# Patient Record
Sex: FEMALE | Race: WHITE | NOT HISPANIC OR LATINO | URBAN - METROPOLITAN AREA
[De-identification: names, ages, dates, MRNs, and addresses within clinical notes are randomized per-mention and may not be internally consistent; named-entity substitution may affect disease eponyms.]

---

## 2018-03-09 ENCOUNTER — EMERGENCY (EMERGENCY)
Facility: HOSPITAL | Age: 48
LOS: 1 days | Discharge: HOME | End: 2018-03-09
Attending: EMERGENCY MEDICINE | Admitting: EMERGENCY MEDICINE
Payer: MEDICARE

## 2018-03-09 VITALS
HEART RATE: 87 BPM | SYSTOLIC BLOOD PRESSURE: 141 MMHG | TEMPERATURE: 99 F | DIASTOLIC BLOOD PRESSURE: 76 MMHG | OXYGEN SATURATION: 98 % | RESPIRATION RATE: 16 BRPM

## 2018-03-09 LAB
BASOPHILS # BLD AUTO: 0.07 K/UL — SIGNIFICANT CHANGE UP (ref 0–0.2)
BASOPHILS NFR BLD AUTO: 0.6 % — SIGNIFICANT CHANGE UP (ref 0–2)
BUN SERPL-MCNC: 6 MG/DL — LOW (ref 7–23)
CALCIUM SERPL-MCNC: 8.8 MG/DL — SIGNIFICANT CHANGE UP (ref 8.4–10.5)
CHLORIDE SERPL-SCNC: 99 MMOL/L — SIGNIFICANT CHANGE UP (ref 98–107)
CO2 SERPL-SCNC: 24 MMOL/L — SIGNIFICANT CHANGE UP (ref 22–31)
CREAT SERPL-MCNC: 0.88 MG/DL — SIGNIFICANT CHANGE UP (ref 0.5–1.3)
EOSINOPHIL # BLD AUTO: 0.24 K/UL — SIGNIFICANT CHANGE UP (ref 0–0.5)
EOSINOPHIL NFR BLD AUTO: 1.9 % — SIGNIFICANT CHANGE UP (ref 0–6)
GLUCOSE SERPL-MCNC: 114 MG/DL — HIGH (ref 70–99)
HBA1C BLD-MCNC: 6.4 % — HIGH (ref 4–5.6)
HCT VFR BLD CALC: 41.4 % — SIGNIFICANT CHANGE UP (ref 34.5–45)
HGB BLD-MCNC: 12.6 G/DL — SIGNIFICANT CHANGE UP (ref 11.5–15.5)
IMM GRANULOCYTES # BLD AUTO: 0.1 # — SIGNIFICANT CHANGE UP
IMM GRANULOCYTES NFR BLD AUTO: 0.8 % — SIGNIFICANT CHANGE UP (ref 0–1.5)
LYMPHOCYTES # BLD AUTO: 1.65 K/UL — SIGNIFICANT CHANGE UP (ref 1–3.3)
LYMPHOCYTES # BLD AUTO: 13.2 % — SIGNIFICANT CHANGE UP (ref 13–44)
MCHC RBC-ENTMCNC: 27.5 PG — SIGNIFICANT CHANGE UP (ref 27–34)
MCHC RBC-ENTMCNC: 30.4 % — LOW (ref 32–36)
MCV RBC AUTO: 90.2 FL — SIGNIFICANT CHANGE UP (ref 80–100)
MONOCYTES # BLD AUTO: 1.14 K/UL — HIGH (ref 0–0.9)
MONOCYTES NFR BLD AUTO: 9.1 % — SIGNIFICANT CHANGE UP (ref 2–14)
NEUTROPHILS # BLD AUTO: 9.3 K/UL — HIGH (ref 1.8–7.4)
NEUTROPHILS NFR BLD AUTO: 74.4 % — SIGNIFICANT CHANGE UP (ref 43–77)
NRBC # FLD: 0 — SIGNIFICANT CHANGE UP
PLATELET # BLD AUTO: 338 K/UL — SIGNIFICANT CHANGE UP (ref 150–400)
PMV BLD: 10.1 FL — SIGNIFICANT CHANGE UP (ref 7–13)
POTASSIUM SERPL-MCNC: 4.4 MMOL/L — SIGNIFICANT CHANGE UP (ref 3.5–5.3)
POTASSIUM SERPL-SCNC: 4.4 MMOL/L — SIGNIFICANT CHANGE UP (ref 3.5–5.3)
RBC # BLD: 4.59 M/UL — SIGNIFICANT CHANGE UP (ref 3.8–5.2)
RBC # FLD: 18.5 % — HIGH (ref 10.3–14.5)
SODIUM SERPL-SCNC: 138 MMOL/L — SIGNIFICANT CHANGE UP (ref 135–145)
WBC # BLD: 12.5 K/UL — HIGH (ref 3.8–10.5)
WBC # FLD AUTO: 12.5 K/UL — HIGH (ref 3.8–10.5)

## 2018-03-09 PROCEDURE — 10060 I&D ABSCESS SIMPLE/SINGLE: CPT | Mod: GC

## 2018-03-09 PROCEDURE — 73130 X-RAY EXAM OF HAND: CPT | Mod: 26,LT

## 2018-03-09 PROCEDURE — 99218: CPT | Mod: 25,GC

## 2018-03-09 RX ORDER — KETOROLAC TROMETHAMINE 30 MG/ML
30 SYRINGE (ML) INJECTION ONCE
Qty: 0 | Refills: 0 | Status: DISCONTINUED | OUTPATIENT
Start: 2018-03-09 | End: 2018-03-09

## 2018-03-09 RX ORDER — AMPICILLIN SODIUM AND SULBACTAM SODIUM 250; 125 MG/ML; MG/ML
3 INJECTION, POWDER, FOR SUSPENSION INTRAMUSCULAR; INTRAVENOUS EVERY 6 HOURS
Qty: 0 | Refills: 0 | Status: DISCONTINUED | OUTPATIENT
Start: 2018-03-09 | End: 2018-03-13

## 2018-03-09 RX ORDER — OXYCODONE AND ACETAMINOPHEN 5; 325 MG/1; MG/1
1 TABLET ORAL ONCE
Qty: 0 | Refills: 0 | Status: DISCONTINUED | OUTPATIENT
Start: 2018-03-09 | End: 2018-03-09

## 2018-03-09 RX ORDER — SODIUM CHLORIDE 9 MG/ML
1000 INJECTION INTRAMUSCULAR; INTRAVENOUS; SUBCUTANEOUS ONCE
Qty: 0 | Refills: 0 | Status: COMPLETED | OUTPATIENT
Start: 2018-03-09 | End: 2018-03-09

## 2018-03-09 RX ORDER — TETANUS TOXOID, REDUCED DIPHTHERIA TOXOID AND ACELLULAR PERTUSSIS VACCINE, ADSORBED 5; 2.5; 8; 8; 2.5 [IU]/.5ML; [IU]/.5ML; UG/.5ML; UG/.5ML; UG/.5ML
0.5 SUSPENSION INTRAMUSCULAR ONCE
Qty: 0 | Refills: 0 | Status: COMPLETED | OUTPATIENT
Start: 2018-03-09 | End: 2018-03-09

## 2018-03-09 RX ORDER — ONDANSETRON 8 MG/1
4 TABLET, FILM COATED ORAL ONCE
Qty: 0 | Refills: 0 | Status: COMPLETED | OUTPATIENT
Start: 2018-03-09 | End: 2018-03-09

## 2018-03-09 RX ORDER — AMPICILLIN SODIUM AND SULBACTAM SODIUM 250; 125 MG/ML; MG/ML
3 INJECTION, POWDER, FOR SUSPENSION INTRAMUSCULAR; INTRAVENOUS ONCE
Qty: 0 | Refills: 0 | Status: COMPLETED | OUTPATIENT
Start: 2018-03-09 | End: 2018-03-09

## 2018-03-09 RX ADMIN — ONDANSETRON 4 MILLIGRAM(S): 8 TABLET, FILM COATED ORAL at 17:38

## 2018-03-09 RX ADMIN — AMPICILLIN SODIUM AND SULBACTAM SODIUM 200 GRAM(S): 250; 125 INJECTION, POWDER, FOR SUSPENSION INTRAMUSCULAR; INTRAVENOUS at 13:54

## 2018-03-09 RX ADMIN — OXYCODONE AND ACETAMINOPHEN 1 TABLET(S): 5; 325 TABLET ORAL at 13:24

## 2018-03-09 RX ADMIN — Medication 30 MILLIGRAM(S): at 18:30

## 2018-03-09 RX ADMIN — OXYCODONE AND ACETAMINOPHEN 1 TABLET(S): 5; 325 TABLET ORAL at 12:54

## 2018-03-09 RX ADMIN — AMPICILLIN SODIUM AND SULBACTAM SODIUM 200 GRAM(S): 250; 125 INJECTION, POWDER, FOR SUSPENSION INTRAMUSCULAR; INTRAVENOUS at 19:31

## 2018-03-09 RX ADMIN — TETANUS TOXOID, REDUCED DIPHTHERIA TOXOID AND ACELLULAR PERTUSSIS VACCINE, ADSORBED 0.5 MILLILITER(S): 5; 2.5; 8; 8; 2.5 SUSPENSION INTRAMUSCULAR at 14:33

## 2018-03-09 RX ADMIN — SODIUM CHLORIDE 1000 MILLILITER(S): 9 INJECTION INTRAMUSCULAR; INTRAVENOUS; SUBCUTANEOUS at 13:41

## 2018-03-09 RX ADMIN — Medication 30 MILLIGRAM(S): at 19:00

## 2018-03-09 NOTE — ED PROVIDER NOTE - OBJECTIVE STATEMENT
48 y/o female h/o anxiety, gastric bypass bibems with left hand pain and swelling after cat bite.  Per pt, she startled her housecat (shots utd) two days ago and got scratches to her left forearm and was bitten on the ulnar aspect of her left hand.  Since that time, worse pain, redness, and swelling.  Shaking chills this am, but no fever.  Denies ha, neck stiffness, cp, sob, cough, abd pain, n/v/d, dysuria, rash.

## 2018-03-09 NOTE — ED CDU PROVIDER INITIAL DAY NOTE - OBJECTIVE STATEMENT
48 yo F here for L hand swelling and erythema s/p cat bite. pt reports 2 nights ago she pulled blanket off cat and startled cat sustained two bites and several scratches to L hand/wrist. Did not take any meds. Unknown last tetanus. Cat is owned by patient is has been acting normally and is vaccinated. Denies fever chills vomiting diarrhea cp sob weakness numbness tingling

## 2018-03-09 NOTE — ED PROVIDER NOTE - MEDICAL DECISION MAKING DETAILS
46 y/o female s/p cat bite to hand with pain, redness, swelling.  Concern for puncture wounds leading to deep space infection.  Obtain cbc, bmp, xr hand to eval for fb, give ivf, iv abx, discuss with hand for recs, antic obs for continued antibiosis and reassessment in the am.

## 2018-03-09 NOTE — ED PROVIDER NOTE - MUSCULOSKELETAL MINIMAL EXAM
right hand:  dorsum is erythematous and swollen without fluctuance, scabbed over puncture wounds at ulnar aspect of dorsum and x2 puncture wounds at lat ulnar aspect, distal sensation intact, brisk cap refill <2 sec each digit, lmtd rom pip/dip/mcp/wrist d/t pain

## 2018-03-09 NOTE — ED CDU PROVIDER INITIAL DAY NOTE - PROGRESS NOTE DETAILS
Norberto: I am signing out to VESTA Case. Patient's mother is terminally ill and may pass away overnight. If this happens and she has to leave she can be discharged but the patient MUST return for her next dose of IV antibiotics. She should be aware she must return through the ED

## 2018-03-09 NOTE — ED CDU PROVIDER INITIAL DAY NOTE - ATTENDING CONTRIBUTION TO CARE
pt with pmhx htn, cat bite 2 days ago, came to ED due to worsening left hand swelling. cat is owned by the patient and has had all it's shots.. no fevers. exam, vs wnl, nad. dorsal left hand mild erythema, swelling and tenderness. multiple superficial forearm abrasions and one abrasion on dorsal left hand. seen by hand who rec opening up the hand wounds, irrigating and packing, which was done by Dr. Stockton in ED. erythema margins marked by Dr. Stockton. started on unasyn. pt admitted to CDU for IV abx and observation for worsening of cellulitis.

## 2018-03-09 NOTE — ED CDU PROVIDER INITIAL DAY NOTE - MEDICAL DECISION MAKING DETAILS
48 yo F here for L hand/wrist swelling and erythema s/p cat bite/scratch. Plan for CDU for IV abx, and monitoring. Hand consulted in ED and recc I&D and packing, will be performed by ED team.

## 2018-03-09 NOTE — PROGRESS NOTE ADULT - SUBJECTIVE AND OBJECTIVE BOX
Left dorsal hand cat bite 2 days ago  With progressive cellulitis swelling and pain  Asked to eval  Has scratch marks on forearm and puncture wounds dorsal hand and wrist    Recc:    Reopening irrigating and packing all bite wounds on dorsal hand and wrist  IV ABX  Elevation

## 2018-03-09 NOTE — ED CDU PROVIDER INITIAL DAY NOTE - PHYSICAL EXAMINATION
L hand/wrist: sensate intact, NVI, mild swelling, erythema to dorsum of hand extending over wrist joint. Able to ROM fingers and wrist however c/o pain with this movement. No discharge or bleeding.

## 2018-03-09 NOTE — ED PROVIDER NOTE - PSH
Status Post Gastric Bypass for Obesity    Status Post Laparoscopic Cholecystectomy    Tubal Ligation Status

## 2018-03-09 NOTE — ED ADULT NURSE NOTE - OBJECTIVE STATEMENT
Break RN: Pt rcvd in intake room 13, aox3, ambulatory, c/o cat bite from 2 nights ago, bites noted on the L hand, scratches on L arm, +pain and swelling, pt unable to move wrist and fingers without pain. Pt denies any other complaints, seen and eval by MD, 20G placed on R AC, labs sent, meds given as ordered, to be transferred to CDU.

## 2018-03-09 NOTE — ED PROVIDER NOTE - PMH
Anxiety    Chronic insomnia    Depression    ETOH abuse    HTN (hypertension)    PTSD (post-traumatic stress disorder)

## 2018-03-09 NOTE — ED CDU PROVIDER INITIAL DAY NOTE - CHPI ED SYMPTOMS NEG
no weakness/no decreased eating/drinking/no numbness/no vomiting/no dizziness/no nausea/no tingling/no fever/no chills

## 2018-03-10 VITALS
OXYGEN SATURATION: 99 % | TEMPERATURE: 98 F | RESPIRATION RATE: 16 BRPM | HEART RATE: 74 BPM | DIASTOLIC BLOOD PRESSURE: 92 MMHG | SYSTOLIC BLOOD PRESSURE: 160 MMHG

## 2018-03-10 LAB — HCG SERPL-ACNC: < 5 MIU/ML — SIGNIFICANT CHANGE UP

## 2018-03-10 PROCEDURE — 99217: CPT

## 2018-03-10 RX ORDER — ACETAMINOPHEN WITH CODEINE 300MG-30MG
1 TABLET ORAL
Qty: 1 | Refills: 0 | OUTPATIENT
Start: 2018-03-10

## 2018-03-10 RX ORDER — KETOROLAC TROMETHAMINE 30 MG/ML
30 SYRINGE (ML) INJECTION ONCE
Qty: 0 | Refills: 0 | Status: DISCONTINUED | OUTPATIENT
Start: 2018-03-10 | End: 2018-03-10

## 2018-03-10 RX ORDER — ACETAMINOPHEN 500 MG
650 TABLET ORAL ONCE
Qty: 0 | Refills: 0 | Status: COMPLETED | OUTPATIENT
Start: 2018-03-10 | End: 2018-03-10

## 2018-03-10 RX ADMIN — AMPICILLIN SODIUM AND SULBACTAM SODIUM 200 GRAM(S): 250; 125 INJECTION, POWDER, FOR SUSPENSION INTRAMUSCULAR; INTRAVENOUS at 01:23

## 2018-03-10 RX ADMIN — AMPICILLIN SODIUM AND SULBACTAM SODIUM 200 GRAM(S): 250; 125 INJECTION, POWDER, FOR SUSPENSION INTRAMUSCULAR; INTRAVENOUS at 07:04

## 2018-03-10 RX ADMIN — Medication 30 MILLIGRAM(S): at 02:15

## 2018-03-10 RX ADMIN — Medication 30 MILLIGRAM(S): at 01:38

## 2018-03-10 RX ADMIN — Medication 650 MILLIGRAM(S): at 07:25

## 2018-03-10 NOTE — ED CDU PROVIDER SUBSEQUENT DAY NOTE - ATTENDING CONTRIBUTION TO CARE
Seen by me yesterday. pt came to ED for left hand cellulitis due to cat bat. started on unasyn IV. this am, much improved edema and redness. no extension of cellulitis. seen by hand surgery yesterday and re-evaluated today. He cleared for discharge. will continue augmentin and f/u with him. rter for worsening cellulitis.

## 2018-03-10 NOTE — ED CDU PROVIDER SUBSEQUENT DAY NOTE - HISTORY
Pt is 46 y/o female, Rt hand dominant with PMhx of anxiety, depression, HTN, PTSD, Rt hand dominant her eval of cat bite/scratch to Lt hand 3 days ago. Pt states that she was trying to pull the blanket, didn't see the cat underneath, the cat got startled and bit/scratched the pt. Pt comes with pain and swelling to Lt hand, denies fever, chills, immunosuppressed state; cat is UTD with immunizations and pt is UTD with tdap. in CDU for IV abx; I&D performed by ER, no drainable collection.

## 2018-03-10 NOTE — PROGRESS NOTE ADULT - SUBJECTIVE AND OBJECTIVE BOX
Hand swelling and cellulitis improved  Recc :  Daily soaks BID with packing out  Pack wounds  Elevation  ABX

## 2018-03-10 NOTE — ED CDU PROVIDER SUBSEQUENT DAY NOTE - PHYSICAL EXAMINATION
Lt hand/forearm  - (+) edema noted to dorsum oh hand, dsg in situ, not removed as pt will be evaluated by plastics am, pt able to make good  (which is improvement), superficcial abrasions noted to volar aspect of forearm. 2+ dp, no streaking

## 2018-03-10 NOTE — ED CDU PROVIDER SUBSEQUENT DAY NOTE - PROGRESS NOTE DETAILS
Pt feeling better after CDU stay. Hand swelling and redness improved. Pt seen and eval by Hand this morning, pt stable for dc with hand f.u, ,will dc on augmentin.

## 2018-03-10 NOTE — ED CDU PROVIDER DISPOSITION NOTE - CLINICAL COURSE
pt presented to the ED for left hand cellulitis due to cat bite. received unasyn IV with significant improvement. cleared by hand surgery. will discharge with augmentin, rter if worsening or no improvement.   The patient was given verbal and written discharge instructions. Specifically, instructions when to return to the ED and when to seek follow-up from their pcp was discussed. Any specialty follow-up was discussed, including how to make an appointment.  Instructions were discussed in simple, plain language and was understood by the patient. The patient understands that should their symptoms worsen or any new symptoms arise, they should return to the ED immediately for further evaluation.

## 2018-07-26 ENCOUNTER — EMERGENCY (EMERGENCY)
Facility: HOSPITAL | Age: 48
LOS: 1 days | Discharge: HOME | End: 2018-07-26
Admitting: EMERGENCY MEDICINE
Payer: MEDICARE

## 2018-07-26 VITALS
DIASTOLIC BLOOD PRESSURE: 94 MMHG | OXYGEN SATURATION: 100 % | HEART RATE: 92 BPM | RESPIRATION RATE: 15 BRPM | TEMPERATURE: 99 F | SYSTOLIC BLOOD PRESSURE: 161 MMHG

## 2018-07-26 VITALS
DIASTOLIC BLOOD PRESSURE: 101 MMHG | TEMPERATURE: 99 F | SYSTOLIC BLOOD PRESSURE: 155 MMHG | HEART RATE: 98 BPM | RESPIRATION RATE: 16 BRPM | OXYGEN SATURATION: 100 %

## 2018-07-26 LAB
ALBUMIN SERPL ELPH-MCNC: 3.5 G/DL — SIGNIFICANT CHANGE UP (ref 3.3–5)
ALP SERPL-CCNC: 102 U/L — SIGNIFICANT CHANGE UP (ref 40–120)
ALT FLD-CCNC: 39 U/L — HIGH (ref 4–33)
APPEARANCE UR: SIGNIFICANT CHANGE UP
APTT BLD: 27.2 SEC — LOW (ref 27.5–37.4)
AST SERPL-CCNC: 33 U/L — HIGH (ref 4–32)
BACTERIA # UR AUTO: SIGNIFICANT CHANGE UP
BASOPHILS # BLD AUTO: 0.03 K/UL — SIGNIFICANT CHANGE UP (ref 0–0.2)
BASOPHILS NFR BLD AUTO: 0.3 % — SIGNIFICANT CHANGE UP (ref 0–2)
BILIRUB SERPL-MCNC: 0.7 MG/DL — SIGNIFICANT CHANGE UP (ref 0.2–1.2)
BILIRUB UR-MCNC: SIGNIFICANT CHANGE UP
BLOOD UR QL VISUAL: NEGATIVE — SIGNIFICANT CHANGE UP
BUN SERPL-MCNC: 9 MG/DL — SIGNIFICANT CHANGE UP (ref 7–23)
CALCIUM SERPL-MCNC: 8.7 MG/DL — SIGNIFICANT CHANGE UP (ref 8.4–10.5)
CHLORIDE SERPL-SCNC: 98 MMOL/L — SIGNIFICANT CHANGE UP (ref 98–107)
CO2 SERPL-SCNC: 22 MMOL/L — SIGNIFICANT CHANGE UP (ref 22–31)
COLOR SPEC: HIGH
CREAT SERPL-MCNC: 0.79 MG/DL — SIGNIFICANT CHANGE UP (ref 0.5–1.3)
EOSINOPHIL # BLD AUTO: 0.25 K/UL — SIGNIFICANT CHANGE UP (ref 0–0.5)
EOSINOPHIL NFR BLD AUTO: 2.3 % — SIGNIFICANT CHANGE UP (ref 0–6)
GLUCOSE SERPL-MCNC: 105 MG/DL — HIGH (ref 70–99)
GLUCOSE UR-MCNC: SIGNIFICANT CHANGE UP
GRAN CASTS # UR COMP ASSIST: SIGNIFICANT CHANGE UP
HCG SERPL-ACNC: < 5 MIU/ML — SIGNIFICANT CHANGE UP
HCT VFR BLD CALC: 35.9 % — SIGNIFICANT CHANGE UP (ref 34.5–45)
HGB BLD-MCNC: 12.1 G/DL — SIGNIFICANT CHANGE UP (ref 11.5–15.5)
HYALINE CASTS # UR AUTO: SIGNIFICANT CHANGE UP (ref 0–?)
IMM GRANULOCYTES # BLD AUTO: 0.04 # — SIGNIFICANT CHANGE UP
IMM GRANULOCYTES NFR BLD AUTO: 0.4 % — SIGNIFICANT CHANGE UP (ref 0–1.5)
INR BLD: 1.23 — HIGH (ref 0.88–1.17)
KETONES UR-MCNC: SIGNIFICANT CHANGE UP
LEUKOCYTE ESTERASE UR-ACNC: NEGATIVE — SIGNIFICANT CHANGE UP
LIDOCAIN IGE QN: 59.5 U/L — SIGNIFICANT CHANGE UP (ref 7–60)
LYMPHOCYTES # BLD AUTO: 1.07 K/UL — SIGNIFICANT CHANGE UP (ref 1–3.3)
LYMPHOCYTES # BLD AUTO: 9.7 % — LOW (ref 13–44)
MCHC RBC-ENTMCNC: 33.7 % — SIGNIFICANT CHANGE UP (ref 32–36)
MCHC RBC-ENTMCNC: 34.7 PG — HIGH (ref 27–34)
MCV RBC AUTO: 102.9 FL — HIGH (ref 80–100)
MONOCYTES # BLD AUTO: 0.66 K/UL — SIGNIFICANT CHANGE UP (ref 0–0.9)
MONOCYTES NFR BLD AUTO: 6 % — SIGNIFICANT CHANGE UP (ref 2–14)
MUCOUS THREADS # UR AUTO: SIGNIFICANT CHANGE UP
NEUTROPHILS # BLD AUTO: 8.95 K/UL — HIGH (ref 1.8–7.4)
NEUTROPHILS NFR BLD AUTO: 81.3 % — HIGH (ref 43–77)
NITRITE UR-MCNC: NEGATIVE — SIGNIFICANT CHANGE UP
NRBC # FLD: 0 — SIGNIFICANT CHANGE UP
PH UR: 6 — SIGNIFICANT CHANGE UP (ref 4.6–8)
PLATELET # BLD AUTO: 301 K/UL — SIGNIFICANT CHANGE UP (ref 150–400)
PMV BLD: 10.5 FL — SIGNIFICANT CHANGE UP (ref 7–13)
POTASSIUM SERPL-MCNC: 3.7 MMOL/L — SIGNIFICANT CHANGE UP (ref 3.5–5.3)
POTASSIUM SERPL-SCNC: 3.7 MMOL/L — SIGNIFICANT CHANGE UP (ref 3.5–5.3)
PROT SERPL-MCNC: 6.4 G/DL — SIGNIFICANT CHANGE UP (ref 6–8.3)
PROT UR-MCNC: 100 MG/DL — HIGH
PROTHROM AB SERPL-ACNC: 13.7 SEC — HIGH (ref 9.8–13.1)
RBC # BLD: 3.49 M/UL — LOW (ref 3.8–5.2)
RBC # FLD: 26.5 % — HIGH (ref 10.3–14.5)
SODIUM SERPL-SCNC: 136 MMOL/L — SIGNIFICANT CHANGE UP (ref 135–145)
SP GR SPEC: 1.03 — SIGNIFICANT CHANGE UP (ref 1–1.04)
SQUAMOUS # UR AUTO: SIGNIFICANT CHANGE UP
UROBILINOGEN FLD QL: 4 MG/DL — HIGH
WBC # BLD: 11 K/UL — HIGH (ref 3.8–10.5)
WBC # FLD AUTO: 11 K/UL — HIGH (ref 3.8–10.5)
WBC UR QL: HIGH (ref 0–?)

## 2018-07-26 PROCEDURE — 74177 CT ABD & PELVIS W/CONTRAST: CPT | Mod: 26

## 2018-07-26 PROCEDURE — 99284 EMERGENCY DEPT VISIT MOD MDM: CPT

## 2018-07-26 RX ORDER — KETOROLAC TROMETHAMINE 30 MG/ML
15 SYRINGE (ML) INJECTION ONCE
Qty: 0 | Refills: 0 | Status: DISCONTINUED | OUTPATIENT
Start: 2018-07-26 | End: 2018-07-26

## 2018-07-26 RX ORDER — FAMOTIDINE 10 MG/ML
20 INJECTION INTRAVENOUS ONCE
Qty: 0 | Refills: 0 | Status: COMPLETED | OUTPATIENT
Start: 2018-07-26 | End: 2018-07-26

## 2018-07-26 RX ORDER — ONDANSETRON 8 MG/1
4 TABLET, FILM COATED ORAL ONCE
Qty: 0 | Refills: 0 | Status: COMPLETED | OUTPATIENT
Start: 2018-07-26 | End: 2018-07-26

## 2018-07-26 RX ORDER — SODIUM CHLORIDE 9 MG/ML
1000 INJECTION INTRAMUSCULAR; INTRAVENOUS; SUBCUTANEOUS ONCE
Qty: 0 | Refills: 0 | Status: COMPLETED | OUTPATIENT
Start: 2018-07-26 | End: 2018-07-26

## 2018-07-26 RX ADMIN — Medication 15 MILLIGRAM(S): at 20:43

## 2018-07-26 RX ADMIN — FAMOTIDINE 20 MILLIGRAM(S): 10 INJECTION INTRAVENOUS at 20:52

## 2018-07-26 RX ADMIN — SODIUM CHLORIDE 1000 MILLILITER(S): 9 INJECTION INTRAMUSCULAR; INTRAVENOUS; SUBCUTANEOUS at 20:43

## 2018-07-26 RX ADMIN — Medication 30 MILLILITER(S): at 20:52

## 2018-07-26 RX ADMIN — ONDANSETRON 4 MILLIGRAM(S): 8 TABLET, FILM COATED ORAL at 20:43

## 2018-07-26 RX ADMIN — SODIUM CHLORIDE 1000 MILLILITER(S): 9 INJECTION INTRAMUSCULAR; INTRAVENOUS; SUBCUTANEOUS at 22:00

## 2018-07-26 NOTE — ED PROVIDER NOTE - PROGRESS NOTE DETAILS
PA CHERELLE: Patient laying comfortably in bed NAD, No complaints. Will continue to monitor. WBC 11, UA neg, lipase wnl, Pending CT read. VESTA Mac: pt signed out to me from VESTA Johnson, pt doing well, labs reviewed, CT negative for acute pathology, results d/w patient and will fu with PCP regarding hepatomegaly. Pt abdomen is soft nt nd. Denies any abdominal pain in ED. Tolerating PO. Pt is going to follow with her surgeon out patient given resolution of symptoms and normal CT.

## 2018-07-26 NOTE — ED PROVIDER NOTE - OBJECTIVE STATEMENT
46 yo F, active smoker with PMH of gastric bypass 2004, cholecystectomy 2002, exploratory lap for SBO 8 yrs ago, HTN, BIB daughter to ER c/o epigastric pain associate with n/v/d x2 weeks; nonspecific right leg numbness x2-3 days. Pt states epigastric pain, sharp and squeezing, 9/10, radiating to the right, worse with solid and fluid intake. Reports nausea throughout the day, with intermittent vomiting with foam and jigna, nonbloody. States having diarrhea, watery, nonbloody, x3 today, x6 yesterday. Admits taking Zantac and tylenol with some relief. Admits drinking beers x2 daily to help her sleep. Denies eating outside food, recent abx, recent hospitalization, or recent travel. Reports having right thigh numbness, constant for 2-3 days, w/ chronic lower back pain, no radiculopathy, no weakness, no injury. Also, admits stating sob at triage, states feeling sob x2 years, contribute to her obesity, no chest pain, no diaphoresis, no leg swelling, no calf tenderness, no injury/trauma, no hx of PE/DVT. Denies any fever, chills, dizziness, headache, neck pain, chest pain, back pain, dysuria, or any other complaints.

## 2018-07-26 NOTE — ED ADULT NURSE NOTE - OBJECTIVE STATEMENT
Pt received in intake spot 4, A&OX3, NAD.  c/o upper abdominal pain intermittent for the past 3 weeks with N/V/D.  Pt denies any bloody emesis or stool.  Denies any urinary symptoms.  Pt appears comfortable.  Labs sent.  Medicated as per MD orders.  Will continue to monitor.

## 2018-07-26 NOTE — ED PROVIDER NOTE - MEDICAL DECISION MAKING DETAILS
48 yo F, active smoker with PMH of gastric bypass 2004, cholecystectomy 2002, exploratory lap for SBO 8 yrs ago, HTN, BIB daughter to ER c/o epigastric pain associate with n/v/d x2 weeks; nonspecific right leg numbness x2-3 days.   -Plan: labs, Ua, CT A/P to r/o acute intra abd pathology, give IVF, pain control, and reassess.

## 2018-07-27 RX ADMIN — Medication 30 MILLILITER(S): at 01:10

## 2018-07-27 RX ADMIN — Medication 15 MILLIGRAM(S): at 00:00

## 2018-09-25 PROCEDURE — 90834 PSYTX W PT 45 MINUTES: CPT

## 2018-11-15 ENCOUNTER — OUTPATIENT (OUTPATIENT)
Dept: OUTPATIENT SERVICES | Facility: HOSPITAL | Age: 48
LOS: 1 days | Discharge: HOME | End: 2018-11-15
Payer: MEDICARE

## 2018-11-15 LAB
ALBUMIN SERPL ELPH-MCNC: 3.8 G/DL — SIGNIFICANT CHANGE UP (ref 3.3–5)
ALP SERPL-CCNC: 104 U/L — SIGNIFICANT CHANGE UP (ref 40–120)
ALT FLD-CCNC: 18 U/L — SIGNIFICANT CHANGE UP (ref 4–33)
APPEARANCE UR: SIGNIFICANT CHANGE UP
AST SERPL-CCNC: 26 U/L — SIGNIFICANT CHANGE UP (ref 4–32)
BACTERIA # UR AUTO: SIGNIFICANT CHANGE UP
BASOPHILS # BLD AUTO: 0.06 K/UL — SIGNIFICANT CHANGE UP (ref 0–0.2)
BASOPHILS NFR BLD AUTO: 0.5 % — SIGNIFICANT CHANGE UP (ref 0–2)
BILIRUB SERPL-MCNC: 0.4 MG/DL — SIGNIFICANT CHANGE UP (ref 0.2–1.2)
BILIRUB UR-MCNC: SIGNIFICANT CHANGE UP
BLOOD UR QL VISUAL: NEGATIVE — SIGNIFICANT CHANGE UP
BUN SERPL-MCNC: 8 MG/DL — SIGNIFICANT CHANGE UP (ref 7–23)
CALCIUM SERPL-MCNC: 9.4 MG/DL — SIGNIFICANT CHANGE UP (ref 8.4–10.5)
CHLORIDE SERPL-SCNC: 103 MMOL/L — SIGNIFICANT CHANGE UP (ref 98–107)
CHOLEST SERPL-MCNC: 139 MG/DL — SIGNIFICANT CHANGE UP (ref 120–199)
CO2 SERPL-SCNC: 22 MMOL/L — SIGNIFICANT CHANGE UP (ref 22–31)
COLOR SPEC: SIGNIFICANT CHANGE UP
CREAT SERPL-MCNC: 0.82 MG/DL — SIGNIFICANT CHANGE UP (ref 0.5–1.3)
EOSINOPHIL # BLD AUTO: 0.26 K/UL — SIGNIFICANT CHANGE UP (ref 0–0.5)
EOSINOPHIL NFR BLD AUTO: 2.2 % — SIGNIFICANT CHANGE UP (ref 0–6)
FERRITIN SERPL-MCNC: 36.05 NG/ML — SIGNIFICANT CHANGE UP (ref 15–150)
FOLATE SERPL-MCNC: 2.6 NG/ML — LOW (ref 4.7–20)
GLUCOSE SERPL-MCNC: 98 MG/DL — SIGNIFICANT CHANGE UP (ref 70–99)
GLUCOSE UR-MCNC: NEGATIVE — SIGNIFICANT CHANGE UP
HCT VFR BLD CALC: 45.2 % — HIGH (ref 34.5–45)
HDLC SERPL-MCNC: 44 MG/DL — LOW (ref 45–65)
HGB BLD-MCNC: 14.4 G/DL — SIGNIFICANT CHANGE UP (ref 11.5–15.5)
HYALINE CASTS # UR AUTO: HIGH
IMM GRANULOCYTES # BLD AUTO: 0.04 # — SIGNIFICANT CHANGE UP
IMM GRANULOCYTES NFR BLD AUTO: 0.3 % — SIGNIFICANT CHANGE UP (ref 0–1.5)
IRON SATN MFR SERPL: 58 UG/DL — SIGNIFICANT CHANGE UP (ref 30–160)
KETONES UR-MCNC: SIGNIFICANT CHANGE UP
LEUKOCYTE ESTERASE UR-ACNC: SIGNIFICANT CHANGE UP
LIPID PNL WITH DIRECT LDL SERPL: 74 MG/DL — SIGNIFICANT CHANGE UP
LYMPHOCYTES # BLD AUTO: 1.44 K/UL — SIGNIFICANT CHANGE UP (ref 1–3.3)
LYMPHOCYTES # BLD AUTO: 12.2 % — LOW (ref 13–44)
MCHC RBC-ENTMCNC: 30.5 PG — SIGNIFICANT CHANGE UP (ref 27–34)
MCHC RBC-ENTMCNC: 31.9 % — LOW (ref 32–36)
MCV RBC AUTO: 95.8 FL — SIGNIFICANT CHANGE UP (ref 80–100)
MONOCYTES # BLD AUTO: 0.85 K/UL — SIGNIFICANT CHANGE UP (ref 0–0.9)
MONOCYTES NFR BLD AUTO: 7.2 % — SIGNIFICANT CHANGE UP (ref 2–14)
NEUTROPHILS # BLD AUTO: 9.14 K/UL — HIGH (ref 1.8–7.4)
NEUTROPHILS NFR BLD AUTO: 77.6 % — HIGH (ref 43–77)
NITRITE UR-MCNC: NEGATIVE — SIGNIFICANT CHANGE UP
NRBC # FLD: 0 — SIGNIFICANT CHANGE UP
PH UR: 5.5 — SIGNIFICANT CHANGE UP (ref 5–8)
PLATELET # BLD AUTO: 367 K/UL — SIGNIFICANT CHANGE UP (ref 150–400)
PMV BLD: 12.1 FL — SIGNIFICANT CHANGE UP (ref 7–13)
POTASSIUM SERPL-MCNC: 4.4 MMOL/L — SIGNIFICANT CHANGE UP (ref 3.5–5.3)
POTASSIUM SERPL-SCNC: 4.4 MMOL/L — SIGNIFICANT CHANGE UP (ref 3.5–5.3)
PROT SERPL-MCNC: 6.4 G/DL — SIGNIFICANT CHANGE UP (ref 6–8.3)
PROT UR-MCNC: 30 — SIGNIFICANT CHANGE UP
RBC # BLD: 4.72 M/UL — SIGNIFICANT CHANGE UP (ref 3.8–5.2)
RBC # FLD: 17.8 % — HIGH (ref 10.3–14.5)
RBC CASTS # UR COMP ASSIST: SIGNIFICANT CHANGE UP (ref 0–?)
SODIUM SERPL-SCNC: 142 MMOL/L — SIGNIFICANT CHANGE UP (ref 135–145)
SP GR SPEC: 1.03 — SIGNIFICANT CHANGE UP (ref 1–1.04)
SQUAMOUS # UR AUTO: SIGNIFICANT CHANGE UP
TRIGL SERPL-MCNC: 151 MG/DL — HIGH (ref 10–149)
TSH SERPL-MCNC: 1.33 UIU/ML — SIGNIFICANT CHANGE UP (ref 0.27–4.2)
UROBILINOGEN FLD QL: HIGH
VIT B12 SERPL-MCNC: 269 PG/ML — SIGNIFICANT CHANGE UP (ref 200–900)
WBC # BLD: 11.79 K/UL — HIGH (ref 3.8–10.5)
WBC # FLD AUTO: 11.79 K/UL — HIGH (ref 3.8–10.5)
WBC UR QL: HIGH (ref 0–?)

## 2018-11-15 PROCEDURE — 93010 ELECTROCARDIOGRAM REPORT: CPT

## 2018-11-15 PROCEDURE — 99204 OFFICE O/P NEW MOD 45 MIN: CPT

## 2018-12-04 DIAGNOSIS — G43.909 MIGRAINE, UNSPECIFIED, NOT INTRACTABLE, WITHOUT STATUS MIGRAINOSUS: ICD-10-CM

## 2018-12-04 DIAGNOSIS — F32.9 MAJOR DEPRESSIVE DISORDER, SINGLE EPISODE, UNSPECIFIED: ICD-10-CM

## 2019-01-07 ENCOUNTER — APPOINTMENT (OUTPATIENT)
Dept: GASTROENTEROLOGY | Facility: CLINIC | Age: 49
End: 2019-01-07
Payer: MEDICARE

## 2019-01-07 VITALS
WEIGHT: 213 LBS | TEMPERATURE: 98.7 F | HEIGHT: 64 IN | SYSTOLIC BLOOD PRESSURE: 135 MMHG | BODY MASS INDEX: 36.37 KG/M2 | HEART RATE: 94 BPM | OXYGEN SATURATION: 98 % | DIASTOLIC BLOOD PRESSURE: 85 MMHG

## 2019-01-07 DIAGNOSIS — E16.2 HYPOGLYCEMIA, UNSPECIFIED: ICD-10-CM

## 2019-01-07 DIAGNOSIS — Z86.39 PERSONAL HISTORY OF OTHER ENDOCRINE, NUTRITIONAL AND METABOLIC DISEASE: ICD-10-CM

## 2019-01-07 DIAGNOSIS — R12 HEARTBURN: ICD-10-CM

## 2019-01-07 DIAGNOSIS — R63.4 ABNORMAL WEIGHT LOSS: ICD-10-CM

## 2019-01-07 DIAGNOSIS — R13.10 DYSPHAGIA, UNSPECIFIED: ICD-10-CM

## 2019-01-07 PROCEDURE — 99204 OFFICE O/P NEW MOD 45 MIN: CPT

## 2019-01-07 RX ORDER — FOLIC ACID 20 MG
CAPSULE ORAL
Refills: 0 | Status: ACTIVE | COMMUNITY

## 2019-01-07 RX ORDER — HYDROXYZINE HYDROCHLORIDE 25 MG/1
25 TABLET ORAL
Refills: 0 | Status: ACTIVE | COMMUNITY

## 2019-01-07 NOTE — HISTORY OF PRESENT ILLNESS
[FreeTextEntry1] : Patient is a 48-year-old female with history of gastric bypass performed in 2004. She had some sort of a revision for possible internal hernia in 2012. In July patient had severe heartburn and reflux symptoms with vomiting and some diarrhea. She took Pepcid and Zantac with some relief of her symptoms. She was seen in the emergency room and underwent a CAT scan.\par She now has been complaining of difficulty eating. She is not even able to take some oral amounts of food without having difficulty in swallowing. She has lost about 70 pounds over the past 6 months which has been unintentional.\par Over the past 24 hours, she has had 2 episodes of vomiting.\par Her bowel movements are regular except for occasional diarrhea.\par \par Stopped her anti anxiety medication 8 months ago.

## 2019-01-07 NOTE — ASSESSMENT
[FreeTextEntry1] : Patient with history of gastric bypass surgery for morbid obesity in 2004. 6 months ago she noted severe bouts of heartburn. She had taken some H2 blockers with some relief. She now complains of episodes of nausea and vomiting, early satiety, dysphagia, an unintentional weight loss of about 70 pounds in 6 months.\par The symptoms may be due to stricture or gastritis. She also stopped taking her anti anxiety medications about 8 months ago.

## 2019-01-09 ENCOUNTER — RESULT REVIEW (OUTPATIENT)
Age: 49
End: 2019-01-09

## 2019-01-09 ENCOUNTER — OTHER (OUTPATIENT)
Age: 49
End: 2019-01-09

## 2019-01-09 ENCOUNTER — APPOINTMENT (OUTPATIENT)
Dept: GASTROENTEROLOGY | Facility: HOSPITAL | Age: 49
End: 2019-01-09
Payer: MEDICARE

## 2019-01-09 ENCOUNTER — OUTPATIENT (OUTPATIENT)
Dept: OUTPATIENT SERVICES | Facility: HOSPITAL | Age: 49
LOS: 1 days | Discharge: HOME | End: 2019-01-09
Payer: MEDICARE

## 2019-01-09 DIAGNOSIS — K21.9 GASTRO-ESOPHAGEAL REFLUX DISEASE WITHOUT ESOPHAGITIS: ICD-10-CM

## 2019-01-09 LAB — GLUCOSE BLDC GLUCOMTR-MCNC: 124 MG/DL — HIGH (ref 70–99)

## 2019-01-09 PROCEDURE — 88312 SPECIAL STAINS GROUP 1: CPT | Mod: 26

## 2019-01-09 PROCEDURE — 43239 EGD BIOPSY SINGLE/MULTIPLE: CPT

## 2019-01-09 PROCEDURE — 88305 TISSUE EXAM BY PATHOLOGIST: CPT | Mod: 26

## 2019-01-11 LAB — SURGICAL PATHOLOGY STUDY: SIGNIFICANT CHANGE UP

## 2019-01-22 ENCOUNTER — RX RENEWAL (OUTPATIENT)
Age: 49
End: 2019-01-22

## 2019-01-22 RX ORDER — DEXLANSOPRAZOLE 60 MG/1
60 CAPSULE, DELAYED RELEASE ORAL DAILY
Qty: 30 | Refills: 3 | Status: ACTIVE | COMMUNITY
Start: 2019-01-07 | End: 1900-01-01

## 2019-01-28 ENCOUNTER — APPOINTMENT (OUTPATIENT)
Dept: GASTROENTEROLOGY | Facility: CLINIC | Age: 49
End: 2019-01-28
Payer: MEDICARE

## 2019-01-28 VITALS
SYSTOLIC BLOOD PRESSURE: 131 MMHG | DIASTOLIC BLOOD PRESSURE: 80 MMHG | HEART RATE: 78 BPM | BODY MASS INDEX: 35.68 KG/M2 | OXYGEN SATURATION: 98 % | WEIGHT: 209 LBS | HEIGHT: 64 IN | TEMPERATURE: 98.2 F

## 2019-01-28 DIAGNOSIS — Z09 ENCOUNTER FOR FOLLOW-UP EXAMINATION AFTER COMPLETED TREATMENT FOR CONDITIONS OTHER THAN MALIGNANT NEOPLASM: ICD-10-CM

## 2019-01-28 DIAGNOSIS — E11.29 TYPE 2 DIABETES MELLITUS WITH OTHER DIABETIC KIDNEY COMPLICATION: ICD-10-CM

## 2019-01-28 DIAGNOSIS — Z78.9 OTHER SPECIFIED HEALTH STATUS: ICD-10-CM

## 2019-01-28 DIAGNOSIS — K28.9 GASTROJEJUNAL ULCER, UNSPECIFIED AS ACUTE OR CHRONIC, W/OUT HEMORRHAGE OR PERFORATION: ICD-10-CM

## 2019-01-28 DIAGNOSIS — R68.81 EARLY SATIETY: ICD-10-CM

## 2019-01-28 DIAGNOSIS — Z80.1 FAMILY HISTORY OF MALIGNANT NEOPLASM OF TRACHEA, BRONCHUS AND LUNG: ICD-10-CM

## 2019-01-28 DIAGNOSIS — Z63.5 DISRUPTION OF FAMILY BY SEPARATION AND DIVORCE: ICD-10-CM

## 2019-01-28 DIAGNOSIS — R11.2 NAUSEA WITH VOMITING, UNSPECIFIED: ICD-10-CM

## 2019-01-28 DIAGNOSIS — Z87.19 PERSONAL HISTORY OF OTHER DISEASES OF THE DIGESTIVE SYSTEM: ICD-10-CM

## 2019-01-28 DIAGNOSIS — F17.200 NICOTINE DEPENDENCE, UNSPECIFIED, UNCOMPLICATED: ICD-10-CM

## 2019-01-28 PROCEDURE — 99214 OFFICE O/P EST MOD 30 MIN: CPT

## 2019-01-28 RX ORDER — FAMOTIDINE 40 MG/1
40 TABLET, FILM COATED ORAL
Qty: 30 | Refills: 3 | Status: ACTIVE | COMMUNITY
Start: 2019-01-28 | End: 1900-01-01

## 2019-01-28 RX ORDER — SUCRALFATE 1 G/1
1 TABLET ORAL 3 TIMES DAILY
Qty: 90 | Refills: 1 | Status: ACTIVE | COMMUNITY
Start: 2019-01-28 | End: 1900-01-01

## 2019-01-28 RX ORDER — OMEPRAZOLE 40 MG/1
40 CAPSULE, DELAYED RELEASE ORAL
Qty: 30 | Refills: 3 | Status: ACTIVE | COMMUNITY
Start: 2019-01-28 | End: 1900-01-01

## 2019-01-28 SDOH — SOCIAL STABILITY - SOCIAL INSECURITY: DISRUPTION OF FAMILY BY SEPARATION AND DIVORCE: Z63.5

## 2019-01-28 NOTE — HISTORY OF PRESENT ILLNESS
[FreeTextEntry1] : Patient with history of gastric bypass. She had an upper endoscopy that revealed a large marginal ulcer at the gastrojejunal anastomosis on the gastric side. She is off Dexilant with worsening of her symptoms. She still has occasional vomiting, abdominal pain, and early satiety.\par Biopsies revealed a benign ulcer. H. pylori was negative

## 2019-01-28 NOTE — REVIEW OF SYSTEMS
[As Noted in HPI] : as noted in HPI [Arthralgias] : arthralgias [Joint Pain] : joint pain [Limb Weakness] : limb weakness [Difficulty Walking] : difficulty walking [Negative] : Endocrine [FreeTextEntry2] : Overweight

## 2019-01-28 NOTE — PHYSICAL EXAM
[General Appearance - Alert] : alert [General Appearance - In No Acute Distress] : in no acute distress [Sclera] : the sclera and conjunctiva were normal [PERRL With Normal Accommodation] : pupils were equal in size, round, and reactive to light [Extraocular Movements] : extraocular movements were intact [Outer Ear] : the ears and nose were normal in appearance [Oropharynx] : the oropharynx was normal [Neck Appearance] : the appearance of the neck was normal [Neck Cervical Mass (___cm)] : no neck mass was observed [Jugular Venous Distention Increased] : there was no jugular-venous distention [Thyroid Diffuse Enlargement] : the thyroid was not enlarged [Thyroid Nodule] : there were no palpable thyroid nodules [Auscultation Breath Sounds / Voice Sounds] : lungs were clear to auscultation bilaterally [Heart Rate And Rhythm] : heart rate was normal and rhythm regular [Heart Sounds] : normal S1 and S2 [Heart Sounds Gallop] : no gallops [Murmurs] : no murmurs [Heart Sounds Pericardial Friction Rub] : no pericardial rub [Bowel Sounds] : normal bowel sounds [Abdomen Soft] : soft [Abdomen Tenderness] : non-tender [] : no hepato-splenomegaly [Abdomen Mass (___ Cm)] : no abdominal mass palpated [No CVA Tenderness] : no ~M costovertebral angle tenderness [No Spinal Tenderness] : no spinal tenderness [FreeTextEntry1] : Difficulty walking [Oriented To Time, Place, And Person] : oriented to person, place, and time [Impaired Insight] : insight and judgment were intact [Affect] : the affect was normal

## 2019-01-28 NOTE — ASSESSMENT
[FreeTextEntry1] : Patient with marginal ulcer. She will continue her PPI, an H2 blocker, and Carafate t.i.d. It symptoms persist, a trial of metoclopramide 10 mg Q. p.m. will be given. She may have a component of gastroparesis.

## 2019-02-06 NOTE — ED ADULT TRIAGE NOTE - TEMPERATURE IN CELSIUS (DEGREES C)
February 6, 2019     Patient: Elliot Mclaughlin   YOB: 2013   Date of Visit: 2/6/2019       To Whom it May Concern:    Elliot Mclaughlin was seen in my clinic on 2/6/2019 at 10:00 am. Please excuse Elliot for his absence from school on this day to make the appointment.    If you have any questions or concerns, please don't hesitate to call.      Sincerely,         Zahira Cantu CNP        CC: No Recipients    
37.3

## 2019-03-12 PROCEDURE — 90853 GROUP PSYCHOTHERAPY: CPT

## 2019-03-19 LAB
ALBUMIN SERPL ELPH-MCNC: 3.4 G/DL — SIGNIFICANT CHANGE UP (ref 3.3–5)
ALP SERPL-CCNC: 95 U/L — SIGNIFICANT CHANGE UP (ref 40–120)
ALT FLD-CCNC: 21 U/L — SIGNIFICANT CHANGE UP (ref 4–33)
ANION GAP SERPL CALC-SCNC: 12 MMO/L — SIGNIFICANT CHANGE UP (ref 7–14)
AST SERPL-CCNC: 27 U/L — SIGNIFICANT CHANGE UP (ref 4–32)
BILIRUB SERPL-MCNC: 0.4 MG/DL — SIGNIFICANT CHANGE UP (ref 0.2–1.2)
BUN SERPL-MCNC: 12 MG/DL — SIGNIFICANT CHANGE UP (ref 7–23)
CALCIUM SERPL-MCNC: 9.5 MG/DL — SIGNIFICANT CHANGE UP (ref 8.4–10.5)
CHLORIDE SERPL-SCNC: 105 MMOL/L — SIGNIFICANT CHANGE UP (ref 98–107)
CHOLEST SERPL-MCNC: 146.6 MG/DL — SIGNIFICANT CHANGE UP (ref 120–199)
CO2 SERPL-SCNC: 22 MMOL/L — SIGNIFICANT CHANGE UP (ref 22–31)
CREAT SERPL-MCNC: 0.71 MG/DL — SIGNIFICANT CHANGE UP (ref 0.5–1.3)
FOLATE SERPL-MCNC: > 20 NG/ML — HIGH (ref 4.7–20)
GLUCOSE SERPL-MCNC: 100 MG/DL — HIGH (ref 70–99)
HCT VFR BLD CALC: 42.5 % — SIGNIFICANT CHANGE UP (ref 34.5–45)
HDLC SERPL-MCNC: 54 MG/DL — SIGNIFICANT CHANGE UP (ref 45–65)
HGB BLD-MCNC: 13.3 G/DL — SIGNIFICANT CHANGE UP (ref 11.5–15.5)
LIPID PNL WITH DIRECT LDL SERPL: 66 MG/DL — SIGNIFICANT CHANGE UP
MCHC RBC-ENTMCNC: 30.1 PG — SIGNIFICANT CHANGE UP (ref 27–34)
MCHC RBC-ENTMCNC: 31.3 % — LOW (ref 32–36)
MCV RBC AUTO: 96.2 FL — SIGNIFICANT CHANGE UP (ref 80–100)
NRBC # FLD: 0 K/UL — LOW (ref 25–125)
PLATELET # BLD AUTO: 301 K/UL — SIGNIFICANT CHANGE UP (ref 150–400)
PMV BLD: 11.8 FL — SIGNIFICANT CHANGE UP (ref 7–13)
POTASSIUM SERPL-MCNC: 4.4 MMOL/L — SIGNIFICANT CHANGE UP (ref 3.5–5.3)
POTASSIUM SERPL-SCNC: 4.4 MMOL/L — SIGNIFICANT CHANGE UP (ref 3.5–5.3)
PROT SERPL-MCNC: 6.1 G/DL — SIGNIFICANT CHANGE UP (ref 6–8.3)
RBC # BLD: 4.42 M/UL — SIGNIFICANT CHANGE UP (ref 3.8–5.2)
RBC # FLD: 14.2 % — SIGNIFICANT CHANGE UP (ref 10.3–14.5)
SODIUM SERPL-SCNC: 139 MMOL/L — SIGNIFICANT CHANGE UP (ref 135–145)
TRIGL SERPL-MCNC: 130 MG/DL — SIGNIFICANT CHANGE UP (ref 10–149)
VIT B12 SERPL-MCNC: 319 PG/ML — SIGNIFICANT CHANGE UP (ref 200–900)
WBC # BLD: 8.27 K/UL — SIGNIFICANT CHANGE UP (ref 3.8–10.5)
WBC # FLD AUTO: 8.27 K/UL — SIGNIFICANT CHANGE UP (ref 3.8–10.5)

## 2019-03-19 PROCEDURE — 99214 OFFICE O/P EST MOD 30 MIN: CPT

## 2019-05-23 PROCEDURE — 90853 GROUP PSYCHOTHERAPY: CPT

## 2019-05-28 PROCEDURE — 90853 GROUP PSYCHOTHERAPY: CPT

## 2019-09-02 PROBLEM — Z09 FOLLOW UP: Status: ACTIVE | Noted: 2019-01-28

## 2019-12-24 ENCOUNTER — INPATIENT (INPATIENT)
Facility: HOSPITAL | Age: 49
LOS: 5 days | Discharge: ANOTHER HOSPITAL | DRG: 949 | End: 2019-12-30
Attending: PHYSICAL MEDICINE & REHABILITATION | Admitting: PHYSICAL MEDICINE & REHABILITATION
Payer: MEDICARE

## 2019-12-24 VITALS
RESPIRATION RATE: 16 BRPM | DIASTOLIC BLOOD PRESSURE: 70 MMHG | SYSTOLIC BLOOD PRESSURE: 134 MMHG | WEIGHT: 201.28 LBS | OXYGEN SATURATION: 98 % | HEART RATE: 76 BPM | HEIGHT: 64 IN | TEMPERATURE: 98 F

## 2019-12-24 DIAGNOSIS — I69.354 HEMIPLEGIA AND HEMIPARESIS FOLLOWING CEREBRAL INFARCTION AFFECTING LEFT NON-DOMINANT SIDE: ICD-10-CM

## 2019-12-24 DIAGNOSIS — G62.9 POLYNEUROPATHY, UNSPECIFIED: ICD-10-CM

## 2019-12-24 DIAGNOSIS — K21.9 GASTRO-ESOPHAGEAL REFLUX DISEASE WITHOUT ESOPHAGITIS: ICD-10-CM

## 2019-12-24 DIAGNOSIS — Z51.89 ENCOUNTER FOR OTHER SPECIFIED AFTERCARE: ICD-10-CM

## 2019-12-24 DIAGNOSIS — M79.671 PAIN IN RIGHT FOOT: ICD-10-CM

## 2019-12-24 DIAGNOSIS — R20.0 ANESTHESIA OF SKIN: ICD-10-CM

## 2019-12-24 DIAGNOSIS — R53.1 WEAKNESS: ICD-10-CM

## 2019-12-24 DIAGNOSIS — R21 RASH AND OTHER NONSPECIFIC SKIN ERUPTION: ICD-10-CM

## 2019-12-24 DIAGNOSIS — F41.9 ANXIETY DISORDER, UNSPECIFIED: ICD-10-CM

## 2019-12-24 DIAGNOSIS — F43.10 POST-TRAUMATIC STRESS DISORDER, UNSPECIFIED: ICD-10-CM

## 2019-12-24 DIAGNOSIS — F10.10 ALCOHOL ABUSE, UNCOMPLICATED: ICD-10-CM

## 2019-12-24 DIAGNOSIS — Z98.84 BARIATRIC SURGERY STATUS: ICD-10-CM

## 2019-12-24 DIAGNOSIS — F17.210 NICOTINE DEPENDENCE, CIGARETTES, UNCOMPLICATED: ICD-10-CM

## 2019-12-24 DIAGNOSIS — I65.22 OCCLUSION AND STENOSIS OF LEFT CAROTID ARTERY: ICD-10-CM

## 2019-12-24 DIAGNOSIS — F32.9 MAJOR DEPRESSIVE DISORDER, SINGLE EPISODE, UNSPECIFIED: ICD-10-CM

## 2019-12-24 DIAGNOSIS — I73.9 PERIPHERAL VASCULAR DISEASE, UNSPECIFIED: ICD-10-CM

## 2019-12-24 DIAGNOSIS — I63.9 CEREBRAL INFARCTION, UNSPECIFIED: ICD-10-CM

## 2019-12-24 DIAGNOSIS — G47.00 INSOMNIA, UNSPECIFIED: ICD-10-CM

## 2019-12-24 DIAGNOSIS — I63.511 CEREBRAL INFARCTION DUE TO UNSPECIFIED OCCLUSION OR STENOSIS OF RIGHT MIDDLE CEREBRAL ARTERY: ICD-10-CM

## 2019-12-24 DIAGNOSIS — I96 GANGRENE, NOT ELSEWHERE CLASSIFIED: ICD-10-CM

## 2019-12-24 RX ORDER — ENOXAPARIN SODIUM 100 MG/ML
40 INJECTION SUBCUTANEOUS DAILY
Refills: 0 | Status: DISCONTINUED | OUTPATIENT
Start: 2019-12-24 | End: 2019-12-24

## 2019-12-24 RX ORDER — PREGABALIN 225 MG/1
1000 CAPSULE ORAL DAILY
Refills: 0 | Status: DISCONTINUED | OUTPATIENT
Start: 2019-12-24 | End: 2019-12-30

## 2019-12-24 RX ORDER — TRAZODONE HCL 50 MG
150 TABLET ORAL AT BEDTIME
Refills: 0 | Status: DISCONTINUED | OUTPATIENT
Start: 2019-12-24 | End: 2019-12-30

## 2019-12-24 RX ORDER — PANTOPRAZOLE SODIUM 20 MG/1
40 TABLET, DELAYED RELEASE ORAL
Refills: 0 | Status: DISCONTINUED | OUTPATIENT
Start: 2019-12-24 | End: 2019-12-30

## 2019-12-24 RX ORDER — ATORVASTATIN CALCIUM 80 MG/1
20 TABLET, FILM COATED ORAL AT BEDTIME
Refills: 0 | Status: DISCONTINUED | OUTPATIENT
Start: 2019-12-24 | End: 2019-12-28

## 2019-12-24 RX ORDER — ACETAMINOPHEN 500 MG
650 TABLET ORAL EVERY 6 HOURS
Refills: 0 | Status: DISCONTINUED | OUTPATIENT
Start: 2019-12-24 | End: 2019-12-30

## 2019-12-24 RX ORDER — FLUOXETINE HCL 10 MG
20 CAPSULE ORAL DAILY
Refills: 0 | Status: DISCONTINUED | OUTPATIENT
Start: 2019-12-24 | End: 2019-12-30

## 2019-12-24 RX ORDER — NYSTATIN CREAM 100000 [USP'U]/G
1 CREAM TOPICAL
Refills: 0 | Status: DISCONTINUED | OUTPATIENT
Start: 2019-12-24 | End: 2019-12-30

## 2019-12-24 RX ORDER — ASPIRIN/CALCIUM CARB/MAGNESIUM 324 MG
81 TABLET ORAL DAILY
Refills: 0 | Status: DISCONTINUED | OUTPATIENT
Start: 2019-12-24 | End: 2019-12-30

## 2019-12-24 RX ORDER — FOLIC ACID 0.8 MG
1 TABLET ORAL DAILY
Refills: 0 | Status: DISCONTINUED | OUTPATIENT
Start: 2019-12-24 | End: 2019-12-30

## 2019-12-24 RX ADMIN — ATORVASTATIN CALCIUM 20 MILLIGRAM(S): 80 TABLET, FILM COATED ORAL at 23:09

## 2019-12-24 RX ADMIN — Medication 150 MILLIGRAM(S): at 23:09

## 2019-12-24 RX ADMIN — Medication 650 MILLIGRAM(S): at 23:17

## 2019-12-24 RX ADMIN — Medication 650 MILLIGRAM(S): at 23:41

## 2019-12-24 NOTE — H&P ADULT - HISTORY OF PRESENT ILLNESS
Pt is a 50 y/o F with PMHx of GERD, smoker, PTSD, spinal disc herniation, depression, and gastric bypass who presented on 12/17/19 to [facility] for left sided weakness, right gaze preference, and facial droop. Pt noted that she had been having weakness to her left leg and arm the week prior to admission and it had gotten progressively worse. The day prior to admission, she noted right sided vision loss. On day of admission, she fell while trying to get off the bed and hit the right side of head, no LOC. Her daughters found her and called the ambulance. 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 She developed basal ganglia hemorrhagic conversion, but was stable. Per vascular, pt has a history consistent with ischemic rest pain (pain at night relieved when she hangs her feet off the side of her bed) and at best has short distance claudication. Both feet, while cool distally, are neurovascularly intact with no appreciable differences. There were no signs of acute embolic disease amenable to thrombectomy. Pt continued to have left sided upper and lower extremity weakness. Pt was evaluated by PT and was recommended for acute inpatient rehabilitation when stable. Pt was deemed stable for discharge, and she was transferred to [facility] on 12/24/19 for acute inpatient rehabilitation.

## 2019-12-24 NOTE — H&P ADULT - NSHPPHYSICALEXAM_GEN_ALL_CORE
Constitutional - NAD, Comfortable  HEENT - NCAT, EOMI  Neck - Supple, No limited ROM, R neck pain and spasm  Chest - good chest expansion, good respiratory effort, CTAB   Cardio - warm and well perfused extremities, RRR, no murmur  Abdomen -  Soft, NTND  Extremities - No peripheral edema, No calf tenderness   Neurologic Exam:                    Cognitive -             Orientation: Awake, Alert, AAO to self, place, date with minimal cuing.             Attention:  Days of week, recall 3 objects with minimal cuing, spells WORLD backwards, Months of year backwards            Memory: Recent/ remote memory intact            Thought: process, content appropriate, follows commands     Speech - Fluent, Comprehensible, No dysarthria, No aphasia      Cranial Nerves - Mild L facial asymmetry, tongue midline     Motor -  grossly L hemiplegia                    LEFT    UE - ShAB 0/5, EF 0/5, EE 0/5, WE 0/5, no  strength                    RIGHT UE - ShAB 5/5, EF 5/5, EE 5/5, WE 5/5,  WNL                    LEFT    LE - HF 2/5, KE 2/5, DF 1/5, PF 0/5                    RIGHT LE - HF 5/5, KE 5/5, DF 5/5, PF 5/5        Sensory - decreased to LT Left upper and lower extremity      Reflexes - DTR 1+ / 4 bilaterally, +mcgraw on L , neg Babinski's b/l     Coordination - FTN intact on R. unable to perform on L due to weakness     OculoVestibular - No saccades, R non-sustained nystagmus   Psychiatric - Mood stable, Affect WNL

## 2019-12-24 NOTE — H&P ADULT - NSHPSOCIALHISTORY_GEN_ALL_CORE
SOCIAL HISTORY  - Smoking: every day smoker, 0.5 packs per day  - Alcohol: pt drinks one large can of beer each day  - Drug Use: denied    FUNCTIONAL HISTORY  Pt lives with her 2 adult daughters in an elevator apartment. She has no stairs to navigate. Prior to admission, pt was independent with all of her ADLs and did not require any assistive devices for ambulation.    CURRENT FUNCTIONAL STATUS  12/22/19  Supine to sit Mod A  Sit to stand Max A x2  Side-steps attempted, pt unable  Pt sat at the edge of bed for 6-8 minutes. In sitting, Pt performed leWright and forward/posterior weight shifting with min/mod A. Pt required mod/max A to maintain sitting balance during the ex.  Therapeutic Exercise: in sitting, Pt performed following ther ex: right hip flexion, knee extension, ankle pumps and PROM (hip flexion, knee extension, ankle pumps) perform to LLE, 10 repetitions each.

## 2019-12-24 NOTE — H&P ADULT - NSICDXPASTMEDICALHX_GEN_ALL_CORE_FT
PAST MEDICAL HISTORY:  Anxiety     Chronic insomnia     Depression     ETOH abuse     HTN (hypertension)     PTSD (post-traumatic stress disorder)

## 2019-12-24 NOTE — H&P ADULT - ASSESSMENT
Pt is a 50 y/o F with PMHx of GERD, smoker, PTSD, spinal disc herniation, depression, and gastric bypass who presented on 12/17/19 to [facility] for left sided weakness, right gaze preference, and facial droop. Pt was found to have an occlusion of the Right MCA M1 segment with occlusion of left internal carotid artery.    #CVA  - Right MCA M1 segment infarct  - Lipitor  - Aspirin    #HTN  - Continue Enalapril 10mg bid    #Depression  - Fluoxetine 20mg    #Insomnia  - Trazodone 150mg at bedtime    #GI  - Protonix    #Alcohol Abuse  - Vit B12, Folic Acid    #Pain  - Tylenol    #Rash  - Nystatin    #Diet  - Dysphagia 1 with honey thick liquid    #DVT ppx  - Lovenox    MEDICAL PROGNOSIS: GOOD                                   REHAB POTENTIAL: GOOD  ESTIMATED DISPOSITION: HOME                             ELOS: 10-14 Days   EXPECTED THERAPY:     P.T. 1 hr/day       O.T. 1 hr/day      S.L.P. 1 hr/day      EXP FREQUENCY: 5 days per 7 day period     PRESCREEN COMPARISON I have reviewed the prescreen information and I have found no relevant changes between the preadmission screening and my post admission evaluation     RATIONALE FOR INPATIENT ADMISSION - Patient demonstrates the following: (check all that apply)  [X] Medically appropriate for rehabilitation admission  [X] Has attainable rehab goals with an appropriate initial discharge plan  [X] Has rehabilitation potential (expected to make a significant improvement within a reasonable period of time)   [X] Requires close medical management by a rehab physician, rehab nursing care, Hospitalist and comprehensive interdisciplinary team (including PT, OT, & or SLP, Prosthetics and Orthotics) Pt is a 50 y/o F with PMHx of GERD, smoker, PTSD, spinal disc herniation, depression, and gastric bypass who presented on 12/17/19 to [facility] for left sided weakness, right gaze preference, and facial droop. Pt was found to have an occlusion of the Right MCA M1 segment with occlusion of left internal carotid artery.    #CVA  - Right MCA M1 segment infarct  - Lipitor  - Aspirin    #HTN  - Continue Enalapril 10mg bid    #Depression  - Fluoxetine 20mg    #Insomnia  - Trazodone 150mg at bedtime    #GI  - Protonix    #Alcohol Abuse  - Vit B12, Folic Acid    #Pain  - Tylenol    #Rash  - Nystatin    #Diet  - Dysphagia 1 with honey thick liquid  - oral care BID    #DVT ppx  - Lovenox on hold. Not on discharge medication rec. Unclear if restarted at [facility].  Will consult neurology in AM to evaluate if ok to restart. CTH on 12/19 with stable BG hemorrhage     MEDICAL PROGNOSIS: GOOD                                   REHAB POTENTIAL: GOOD  ESTIMATED DISPOSITION: HOME                             ELOS: 10-14 Days   EXPECTED THERAPY:     P.T. 1 hr/day       O.T. 1 hr/day      S.L.P. 1 hr/day      EXP FREQUENCY: 5 days per 7 day period     PRESCREEN COMPARISON I have reviewed the prescreen information and I have found no relevant changes between the preadmission screening and my post admission evaluation     RATIONALE FOR INPATIENT ADMISSION - Patient demonstrates the following: (check all that apply)  [X] Medically appropriate for rehabilitation admission  [X] Has attainable rehab goals with an appropriate initial discharge plan  [X] Has rehabilitation potential (expected to make a significant improvement within a reasonable period of time)   [X] Requires close medical management by a rehab physician, rehab nursing care, Hospitalist and comprehensive interdisciplinary team (including PT, OT, & or SLP, Prosthetics and Orthotics)

## 2019-12-24 NOTE — H&P ADULT - ATTENDING COMMENTS
Patient seen and examined.   I agree with note and plan.  Vitals/labs stable. Will consult neuro to see if can start Lovenox for DVT px     MEDICAL PROGNOSIS: GOOD                                   REHAB POTENTIAL: GOOD  ESTIMATED DISPOSITION: HOME                             ELOS: 10-14 Days   EXPECTED THERAPY:     P.T. 1 hr/day       O.T. 1 hr/day      S.L.P. 1 hr/day      EXP FREQUENCY: 5 days per 7 day period     PRESCREEN COMPARISON I have reviewed the prescreen information and I have found no relevant changes between the preadmission screening and my post admission evaluation     RATIONALE FOR INPATIENT ADMISSION - Patient demonstrates the following: (check all that apply)  [X] Medically appropriate for rehabilitation admission  [X] Has attainable rehab goals with an appropriate initial discharge plan  [X] Has rehabilitation potential (expected to make a significant improvement within a reasonable period of time)   [X] Requires close medical management by a rehab physician, rehab nursing care, Hospitalist and comprehensive interdisciplinary team (including PT, OT, & or SLP, Prosthetics and Orthotics)

## 2019-12-24 NOTE — H&P ADULT - NSHPLABSRESULTS_GEN_ALL_CORE
12/22/19    Glucose: 89  Sodium: 142  Potassium: 3.9  BUN: 2.2  Creatinine: 0.50    WBC: 8.02  HgB: 11.3  Hct: 35.7  Platelets: 324  INR (12/21): 1.06  PT/PTT (12/21): 12.2/25.3    Radiology    XR foot right 12/18/19: No evidence of acute bony abnormality    CTA neck w contrast 12/18/19: Occlusion of the RIGHT MCA M1 segment. There is collateral flow visualized in right MCA M2 branches. Occlusion at the origin of the LEFT internal carotid artery with reconstitution of flow at the level of the LEFT supraclinoid internal carotid artery.    CT Head 12/22/19: Right MCA territory infarct with associated basal ganglia hemorrhage, similar in appearance to the prior study, with slight increased mass effect on the right lateral ventricle and persistent effacement of the occipital horn of the right lateral ventricle. Unchanged minimal leftward midline shift of 2mm.

## 2019-12-24 NOTE — H&P ADULT - NSICDXPASTSURGICALHX_GEN_ALL_CORE_FT
PAST SURGICAL HISTORY:  Status Post Gastric Bypass for Obesity     Status Post Laparoscopic Cholecystectomy     Tubal Ligation Status

## 2019-12-25 LAB
ANION GAP SERPL CALC-SCNC: 9 MMOL/L — SIGNIFICANT CHANGE UP (ref 5–17)
BUN SERPL-MCNC: 10 MG/DL — SIGNIFICANT CHANGE UP (ref 7–23)
CALCIUM SERPL-MCNC: 8.2 MG/DL — LOW (ref 8.4–10.5)
CHLORIDE SERPL-SCNC: 105 MMOL/L — SIGNIFICANT CHANGE UP (ref 96–108)
CO2 SERPL-SCNC: 27 MMOL/L — SIGNIFICANT CHANGE UP (ref 22–31)
CREAT SERPL-MCNC: 0.66 MG/DL — SIGNIFICANT CHANGE UP (ref 0.5–1.3)
GLUCOSE SERPL-MCNC: 76 MG/DL — SIGNIFICANT CHANGE UP (ref 70–99)
HCT VFR BLD CALC: 34.3 % — LOW (ref 34.5–45)
HGB BLD-MCNC: 11.4 G/DL — LOW (ref 11.5–15.5)
MCHC RBC-ENTMCNC: 33.2 GM/DL — SIGNIFICANT CHANGE UP (ref 32–36)
MCHC RBC-ENTMCNC: 34.2 PG — HIGH (ref 27–34)
MCV RBC AUTO: 103 FL — HIGH (ref 80–100)
NRBC # BLD: 0 /100 WBCS — SIGNIFICANT CHANGE UP (ref 0–0)
PLATELET # BLD AUTO: 428 K/UL — HIGH (ref 150–400)
POTASSIUM SERPL-MCNC: 3.5 MMOL/L — SIGNIFICANT CHANGE UP (ref 3.5–5.3)
POTASSIUM SERPL-SCNC: 3.5 MMOL/L — SIGNIFICANT CHANGE UP (ref 3.5–5.3)
RBC # BLD: 3.33 M/UL — LOW (ref 3.8–5.2)
RBC # FLD: 19.7 % — HIGH (ref 10.3–14.5)
SODIUM SERPL-SCNC: 141 MMOL/L — SIGNIFICANT CHANGE UP (ref 135–145)
WBC # BLD: 8.78 K/UL — SIGNIFICANT CHANGE UP (ref 3.8–10.5)
WBC # FLD AUTO: 8.78 K/UL — SIGNIFICANT CHANGE UP (ref 3.8–10.5)

## 2019-12-25 PROCEDURE — 99223 1ST HOSP IP/OBS HIGH 75: CPT

## 2019-12-25 PROCEDURE — 70450 CT HEAD/BRAIN W/O DYE: CPT | Mod: 26

## 2019-12-25 PROCEDURE — 99223 1ST HOSP IP/OBS HIGH 75: CPT | Mod: GC

## 2019-12-25 RX ORDER — OXYCODONE AND ACETAMINOPHEN 5; 325 MG/1; MG/1
1 TABLET ORAL EVERY 4 HOURS
Refills: 0 | Status: DISCONTINUED | OUTPATIENT
Start: 2019-12-25 | End: 2019-12-30

## 2019-12-25 RX ORDER — GABAPENTIN 400 MG/1
100 CAPSULE ORAL EVERY 8 HOURS
Refills: 0 | Status: DISCONTINUED | OUTPATIENT
Start: 2019-12-25 | End: 2019-12-30

## 2019-12-25 RX ORDER — ENOXAPARIN SODIUM 100 MG/ML
40 INJECTION SUBCUTANEOUS DAILY
Refills: 0 | Status: DISCONTINUED | OUTPATIENT
Start: 2019-12-25 | End: 2019-12-30

## 2019-12-25 RX ORDER — TRAMADOL HYDROCHLORIDE 50 MG/1
25 TABLET ORAL ONCE
Refills: 0 | Status: DISCONTINUED | OUTPATIENT
Start: 2019-12-25 | End: 2019-12-25

## 2019-12-25 RX ADMIN — Medication 1 MILLIGRAM(S): at 11:44

## 2019-12-25 RX ADMIN — PANTOPRAZOLE SODIUM 40 MILLIGRAM(S): 20 TABLET, DELAYED RELEASE ORAL at 05:50

## 2019-12-25 RX ADMIN — OXYCODONE AND ACETAMINOPHEN 1 TABLET(S): 5; 325 TABLET ORAL at 09:35

## 2019-12-25 RX ADMIN — Medication 150 MILLIGRAM(S): at 21:47

## 2019-12-25 RX ADMIN — GABAPENTIN 100 MILLIGRAM(S): 400 CAPSULE ORAL at 14:36

## 2019-12-25 RX ADMIN — NYSTATIN CREAM 1 APPLICATION(S): 100000 CREAM TOPICAL at 17:36

## 2019-12-25 RX ADMIN — GABAPENTIN 100 MILLIGRAM(S): 400 CAPSULE ORAL at 21:47

## 2019-12-25 RX ADMIN — Medication 650 MILLIGRAM(S): at 05:51

## 2019-12-25 RX ADMIN — NYSTATIN CREAM 1 APPLICATION(S): 100000 CREAM TOPICAL at 05:51

## 2019-12-25 RX ADMIN — Medication 10 MILLIGRAM(S): at 17:36

## 2019-12-25 RX ADMIN — ATORVASTATIN CALCIUM 20 MILLIGRAM(S): 80 TABLET, FILM COATED ORAL at 21:46

## 2019-12-25 RX ADMIN — TRAMADOL HYDROCHLORIDE 25 MILLIGRAM(S): 50 TABLET ORAL at 00:51

## 2019-12-25 RX ADMIN — PREGABALIN 1000 MICROGRAM(S): 225 CAPSULE ORAL at 11:45

## 2019-12-25 RX ADMIN — ENOXAPARIN SODIUM 40 MILLIGRAM(S): 100 INJECTION SUBCUTANEOUS at 21:47

## 2019-12-25 RX ADMIN — TRAMADOL HYDROCHLORIDE 25 MILLIGRAM(S): 50 TABLET ORAL at 01:42

## 2019-12-25 RX ADMIN — Medication 81 MILLIGRAM(S): at 11:44

## 2019-12-25 RX ADMIN — Medication 10 MILLIGRAM(S): at 05:50

## 2019-12-25 RX ADMIN — Medication 20 MILLIGRAM(S): at 11:45

## 2019-12-25 RX ADMIN — OXYCODONE AND ACETAMINOPHEN 1 TABLET(S): 5; 325 TABLET ORAL at 09:05

## 2019-12-25 NOTE — CONSULT NOTE ADULT - SUBJECTIVE AND OBJECTIVE BOX
50 y/o F with PMHx of GERD, smoker, PTSD, spinal disc herniation, depression, and gastric bypass who presented on 12/17/19 to [facility] for left sided weakness, right gaze preference, and facial droop. Pt noted that she had been having weakness to her left leg and arm the week prior to admission and it had gotten progressively worse. The day prior to admission, she noted right sided vision loss. On day of admission, she fell while trying to get off the bed and hit the right side of head, no LOC. Her daughters found her and called the ambulance. 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 She developed basal ganglia hemorrhagic conversion, but was stable. Per vascular, pt has a history consistent with ischemic rest pain (pain at night relieved when she hangs her feet off the side of her bed) and at best has short distance claudication. Both feet, while cool distally, are neurovascularly intact with no appreciable differences. There were no signs of acute embolic disease amenable to thrombectomy. Pt continued to have left sided upper and lower extremity weakness. Pt was evaluated by PT and was recommended for acute inpatient rehabilitation when stable. Pt was deemed stable for discharge, and she was transferred to [facility] on 12/24/19 for acute inpatient rehabilitation.    seen at the bedside, no sob, no n/v, c/o 10/10 pain in the LE right > left, burning, sharp pain, radiated form toes to the thighs.        Allergies and Intolerances:        Allergies:  	No Known Allergies:   	No Known Allergies:     Home Medications:   * Patient Currently Takes Medications as of 10-Mar-2018 04:12 documented in Structured Notes  · 	amoxicillin-clavulanate 875 mg-125 mg oral tablet: 875 milligram(s) orally every 12 hours   · 	Percocet 5/325: 1 tab(s) orally every 6 hours, As Needed - for severe pain  · 	folic acid 1 mg oral tablet: 1 tab(s) orally once a day  · 	thiamine 100 mg oral tablet: 1 tab(s) orally once a day  · 	Multiple Vitamins oral tablet: 1 tab(s) orally once a day  · 	pantoprazole 40 mg oral delayed release tablet: 1 tab(s) orally once a day (before a meal)  · 	prazosin 2 mg oral capsule: 3 cap(s) orally once a day (at bedtime)  · 	gabapentin 100 mg oral capsule: 1 cap(s) orally 3 times a day, As needed, anxiety  · 	QUEtiapine 300 mg oral tablet: 1 tab(s) orally once a day (at bedtime)  · 	Effexor XR: 225 milligram(s) orally once a day    Patient History:   Past Medical, Past Surgical, and Family History:  PAST MEDICAL HISTORY:  Anxiety     Chronic insomnia     Depression     ETOH abuse     HTN (hypertension)     PTSD (post-traumatic stress disorder).     PAST SURGICAL HISTORY:  Status Post Gastric Bypass for Obesity     Status Post Laparoscopic Cholecystectomy     Tubal Ligation Status.    Social History:  Smoking: every day smoker, 0.5 packs per day  	- Alcohol: pt drinks one large can of beer each day  	- Drug Use: denied    Vital Signs Last 24 Hrs  T(C): 36.8 (24 Dec 2019 20:20), Max: 36.8 (24 Dec 2019 20:20)  T(F): 98.3 (24 Dec 2019 20:20), Max: 98.3 (24 Dec 2019 20:20)  HR: 72 (25 Dec 2019 05:46) (72 - 76)  BP: 150/74 (25 Dec 2019 05:46) (134/70 - 150/74)  BP(mean): --  RR: 16 (24 Dec 2019 20:20) (16 - 16)  SpO2: 98% (24 Dec 2019 20:20) (98% - 98%)    GENERAL- NAD  EAR/NOSE/MOUTH/THROAT - no pharyngeal exudates, no oral leisions,  MMM  EYES- ROSE, conjunctiva and Sclera clear  NECK- supple  RESPIRATORY-  clear to auscultation bilaterally  CARDIOVASCULAR - SIS2, RRR  GI - soft NT BS present  EXTREMITIES- no pedal edema  NEUROLOGY- left sided weakness, facial droop.  SKIN- no rashes, warm to touch  PSYCHIATRY- AAO X 3  MUSCULOSKELETAL- ROM restricted to left UE AND LE                              11.4   8.78  )-----------( 428      ( 25 Dec 2019 06:00 )             34.3       12-25    141  |  105  |  10  ----------------------------<  76  3.5   |  27  |  0.66    Ca    8.2<L>      25 Dec 2019 06:00            Labs reviewed:     < from: CT Abdomen and Pelvis w/ Oral Cont and w/ IV Cont (07.26.18 @ 23:27) >    IMPRESSION:     No CT evidence of bowel obstruction, active inflammatory process or   intra-abdominal source for infection.  There are no findings on the CT   scan to explain the patient's epigastric pain.  Severe hepatomegaly and   severe    < end of copied text >      ECG reviewed and interpreted:   < from: 12 Lead ECG (11.15.18 @ 14:13) >    Ventricular Rate 88 BPM    Atrial Rate 88 BPM    P-R Interval 156 ms    QRS Duration 68 ms    Q-T Interval 358 ms    QTC Calculation(Bezet) 433 ms    P Axis 67 degrees    R Axis 52 degrees    T Axis 68 degrees    Diagnosis Line Normal sinus rhythm  Low voltage QRS  Borderline ECG

## 2019-12-25 NOTE — CONSULT NOTE ADULT - SUBJECTIVE AND OBJECTIVE BOX
Neurology consult    IMTIAZ [id number]     Patient is a 49y old  Female who presents with a chief complaint of CVA (25 Dec 2019 09:22)      HPI:  Pt is a 48 y/o F with PMHx of GERD, smoker, PTSD, spinal disc herniation, depression, and gastric bypass who presented on 12/17/19 to [facility] for left sided weakness, right gaze preference, and facial droop. Pt noted that she had been having weakness to her left leg and arm the week prior to admission and it had gotten progressively worse. The day prior to admission, she noted right sided vision loss. On day of admission, she fell while trying to get off the bed and hit the right side of head, no LOC. Her daughters found her and called the ambulance. 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 She developed basal ganglia hemorrhagic conversion, but was stable. Per vascular, pt has a history consistent with ischemic rest pain (pain at night relieved when she hangs her feet off the side of her bed) and at best has short distance claudication. Both feet, while cool distally, are neurovascularly intact with no appreciable differences. There were no signs of acute embolic disease amenable to thrombectomy. Pt continued to have left sided upper and lower extremity weakness. Pt was evaluated by PT and was recommended for acute inpatient rehabilitation when stable. Pt was deemed stable for discharge, and she was transferred to [facility] on 12/24/19 for acute inpatient rehabilitation. (24 Dec 2019 13:09)      MEDICATIONS    acetaminophen   Tablet .. 650 milliGRAM(s) Oral every 6 hours PRN  aspirin  chewable 81 milliGRAM(s) Oral daily  atorvastatin 20 milliGRAM(s) Oral at bedtime  cyanocobalamin 1000 MICROGram(s) Oral daily  enalapril 10 milliGRAM(s) Oral two times a day  FLUoxetine 20 milliGRAM(s) Oral daily  folic acid 1 milliGRAM(s) Oral daily  gabapentin 100 milliGRAM(s) Oral every 8 hours  nystatin Cream 1 Application(s) Topical two times a day  oxycodone    5 mG/acetaminophen 325 mG 1 Tablet(s) Oral every 4 hours PRN  pantoprazole    Tablet 40 milliGRAM(s) Oral before breakfast  traZODone 150 milliGRAM(s) Oral at bedtime      PMH: Chronic insomnia  PTSD (post-traumatic stress disorder)  ETOH abuse  Anxiety  HTN (hypertension)  Depression       PSH: Tubal Ligation Status  Status Post Gastric Bypass for Obesity  Status Post Laparoscopic Cholecystectomy      Family history:   FAMILY HISTORY:      SOCIAL HISTORY:  No history of tobacco or alcohol use     Allergies    No Known Allergies    Intolerances        Height (cm): 162.6 (12-24 @ 20:20)  Weight (kg): 91.3 (12-24 @ 20:20)  BMI (kg/m2): 34.5 (12-24 @ 20:20)    Vital Signs Last 24 Hrs  T(C): 36.8 (25 Dec 2019 09:42), Max: 36.8 (24 Dec 2019 20:20)  T(F): 98.3 (25 Dec 2019 09:42), Max: 98.3 (24 Dec 2019 20:20)  HR: 72 (25 Dec 2019 09:42) (72 - 76)  BP: 147/86 (25 Dec 2019 09:42) (134/70 - 150/74)  BP(mean): --  RR: 14 (25 Dec 2019 09:42) (14 - 16)  SpO2: 99% (25 Dec 2019 09:42) (98% - 99%)      On Neurological Examination:    Head: normocephalic Neck: supple no carotid bruits    Mental Status - Patient is alert, oriented speech basically intact    Cranial Nerves - PERRL, EOMI, VFF, left VII paresis    Motor Exam : Left arm plegic left leg severe paresis    Sensory : reduced pin left    Reflexes:  symmetric  plantars silent                                                           LABS:  CBC Full  -  ( 25 Dec 2019 06:00 )  WBC Count : 8.78 K/uL  RBC Count : 3.33 M/uL  Hemoglobin : 11.4 g/dL  Hematocrit : 34.3 %  Platelet Count - Automated : 428 K/uL  Mean Cell Volume : 103.0 fl  Mean Cell Hemoglobin : 34.2 pg  Mean Cell Hemoglobin Concentration : 33.2 gm/dL  Auto Neutrophil # : x  Auto Lymphocyte # : x  Auto Monocyte # : x  Auto Eosinophil # : x  Auto Basophil # : x  Auto Neutrophil % : x  Auto Lymphocyte % : x  Auto Monocyte % : x  Auto Eosinophil % : x  Auto Basophil % : x      12-25    141  |  105  |  10  ----------------------------<  76  3.5   |  27  |  0.66    Ca    8.2<L>      25 Dec 2019 06:00        Hemoglobin A1C:                 RADIOLOGY  CT:     < from: CT Head No Cont (12.25.19 @ 11:06) >    EXAM:  CT BRAIN      PROCEDURE DATE:  12/25/2019        INTERPRETATION:  HISTORY: History of CVA with hemorrhagic transformation.    COMPARISON: None.    Multiple contiguous transaxial images of the brain were obtained from the skull base to the vertex. Reformatted sagittal and coronal imaging also performed.    FINDINGS: Subacute right MCA territorial type infarct is identified with effacement of the overlying cortical sulci and mass effect upon the right lateral ventricular system. No significant midline shift is identified. Intraparenchymal hemorrhage is identified consistent with provided clinical history of hemorrhagic transformation; assessment for interval change cannot be performed as prior CT examinations are not submitted for comparison. There is no evidence for hydrocephalus. No extra-axial hemorrhage is identified. No osseous fractures identified.    IMPRESSION: Subacute right MCA territorial type infarct is identified with effacement of the overlying cortical sulci and masseffect upon the right lateral ventricular system. No significant midline shift is identified. Intraparenchymal hemorrhage is identified consistent with provided clinical history of hemorrhagic transformation; assessment for interval change cannot be performed as prior CT examinations are not submitted for comparison.    Results discussed by telephone with Dr. Kirkland.            < end of copied text >

## 2019-12-25 NOTE — CONSULT NOTE ADULT - ASSESSMENT
48 yo female with right MCA M1 occlusion and sizable right hemisphere infarct.  Today's CT reviewed revealing infarct with an area of residual hemorrhage in the right basal ganglia. Also reported to have a left carotid occlusion on CTA.     REC:  I'd OK this patient for DVT prophylaxis with Lovenox in the setting of a significant risk for DVT following the right hemisphere stroke.

## 2019-12-25 NOTE — CONSULT NOTE ADULT - ASSESSMENT
50 y/o F with PMHx of GERD, smoker, PTSD, spinal disc herniation, depression, and gastric bypass who presented on 12/17/19 to [facility] for left sided weakness, right gaze preference, and facial droop. Pt was found to have an occlusion of the Right MCA M1 segment with occlusion of left internal carotid artery- pt/ot/dvt ppx, pain meds  Lipitor    LE pain- gabapentin, percocet prn    HTN- Enalapril     Depression-Fluoxetine     Insomnia- Trazodone     Gerd- Protonix    Alcohol Abuse- Vit B12, Folic Acid    Pain- Tylenol    DVT ppx -asa 81    will follow 50 y/o F with PMHx of GERD, smoker, PTSD, spinal disc herniation, depression, and gastric bypass who presented on 12/17/19 to [facility] for left sided weakness, right gaze preference, and facial droop. Pt was found to have an occlusion of the Right MCA M1 segment with occlusion of left internal carotid artery. She developed basal ganglia hemorrhagic conversion,- pt/ot/dvt ppx, pain meds  Lipitor    LE pain- gabapentin, percocet prn    HTN- Enalapril     Depression-Fluoxetine     Insomnia- Trazodone     Gerd- Protonix    Alcohol Abuse- Vit B12, Folic Acid    Pain- Tylenol    DVT ppx -asa 81    will follow

## 2019-12-26 PROCEDURE — 99232 SBSQ HOSP IP/OBS MODERATE 35: CPT | Mod: GC

## 2019-12-26 RX ORDER — LIDOCAINE 4 G/100G
1 CREAM TOPICAL
Refills: 0 | Status: DISCONTINUED | OUTPATIENT
Start: 2019-12-26 | End: 2019-12-30

## 2019-12-26 RX ADMIN — GABAPENTIN 100 MILLIGRAM(S): 400 CAPSULE ORAL at 14:06

## 2019-12-26 RX ADMIN — LIDOCAINE 1 APPLICATION(S): 4 CREAM TOPICAL at 18:18

## 2019-12-26 RX ADMIN — NYSTATIN CREAM 1 APPLICATION(S): 100000 CREAM TOPICAL at 05:36

## 2019-12-26 RX ADMIN — Medication 81 MILLIGRAM(S): at 12:29

## 2019-12-26 RX ADMIN — ATORVASTATIN CALCIUM 20 MILLIGRAM(S): 80 TABLET, FILM COATED ORAL at 21:57

## 2019-12-26 RX ADMIN — Medication 1 MILLIGRAM(S): at 12:29

## 2019-12-26 RX ADMIN — PANTOPRAZOLE SODIUM 40 MILLIGRAM(S): 20 TABLET, DELAYED RELEASE ORAL at 06:04

## 2019-12-26 RX ADMIN — Medication 20 MILLIGRAM(S): at 12:29

## 2019-12-26 RX ADMIN — OXYCODONE AND ACETAMINOPHEN 1 TABLET(S): 5; 325 TABLET ORAL at 09:30

## 2019-12-26 RX ADMIN — GABAPENTIN 100 MILLIGRAM(S): 400 CAPSULE ORAL at 05:37

## 2019-12-26 RX ADMIN — Medication 10 MILLIGRAM(S): at 18:13

## 2019-12-26 RX ADMIN — ENOXAPARIN SODIUM 40 MILLIGRAM(S): 100 INJECTION SUBCUTANEOUS at 12:29

## 2019-12-26 RX ADMIN — PREGABALIN 1000 MICROGRAM(S): 225 CAPSULE ORAL at 12:29

## 2019-12-26 RX ADMIN — Medication 10 MILLIGRAM(S): at 05:37

## 2019-12-26 RX ADMIN — GABAPENTIN 100 MILLIGRAM(S): 400 CAPSULE ORAL at 22:30

## 2019-12-26 RX ADMIN — Medication 150 MILLIGRAM(S): at 21:57

## 2019-12-26 RX ADMIN — NYSTATIN CREAM 1 APPLICATION(S): 100000 CREAM TOPICAL at 18:14

## 2019-12-26 RX ADMIN — OXYCODONE AND ACETAMINOPHEN 1 TABLET(S): 5; 325 TABLET ORAL at 09:08

## 2019-12-26 NOTE — PROGRESS NOTE ADULT - ASSESSMENT
Pt is a 48 y/o F with PMHx of GERD, smoker, PTSD, spinal disc herniation, depression, and gastric bypass who presented on 12/17/19 to [facility] for left sided weakness, right gaze preference, and facial droop. Pt was found to have an occlusion of the Right MCA M1 segment with occlusion of left internal carotid artery.    #CVA  - Right MCA M1 segment infarct  - Lipitor  - Aspirin    #HTN  - Continue Enalapril 10mg bid    #Depression  - Fluoxetine 20mg    #Insomnia  - Trazodone 150mg at bedtime    #GI  - Protonix    #Alcohol Abuse  - Vit B12, Folic Acid    #Pain  - Tylenol    #Rash  - Nystatin    #Diet  - Dysphagia 1 with honey thick liquid  - oral care BID    #DVT ppx  - Lovenox

## 2019-12-26 NOTE — DIETITIAN INITIAL EVALUATION ADULT. - EST PROTEIN NEEDS3
myalgias. Allergic/Immunologic: Negative for environmental allergies. Neurological: Negative for dizziness and headaches. Objective:      Physical Exam   Constitutional: He is oriented to person, place, and time. He appears well-developed and well-nourished. HENT:   Head: Normocephalic and atraumatic. Eyes: Pupils are equal, round, and reactive to light. EOM are normal.   Neck: Normal range of motion. Cardiovascular: Normal rate, regular rhythm and normal heart sounds. Pulmonary/Chest: Effort normal and breath sounds normal.   Abdominal: Bowel sounds are normal.   Neurological: He is alert and oriented to person, place, and time. Psychiatric: He has a normal mood and affect. His behavior is normal. Judgment and thought content normal.   Vitals reviewed. Monofilament Foot &Sensory exam:  Exam of the of the both feet showed moderate decreased sensation, tested with the monofilament. Good pulses, no lesions or ulcers, goodperipheral pulses. Nails unremarkable. Laboratory:  Recent Labs     05/10/19  0859   LABA1C 9.8     No results for input(s): LABMICR in the last 72 hours. No results found for: NA, K, CL, CO2, BUN, CREATININE, GLUCOSE, CALCIUM, PROT, LABALBU, BILITOT, ALKPHOS, AST, ALT, LABGLOM, GFRAA, AGRATIO, GLOB       Assessment:      Diabetes mellitus Type II, under inadequate control. Diagnosis Orders   1. Type 2 diabetes mellitus without complication, without long-term current use of insulin ([facility])  POCT glycosylated hemoglobin (Hb A1C)   2. Essential hypertension  Controlled - lisinopril (PRINIVIL;ZESTRIL) 20 MG tablet   3. Atrial fibrillation, unspecified type ([facility])  metoprolol tartrate (LOPRESSOR) 50 MG tablet        Plan:   Counseled pt to have labs done that were ordered on October 24, 2018. Discussed Coronary Calcium CT with pt ad he will consider and let me know when to order. Discussed foot care.
68.32

## 2019-12-26 NOTE — DIETITIAN INITIAL EVALUATION ADULT. - ADD RECOMMEND
1) Monitor Weights, Intake, Tolerance, Skin & Labwork   2) Education Provided on Need for Increased Fluids/Fiber 3) Continue Nutrition Plan of Care

## 2019-12-26 NOTE — DIETITIAN INITIAL EVALUATION ADULT. - OTHER INFO
49yr Old Female - Dx: CVA - Initial Assessment - Diet - Puree (Dysphagia 1) Diet  w/ Honey Thick Liquids, Denies Food Allergy/Intolerance, Tolerates Diet Well, No Chewing/Some Swallowing Complications (Per Patient- Speech Therapy to Eval/Screen), Consumes 100% of 1st Meal (as Per Observation), No Pressure Ulcers (as Per Nursing Flow Sheets), No Edema Noted (as Per Nursing Flow Sheets), No Recent Vomiting/Diarrhea & Some Nausea/Constipation (as Per Patient)

## 2019-12-26 NOTE — CHART NOTE - NSCHARTNOTEFT_GEN_A_CORE
REHABILITATION DIAGNOSIS/IMPAIRMENT GROUP CODE: CVA  ***    COMORBIDITIES/COMPLICATING CONDITIONS IMPACTING REHABILITATION:  HEALTH ISSUES - PROBLEM Dx:  CVA  depression  PAD         PAST MEDICAL & SURGICAL HISTORY:  Chronic insomnia  PTSD (post-traumatic stress disorder)  ETOH abuse  Anxiety  HTN (hypertension)  Depression  Tubal Ligation Status  Status Post Gastric Bypass for Obesity  Status Post Laparoscopic Cholecystectomy      Based upon consideration of the patient's impairments, functional status, complicating conditions and any other contributing factors and after information garnered from the assessments of all therapy disciplines involved in treating the patient and other pertinent clinicians:    INTERDISCIPLINARY REHABILITATION INTERVENTIONS:    [  X ] Transfer Training  [  X ] Bed Mobility  [ X  ] Therapeutic Exercise  [  X ] Balance/Coordination Exercises  [  X ] Locomotion retraining  [   ] Stairs  [   ]X Functional Transfer Training  [  X ] Bowel/Bladder program  [  X ] Pain Management  [   X] Skin/Wound Care  [  X ] Visual/Perceptual Training  [ X  ] Therapeutic Recreation Activities  [  X ] Neuromuscular Re-education  [ X  ] Activities of Daily Living  [ X  ] Speech Exercise  [   X] Swallowing Exercises  [   ] Vital Stim  [   ] Dietary Supplements  [  X ] Calorie Count  [ X  ] Cognitive Exercises  [ X  ] Congnitive/Linguistic Treatment  [   ] Behavior Program  [   X] Neuropsych Therapy  [  X ] Patient/Family Counseling  [ X  ] Family Training  [  X ] Community Re-entry  [   X] Orthotic Evaluation  [   ] Prosthetic Eval/Training    MEDICAL PROGNOSIS:  good     REHAB POTENTIAL:  good     EXPECTED DAILY THERAPY:         PT: 1hr/day       OT: 1hr/day       ST: 1hr/day       P&O: 0    EXPECTED INTENSITY OF PROGRAM:  3 hrs/day    EXPECTED FREQUENCY OF PROGRAM:  5 days/week    ESTIMATED LOS:  10-14 days    ESTIMATED DISPOSITION:  home    INTERDISCIPLINARY FUNCTIONAL OUTCOMES?GOALS:         Gait/Mobility: min a a       Transfers: min a a       ADLs: min a       Functional Transfers: min a       Medication Management: mod i       Communication: independent       Cognitive: independent            Bladder: continent       Bowel: continent

## 2019-12-26 NOTE — DIETITIAN INITIAL EVALUATION ADULT. - DIET TYPE
on Puree (Dysphagia 1) Diet w/ Honey Thick Liquids   Education Provided on Need for Increased Fluids/Fiber   Patient Does Not Follow Diet @Home, Consumes 2-3Meals a Day  & Occasionally Takes  Protein Supplements @Home Education Provided on Need for Increased Fluids/Fiber

## 2019-12-26 NOTE — DIETITIAN INITIAL EVALUATION ADULT. - ENERGY NEEDS
Height: 64Inches   Weight: 201lb   Body Mass Index (BMI): 34.5kg/m2   Ideal Body Weight Range: 120lb (+/-10%)   Percent Ideal Body Weight ~168%

## 2019-12-27 LAB
ANION GAP SERPL CALC-SCNC: 9 MMOL/L — SIGNIFICANT CHANGE UP (ref 5–17)
BUN SERPL-MCNC: 8 MG/DL — SIGNIFICANT CHANGE UP (ref 7–23)
CALCIUM SERPL-MCNC: 8.2 MG/DL — LOW (ref 8.4–10.5)
CHLORIDE SERPL-SCNC: 107 MMOL/L — SIGNIFICANT CHANGE UP (ref 96–108)
CO2 SERPL-SCNC: 25 MMOL/L — SIGNIFICANT CHANGE UP (ref 22–31)
CREAT SERPL-MCNC: 0.62 MG/DL — SIGNIFICANT CHANGE UP (ref 0.5–1.3)
GLUCOSE SERPL-MCNC: 83 MG/DL — SIGNIFICANT CHANGE UP (ref 70–99)
HCT VFR BLD CALC: 36.5 % — SIGNIFICANT CHANGE UP (ref 34.5–45)
HGB BLD-MCNC: 12.1 G/DL — SIGNIFICANT CHANGE UP (ref 11.5–15.5)
MCHC RBC-ENTMCNC: 33.2 GM/DL — SIGNIFICANT CHANGE UP (ref 32–36)
MCHC RBC-ENTMCNC: 34.2 PG — HIGH (ref 27–34)
MCV RBC AUTO: 103.1 FL — HIGH (ref 80–100)
NRBC # BLD: 0 /100 WBCS — SIGNIFICANT CHANGE UP (ref 0–0)
PLATELET # BLD AUTO: 339 K/UL — SIGNIFICANT CHANGE UP (ref 150–400)
POTASSIUM SERPL-MCNC: 3.8 MMOL/L — SIGNIFICANT CHANGE UP (ref 3.5–5.3)
POTASSIUM SERPL-SCNC: 3.8 MMOL/L — SIGNIFICANT CHANGE UP (ref 3.5–5.3)
RBC # BLD: 3.54 M/UL — LOW (ref 3.8–5.2)
RBC # FLD: 19.8 % — HIGH (ref 10.3–14.5)
SODIUM SERPL-SCNC: 141 MMOL/L — SIGNIFICANT CHANGE UP (ref 135–145)
WBC # BLD: 8.48 K/UL — SIGNIFICANT CHANGE UP (ref 3.8–10.5)
WBC # FLD AUTO: 8.48 K/UL — SIGNIFICANT CHANGE UP (ref 3.8–10.5)

## 2019-12-27 PROCEDURE — 99232 SBSQ HOSP IP/OBS MODERATE 35: CPT | Mod: GC

## 2019-12-27 RX ADMIN — ATORVASTATIN CALCIUM 20 MILLIGRAM(S): 80 TABLET, FILM COATED ORAL at 22:53

## 2019-12-27 RX ADMIN — PREGABALIN 1000 MICROGRAM(S): 225 CAPSULE ORAL at 12:01

## 2019-12-27 RX ADMIN — Medication 20 MILLIGRAM(S): at 12:01

## 2019-12-27 RX ADMIN — OXYCODONE AND ACETAMINOPHEN 1 TABLET(S): 5; 325 TABLET ORAL at 13:16

## 2019-12-27 RX ADMIN — LIDOCAINE 1 APPLICATION(S): 4 CREAM TOPICAL at 09:09

## 2019-12-27 RX ADMIN — NYSTATIN CREAM 1 APPLICATION(S): 100000 CREAM TOPICAL at 05:43

## 2019-12-27 RX ADMIN — Medication 81 MILLIGRAM(S): at 12:01

## 2019-12-27 RX ADMIN — OXYCODONE AND ACETAMINOPHEN 1 TABLET(S): 5; 325 TABLET ORAL at 09:40

## 2019-12-27 RX ADMIN — OXYCODONE AND ACETAMINOPHEN 1 TABLET(S): 5; 325 TABLET ORAL at 23:20

## 2019-12-27 RX ADMIN — ENOXAPARIN SODIUM 40 MILLIGRAM(S): 100 INJECTION SUBCUTANEOUS at 12:01

## 2019-12-27 RX ADMIN — GABAPENTIN 100 MILLIGRAM(S): 400 CAPSULE ORAL at 22:53

## 2019-12-27 RX ADMIN — LIDOCAINE 1 APPLICATION(S): 4 CREAM TOPICAL at 17:49

## 2019-12-27 RX ADMIN — GABAPENTIN 100 MILLIGRAM(S): 400 CAPSULE ORAL at 05:42

## 2019-12-27 RX ADMIN — NYSTATIN CREAM 1 APPLICATION(S): 100000 CREAM TOPICAL at 17:49

## 2019-12-27 RX ADMIN — OXYCODONE AND ACETAMINOPHEN 1 TABLET(S): 5; 325 TABLET ORAL at 13:45

## 2019-12-27 RX ADMIN — OXYCODONE AND ACETAMINOPHEN 1 TABLET(S): 5; 325 TABLET ORAL at 22:53

## 2019-12-27 RX ADMIN — GABAPENTIN 100 MILLIGRAM(S): 400 CAPSULE ORAL at 13:15

## 2019-12-27 RX ADMIN — Medication 10 MILLIGRAM(S): at 05:42

## 2019-12-27 RX ADMIN — Medication 150 MILLIGRAM(S): at 22:53

## 2019-12-27 RX ADMIN — Medication 10 MILLIGRAM(S): at 17:49

## 2019-12-27 RX ADMIN — OXYCODONE AND ACETAMINOPHEN 1 TABLET(S): 5; 325 TABLET ORAL at 09:09

## 2019-12-27 RX ADMIN — PANTOPRAZOLE SODIUM 40 MILLIGRAM(S): 20 TABLET, DELAYED RELEASE ORAL at 05:44

## 2019-12-27 RX ADMIN — Medication 1 MILLIGRAM(S): at 12:01

## 2019-12-27 NOTE — PROGRESS NOTE ADULT - ASSESSMENT
Pt is a 50 y/o F with PMHx of GERD, smoker, PTSD, spinal disc herniation, depression, and gastric bypass who presented on 12/17/19 to [facility] for left sided weakness, right gaze preference, and facial droop. Pt was found to have an occlusion of the Right MCA M1 segment with occlusion of left internal carotid artery.    #CVA  - Right MCA M1 segment infarct  - Lipitor  - Aspirin    #HTN  - Continue Enalapril 10mg bid    #Depression  - Fluoxetine 20mg    #Insomnia  - Trazodone 150mg at bedtime    #GI  - Protonix    #Alcohol Abuse  - Vit B12, Folic Acid    #Pain  - Tylenol  - Gabapentin added for neuropathic pain    #Rash  - Nystatin    #Diet  - Dysphagia 1 with pureed thick liquid  - oral care BID    #DVT ppx  - Lovenox

## 2019-12-28 PROCEDURE — 99232 SBSQ HOSP IP/OBS MODERATE 35: CPT

## 2019-12-28 PROCEDURE — 99232 SBSQ HOSP IP/OBS MODERATE 35: CPT | Mod: GC

## 2019-12-28 RX ORDER — ATORVASTATIN CALCIUM 80 MG/1
40 TABLET, FILM COATED ORAL AT BEDTIME
Refills: 0 | Status: DISCONTINUED | OUTPATIENT
Start: 2019-12-28 | End: 2019-12-30

## 2019-12-28 RX ADMIN — GABAPENTIN 100 MILLIGRAM(S): 400 CAPSULE ORAL at 06:23

## 2019-12-28 RX ADMIN — Medication 20 MILLIGRAM(S): at 12:27

## 2019-12-28 RX ADMIN — NYSTATIN CREAM 1 APPLICATION(S): 100000 CREAM TOPICAL at 06:23

## 2019-12-28 RX ADMIN — Medication 81 MILLIGRAM(S): at 12:27

## 2019-12-28 RX ADMIN — Medication 10 MILLIGRAM(S): at 21:49

## 2019-12-28 RX ADMIN — Medication 150 MILLIGRAM(S): at 21:44

## 2019-12-28 RX ADMIN — Medication 1 MILLIGRAM(S): at 12:27

## 2019-12-28 RX ADMIN — ATORVASTATIN CALCIUM 40 MILLIGRAM(S): 80 TABLET, FILM COATED ORAL at 21:44

## 2019-12-28 RX ADMIN — PREGABALIN 1000 MICROGRAM(S): 225 CAPSULE ORAL at 12:27

## 2019-12-28 RX ADMIN — Medication 10 MILLIGRAM(S): at 17:04

## 2019-12-28 RX ADMIN — NYSTATIN CREAM 1 APPLICATION(S): 100000 CREAM TOPICAL at 17:03

## 2019-12-28 RX ADMIN — Medication 10 MILLIGRAM(S): at 08:23

## 2019-12-28 RX ADMIN — PANTOPRAZOLE SODIUM 40 MILLIGRAM(S): 20 TABLET, DELAYED RELEASE ORAL at 06:23

## 2019-12-28 RX ADMIN — ENOXAPARIN SODIUM 40 MILLIGRAM(S): 100 INJECTION SUBCUTANEOUS at 12:27

## 2019-12-28 RX ADMIN — GABAPENTIN 100 MILLIGRAM(S): 400 CAPSULE ORAL at 21:44

## 2019-12-28 RX ADMIN — LIDOCAINE 1 APPLICATION(S): 4 CREAM TOPICAL at 17:06

## 2019-12-28 RX ADMIN — GABAPENTIN 100 MILLIGRAM(S): 400 CAPSULE ORAL at 17:02

## 2019-12-28 RX ADMIN — LIDOCAINE 1 APPLICATION(S): 4 CREAM TOPICAL at 06:23

## 2019-12-28 NOTE — PROGRESS NOTE ADULT - ASSESSMENT
Ms Chang is a pleasant 49 year-old female with hx of GERD, tobacco use, PTSD, spinal disc herniation, depression, and gastric bypass who presented with focal deficits and was found to have an occlusion of the Right MCA M1 segment with occlusion of left internal carotid artery. She developed basal ganglia hemorrhagic conversion as well. She is currently admitted to [facility] for acute rehabilitation of her functional deficits    Neurology  Stroke  - c/w lipitor 20mg qhs, would increase to 80mg qhs  - c/w asa 81mg qd  - pt/ot/slp per primary team  - pain management per primary team  Depression  - fluoxetine 20mg qd  Insomnia  - trazadone 150mg qhs  Alcohol/Tobacco Use  - Vit B12, Folic Acid  - cessation advised    Cardiovascular  Hypertension  - enalapril 10mg bid with hold parameters  - patient's BP very labile, would ideally like to target BP<120 given recent stroke but limited by points of documented borderline BP        dvt prophylaxis: lovenox  diet: per primary team  case discussed with PMR resident  if a clinical question should arise regarding this patient, please do not hesitate to contact me at [phone] until 7pm on 12/28/2019

## 2019-12-29 PROCEDURE — 99232 SBSQ HOSP IP/OBS MODERATE 35: CPT

## 2019-12-29 PROCEDURE — 99232 SBSQ HOSP IP/OBS MODERATE 35: CPT | Mod: GC

## 2019-12-29 RX ADMIN — PREGABALIN 1000 MICROGRAM(S): 225 CAPSULE ORAL at 12:01

## 2019-12-29 RX ADMIN — LIDOCAINE 1 APPLICATION(S): 4 CREAM TOPICAL at 06:03

## 2019-12-29 RX ADMIN — Medication 10 MILLIGRAM(S): at 17:32

## 2019-12-29 RX ADMIN — Medication 10 MILLIGRAM(S): at 05:49

## 2019-12-29 RX ADMIN — NYSTATIN CREAM 1 APPLICATION(S): 100000 CREAM TOPICAL at 05:52

## 2019-12-29 RX ADMIN — GABAPENTIN 100 MILLIGRAM(S): 400 CAPSULE ORAL at 21:25

## 2019-12-29 RX ADMIN — Medication 20 MILLIGRAM(S): at 12:01

## 2019-12-29 RX ADMIN — Medication 650 MILLIGRAM(S): at 00:55

## 2019-12-29 RX ADMIN — GABAPENTIN 100 MILLIGRAM(S): 400 CAPSULE ORAL at 13:50

## 2019-12-29 RX ADMIN — Medication 1 MILLIGRAM(S): at 12:48

## 2019-12-29 RX ADMIN — ATORVASTATIN CALCIUM 40 MILLIGRAM(S): 80 TABLET, FILM COATED ORAL at 21:24

## 2019-12-29 RX ADMIN — Medication 150 MILLIGRAM(S): at 21:25

## 2019-12-29 RX ADMIN — PANTOPRAZOLE SODIUM 40 MILLIGRAM(S): 20 TABLET, DELAYED RELEASE ORAL at 06:02

## 2019-12-29 RX ADMIN — NYSTATIN CREAM 1 APPLICATION(S): 100000 CREAM TOPICAL at 17:33

## 2019-12-29 RX ADMIN — OXYCODONE AND ACETAMINOPHEN 1 TABLET(S): 5; 325 TABLET ORAL at 12:30

## 2019-12-29 RX ADMIN — OXYCODONE AND ACETAMINOPHEN 1 TABLET(S): 5; 325 TABLET ORAL at 21:25

## 2019-12-29 RX ADMIN — LIDOCAINE 1 APPLICATION(S): 4 CREAM TOPICAL at 17:32

## 2019-12-29 RX ADMIN — Medication 650 MILLIGRAM(S): at 07:21

## 2019-12-29 RX ADMIN — GABAPENTIN 100 MILLIGRAM(S): 400 CAPSULE ORAL at 06:02

## 2019-12-29 RX ADMIN — OXYCODONE AND ACETAMINOPHEN 1 TABLET(S): 5; 325 TABLET ORAL at 22:05

## 2019-12-29 RX ADMIN — Medication 81 MILLIGRAM(S): at 12:01

## 2019-12-29 RX ADMIN — ENOXAPARIN SODIUM 40 MILLIGRAM(S): 100 INJECTION SUBCUTANEOUS at 12:48

## 2019-12-29 RX ADMIN — OXYCODONE AND ACETAMINOPHEN 1 TABLET(S): 5; 325 TABLET ORAL at 11:58

## 2019-12-29 NOTE — CHART NOTE - NSCHARTNOTEFT_GEN_A_CORE
Given concern for vascular changes of the right foot. Dopplers were utilized to locate pulses on patient. Dorsalis pedis and posterior tibialis were both audible. Discussed with vascular surgery. Agree with current plan of care. Will obtain ROSA MARIA and vascular surgery will evaluate patient in the AM

## 2019-12-29 NOTE — PROGRESS NOTE ADULT - ASSESSMENT
Ms Chang is a pleasant 49 year-old female with hx of GERD, PVD, tobacco use, PTSD, spinal disc herniation, depression, and gastric bypass who presented with focal deficits and was found to have an occlusion of the Right MCA M1 segment with occlusion of left internal carotid artery. She developed basal ganglia hemorrhagic conversion as well. She is currently admitted to [facility] for acute rehabilitation of her functional deficits    Neurology  Stroke  - c/w lipitor 20mg qhs, would increase to 80mg qhs  - c/w asa 81mg qd  - pt/ot/slp per primary team  - pain management per primary team  Depression  - fluoxetine 20mg qd  Insomnia  - trazadone 150mg qhs  Alcohol/Tobacco Use  - Vit B12, Folic Acid  - cessation advised    Cardiovascular  Hypertension  - enalapril 10mg bid with hold parameters  - patient's BP very labile, would ideally like to target BP<120 given recent stroke but limited by points of documented borderline BP    Peripheral Vascular Disease  - was extensively investigated when pt was in [facility]. Per chart review patient has ischemic rest pain and both feet were cool but neurovascularly intact.   - would obtain vascular surgery input to determine role for anticoagulation vs surgical intervention vs symptom control.     dvt prophylaxis: lovenox  diet: per primary team  case discussed with PMR resident  if a clinical question should arise regarding this patient, please do not hesitate to contact me at [phone] until 7pm on 12/29/2019

## 2019-12-30 ENCOUNTER — TRANSCRIPTION ENCOUNTER (OUTPATIENT)
Age: 49
End: 2019-12-30

## 2019-12-30 ENCOUNTER — INPATIENT (INPATIENT)
Facility: HOSPITAL | Age: 49
LOS: 22 days | Discharge: NURSING, REHAB OR LONG-TERM CARE | DRG: 240 | End: 2020-01-22
Attending: SURGERY | Admitting: HOSPITALIST
Payer: MEDICARE

## 2019-12-30 VITALS
RESPIRATION RATE: 14 BRPM | TEMPERATURE: 98 F | DIASTOLIC BLOOD PRESSURE: 79 MMHG | OXYGEN SATURATION: 98 % | HEART RATE: 78 BPM | SYSTOLIC BLOOD PRESSURE: 131 MMHG

## 2019-12-30 VITALS
SYSTOLIC BLOOD PRESSURE: 119 MMHG | TEMPERATURE: 98 F | RESPIRATION RATE: 18 BRPM | HEART RATE: 74 BPM | OXYGEN SATURATION: 99 % | DIASTOLIC BLOOD PRESSURE: 80 MMHG

## 2019-12-30 DIAGNOSIS — M79.606 PAIN IN LEG, UNSPECIFIED: ICD-10-CM

## 2019-12-30 LAB
ANION GAP SERPL CALC-SCNC: 10 MMOL/L — SIGNIFICANT CHANGE UP (ref 5–17)
BUN SERPL-MCNC: 10 MG/DL — SIGNIFICANT CHANGE UP (ref 7–23)
CALCIUM SERPL-MCNC: 8.7 MG/DL — SIGNIFICANT CHANGE UP (ref 8.4–10.5)
CHLORIDE SERPL-SCNC: 107 MMOL/L — SIGNIFICANT CHANGE UP (ref 96–108)
CO2 SERPL-SCNC: 26 MMOL/L — SIGNIFICANT CHANGE UP (ref 22–31)
CREAT SERPL-MCNC: 0.68 MG/DL — SIGNIFICANT CHANGE UP (ref 0.5–1.3)
GLUCOSE SERPL-MCNC: 117 MG/DL — HIGH (ref 70–99)
HCT VFR BLD CALC: 37.3 % — SIGNIFICANT CHANGE UP (ref 34.5–45)
HGB BLD-MCNC: 12.2 G/DL — SIGNIFICANT CHANGE UP (ref 11.5–15.5)
MCHC RBC-ENTMCNC: 32.7 GM/DL — SIGNIFICANT CHANGE UP (ref 32–36)
MCHC RBC-ENTMCNC: 34.1 PG — HIGH (ref 27–34)
MCV RBC AUTO: 104.2 FL — HIGH (ref 80–100)
NRBC # BLD: 0 /100 WBCS — SIGNIFICANT CHANGE UP (ref 0–0)
PLATELET # BLD AUTO: 455 K/UL — HIGH (ref 150–400)
POTASSIUM SERPL-MCNC: 3.8 MMOL/L — SIGNIFICANT CHANGE UP (ref 3.5–5.3)
POTASSIUM SERPL-SCNC: 3.8 MMOL/L — SIGNIFICANT CHANGE UP (ref 3.5–5.3)
RBC # BLD: 3.58 M/UL — LOW (ref 3.8–5.2)
RBC # FLD: 19.4 % — HIGH (ref 10.3–14.5)
SODIUM SERPL-SCNC: 143 MMOL/L — SIGNIFICANT CHANGE UP (ref 135–145)
WBC # BLD: 10.29 K/UL — SIGNIFICANT CHANGE UP (ref 3.8–10.5)
WBC # FLD AUTO: 10.29 K/UL — SIGNIFICANT CHANGE UP (ref 3.8–10.5)

## 2019-12-30 PROCEDURE — 99233 SBSQ HOSP IP/OBS HIGH 50: CPT

## 2019-12-30 PROCEDURE — 99232 SBSQ HOSP IP/OBS MODERATE 35: CPT | Mod: GC

## 2019-12-30 RX ORDER — ENOXAPARIN SODIUM 100 MG/ML
40 INJECTION SUBCUTANEOUS
Qty: 0 | Refills: 0 | DISCHARGE
Start: 2019-12-30

## 2019-12-30 RX ORDER — FLUOXETINE HCL 10 MG
1 CAPSULE ORAL
Qty: 0 | Refills: 0 | DISCHARGE
Start: 2019-12-30

## 2019-12-30 RX ORDER — ASPIRIN/CALCIUM CARB/MAGNESIUM 324 MG
1 TABLET ORAL
Qty: 0 | Refills: 0 | DISCHARGE
Start: 2019-12-30

## 2019-12-30 RX ORDER — TRAZODONE HCL 50 MG
1 TABLET ORAL
Qty: 0 | Refills: 0 | DISCHARGE
Start: 2019-12-30

## 2019-12-30 RX ORDER — PREGABALIN 225 MG/1
1 CAPSULE ORAL
Qty: 0 | Refills: 0 | DISCHARGE
Start: 2019-12-30

## 2019-12-30 RX ORDER — FOLIC ACID 0.8 MG
1 TABLET ORAL
Qty: 0 | Refills: 0 | DISCHARGE
Start: 2019-12-30

## 2019-12-30 RX ORDER — LIDOCAINE 4 G/100G
1 CREAM TOPICAL
Qty: 0 | Refills: 0 | DISCHARGE
Start: 2019-12-30

## 2019-12-30 RX ORDER — ATORVASTATIN CALCIUM 80 MG/1
1 TABLET, FILM COATED ORAL
Qty: 0 | Refills: 0 | DISCHARGE
Start: 2019-12-30

## 2019-12-30 RX ORDER — GABAPENTIN 400 MG/1
1 CAPSULE ORAL
Qty: 0 | Refills: 0 | DISCHARGE
Start: 2019-12-30

## 2019-12-30 RX ORDER — NYSTATIN CREAM 100000 [USP'U]/G
1 CREAM TOPICAL
Qty: 0 | Refills: 0 | DISCHARGE
Start: 2019-12-30

## 2019-12-30 RX ORDER — ACETAMINOPHEN 500 MG
3 TABLET ORAL
Qty: 0 | Refills: 0 | DISCHARGE
Start: 2019-12-30

## 2019-12-30 RX ORDER — PANTOPRAZOLE SODIUM 20 MG/1
1 TABLET, DELAYED RELEASE ORAL
Qty: 0 | Refills: 0 | DISCHARGE
Start: 2019-12-30

## 2019-12-30 RX ORDER — ACETAMINOPHEN 500 MG
2 TABLET ORAL
Qty: 0 | Refills: 0 | DISCHARGE
Start: 2019-12-30

## 2019-12-30 RX ADMIN — NYSTATIN CREAM 1 APPLICATION(S): 100000 CREAM TOPICAL at 05:46

## 2019-12-30 RX ADMIN — Medication 10 MILLIGRAM(S): at 17:48

## 2019-12-30 RX ADMIN — Medication 20 MILLIGRAM(S): at 12:42

## 2019-12-30 RX ADMIN — PREGABALIN 1000 MICROGRAM(S): 225 CAPSULE ORAL at 12:42

## 2019-12-30 RX ADMIN — ENOXAPARIN SODIUM 40 MILLIGRAM(S): 100 INJECTION SUBCUTANEOUS at 12:42

## 2019-12-30 RX ADMIN — GABAPENTIN 100 MILLIGRAM(S): 400 CAPSULE ORAL at 13:46

## 2019-12-30 RX ADMIN — OXYCODONE AND ACETAMINOPHEN 1 TABLET(S): 5; 325 TABLET ORAL at 18:20

## 2019-12-30 RX ADMIN — PANTOPRAZOLE SODIUM 40 MILLIGRAM(S): 20 TABLET, DELAYED RELEASE ORAL at 06:04

## 2019-12-30 RX ADMIN — GABAPENTIN 100 MILLIGRAM(S): 400 CAPSULE ORAL at 05:42

## 2019-12-30 RX ADMIN — Medication 1 MILLIGRAM(S): at 12:42

## 2019-12-30 RX ADMIN — Medication 650 MILLIGRAM(S): at 05:42

## 2019-12-30 RX ADMIN — Medication 81 MILLIGRAM(S): at 12:42

## 2019-12-30 RX ADMIN — LIDOCAINE 1 APPLICATION(S): 4 CREAM TOPICAL at 17:53

## 2019-12-30 RX ADMIN — OXYCODONE AND ACETAMINOPHEN 1 TABLET(S): 5; 325 TABLET ORAL at 14:20

## 2019-12-30 RX ADMIN — LIDOCAINE 1 APPLICATION(S): 4 CREAM TOPICAL at 05:47

## 2019-12-30 RX ADMIN — OXYCODONE AND ACETAMINOPHEN 1 TABLET(S): 5; 325 TABLET ORAL at 13:46

## 2019-12-30 RX ADMIN — Medication 10 MILLIGRAM(S): at 05:42

## 2019-12-30 RX ADMIN — NYSTATIN CREAM 1 APPLICATION(S): 100000 CREAM TOPICAL at 17:52

## 2019-12-30 RX ADMIN — OXYCODONE AND ACETAMINOPHEN 1 TABLET(S): 5; 325 TABLET ORAL at 17:48

## 2019-12-30 NOTE — PROGRESS NOTE ADULT - ASSESSMENT
Pt is a 50 y/o F with PMHx of GERD, smoker, PTSD, spinal disc herniation, depression, and gastric bypass who presented on 12/17/19 to [facility] for left sided weakness, right gaze preference, and facial droop. Pt was found to have an occlusion of the Right MCA M1 segment with occlusion of left internal carotid artery.    #CVA  - Right MCA M1 segment infarct  - Lipitor  - Aspirin    #Right Foot Pain  - Vascular consult is in and she should be seen today  - pain control   - xray foot    #HTN  - Continue Enalapril 10mg bid    #Depression  - Fluoxetine 20mg    #Insomnia  - Trazodone 150mg at bedtime    #GI  - Protonix    #Alcohol Abuse  - Vit B12, Folic Acid    #Pain  - Tylenol  - Gabapentin added for neuropathic pain    #Rash  - Nystatin    #Diet  - Dysphagia 1 with pureed nectar thick liquid  - oral care BID    #DVT ppx  - Lovenox

## 2019-12-30 NOTE — DISCHARGE NOTE PROVIDER - HOSPITAL COURSE
Pt is a 50 y/o F with PMHx of GERD, smoker, PTSD, spinal disc herniation, depression, and gastric bypass who presented on 12/17/19 to [facility] for left sided weakness, right gaze preference, and facial droop. Pt noted that she had been having weakness to her left leg and arm the week prior to admission and it had gotten progressively worse. The day prior to admission, she noted right sided vision loss. On day of admission, she fell while trying to get off the bed and hit the right side of head, no LOC. Her daughters found her and called the ambulance. 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 She developed basal ganglia hemorrhagic conversion, but was stable. Per vascular, pt has a history consistent with ischemic rest pain (pain at night relieved when she hangs her feet off the side of her bed) and at best has short distance claudication. Both feet, while cool distally, are neurovascularly intact with no appreciable differences. There were no signs of acute embolic disease amenable to thrombectomy. Pt continued to have left sided upper and lower extremity weakness. Pt was evaluated by PT and was recommended for acute inpatient rehabilitation when stable. Pt was deemed stable for discharge, and she was transferred to [facility] on 12/24/19 for acute inpatient rehabilitation.        Pt did well with therapy, however, she continued to have right foot pain, especially at her right 4th toe which was ashy/dusky in color. Patient was evaluated by vascular surgery and an ROSA MARIA was 0.4 which correlates with severe PAD. Vascular surgery recommended transfer to [place] for angiogram and possible stenting or other vascular procedures.

## 2019-12-30 NOTE — H&P ADULT - HISTORY OF PRESENT ILLNESS
Mrs. Pickett is a 48 y/o woman w/ history of chronic smoker, PTSD/depression, spinal disc herniation, GERD, gastric bypass (2004), ex lap for SBO (9 yrs ago) and recent CVA, who is transferred from [place] for severe PVD.    Patient has a history consistent with ischemic rest pain. She has pain at night which gets relieved by hanging her feet down off at the side. She also reports symptoms consistent with vascular claudications of legs even with short ambulation, despite being able to ambulate without any assistance devices. She reports sharp burning pain that is severe (8~10/10 when it is bad) that shoots up from her toes to the thigh area below the knee. This has been ongoing issue, yet got worse lately. The right foot pain is worse on 4th toe which has discoloration to ashy/dusky color, and also on distal plantar surface. Also has left leg pain similar to right yet less in intensity. Since recent stroke and residual left sided weakness, she has been more reliant on right side for functioning, and the foot pain has been getting worse with movements.  Patient states that prior to the recent admission for CVA, patient did not have trouble with the ambulation in terms of muscle strength, paresthesia, or numbness.  At [facility], dana was evaluated by vascular surgery, and an ROSA MARIA was 0.4, correlating w/ severe PAD. Vascular surgery recommended transfer to [place] for angiogram and possible stenting or other vascular procedures, and patient is now transferred to [facility].    Of note, patient had recent CVA. She presented to [facility] on 12/17/19 with left sided weakness, right gaze preference, and facial droop, found to have Right M1 occlusion and Left carotid artery occlusion. She developed basal ganglia hemorrhagic conversion, but was stable. Pt was evaluated by PT and was recommended for acute inpatient rehabilitation when stable. Pt was deemed stable for discharge, and she was transferred to [facility] on 12/24/19 for acute inpatient rehabilitation.    Upon admission, patient is hemodynamically stable.  Temp 36.8, HR 74, /80, RR 18, SpO2 99% on RA.  She continues to report the foot pain, yet is not too severe at the moment.  She has been also constipated, which gives her abdominal discomfort. Last BM today. Otherwise, denies CP, palpitation, SOB, lightheadedness, abdominal pain, melena/hematochezia, n/v/d, dysuria/hematuria, fever, chills. Mrs. Pickett is a 50 y/o woman w/ history of chronic smoker, PTSD/depression, spinal disc herniation, GERD, gastric bypass (2004), ex lap for SBO (9 yrs ago) and recent CVA, who is transferred from [place] for severe PVD.    Patient has a history consistent with ischemic rest pain. She has pain at night which gets relieved by hanging her feet down off at the side. She also reports symptoms consistent with vascular claudications of legs even with short ambulation, despite being able to ambulate without any assistance devices. She reports sharp burning pain that is severe (8~10/10 when it is bad) that shoots up from her toes to the thigh area below the knee. This has been ongoing issue, yet got worse lately. The right foot pain is worse on 4th toe which has discoloration to ashy/dusky color, and also on distal plantar surface. Also has left leg pain similar to right yet less in intensity. Since recent stroke and residual left sided weakness, she has been more reliant on right side for functioning, and the foot pain has been getting worse with movements.  Patient states that prior to the recent admission for CVA, patient did not have trouble with the ambulation in terms of muscle strength, paresthesia, or numbness. However, now, she has severe pain with paresthesia.  At [facility], dana was evaluated by vascular surgery, and an ROSA MARIA was 0.4, correlating w/ severe PAD. Vascular surgery recommended transfer to [place] for angiogram and possible stenting or other vascular procedures, and patient is now transferred to [facility].    Of note, patient had recent CVA. She presented to [facility] on 12/17/19 with left sided weakness, right gaze preference, and facial droop, found to have Right M1 occlusion and Left carotid artery occlusion. She developed basal ganglia hemorrhagic conversion, but was stable. Pt was evaluated by PT and was recommended for acute inpatient rehabilitation when stable. Pt was deemed stable for discharge, and she was transferred to [facility] on 12/24/19 for acute inpatient rehabilitation.    Upon admission, patient is hemodynamically stable.  Temp 36.8, HR 74, /80, RR 18, SpO2 99% on RA.  She continues to report the foot pain, yet is not too severe at the moment.  She has been also constipated, which gives her abdominal discomfort. Last BM today. Otherwise, denies CP, palpitation, SOB, lightheadedness, abdominal pain, melena/hematochezia, n/v/d, dysuria/hematuria, fever, chills.

## 2019-12-30 NOTE — H&P ADULT - NSHPPHYSICALEXAM_GEN_ALL_CORE
Vital Signs Last 24 Hrs  T(C): 36.8 (30 Dec 2019 21:47), Max: 36.8 (30 Dec 2019 14:00)  T(F): 98.3 (30 Dec 2019 21:47), Max: 98.3 (30 Dec 2019 14:00)  HR: 74 (30 Dec 2019 21:47) (74 - 123)  BP: 119/80 (30 Dec 2019 21:47) (119/80 - 156/88)  BP(mean): --  RR: 18 (30 Dec 2019 21:47) (14 - 18)  SpO2: 99% (30 Dec 2019 21:47) (97% - 99%)      PHYSICAL EXAM:    Constitutional: NAD. obese female lying on bed at her left side. on room air.  HEENT: AT/NC, no conjunctival injection. MMM, supple neck.  Respiratory: CTAB. equal aeration bilaterally. no wheezing, no crackes, no rhonchi. no increase in WOB  Cardiovascular: RRR, no M/R/G. 2+ distal pulses. Cap refill < 3 seconds.  Gastrointestinal: soft; NT/ND, +BS  Extremities: right 4th toe with discoloration. tender to palpation and movements. no pedal edema. Was unable to palpate dorsalis pedis pulses bilaterally. popliteal pulses present bilaterally.   Neurological: A&Ox 3; responds to pain; responds to verbal commands and answers questions appropriately; left hemiparesis, decreased epicritic sensation on both feet.   Psychiatric: normal mood/affect.

## 2019-12-30 NOTE — CONSULT NOTE ADULT - ASSESSMENT
49 y.o. female with a recent CVA , LONG SMOKING HX AND NEUROPATHY PRESENTS WITH ISCHEMIC PAIN IN THE RIGHT FOREFOOT WITH GANGRENE OF THE RIGHT 4TH. TOE. THE PATIENT SHOULD BE   TRANSFERRED TO [place] FOR ANGIOGRAM AND REVASCULARIZATION OF THE RIGHT LOWER EXTREMITY. THE PATIENT WAS GIVEN INFORMED CONSENT.

## 2019-12-30 NOTE — DISCHARGE NOTE PROVIDER - CARE PROVIDERS DIRECT ADDRESSES
[email or web address]

## 2019-12-30 NOTE — DISCHARGE NOTE PROVIDER - CARE PROVIDER_API CALL
Shanae Kirkland ()  PhysicalRehab Medicine  [address], [address] [place] [address]  Phone: [phone]  Fax: [phone]  Follow Up Time:     Javier Dennison)  Surgery  [address], [address]  [place], [place] [id number]  Phone: [phone]  Fax: [phone]  Follow Up Time:

## 2019-12-30 NOTE — H&P ADULT - NSHPLABSRESULTS_GEN_ALL_CORE
12.2   10.29 )-----------( 455      ( 30 Dec 2019 06:20 )             37.3       12-30    143  |  107  |  10  ----------------------------<  117<H>  3.8   |  26  |  0.68    Ca    8.7      30 Dec 2019 06:20                        Lactate Trend            CAPILLARY BLOOD GLUCOSE                RADIOLOGY, EKG & ADDITIONAL TESTS: Reviewed.

## 2019-12-30 NOTE — PROGRESS NOTE ADULT - SUBJECTIVE AND OBJECTIVE BOX
HPI:  Pt is a 50 y/o F with PMHx of GERD, smoker, PTSD, spinal disc herniation, depression, and gastric bypass who presented on 12/17/19 to [facility] for left sided weakness, right gaze preference, and facial droop. Pt noted that she had been having weakness to her left leg and arm the week prior to admission and it had gotten progressively worse. The day prior to admission, she noted right sided vision loss. On day of admission, she fell while trying to get off the bed and hit the right side of head, no LOC. Her daughters found her and called the ambulance. 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 She developed basal ganglia hemorrhagic conversion, but was stable. Per vascular, pt has a history consistent with ischemic rest pain (pain at night relieved when she hangs her feet off the side of her bed) and at best has short distance claudication. Both feet, while cool distally, are neurovascularly intact with no appreciable differences. There were no signs of acute embolic disease amenable to thrombectomy. Pt continued to have left sided upper and lower extremity weakness.    SUBJECTIVE / INTERVAL HPI: Patient seen and examined at bedside. Pt is complaining of shooting pain from her toe up to her knee on the right side. Her toe looks better; she previously said it looked black. She continues to favor her right side.     REVIEW OF SYSTEMS:    CONSTITUTIONAL: No fevers or chills  RESPIRATORY: No cough, wheezing, or shortness of breath  CARDIOVASCULAR: No chest pain or palpitations  GASTROINTESTINAL: No abdominal or epigastric pain. No nausea or vomiting. No diarrhea or constipation.   GENITOURINARY: No dysuria, frequency or hematuria  NEUROLOGICAL: + numbness, + weakness  SKIN: No itching, rashes, +bruising of right 4th toe      VITAL SIGNS:  Vital Signs Last 24 Hrs  T(C): 36.6 (27 Dec 2019 08:12), Max: 36.7 (26 Dec 2019 20:37)  T(F): 97.9 (27 Dec 2019 08:12), Max: 98.1 (26 Dec 2019 20:37)  HR: 74 (27 Dec 2019 08:12) (70 - 75)  BP: 114/83 (27 Dec 2019 08:12) (114/83 - 149/96)  BP(mean): --  RR: 14 (27 Dec 2019 08:12) (14 - 14)  SpO2: 98% (27 Dec 2019 08:12) (98% - 98%)    PHYSICAL EXAM:    General: NAD, sitting in wheelchair   Cardiovascular: +S1/S2, RRR  Respiratory: CTA B/L; no W/R/R, no crackles  Gastrointestinal: soft, NT/ND; +BSx4  Extremities: warm, well perfused; Right 4th toe is blue in color, but warm, tender to palpation  Neurological: AAOx3; left hemiplegia    MEDICATIONS:  MEDICATIONS  (STANDING):  aspirin  chewable 81 milliGRAM(s) Oral daily  atorvastatin 20 milliGRAM(s) Oral at bedtime  cyanocobalamin 1000 MICROGram(s) Oral daily  enalapril 10 milliGRAM(s) Oral two times a day  enoxaparin Injectable 40 milliGRAM(s) SubCutaneous daily  FLUoxetine 20 milliGRAM(s) Oral daily  folic acid 1 milliGRAM(s) Oral daily  gabapentin 100 milliGRAM(s) Oral every 8 hours  lidocaine 2% Gel 1 Application(s) Topical two times a day  nystatin Cream 1 Application(s) Topical two times a day  pantoprazole    Tablet 40 milliGRAM(s) Oral before breakfast  traZODone 150 milliGRAM(s) Oral at bedtime    MEDICATIONS  (PRN):  acetaminophen   Tablet .. 650 milliGRAM(s) Oral every 6 hours PRN Temp greater or equal to 38C (100.4F), Mild Pain (1 - 3)  bisacodyl Suppository 10 milliGRAM(s) Rectal daily PRN Constipation  oxycodone    5 mG/acetaminophen 325 mG 1 Tablet(s) Oral every 4 hours PRN Moderate Pain (4 - 6)      ALLERGIES:  Allergies    No Known Allergies    Intolerances        LABS:                        12.1   8.48  )-----------( 339      ( 27 Dec 2019 06:10 )             36.5     12-27    141  |  107  |  8   ----------------------------<  83  3.8   |  25  |  0.62    Ca    8.2<L>      27 Dec 2019 06:10          CAPILLARY BLOOD GLUCOSE          RADIOLOGY & ADDITIONAL TESTS: Reviewed.
HPI:  Pt is a 50 y/o F with PMHx of GERD, smoker, PTSD, spinal disc herniation, depression, and gastric bypass who presented on 12/17/19 to [facility] for left sided weakness, right gaze preference, and facial droop. Pt noted that she had been having weakness to her left leg and arm the week prior to admission and it had gotten progressively worse. The day prior to admission, she noted right sided vision loss. On day of admission, she fell while trying to get off the bed and hit the right side of head, no LOC. Her daughters found her and called the ambulance. 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 She developed basal ganglia hemorrhagic conversion, but was stable. Per vascular, pt has a history consistent with ischemic rest pain (pain at night relieved when she hangs her feet off the side of her bed) and at best has short distance claudication. Both feet, while cool distally, are neurovascularly intact with no appreciable differences. There were no signs of acute embolic disease amenable to thrombectomy. Pt continued to have left sided upper and lower extremity weakness.    SUBJECTIVE / INTERVAL HPI: Patient seen and examined at bedside. Pt is doing well this morning. She is still favoring her right side and has left side neglect. Pt was able to sleep overnight. She complains of right foot pain, due to hitting her foot on a table wheel prior to admission. Otherwise she is doing well.     REVIEW OF SYSTEMS:    CONSTITUTIONAL: No fevers or chills  EYES/ENT: No visual changes;  No vertigo or throat pain   NECK: No pain or stiffness  RESPIRATORY: No cough, wheezing, or shortness of breath  CARDIOVASCULAR: No chest pain or palpitations  GASTROINTESTINAL: No abdominal or epigastric pain. No nausea or vomiting. No diarrhea or constipation.   GENITOURINARY: No dysuria, frequency or hematuria  NEUROLOGICAL: + numbness, + weakness  SKIN: No itching, rashes      VITAL SIGNS:  Vital Signs Last 24 Hrs  T(C): 36.5 (26 Dec 2019 07:56), Max: 36.7 (25 Dec 2019 21:54)  T(F): 97.7 (26 Dec 2019 07:56), Max: 98 (25 Dec 2019 21:54)  HR: 76 (26 Dec 2019 07:56) (70 - 79)  BP: 159/95 (26 Dec 2019 07:56) (118/81 - 159/95)  BP(mean): --  RR: 14 (26 Dec 2019 07:56) (14 - 14)  SpO2: 99% (26 Dec 2019 07:56) (99% - 99%)    PHYSICAL EXAM:    General: NAD, lying in bed eating breakfast  Cardiovascular: warm and well perfused  Respiratory: no respiratory distress; no W/R/R,  Gastrointestinal: soft, NT/ND; +BSx4  Extremities: No lower extremity edema. Right 4th toe is blue (improved per patient) and tender to palpation  Neurological: AAOx3; Left hemiplegia     MEDICATIONS:  MEDICATIONS  (STANDING):  aspirin  chewable 81 milliGRAM(s) Oral daily  atorvastatin 20 milliGRAM(s) Oral at bedtime  cyanocobalamin 1000 MICROGram(s) Oral daily  enalapril 10 milliGRAM(s) Oral two times a day  enoxaparin Injectable 40 milliGRAM(s) SubCutaneous daily  FLUoxetine 20 milliGRAM(s) Oral daily  folic acid 1 milliGRAM(s) Oral daily  gabapentin 100 milliGRAM(s) Oral every 8 hours  nystatin Cream 1 Application(s) Topical two times a day  pantoprazole    Tablet 40 milliGRAM(s) Oral before breakfast  traZODone 150 milliGRAM(s) Oral at bedtime    MEDICATIONS  (PRN):  acetaminophen   Tablet .. 650 milliGRAM(s) Oral every 6 hours PRN Temp greater or equal to 38C (100.4F), Mild Pain (1 - 3)  oxycodone    5 mG/acetaminophen 325 mG 1 Tablet(s) Oral every 4 hours PRN Moderate Pain (4 - 6)      ALLERGIES:  Allergies    No Known Allergies    Intolerances        LABS:                        11.4   8.78  )-----------( 428      ( 25 Dec 2019 06:00 )             34.3     12-25    141  |  105  |  10  ----------------------------<  76  3.5   |  27  |  0.66    Ca    8.2<L>      25 Dec 2019 06:00          CAPILLARY BLOOD GLUCOSE          RADIOLOGY & ADDITIONAL TESTS: Reviewed.
HPI:  Pt is a 50 y/o F with PMHx of GERD, smoker, PTSD, spinal disc herniation, depression, and gastric bypass who presented on 12/17/19 to [facility] for left sided weakness, right gaze preference, and facial droop. Pt noted that she had been having weakness to her left leg and arm the week prior to admission and it had gotten progressively worse. The day prior to admission, she noted right sided vision loss. On day of admission, she fell while trying to get off the bed and hit the right side of head, no LOC. Her daughters found her and called the ambulance. 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 She developed basal ganglia hemorrhagic conversion, but was stable. Per vascular, pt has a history consistent with ischemic rest pain (pain at night relieved when she hangs her feet off the side of her bed) and at best has short distance claudication. Both feet, while cool distally, are neurovascularly intact with no appreciable differences. There were no signs of acute embolic disease amenable to thrombectomy. Pt continued to have left sided upper and lower extremity weakness.    SUBJECTIVE / INTERVAL HPI: Patient seen and examined in OT gym. Pt is still having a lot of pain in her right foot, especially in the 4th toe and on the plantar surface of the foot. She favors her right side due to her left sided weakness, but the pain has been getting worse with movement.     REVIEW OF SYSTEMS:    CONSTITUTIONAL: No fevers or chills  RESPIRATORY: No cough, wheezing, or shortness of breath  CARDIOVASCULAR: No chest pain or palpitations  GASTROINTESTINAL: No abdominal or epigastric pain. No nausea or vomiting. No diarrhea or constipation.   GENITOURINARY: No dysuria, frequency or hematuria  NEUROLOGICAL: + numbness, + weakness  SKIN: No itching, rashes, +bruising of right 4th toe and along the base of the foot      VITAL SIGNS:  Vital Signs Last 24 Hrs  T(C): 36.6 (30 Dec 2019 09:00), Max: 36.7 (29 Dec 2019 20:45)  T(F): 97.9 (30 Dec 2019 09:00), Max: 98.1 (29 Dec 2019 20:45)  HR: 123 (30 Dec 2019 09:00) (83 - 123)  BP: 131/93 (30 Dec 2019 09:00) (125/83 - 156/88)  BP(mean): --  RR: 14 (30 Dec 2019 09:00) (14 - 14)  SpO2: 98% (30 Dec 2019 09:00) (96% - 98%)    PHYSICAL EXAM:    General: NAD, exercising in OT gym  HEENT: NC/AT; PERRL, anicteric sclera; MMM  Neck: supple  Cardiovascular: warm and well perfused  Respiratory: no respiratory distress  Gastrointestinal: soft, NT/ND;  Extremities: warm, well perfused; Right 4th toe ashy in color, right foot is very painful to palpation, plantar surface of right foot bruised  Neurological: AAOx3; left hemiplegia, left facial droop    MEDICATIONS:  MEDICATIONS  (STANDING):  aspirin  chewable 81 milliGRAM(s) Oral daily  atorvastatin 40 milliGRAM(s) Oral at bedtime  cyanocobalamin 1000 MICROGram(s) Oral daily  enalapril 10 milliGRAM(s) Oral two times a day  enoxaparin Injectable 40 milliGRAM(s) SubCutaneous daily  FLUoxetine 20 milliGRAM(s) Oral daily  folic acid 1 milliGRAM(s) Oral daily  gabapentin 100 milliGRAM(s) Oral every 8 hours  lidocaine 2% Gel 1 Application(s) Topical two times a day  nystatin Cream 1 Application(s) Topical two times a day  pantoprazole    Tablet 40 milliGRAM(s) Oral before breakfast  traZODone 150 milliGRAM(s) Oral at bedtime    MEDICATIONS  (PRN):  acetaminophen   Tablet .. 650 milliGRAM(s) Oral every 6 hours PRN Temp greater or equal to 38C (100.4F), Mild Pain (1 - 3)  bisacodyl Suppository 10 milliGRAM(s) Rectal daily PRN Constipation  oxycodone    5 mG/acetaminophen 325 mG 1 Tablet(s) Oral every 4 hours PRN Moderate Pain (4 - 6)      ALLERGIES:  Allergies    No Known Allergies    Intolerances        LABS:                        12.2   10.29 )-----------( 455      ( 30 Dec 2019 06:20 )             37.3     12-30    143  |  107  |  10  ----------------------------<  117<H>  3.8   |  26  |  0.68    Ca    8.7      30 Dec 2019 06:20          CAPILLARY BLOOD GLUCOSE          RADIOLOGY & ADDITIONAL TESTS: Reviewed.
Patient is a 49y old  Female who presents with a chief complaint of CVA (30 Dec 2019 12:47)      Patient seen and examined at bedside. No overnight events reported. Pt reports severe bilateral LE pain, R > L.    ALLERGIES:  No Known Allergies    MEDICATIONS  (STANDING):  aspirin  chewable 81 milliGRAM(s) Oral daily  atorvastatin 40 milliGRAM(s) Oral at bedtime  cyanocobalamin 1000 MICROGram(s) Oral daily  enalapril 10 milliGRAM(s) Oral two times a day  enoxaparin Injectable 40 milliGRAM(s) SubCutaneous daily  FLUoxetine 20 milliGRAM(s) Oral daily  folic acid 1 milliGRAM(s) Oral daily  gabapentin 100 milliGRAM(s) Oral every 8 hours  lidocaine 2% Gel 1 Application(s) Topical two times a day  nystatin Cream 1 Application(s) Topical two times a day  pantoprazole    Tablet 40 milliGRAM(s) Oral before breakfast  traZODone 150 milliGRAM(s) Oral at bedtime    MEDICATIONS  (PRN):  acetaminophen   Tablet .. 650 milliGRAM(s) Oral every 6 hours PRN Temp greater or equal to 38C (100.4F), Mild Pain (1 - 3)  bisacodyl Suppository 10 milliGRAM(s) Rectal daily PRN Constipation  oxycodone    5 mG/acetaminophen 325 mG 1 Tablet(s) Oral every 4 hours PRN Moderate Pain (4 - 6)    Vital Signs Last 24 Hrs  T(F): 98.3 (30 Dec 2019 14:00), Max: 98.3 (30 Dec 2019 14:00)  HR: 84 (30 Dec 2019 14:00) (83 - 123)  BP: 151/90 (30 Dec 2019 14:00) (125/83 - 156/88)  RR: 14 (30 Dec 2019 14:00) (14 - 14)  SpO2: 97% (30 Dec 2019 14:00) (96% - 98%)  I&O's Summary        GENERAL: NAD  HEAD:  Atraumatic, Normocephalic  EYES: EOMI, PERRLA, sclera clear  NECK: Supple  CHEST/LUNG: Clear to auscultation bilaterally; No wheeze  HEART: Regular rate and rhythm; No murmurs, rubs, or gallops  ABDOMEN: Soft, Nontender, Nondistended; Bowel sounds present  EXTREMITIES:  cyanotic right 4th toe, bilateral  to touch. both foot cool to touch  NEUROLOGY: non-focal  SKIN: cyanotic R 4th toe    LABS:                        12.2   10.29 )-----------( 455      ( 30 Dec 2019 06:20 )             37.3     12-30    143  |  107  |  10  ----------------------------<  117  3.8   |  26  |  0.68    Ca    8.7      30 Dec 2019 06:20      eGFR if Non African American: 101 mL/min/1.73M2 (12-30-19 @ 06:20)  eGFR if African American: 118 mL/min/1.73M2 (12-30-19 @ 06:20)                        RADIOLOGY & ADDITIONAL TESTS:    Care Discussed with Consultants/Other Providers:
Patient seen and examined at bedside. No overnight events per nursing or chart review. Patient states that she is doing well. Does note continued pain in her right foot. She states that she has issues sleeping but this has been a chronic issue for her. She denies chest pain, shortness of breath, abdominal pain, n/v/d/c and issues with her bowel movements.     ALLERGIES:  No Known Allergies    MEDICATIONS  (STANDING):  aspirin  chewable 81 milliGRAM(s) Oral daily  atorvastatin 20 milliGRAM(s) Oral at bedtime  cyanocobalamin 1000 MICROGram(s) Oral daily  enalapril 10 milliGRAM(s) Oral two times a day  enoxaparin Injectable 40 milliGRAM(s) SubCutaneous daily  FLUoxetine 20 milliGRAM(s) Oral daily  folic acid 1 milliGRAM(s) Oral daily  gabapentin 100 milliGRAM(s) Oral every 8 hours  lidocaine 2% Gel 1 Application(s) Topical two times a day  nystatin Cream 1 Application(s) Topical two times a day  pantoprazole    Tablet 40 milliGRAM(s) Oral before breakfast  traZODone 150 milliGRAM(s) Oral at bedtime    MEDICATIONS  (PRN):  acetaminophen   Tablet .. 650 milliGRAM(s) Oral every 6 hours PRN Temp greater or equal to 38C (100.4F), Mild Pain (1 - 3)  bisacodyl Suppository 10 milliGRAM(s) Rectal daily PRN Constipation  oxycodone    5 mG/acetaminophen 325 mG 1 Tablet(s) Oral every 4 hours PRN Moderate Pain (4 - 6)    Vital Signs Last 24 Hrs  T(F): 97.7 (28 Dec 2019 08:39), Max: 98 (27 Dec 2019 22:40)  HR: 71 (28 Dec 2019 08:39) (60 - 79)  BP: 130/82 (28 Dec 2019 08:39) (103/69 - 160/98)  RR: 14 (28 Dec 2019 08:39) (14 - 14)  SpO2: 98% (28 Dec 2019 08:39) (95% - 98%)  I&O's Summary    BMI (kg/m2): 34.5 (12-24-19 @ 20:20)  PHYSICAL EXAM:  Gen: nad, resting in bed  Neuro: aaox3, facial assymetry noted, 1/5 on the left upper and lower extremity, 5/5 on the right upper and lower extremity  Heent: eomi b/l, no jvd, no oral exudates  Pulm: cta b/l, no w/r/r  CV: +s1s2, no m/r/g  Ab: soft, nt/nd, normoactive bs x 4  Extrem: no edema, pulses intact and equal      LABS:                        12.1   8.48  )-----------( 339      ( 27 Dec 2019 06:10 )             36.5       12-27    141  |  107  |  8   ----------------------------<  83  3.8   |  25  |  0.62    Ca    8.2      27 Dec 2019 06:10       eGFR if Non African American: 104 mL/min/1.73M2 (12-27-19 @ 06:10)  eGFR if African American: 121 mL/min/1.73M2 (12-27-19 @ 06:10)
Pt. seen and examined at bedside.  No overnight events.  States gabapentin for neuropathic pain is somewhat helpful.  c/o constipation but had BM yesterday.        REVIEW OF SYSTEMS  Constitutional - No fever,  No fatigue  Neurological - No headaches, No loss of strength; widespread neuropathic pain  Musculoskeletal - No joint pain, No joint swelling, No muscle pain    VITALS  T(C): 36.4 (12-29-19 @ 08:02), Max: 37 (12-28-19 @ 20:27)  HR: 89 (12-29-19 @ 08:02) (68 - 89)  BP: 149/86 (12-29-19 @ 08:02) (132/82 - 149/86)  RR: 14 (12-29-19 @ 08:02) (14 - 14)  SpO2: 97% (12-29-19 @ 08:02) (94% - 97%)  Wt(kg): --       MEDICATIONS   acetaminophen   Tablet .. 650 milliGRAM(s) every 6 hours PRN  aspirin  chewable 81 milliGRAM(s) daily  atorvastatin 40 milliGRAM(s) at bedtime  bisacodyl Suppository 10 milliGRAM(s) daily PRN  cyanocobalamin 1000 MICROGram(s) daily  enalapril 10 milliGRAM(s) two times a day  enoxaparin Injectable 40 milliGRAM(s) daily  FLUoxetine 20 milliGRAM(s) daily  folic acid 1 milliGRAM(s) daily  gabapentin 100 milliGRAM(s) every 8 hours  lidocaine 2% Gel 1 Application(s) two times a day  nystatin Cream 1 Application(s) two times a day  oxycodone    5 mG/acetaminophen 325 mG 1 Tablet(s) every 4 hours PRN  pantoprazole    Tablet 40 milliGRAM(s) before breakfast  traZODone 150 milliGRAM(s) at bedtime      RECENT LABS/IMAGING                        ---------  PHYSICAL EXAM  Constitutional - NAD, Comfortable  Pulm - Breathing comfortably, No wheezing  Abd - Soft, NTND  Extremities - No edema, No calf tenderness  Neurologic Exam -                    Cognitive - Awake, Alert     Communication - Fluent     Motor - No focal deficits     Sensory - Intact to LT  Psychiatric - Mood WNL, Affect WNL    ASSESSMENT/PLAN  49y Female with functional deficits after CVA  Continue current medical management  Pain - Tylenol PRN, sherrell, oxy, lido  DVT PPX - aspirin  chewable 81 milliGRAM(s) daily  Active issues  - neuropathic pain  Continue 3hrs a day of comprehensive rehab program.
Pt. seen and examined at bedside.  No overnight events.  c/o widespread neuropathic pain.      REVIEW OF SYSTEMS  Constitutional - No fever,  No fatigue  Neurological - No headaches, No loss of strength  Musculoskeletal - No joint pain, No joint swelling, No muscle pain    VITALS  T(C): 36.5 (12-28-19 @ 08:39), Max: 36.7 (12-27-19 @ 22:40)  HR: 71 (12-28-19 @ 08:39) (60 - 79)  BP: 130/82 (12-28-19 @ 08:39) (103/69 - 160/98)  RR: 14 (12-28-19 @ 08:39) (14 - 14)  SpO2: 98% (12-28-19 @ 08:39) (95% - 98%)  Wt(kg): --       MEDICATIONS   acetaminophen   Tablet .. 650 milliGRAM(s) every 6 hours PRN  aspirin  chewable 81 milliGRAM(s) daily  atorvastatin 20 milliGRAM(s) at bedtime  bisacodyl Suppository 10 milliGRAM(s) daily PRN  cyanocobalamin 1000 MICROGram(s) daily  enalapril 10 milliGRAM(s) two times a day  enoxaparin Injectable 40 milliGRAM(s) daily  FLUoxetine 20 milliGRAM(s) daily  folic acid 1 milliGRAM(s) daily  gabapentin 100 milliGRAM(s) every 8 hours  lidocaine 2% Gel 1 Application(s) two times a day  nystatin Cream 1 Application(s) two times a day  oxycodone    5 mG/acetaminophen 325 mG 1 Tablet(s) every 4 hours PRN  pantoprazole    Tablet 40 milliGRAM(s) before breakfast  traZODone 150 milliGRAM(s) at bedtime      RECENT LABS/IMAGING                        12.1   8.48  )-----------( 339      ( 27 Dec 2019 06:10 )             36.5     12-27    141  |  107  |  8   ----------------------------<  83  3.8   |  25  |  0.62    Ca    8.2<L>      27 Dec 2019 06:10                    ---------  PHYSICAL EXAM  Constitutional - NAD, Comfortable  Pulm - Breathing comfortably, No wheezing  Abd - Soft, NTND  Extremities - No edema, No calf tenderness  Neurologic Exam -                    Cognitive - Awake, Alert     Communication - Fluent     Motor - dense L HP     Sensory - Intact to LT  Psychiatric - Mood WNL, Affect WNL    ASSESSMENT/PLAN  49y Female with functional deficits after CVA -> L HP  Continue current medical management  Pain - Tylenol PRN; sherrell; oxy  DVT PPX - enoxaparin Injectable 40 milliGRAM(s) daily  Active issues - neuropathic pain as above  Continue 3hrs a day of comprehensive rehab program.
Patient seen and examined at bedside. No overnight events per nursing or chart review. Patient denies chest pain, palpitations, shortness of breath, abdominal pain, n/v/d/c. She also denies blood in her urine or bowel movements. She continues to note pain in her right foot.     ALLERGIES:  No Known Allergies    MEDICATIONS  (STANDING):  aspirin  chewable 81 milliGRAM(s) Oral daily  atorvastatin 40 milliGRAM(s) Oral at bedtime  cyanocobalamin 1000 MICROGram(s) Oral daily  enalapril 10 milliGRAM(s) Oral two times a day  enoxaparin Injectable 40 milliGRAM(s) SubCutaneous daily  FLUoxetine 20 milliGRAM(s) Oral daily  folic acid 1 milliGRAM(s) Oral daily  gabapentin 100 milliGRAM(s) Oral every 8 hours  lidocaine 2% Gel 1 Application(s) Topical two times a day  nystatin Cream 1 Application(s) Topical two times a day  pantoprazole    Tablet 40 milliGRAM(s) Oral before breakfast  traZODone 150 milliGRAM(s) Oral at bedtime    MEDICATIONS  (PRN):  acetaminophen   Tablet .. 650 milliGRAM(s) Oral every 6 hours PRN Temp greater or equal to 38C (100.4F), Mild Pain (1 - 3)  bisacodyl Suppository 10 milliGRAM(s) Rectal daily PRN Constipation  oxycodone    5 mG/acetaminophen 325 mG 1 Tablet(s) Oral every 4 hours PRN Moderate Pain (4 - 6)    Vital Signs Last 24 Hrs  T(F): 97.6 (29 Dec 2019 08:02), Max: 98.6 (28 Dec 2019 20:27)  HR: 89 (29 Dec 2019 08:02) (68 - 89)  BP: 149/86 (29 Dec 2019 08:02) (132/82 - 149/86)  RR: 14 (29 Dec 2019 08:02) (14 - 14)  SpO2: 97% (29 Dec 2019 08:02) (94% - 97%)  I&O's Summary    28 Dec 2019 07:01  -  29 Dec 2019 07:00  --------------------------------------------------------  IN: 0 mL / OUT: 1 mL / NET: -1 mL      BMI (kg/m2): 34.5 (12-24-19 @ 20:20)  PHYSICAL EXAM:  Gen: nad, resting in bed  Neuro: aaox3, 0/5 left upper and lower extremity muscle strength, 5/5 right upper and lower extremity muscle strength, left facial droop noted (unchanged)  Heent: eomi b/l, no jvd, no oral exudates  Pulm: cta b/l, no w/r/r  CV: +s1s2, no m/r/g  Ab: soft, nt/nd, normoactive bs x 4  Extrem: no edema +2 dorsalis pedis and posterior tibialis on the right with +2 of the same on the left. 3rd metatarsal appears ecchymotic and the dorsal portion of her foot appears bailey in color. Both feet appear slightly cool to the touch      LABS:                        12.1   8.48  )-----------( 339      ( 27 Dec 2019 06:10 )             36.5       12-27    141  |  107  |  8   ----------------------------<  83  3.8   |  25  |  0.62    Ca    8.2      27 Dec 2019 06:10       eGFR if Non African American: 104 mL/min/1.73M2 (12-27-19 @ 06:10)  eGFR if African American: 121 mL/min/1.73M2 (12-27-19 @ 06:10)        RADIOLOGY & ADDITIONAL TESTS:    Care Discussed with Consultants/Other Providers:

## 2019-12-30 NOTE — H&P ADULT - PROBLEM SELECTOR PLAN 3
- c/w SSRI (trazodone 150mg at bedtime for insomnia and possible augmentation for depression. fluoxetine 20mg qD) - c/w SSRI (trazodone 150mg at bedtime for insomnia and possible augmentation for depression. fluoxetine 20mg qD)  - patient drinks alcohol daily for insomnia. Outside time window for withdrawal. However, will need discussion regarding alcohol use prior to discharge. - c/w SSRI (trazodone 150mg at bedtime for insomnia and possible augmentation for depression. fluoxetine 20mg qD)  - patient drinks alcohol daily for insomnia. Outside time window for withdrawal. However, will need discussion regarding alcohol use prior to discharge.  - EKG

## 2019-12-30 NOTE — PROGRESS NOTE ADULT - PROVIDER SPECIALTY LIST ADULT
Hospitalist
Hospitalist
Rehab Medicine
Hospitalist

## 2019-12-30 NOTE — H&P ADULT - NSHPSOCIALHISTORY_GEN_ALL_CORE
Lives with two daughters.  Used to ambulate without assistance device prior to CVA  Daily smoker (chronic smoker for multiple years. 1/2 ppd)  Everyday Alcohol use (to sleep. 1-2 Beers and other shots per night)  no recent rec drug use

## 2019-12-30 NOTE — CONSULT NOTE ADULT - SUBJECTIVE AND OBJECTIVE BOX
History of Present Illness:  49y.o. Female with a  long history of smoking  ( > 30 pack years)  recently suffered a right CVA with left hemiparesis and was admitted to acute rehab for PT therapy. Apparently while at [facility] in [place] she had pain and discoloration in her right  forefoot and was told she had significant PAD in both feet but no interventions were done. Because of increasing pain in her right  forefoot I was requested to evaluate her LE circulation.    PAST MEDICAL & SURGICAL HISTORY:  Chronic insomnia  PTSD (post-traumatic stress disorder)  ETOH abuse  Anxiety  HTN (hypertension)  Depression  Tubal Ligation Status  Status Post Gastric Bypass for Obesity  Status Post Laparoscopic Cholecystectomy      Allergies    No Known Allergies    Intolerances        MEDICATIONS  (STANDING):  aspirin  chewable 81 milliGRAM(s) Oral daily  atorvastatin 40 milliGRAM(s) Oral at bedtime  cyanocobalamin 1000 MICROGram(s) Oral daily  enalapril 10 milliGRAM(s) Oral two times a day  enoxaparin Injectable 40 milliGRAM(s) SubCutaneous daily  FLUoxetine 20 milliGRAM(s) Oral daily  folic acid 1 milliGRAM(s) Oral daily  gabapentin 100 milliGRAM(s) Oral every 8 hours  lidocaine 2% Gel 1 Application(s) Topical two times a day  nystatin Cream 1 Application(s) Topical two times a day  pantoprazole    Tablet 40 milliGRAM(s) Oral before breakfast  traZODone 150 milliGRAM(s) Oral at bedtime    MEDICATIONS  (PRN):  acetaminophen   Tablet .. 650 milliGRAM(s) Oral every 6 hours PRN Temp greater or equal to 38C (100.4F), Mild Pain (1 - 3)  bisacodyl Suppository 10 milliGRAM(s) Rectal daily PRN Constipation  oxycodone    5 mG/acetaminophen 325 mG 1 Tablet(s) Oral every 4 hours PRN Moderate Pain (4 - 6)      Social History:  Smoking History:> 30 pack years  Alcohol Use: abuse    REVIEW OF SYSTEMS:  CONSTITUTIONAL: No weakness, fevers or chills  EYES/ENT: No visual changes;  No vertigo or throat pain   NECK: No pain or stiffness  RESPIRATORY: No cough, wheezing, hemoptysis; No shortness of breath  CARDIOVASCULAR: No chest pain or palpitations  GASTROINTESTINAL: No abdominal or epigastric pain. No nausea, vomiting, or hematemesis; No diarrhea or constipation. No melena or hematochezia.  GENITOURINARY: No dysuria, frequency or hematuria  NEUROLOGICAL:++ pedal paresthesias and left hemiparesis  SKIN: No itching, burning, rashes, or lesions   Vascular:  +++ right  lower extremity claudication, and  right  pedal rest pain with gangrene of right 4th toe  All other review of systems is negative unless indicated above.    PHYSICAL EXAM:  General:  On exam, the patient is alert nontoxic appearing Female in no acute distress.  Vital Signs Last 24 Hrs  T(C): 36.6 (30 Dec 2019 09:00), Max: 36.7 (29 Dec 2019 20:45)  T(F): 97.9 (30 Dec 2019 09:00), Max: 98.1 (29 Dec 2019 20:45)  HR: 123 (30 Dec 2019 09:00) (83 - 123)  BP: 131/93 (30 Dec 2019 09:00) (125/83 - 156/88)  BP(mean): --  RR: 14 (30 Dec 2019 09:00) (14 - 14)  SpO2: 98% (30 Dec 2019 09:00) (96% - 98%)    Neck:  4+/4+ bilateral carotid pulses; no carotid bruit, no palpable cervical masses.  Heart:  Regular, no murmurs, rubs or gallops.    Lungs:  Clear to auscultation.    Chest:  No chest wall deformities  Symmetrical chest expansion.   Abdomen: Soft and nontender.  No palpable masses.  No rebound, guarding or rigidity.  Extremities: There is  gangrenous changes of the right 4th toe with ischemic demarcation on the plantar side  of right foot overlying the 4th and 5th metatarsal heads.   There are no  heel decubiti.  The calf and thigh muscles are soft and nontender.  There are no palpable cords or limb cellulitis.  Seema's sign is negative bilaterally.  There is no lower extremity edema.   On examination of the peripheral pulses:  Left leg femoral pulse is 4/4   , popliteal pulse is 3/4   ,PT Pulse is 2/4  , DP Pulse is 0  Right leg femoral pulse is 4/4  ,popliteal pulse is 2/4  , PT Pulse is 0 , DP Pulse is 0  Doppler signals: + right DP and left PT signlas  Neurological:  ++  left hemiparesis. There is decreased vibratory sensation in both feet c/w neuropathy.                          12.2   10.29 )-----------( 455      ( 30 Dec 2019 06:20 )             37.3     12-30    143  |  107  |  10  ----------------------------<  117<H>  3.8   |  26  |  0.68    Ca    8.7      30 Dec 2019 06:20              Radiology:

## 2019-12-30 NOTE — H&P ADULT - ATTENDING COMMENTS
Patient assigned to me by night hospitalist in charge for management and care for patient for this evening only. Care to be resumed by day hospitalist in the morning and thereafter.     This patient is a 49yoF with PMH of GERD, smoker, PTSD, depression, gastric bypass, PTSD, recent CVA who presents to [facility] as transfer from [facility] for peripheral vascular disease. The patient had recent LUE and LLE weakness and fall out of bed, and was found at [facility] to have R M1 occlusion and left carotid artery occlusion, with basal ganglia hemorrhagic conversion. She also had pain in the lower extremities with associated claudication and seen by vascular team. She was discharged to [facility] on 12/24/2019 for acute inpatient rehab. Patient reports over the last 1.5 weeks, she has had persistent excruciating lower extremity pain, worse in the RLE, which  limits her ability to ambulate. She also had dusky discoloration of the right 4th toe. She was evaluated by vascular surgery, and found to have to have ROSA MARIA of 0.4, with severe PAD of RLE and mild PAD of LLE.   On exam: A&O x 3, EOMI, mucous membranes moist, LUE and LLE 0/5 strength, 5/5 RUE and RLE strength, full sensation to light touch, symmetric eyebrow raise, L facial droop, left tongue deviation, cardiac exam NS1S2 no r/m/g, lungs clear anteriorly to auscultation, abd soft nontender and nondistended, lower extremities with palpable popliteal pulses but absent DP pulse, skin is shiny at feet and R 4th toe appears dusky and is very tender to palpation.   Agree with plan noted above for CTangio of lower extremity vasculature. Continue aspirin, statin.  Patient reports history of PUD- avoid NSAIDs, continue protonix and pepcid.  Patient has hx of cardiac catheterization about 20y ago and states she did not require stent at that time.   I spoke to the patient regarding the benefits of smoking cessation to reduce risk of MI, CVA and atherosclerosis, and she is amenable to starting nicotine patches.

## 2019-12-30 NOTE — H&P ADULT - PROBLEM SELECTOR PLAN 2
Acute M1 CVA w/ residual left sided weakness.  - PT and Speech & Swallow consult  - c/w ASA and Statin  - patient with hemorrhagic transformation at [facility]. Stable for now. judicious use of anticoagulation.  - fall precaution  - aspiration precaution  - dysphagia diet Acute M1 CVA w/ residual left sided weakness.  - PT and Speech & Swallow consult  - c/w ASA and Statin  - patient with hemorrhagic transformation at [facility]. Stable for now. If patient requires anticoagulation during assessment/treatment of PVD, will need to discuss with neurology team first given recent CVA with hemorrhagic transformation.   - fall precaution  - aspiration precaution  - dysphagia diet

## 2019-12-30 NOTE — PROGRESS NOTE ADULT - ASSESSMENT
Ms Chang is a pleasant 49 year-old female with hx of GERD, PVD, tobacco use, PTSD, spinal disc herniation, depression, and gastric bypass who presented with focal deficits and was found to have an occlusion of the Right MCA M1 segment with occlusion of left internal carotid artery. She developed basal ganglia hemorrhagic conversion as well. She is currently admitted to [facility] for acute rehabilitation of her functional deficits. course complicated with ischemic pain on right foot with gangrenous right 4th toe.    Stroke  - c/w lipitor 20mg qhs, would increase to 80mg qhs  - c/w asa 81mg qd  - pt/ot/slp per primary team  - pain management per primary team    Gangrenous right 4th toe  - per vascular, transfer to [place] for angiogram   - spoke to vascular, hold anticoagulations for now and cont ASA 81mg, lovenox 40mg q24  - lipitor   - follow up result of ROSA MARIA    Depression  - fluoxetine 20mg qd    Insomnia  - trazadone 150mg qhs    Alcohol/Tobacco Use  - Vit B12, Folic Acid  - cessation advised    Hypertension  - enalapril 10mg bid with hold parameters  - patient's BP very labile, would ideally like to target BP<140 given recent stroke      dvt prophylaxis: lovenox  diet: per primary team  case discussed with PMR Ms Chang is a pleasant 49 year-old female with hx of GERD, PVD, tobacco use, PTSD, spinal disc herniation, depression, and gastric bypass who presented with focal deficits and was found to have an occlusion of the Right MCA M1 segment with occlusion of left internal carotid artery. She developed basal ganglia hemorrhagic conversion as well. She is currently admitted to [facility] for acute rehabilitation of her functional deficits. course complicated with ischemic pain on right foot with gangrenous right 4th toe.    Stroke  - c/w lipitor 20mg qhs, would increase to 80mg qhs  - c/w asa 81mg qd  - pt/ot/slp per primary team  - pain management per primary team    Gangrenous right 4th toe likely due to underlying peripheral vascular disease  - per vascular, transfer to [place] for angiogram   - spoke to vascular, hold anticoagulations for now and cont ASA 81mg, lovenox 40mg q24  - lipitor   - follow up result of ROSA MARIA    Depression  - fluoxetine 20mg qd    Insomnia  - trazadone 150mg qhs    Alcohol/Tobacco Use  - Vit B12, Folic Acid  - cessation advised    Hypertension  - enalapril 10mg bid with hold parameters  - patient's BP very labile, would ideally like to target BP<140 given recent stroke      dvt prophylaxis: lovenox  diet: per primary team  case discussed with PMR

## 2019-12-30 NOTE — H&P ADULT - NSHPREVIEWOFSYSTEMS_GEN_ALL_CORE
CONSTITUTIONAL: No fever, weight loss, or fatigue  EYES: No eye pain or discharge  ENMT:  No difficulty hearing, tinnitus, vertigo; No sinus or throat pain  RESPIRATORY: No cough, wheezing, chills or hemoptysis; No shortness of breath  CARDIOVASCULAR: No chest pain, palpitations, or leg swelling  GASTROINTESTINAL: constipation No abdominal or epigastric pain. No nausea, vomiting, or hematemesis; No diarrhea No melena or hematochezia.  GENITOURINARY: No dysuria, frequency, hematuria, or incontinence  NEUROLOGICAL: left sided weakness.  LYMPH NODES: No enlarged glands  ENDOCRINE: No heat or cold intolerance; No polydipsia or polyuria  MUSCULOSKELETAL: bilateral foot pain.  PSYCHIATRIC: Denies depression, anxiety  HEME/LYMPH: No easy bruising, or bleeding gums  ALLERGY AND IMMUNOLOGIC: No hives or eczema CONSTITUTIONAL: No fever, weight loss, or fatigue  EYES: No eye pain or discharge  ENMT:  No difficulty hearing, tinnitus, vertigo; No sinus or throat pain  RESPIRATORY: No cough, wheezing, chills or hemoptysis; No shortness of breath  CARDIOVASCULAR: No chest pain, palpitations, or leg swelling  GASTROINTESTINAL: constipation No abdominal or epigastric pain. No nausea, vomiting, or hematemesis; No diarrhea No melena or hematochezia.  GENITOURINARY: No dysuria, frequency, hematuria, or incontinence  NEUROLOGICAL: left sided weakness. paresthesia at foot  LYMPH NODES: No enlarged glands  ENDOCRINE: No heat or cold intolerance; No polydipsia or polyuria  MUSCULOSKELETAL: bilateral foot pain.  PSYCHIATRIC: Denies depression, anxiety  HEME/LYMPH: No easy bruising, or bleeding gums  ALLERGY AND IMMUNOLOGIC: No hives or eczema

## 2019-12-30 NOTE — H&P ADULT - ASSESSMENT
Mrs. Pickett is a 50 y/o woman w/ history of chronic smoker, PTSD/depression, spinal disc herniation, GERD, gastric bypass (2004), ex lap for SBO (9 yrs ago) and recent CVA, who is transferred from [place] for severe PVD.

## 2019-12-30 NOTE — H&P ADULT - PROBLEM SELECTOR PLAN 1
ROSA MARIA finding c/w severe PAD of right foot w/ 4th toe discoloration.  - vascular surgery on board  - no emergent vascular surgery  - CT angiography of LE   - c/w ASA and Statin given recent CVA.  - pain control: Percocet and tylenol PRN.  - gabapentin ATC for neuropathic pain.  - currently on SSRI for depression.  - will need smoking cessation. ROSA MARIA finding c/w severe PAD of right foot w/ 4th toe discoloration.  - vascular surgery on board  - no emergent vascular surgery  - CT angiography of LE   - c/w ASA and Statin given recent CVA.  - pain control: Percocet and tylenol PRN.  - gabapentin ATC for neuropathic pain.  - currently on SSRI for depression.  - will need smoking cessation.  - consider cilostazole given patient's claudication symptoms. ROSA MARIA finding c/w severe PAD of right foot w/ 4th toe discoloration.  - vascular surgery on board  - no emergent vascular surgery  - CT angiography of LE   - c/w ASA and Statin given recent CVA.  - pain control: Percocet and tylenol PRN.  - gabapentin ATC for neuropathic pain.  - currently on SSRI for depression. Hold trazodone to avoid 2 concurrent SSRI dosing.  - will need smoking cessation.  - consider cilostazole given patient's claudication symptoms.

## 2019-12-30 NOTE — DISCHARGE NOTE PROVIDER - NSDCCPCAREPLAN_GEN_ALL_CORE_FT
PRINCIPAL DISCHARGE DIAGNOSIS  Diagnosis: Cerebrovascular accident (CVA)  Assessment and Plan of Treatment: You originally presented to the [facility] with left sided weakness and facial droop. You were found to have an occlusion of the one of the blood vessels in your brain. You started acute rehabilitation at [place] and were doing well with therapy. You will continue to have physical, occupational, and speech therapy and continue with your medications.      SECONDARY DISCHARGE DIAGNOSES  Diagnosis: PAD (peripheral artery disease)  Assessment and Plan of Treatment: You had peripheral artery disease of your feet, and durign your stay at [place], you had pain and bruising of your right foot. Your 4th toe was especially bruised and gray in color. Vascular surgery was consulted, and when they evaluated you, they found that you had severe PAD and recommended transfer to [place] for an angiogram, a study to look at the arteries in your feet. You will be transferred and the vascular team will follow you.

## 2019-12-30 NOTE — DISCHARGE NOTE PROVIDER - NSDCMRMEDTOKEN_GEN_ALL_CORE_FT
acetaminophen 325 mg oral tablet: 2 tab(s) orally every 6 hours, As needed, Temp greater or equal to 38C (100.4F), Mild Pain (1 - 3)  aspirin 81 mg oral tablet, chewable: 1 tab(s) orally once a day  atorvastatin 40 mg oral tablet: 1 tab(s) orally once a day (at bedtime)  bisacodyl 10 mg rectal suppository: 1 suppository(ies) rectal once a day, As needed, Constipation  cyanocobalamin 1000 mcg oral tablet: 1 tab(s) orally once a day  enalapril 10 mg oral tablet: 1 tab(s) orally 2 times a day  enoxaparin: 40 milligram(s) subcutaneously once a day  FLUoxetine 20 mg oral capsule: 1 cap(s) orally once a day  folic acid 1 mg oral tablet: 1 tab(s) orally once a day  gabapentin 100 mg oral capsule: 1 cap(s) orally every 8 hours  lidocaine 2% topical gel with applicator: 1 application topically 2 times a day  nystatin 100,000 units/g topical cream: 1 application topically 2 times a day  oxycodone-acetaminophen 5 mg-325 mg oral tablet: 1 tab(s) orally every 4 hours, As needed, Moderate Pain (4 - 6)  pantoprazole 40 mg oral delayed release tablet: 1 tab(s) orally once a day (before a meal)  traZODone 150 mg oral tablet: 1 tab(s) orally once a day (at bedtime)

## 2019-12-31 DIAGNOSIS — I73.9 PERIPHERAL VASCULAR DISEASE, UNSPECIFIED: ICD-10-CM

## 2019-12-31 DIAGNOSIS — K21.9 GASTRO-ESOPHAGEAL REFLUX DISEASE WITHOUT ESOPHAGITIS: ICD-10-CM

## 2019-12-31 DIAGNOSIS — F32.9 MAJOR DEPRESSIVE DISORDER, SINGLE EPISODE, UNSPECIFIED: ICD-10-CM

## 2019-12-31 DIAGNOSIS — I63.9 CEREBRAL INFARCTION, UNSPECIFIED: ICD-10-CM

## 2019-12-31 DIAGNOSIS — Z29.9 ENCOUNTER FOR PROPHYLACTIC MEASURES, UNSPECIFIED: ICD-10-CM

## 2019-12-31 DIAGNOSIS — I10 ESSENTIAL (PRIMARY) HYPERTENSION: ICD-10-CM

## 2019-12-31 DIAGNOSIS — Z02.9 ENCOUNTER FOR ADMINISTRATIVE EXAMINATIONS, UNSPECIFIED: ICD-10-CM

## 2019-12-31 LAB
ALBUMIN SERPL ELPH-MCNC: 3.1 G/DL — LOW (ref 3.3–5)
ALP SERPL-CCNC: 139 U/L — HIGH (ref 40–120)
ALT FLD-CCNC: 38 U/L — SIGNIFICANT CHANGE UP (ref 10–45)
ANION GAP SERPL CALC-SCNC: 13 MMOL/L — SIGNIFICANT CHANGE UP (ref 5–17)
ANION GAP SERPL CALC-SCNC: 14 MMOL/L — SIGNIFICANT CHANGE UP (ref 5–17)
APTT BLD: 28.4 SEC — SIGNIFICANT CHANGE UP (ref 27.5–36.3)
AST SERPL-CCNC: 56 U/L — HIGH (ref 10–40)
BASOPHILS # BLD AUTO: 0.06 K/UL — SIGNIFICANT CHANGE UP (ref 0–0.2)
BASOPHILS NFR BLD AUTO: 0.6 % — SIGNIFICANT CHANGE UP (ref 0–2)
BILIRUB SERPL-MCNC: 0.4 MG/DL — SIGNIFICANT CHANGE UP (ref 0.2–1.2)
BLD GP AB SCN SERPL QL: NEGATIVE — SIGNIFICANT CHANGE UP
BUN SERPL-MCNC: 10 MG/DL — SIGNIFICANT CHANGE UP (ref 7–23)
BUN SERPL-MCNC: 10 MG/DL — SIGNIFICANT CHANGE UP (ref 7–23)
CALCIUM SERPL-MCNC: 9 MG/DL — SIGNIFICANT CHANGE UP (ref 8.4–10.5)
CALCIUM SERPL-MCNC: 9.3 MG/DL — SIGNIFICANT CHANGE UP (ref 8.4–10.5)
CHLORIDE SERPL-SCNC: 98 MMOL/L — SIGNIFICANT CHANGE UP (ref 96–108)
CHLORIDE SERPL-SCNC: 98 MMOL/L — SIGNIFICANT CHANGE UP (ref 96–108)
CO2 SERPL-SCNC: 25 MMOL/L — SIGNIFICANT CHANGE UP (ref 22–31)
CO2 SERPL-SCNC: 25 MMOL/L — SIGNIFICANT CHANGE UP (ref 22–31)
CREAT SERPL-MCNC: 0.49 MG/DL — LOW (ref 0.5–1.3)
CREAT SERPL-MCNC: 0.52 MG/DL — SIGNIFICANT CHANGE UP (ref 0.5–1.3)
EOSINOPHIL # BLD AUTO: 0.25 K/UL — SIGNIFICANT CHANGE UP (ref 0–0.5)
EOSINOPHIL NFR BLD AUTO: 2.6 % — SIGNIFICANT CHANGE UP (ref 0–6)
GLUCOSE SERPL-MCNC: 122 MG/DL — HIGH (ref 70–99)
GLUCOSE SERPL-MCNC: 98 MG/DL — SIGNIFICANT CHANGE UP (ref 70–99)
HCT VFR BLD CALC: 35.2 % — SIGNIFICANT CHANGE UP (ref 34.5–45)
HCT VFR BLD CALC: 38.3 % — SIGNIFICANT CHANGE UP (ref 34.5–45)
HGB BLD-MCNC: 11.7 G/DL — SIGNIFICANT CHANGE UP (ref 11.5–15.5)
HGB BLD-MCNC: 12.3 G/DL — SIGNIFICANT CHANGE UP (ref 11.5–15.5)
IMM GRANULOCYTES NFR BLD AUTO: 0.6 % — SIGNIFICANT CHANGE UP (ref 0–1.5)
INR BLD: 1.11 RATIO — SIGNIFICANT CHANGE UP (ref 0.88–1.16)
LYMPHOCYTES # BLD AUTO: 1.64 K/UL — SIGNIFICANT CHANGE UP (ref 1–3.3)
LYMPHOCYTES # BLD AUTO: 16.9 % — SIGNIFICANT CHANGE UP (ref 13–44)
MAGNESIUM SERPL-MCNC: 2 MG/DL — SIGNIFICANT CHANGE UP (ref 1.6–2.6)
MAGNESIUM SERPL-MCNC: 2.1 MG/DL — SIGNIFICANT CHANGE UP (ref 1.6–2.6)
MCHC RBC-ENTMCNC: 32.1 GM/DL — SIGNIFICANT CHANGE UP (ref 32–36)
MCHC RBC-ENTMCNC: 33 PG — SIGNIFICANT CHANGE UP (ref 27–34)
MCHC RBC-ENTMCNC: 33.2 GM/DL — SIGNIFICANT CHANGE UP (ref 32–36)
MCHC RBC-ENTMCNC: 33.6 PG — SIGNIFICANT CHANGE UP (ref 27–34)
MCV RBC AUTO: 101.1 FL — HIGH (ref 80–100)
MCV RBC AUTO: 102.7 FL — HIGH (ref 80–100)
MONOCYTES # BLD AUTO: 0.88 K/UL — SIGNIFICANT CHANGE UP (ref 0–0.9)
MONOCYTES NFR BLD AUTO: 9 % — SIGNIFICANT CHANGE UP (ref 2–14)
NEUTROPHILS # BLD AUTO: 6.84 K/UL — SIGNIFICANT CHANGE UP (ref 1.8–7.4)
NEUTROPHILS NFR BLD AUTO: 70.3 % — SIGNIFICANT CHANGE UP (ref 43–77)
NRBC # BLD: 0 /100 WBCS — SIGNIFICANT CHANGE UP (ref 0–0)
NRBC # BLD: 0 /100 WBCS — SIGNIFICANT CHANGE UP (ref 0–0)
PHOSPHATE SERPL-MCNC: 4.5 MG/DL — SIGNIFICANT CHANGE UP (ref 2.5–4.5)
PHOSPHATE SERPL-MCNC: 4.9 MG/DL — HIGH (ref 2.5–4.5)
PLATELET # BLD AUTO: 455 K/UL — HIGH (ref 150–400)
PLATELET # BLD AUTO: 459 K/UL — HIGH (ref 150–400)
POTASSIUM SERPL-MCNC: 3.6 MMOL/L — SIGNIFICANT CHANGE UP (ref 3.5–5.3)
POTASSIUM SERPL-MCNC: 4.5 MMOL/L — SIGNIFICANT CHANGE UP (ref 3.5–5.3)
POTASSIUM SERPL-SCNC: 3.6 MMOL/L — SIGNIFICANT CHANGE UP (ref 3.5–5.3)
POTASSIUM SERPL-SCNC: 4.5 MMOL/L — SIGNIFICANT CHANGE UP (ref 3.5–5.3)
PROT SERPL-MCNC: 6.2 G/DL — SIGNIFICANT CHANGE UP (ref 6–8.3)
PROTHROM AB SERPL-ACNC: 12.7 SEC — SIGNIFICANT CHANGE UP (ref 10–12.9)
RBC # BLD: 3.48 M/UL — LOW (ref 3.8–5.2)
RBC # BLD: 3.73 M/UL — LOW (ref 3.8–5.2)
RBC # FLD: 19.2 % — HIGH (ref 10.3–14.5)
RBC # FLD: 19.6 % — HIGH (ref 10.3–14.5)
RH IG SCN BLD-IMP: POSITIVE — SIGNIFICANT CHANGE UP
SODIUM SERPL-SCNC: 136 MMOL/L — SIGNIFICANT CHANGE UP (ref 135–145)
SODIUM SERPL-SCNC: 137 MMOL/L — SIGNIFICANT CHANGE UP (ref 135–145)
WBC # BLD: 9.57 K/UL — SIGNIFICANT CHANGE UP (ref 3.8–10.5)
WBC # BLD: 9.73 K/UL — SIGNIFICANT CHANGE UP (ref 3.8–10.5)
WBC # FLD AUTO: 9.57 K/UL — SIGNIFICANT CHANGE UP (ref 3.8–10.5)
WBC # FLD AUTO: 9.73 K/UL — SIGNIFICANT CHANGE UP (ref 3.8–10.5)

## 2019-12-31 PROCEDURE — 99406 BEHAV CHNG SMOKING 3-10 MIN: CPT

## 2019-12-31 PROCEDURE — 75635 CT ANGIO ABDOMINAL ARTERIES: CPT | Mod: 26

## 2019-12-31 PROCEDURE — 93010 ELECTROCARDIOGRAM REPORT: CPT

## 2019-12-31 PROCEDURE — 93010 ELECTROCARDIOGRAM REPORT: CPT | Mod: 77

## 2019-12-31 PROCEDURE — 99223 1ST HOSP IP/OBS HIGH 75: CPT | Mod: GC

## 2019-12-31 PROCEDURE — 12345: CPT | Mod: NC

## 2019-12-31 RX ORDER — ASPIRIN/CALCIUM CARB/MAGNESIUM 324 MG
81 TABLET ORAL DAILY
Refills: 0 | Status: DISCONTINUED | OUTPATIENT
Start: 2019-12-31 | End: 2020-01-13

## 2019-12-31 RX ORDER — FLUOXETINE HCL 10 MG
20 CAPSULE ORAL DAILY
Refills: 0 | Status: DISCONTINUED | OUTPATIENT
Start: 2019-12-31 | End: 2020-01-13

## 2019-12-31 RX ORDER — TRAZODONE HCL 50 MG
150 TABLET ORAL AT BEDTIME
Refills: 0 | Status: DISCONTINUED | OUTPATIENT
Start: 2019-12-31 | End: 2020-01-13

## 2019-12-31 RX ORDER — PREGABALIN 225 MG/1
1000 CAPSULE ORAL DAILY
Refills: 0 | Status: DISCONTINUED | OUTPATIENT
Start: 2019-12-31 | End: 2020-01-13

## 2019-12-31 RX ORDER — POLYETHYLENE GLYCOL 3350 17 G/17G
17 POWDER, FOR SOLUTION ORAL DAILY
Refills: 0 | Status: DISCONTINUED | OUTPATIENT
Start: 2019-12-31 | End: 2020-01-13

## 2019-12-31 RX ORDER — ACETAMINOPHEN 500 MG
650 TABLET ORAL EVERY 6 HOURS
Refills: 0 | Status: DISCONTINUED | OUTPATIENT
Start: 2019-12-31 | End: 2020-01-10

## 2019-12-31 RX ORDER — LIDOCAINE 4 G/100G
1 CREAM TOPICAL
Refills: 0 | Status: DISCONTINUED | OUTPATIENT
Start: 2019-12-31 | End: 2020-01-09

## 2019-12-31 RX ORDER — OXYCODONE AND ACETAMINOPHEN 5; 325 MG/1; MG/1
1 TABLET ORAL EVERY 4 HOURS
Refills: 0 | Status: DISCONTINUED | OUTPATIENT
Start: 2019-12-31 | End: 2020-01-03

## 2019-12-31 RX ORDER — NICOTINE POLACRILEX 2 MG
1 GUM BUCCAL DAILY
Refills: 0 | Status: DISCONTINUED | OUTPATIENT
Start: 2019-12-31 | End: 2020-01-13

## 2019-12-31 RX ORDER — ENOXAPARIN SODIUM 100 MG/ML
40 INJECTION SUBCUTANEOUS DAILY
Refills: 0 | Status: DISCONTINUED | OUTPATIENT
Start: 2019-12-31 | End: 2020-01-09

## 2019-12-31 RX ORDER — PANTOPRAZOLE SODIUM 20 MG/1
40 TABLET, DELAYED RELEASE ORAL
Refills: 0 | Status: DISCONTINUED | OUTPATIENT
Start: 2019-12-31 | End: 2020-01-13

## 2019-12-31 RX ORDER — FOLIC ACID 0.8 MG
1 TABLET ORAL DAILY
Refills: 0 | Status: DISCONTINUED | OUTPATIENT
Start: 2019-12-31 | End: 2020-01-13

## 2019-12-31 RX ORDER — GABAPENTIN 400 MG/1
100 CAPSULE ORAL EVERY 8 HOURS
Refills: 0 | Status: DISCONTINUED | OUTPATIENT
Start: 2019-12-31 | End: 2020-01-01

## 2019-12-31 RX ORDER — INFLUENZA VIRUS VACCINE 15; 15; 15; 15 UG/.5ML; UG/.5ML; UG/.5ML; UG/.5ML
0.5 SUSPENSION INTRAMUSCULAR ONCE
Refills: 0 | Status: DISCONTINUED | OUTPATIENT
Start: 2019-12-31 | End: 2020-01-22

## 2019-12-31 RX ORDER — ATORVASTATIN CALCIUM 80 MG/1
40 TABLET, FILM COATED ORAL AT BEDTIME
Refills: 0 | Status: DISCONTINUED | OUTPATIENT
Start: 2019-12-31 | End: 2020-01-03

## 2019-12-31 RX ADMIN — OXYCODONE AND ACETAMINOPHEN 1 TABLET(S): 5; 325 TABLET ORAL at 06:25

## 2019-12-31 RX ADMIN — OXYCODONE AND ACETAMINOPHEN 1 TABLET(S): 5; 325 TABLET ORAL at 01:32

## 2019-12-31 RX ADMIN — Medication 150 MILLIGRAM(S): at 21:41

## 2019-12-31 RX ADMIN — GABAPENTIN 100 MILLIGRAM(S): 400 CAPSULE ORAL at 21:42

## 2019-12-31 RX ADMIN — POLYETHYLENE GLYCOL 3350 17 GRAM(S): 17 POWDER, FOR SOLUTION ORAL at 12:51

## 2019-12-31 RX ADMIN — Medication 10 MILLIGRAM(S): at 18:34

## 2019-12-31 RX ADMIN — OXYCODONE AND ACETAMINOPHEN 1 TABLET(S): 5; 325 TABLET ORAL at 05:55

## 2019-12-31 RX ADMIN — PREGABALIN 1000 MICROGRAM(S): 225 CAPSULE ORAL at 12:48

## 2019-12-31 RX ADMIN — Medication 1 MILLIGRAM(S): at 12:51

## 2019-12-31 RX ADMIN — ENOXAPARIN SODIUM 40 MILLIGRAM(S): 100 INJECTION SUBCUTANEOUS at 12:49

## 2019-12-31 RX ADMIN — Medication 1 PATCH: at 12:32

## 2019-12-31 RX ADMIN — ATORVASTATIN CALCIUM 40 MILLIGRAM(S): 80 TABLET, FILM COATED ORAL at 21:41

## 2019-12-31 RX ADMIN — PANTOPRAZOLE SODIUM 40 MILLIGRAM(S): 20 TABLET, DELAYED RELEASE ORAL at 05:55

## 2019-12-31 RX ADMIN — Medication 81 MILLIGRAM(S): at 12:48

## 2019-12-31 RX ADMIN — Medication 10 MILLIGRAM(S): at 05:55

## 2019-12-31 RX ADMIN — OXYCODONE AND ACETAMINOPHEN 1 TABLET(S): 5; 325 TABLET ORAL at 01:02

## 2019-12-31 RX ADMIN — Medication 1 PATCH: at 19:16

## 2019-12-31 RX ADMIN — GABAPENTIN 100 MILLIGRAM(S): 400 CAPSULE ORAL at 05:55

## 2019-12-31 RX ADMIN — GABAPENTIN 100 MILLIGRAM(S): 400 CAPSULE ORAL at 16:42

## 2019-12-31 RX ADMIN — Medication 20 MILLIGRAM(S): at 12:52

## 2019-12-31 RX ADMIN — LIDOCAINE 1 APPLICATION(S): 4 CREAM TOPICAL at 18:33

## 2019-12-31 NOTE — PHYSICAL THERAPY INITIAL EVALUATION ADULT - ADDITIONAL COMMENTS
as per pt: prior to CVA she was independent in all fucntional mobility and ADL"s. however since CVA pt has been in rehab for approx 1 weeks and now requires assistance for all functional mobility and ADL's.

## 2019-12-31 NOTE — CONSULT NOTE ADULT - ASSESSMENT
Kiara Chang is a 49 year old F with a recent history of a right CVA complicated by hemorrhagic conversion with consequent left hemiparesis. Patient was at rehab with worsening LE pain, seen by Vasc Surg at [place] who recommended a transfer to [facility] for possible angiographic intervention. Afebrile and HD stable, non-palpable distal pulses, but positive Doppler signals.     PLAN:   - No emergent vascular surgery warranted  - CT angiography of the LE to assess vessel patency/run-off  - No therapeutic anticoagulation at this time given recent stroke.  - Recommend resuming anti-platelet therapy (ASA, Statin etc.)  - Patient seen/examined with Sr. Resident, Dr. GHASSAN Brar and d/w Vasc. Surg. Fellow, Dr. KALEIGH Goncalves.  - Plan to be discussed with Attending, Dr. DAVID Davey    Please contact Vascular Surgery (#[phone]) with any questions or concerns. Kiara Chang is a 49 year old F with a recent history of a right CVA complicated by hemorrhagic conversion with consequent left hemiparesis. Patient was at rehab with worsening LE pain, seen by Vasc Surg at [place] who recommended a transfer to [facility] for possible angiographic intervention. Afebrile and HD stable, non-palpable distal pulses, but positive Doppler signals.     PLAN:   - No emergent vascular surgery warranted  - CT angiography of the LE to assess vessel patency/run-off  - No therapeutic anticoagulation at this time given recent stroke with hemorrhagic conversion   - Recommend resuming anti-platelet therapy (ASA, Statin etc.)  - Patient seen/examined with Sr. Resident, Dr. GHASSAN Brar and d/w Vasc. Surg. Fellow, Dr. KALEIGH Goncalves.  - Plan to be discussed with Attending, Dr. DAVID Davey    Please contact Vascular Surgery (#[phone]) with any questions or concerns.

## 2019-12-31 NOTE — PHYSICAL THERAPY INITIAL EVALUATION ADULT - GAIT TRAINING, PT EVAL
Pt will ambulate 10  ft independently with cane in 2 weeks Pt will ambulate 10  ft independently with R elizabeth walker in 2 weeks

## 2019-12-31 NOTE — PROGRESS NOTE ADULT - ASSESSMENT
Mrs. Pickett is a 50 y/o woman w/ history of chronic smoker, PTSD/depression, spinal disc herniation, GERD, gastric bypass (2004), ex lap for SBO (9 yrs ago) and recent CVA, who is transferred from [place] for severe PVD.

## 2019-12-31 NOTE — PHYSICAL THERAPY INITIAL EVALUATION ADULT - TRANSFER TRAINING, PT EVAL
Pt will transfer Min A with RW or least restrictive AD in 2 weeks. Pt will transfer Min A with R elizabeth walker or least restrictive AD in 2 weeks.

## 2019-12-31 NOTE — PHYSICAL THERAPY INITIAL EVALUATION ADULT - PERTINENT HX OF CURRENT PROBLEM, REHAB EVAL
this is a 50 y/o woman w/ history of chronic smoker, PTSD/depression, spinal disc herniation, GERD, gastric bypass (2004), ex lap for SBO (9 yrs ago) and recent CVA, who is transferred from [place] for severe PVD

## 2019-12-31 NOTE — PHYSICAL THERAPY INITIAL EVALUATION ADULT - BALANCE TRAINING, PT EVAL
normal balance for safe upright activity in 2 weeks GOAL: Pt will improve  balance during (static/dynamic) (sitting/standing) activities by at least 1 balance grade within 3-4 weeks to assist with greater independence during functional mobility and ADL's.

## 2019-12-31 NOTE — PHYSICAL THERAPY INITIAL EVALUATION ADULT - PRECAUTIONS/LIMITATIONS, REHAB EVAL
fall precautions/CT angio RLE: IMPRESSION: Occlusion of the right popliteal artery below the knee with poor runoff.

## 2019-12-31 NOTE — PROGRESS NOTE ADULT - SUBJECTIVE AND OBJECTIVE BOX
[facility] Division of Hospital Medicine  Elizabeth Toribio MD  Pager (INNA-YOEL, 9X-5R): [phone]  Other Times:  [phone]    Patient is a 49y old  Female who presents with a chief complaint of Severe PVD (31 Dec 2019 00:03)      SUBJECTIVE / OVERNIGHT EVENTS: Feels well overall, complains only of some complication.   ADDITIONAL REVIEW OF SYSTEMS:   No CP, SOB, N/V/D. No fevers or chills.    MEDICATIONS  (STANDING):  aspirin  chewable 81 milliGRAM(s) Oral daily  atorvastatin 40 milliGRAM(s) Oral at bedtime  cyanocobalamin 1000 MICROGram(s) Oral daily  enalapril 10 milliGRAM(s) Oral two times a day  enoxaparin Injectable 40 milliGRAM(s) SubCutaneous daily  FLUoxetine 20 milliGRAM(s) Oral daily  folic acid 1 milliGRAM(s) Oral daily  gabapentin 100 milliGRAM(s) Oral every 8 hours  influenza   Vaccine 0.5 milliLiter(s) IntraMuscular once  lidocaine 2% Gel 1 Application(s) Topical two times a day  nicotine -   7 mG/24Hr(s) Patch 1 patch Transdermal daily  pantoprazole    Tablet 40 milliGRAM(s) Oral before breakfast  polyethylene glycol 3350 17 Gram(s) Oral daily  traZODone 150 milliGRAM(s) Oral at bedtime    MEDICATIONS  (PRN):  acetaminophen   Tablet .. 650 milliGRAM(s) Oral every 6 hours PRN Temp greater or equal to 38C (100.4F), Mild Pain (1 - 3), Moderate Pain (4 - 6)  bisacodyl Suppository 10 milliGRAM(s) Rectal daily PRN Constipation  oxycodone    5 mG/acetaminophen 325 mG 1 Tablet(s) Oral every 4 hours PRN Severe Pain (7 - 10)    CAPILLARY BLOOD GLUCOSE        I&O's Summary    30 Dec 2019 07:01  -  31 Dec 2019 07:00  --------------------------------------------------------  IN: 160 mL / OUT: 0 mL / NET: 160 mL        PHYSICAL EXAM:  Vital Signs Last 24 Hrs  T(C): 36.9 (31 Dec 2019 10:13), Max: 37.1 (31 Dec 2019 05:52)  T(F): 98.4 (31 Dec 2019 10:13), Max: 98.7 (31 Dec 2019 05:52)  HR: 85 (31 Dec 2019 10:13) (66 - 89)  BP: 157/89 (31 Dec 2019 10:13) (119/80 - 157/89)  BP(mean): --  RR: 18 (31 Dec 2019 10:13) (14 - 18)  SpO2: 99% (31 Dec 2019 10:13) (96% - 99%)    CONSTITUTIONAL: Obese female, in no acute distress, well appearing  EYES: EOMI, conjunctiva and sclera clear  ENMT: Moist oral mucosa, normal dentition  RESPIRATORY: Normal respiratory effort; lungs are clear to auscultation bilaterally  CARDIOVASCULAR: Regular rate and rhythm, normal S1 and S2, no murmur/rub/gallop; No lower extremity edema. Unable to palpate DP/PT pulse on RLE	  ABDOMEN: normoactive bowel sounds, soft, nontender to palpation, no rebound/guarding; no distension   PSYCH: A+O to person, place, and time; affect appropriate  SKIN: Right 4th toe with discoloration, tender to palpation.   NEURO: Left facial droop, left hemiparesis  LABS:                        12.3   9.73  )-----------( 459      ( 31 Dec 2019 09:49 )             38.3     12-31    136  |  98  |  10  ----------------------------<  98  4.5   |  25  |  0.49<L>    Ca    9.3      31 Dec 2019 09:49  Phos  4.9     12-31  Mg     2.1     12-31    TPro  6.2  /  Alb  3.1<L>  /  TBili  0.4  /  DBili  x   /  AST  56<H>  /  ALT  38  /  AlkPhos  139<H>  12-31    PT/INR - ( 31 Dec 2019 09:49 )   PT: 12.7 sec;   INR: 1.11 ratio         PTT - ( 31 Dec 2019 09:49 )  PTT:28.4 sec            RADIOLOGY & ADDITIONAL TESTS:  Results Reviewed:     Imaging Personally Reviewed:    ECG Personally Reviewed:      COORDINATION OF CARE:  Care Discussed with Consultants/Other Providers [Y/N]: Vascular, Medicine ACP  Prior or Outpatient Records Reviewed [Y/N]:

## 2019-12-31 NOTE — PHYSICAL THERAPY INITIAL EVALUATION ADULT - RANGE OF MOTION EXAMINATION, REHAB EVAL
L sided hemiplegia/Right UE ROM was WFL (within functional limits)/Right LE ROM was WFL (within functional limits)

## 2019-12-31 NOTE — PHYSICAL THERAPY INITIAL EVALUATION ADULT - ACTIVE RANGE OF MOTION EXAMINATION, REHAB EVAL
Right LE Active ROM was WFL (within functional limits)/Right UE Active ROM was WFL (within functional limits)/L sided hemiplegia

## 2019-12-31 NOTE — CONSULT NOTE ADULT - SUBJECTIVE AND OBJECTIVE BOX
VASCULAR SURGERY CONSULT NOTE  --------------------------------------------------------------------------------------------  Consulting Attending: Dr. Chilango Davey    Kiara Chang is a 48 y/o F with PMHx of GERD, smoker, PTSD, spinal disc herniation, depression, and gastric bypass who presented on 12/17/19 to [facility] for left sided weakness, right gaze preference, and facial droop. Pt noted that she had been having weakness to her left leg and arm the week prior to admission and it had gotten progressively worse. The day prior to admission, she noted right sided vision loss. On day of admission, she fell while trying to get off the bed and hit the right side of head, no LOC. Her daughters found her and called the ambulance. 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 She developed basal ganglia hemorrhagic conversion, but was stable. Patient has a history consistent with ischemic rest pain (pain at night relieved when she hangs her feet off the side of her bed) and at best has short distance claudication. Both feet, while cool distally, are neurovascularly intact with no appreciable differences. There were no signs of acute embolic disease amenable to thrombectomy. Pt continued to have left sided upper and lower extremity weakness. Pt was evaluated by PT and was recommended for acute inpatient rehabilitation when stable. Pt was deemed stable for discharge, and she was transferred to [facility] on 12/24/19 for acute inpatient rehabilitation. Patient did well with therapy, however, she continued to have right foot pain, especially at her right 4th toe which was ashy/dusky in color. Patient was evaluated by vascular surgery at [place] and was found to have an ROSA MARIA of 0.4 which correlates with severe PAD. Vascular surgery team at [place] recommended transfer to [place] for angiogram and possible stenting or other vascular procedures. Vascular Surgery here at [place] evaluating as requested.      PAST MEDICAL & SURGICAL HISTORY:  Chronic insomnia  PTSD (post-traumatic stress disorder)  ETOH abuse  Anxiety  HTN (hypertension)  Depression  Tubal Ligation Status  Status Post Gastric Bypass for Obesity  Status Post Laparoscopic Cholecystectomy    SOCIAL HISTORY: Current smoker, smokes half a pack a day. Unknown pack years.      ALLERGIES: No Known Allergies    --------------------------------------------------------------------------------------------    Vitals:   T(C): 36.8 (12-30-19 @ 21:47), Max: 36.8 (12-30-19 @ 14:00)  HR: 74 (12-30-19 @ 21:47) (74 - 123)  BP: 119/80 (12-30-19 @ 21:47) (119/80 - 156/88)  RR: 18 (12-30-19 @ 21:47) (14 - 18)  SpO2: 99% (12-30-19 @ 21:47) (97% - 99%)  CAPILLARY BLOOD GLUCOSE    Height (cm): 162.6 (12-24 @ 20:20)  Weight (kg): 91.3 (12-24 @ 20:20)  BMI (kg/m2): 34.5 (12-24 @ 20:20)  BSA (m2): 1.96 (12-24 @ 20:20)    PHYSICAL EXAM:  General: NAD, lying in bed comfortably  Neuro: A+Ox3  HEENT: NC/AT, EOMI  Neck: Soft, supple  Resp: Good effort, CTA b/l  Vascular: Non-palpable LE distal pulses. Positive doppler signals in DP/PT bilaterally.   Skin: Intact, no breakdown  --------------------------------------------------------------------------------------------    LABS  CBC (12-30 @ 06:20)                              12.2                           10.29   )----------------(  455<H>     --    % Neutrophils, --    % Lymphocytes, ANC: --                                  37.3      BMP (12-30 @ 06:20)             143     |  107     |  10    		Ca++ --      Ca 8.7                ---------------------------------( 117<H>		Mg --                 3.8     |  26      |  0.68  			Ph --        --------------------------------------------------------------------------------------------  IMAGING  CT Angiogram pending  -------------------------------------------------------------------------------------------- VASCULAR SURGERY CONSULT NOTE  --------------------------------------------------------------------------------------------  Consulting Attending: Dr. Chilagno Davey    Kiara Chang is a 48 y/o F with PMHx of GERD, smoker, PTSD, spinal disc herniation, depression, and gastric bypass who presented on 12/17/19 to [facility] for left sided weakness, right gaze preference, and facial droop. Pt noted that she had been having weakness to her left leg and arm the week prior to admission and it had gotten progressively worse. The day prior to admission, she noted right sided vision loss. On day of admission, she fell while trying to get off the bed and hit the right side of head, no LOC. Her daughters found her and called the ambulance. 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 She developed basal ganglia hemorrhagic conversion, but was stable. Patient has a history consistent with ischemic rest pain (pain at night relieved when she hangs her feet off the side of her bed) and at best has short distance claudication. Both feet, while cool distally, are neurovascularly intact with no appreciable differences. There were no signs of acute embolic disease amenable to thrombectomy. Pt continued to have left sided upper and lower extremity weakness. Pt was evaluated by PT and was recommended for acute inpatient rehabilitation when stable. Pt was deemed stable for discharge, and she was transferred to [facility] on 12/24/19 for acute inpatient rehabilitation. Patient did well with therapy, however, she continued to have right foot pain, especially at her right 4th toe which was ashy/dusky in color. Patient was evaluated by vascular surgery at [place] and was found to have an ROSA MARIA of 0.4 which correlates with severe PAD. Vascular surgery team at [place] recommended transfer to [place] for angiogram and possible stenting or other vascular procedures. Vascular Surgery here at [place] evaluating as requested.      PAST MEDICAL & SURGICAL HISTORY:  Chronic insomnia  PTSD (post-traumatic stress disorder)  ETOH abuse  Anxiety  HTN (hypertension)  Depression  Tubal Ligation Status  Status Post Gastric Bypass for Obesity  Status Post Laparoscopic Cholecystectomy    SOCIAL HISTORY: Current smoker, smokes half a pack a day. Unknown pack years.      ALLERGIES: No Known Allergies    --------------------------------------------------------------------------------------------    Vitals:   T(C): 36.8 (12-30-19 @ 21:47), Max: 36.8 (12-30-19 @ 14:00)  HR: 74 (12-30-19 @ 21:47) (74 - 123)  BP: 119/80 (12-30-19 @ 21:47) (119/80 - 156/88)  RR: 18 (12-30-19 @ 21:47) (14 - 18)  SpO2: 99% (12-30-19 @ 21:47) (97% - 99%)  CAPILLARY BLOOD GLUCOSE    Height (cm): 162.6 (12-24 @ 20:20)  Weight (kg): 91.3 (12-24 @ 20:20)  BMI (kg/m2): 34.5 (12-24 @ 20:20)  BSA (m2): 1.96 (12-24 @ 20:20)    PHYSICAL EXAM:  General: NAD, lying in bed comfortably  Neuro: A+Ox3  HEENT: NC/AT, EOMI  Neck: Soft, supple  Resp: Good effort, CTA b/l  Vascular: Non-palpable LE distal pulses. Positive doppler signals in DP/PT bilaterally. Bilateral pedal tenderness, R>L  Skin: Intact, no breakdown  --------------------------------------------------------------------------------------------    LABS  CBC (12-30 @ 06:20)                              12.2                           10.29   )----------------(  455<H>     --    % Neutrophils, --    % Lymphocytes, ANC: --                                  37.3      BMP (12-30 @ 06:20)             143     |  107     |  10    		Ca++ --      Ca 8.7                ---------------------------------( 117<H>		Mg --                 3.8     |  26      |  0.68  			Ph --        --------------------------------------------------------------------------------------------  IMAGING  CT Angiogram pending  --------------------------------------------------------------------------------------------

## 2019-12-31 NOTE — PHYSICAL THERAPY INITIAL EVALUATION ADULT - LEVEL OF INDEPENDENCE, REHAB EVAL
Chief Complaint   Patient presents with   • Vaginal Discharge     x 3 days    • Itching   • Throat Problem   • Mouth/Lip Problem     bumps inside right side in the mouth        SUBJECTIVE:  Anat Rodriguez is a 32 year old  female who presents with 3 day history of vaginal discharge. Did have her menses prior but has noticed itchiness on the outside of her vaginal opening as well as slight odor. Denies any thick white discharge. Has been with the same partner for the last 2 months and has been using condoms.  Denies any pain with urination or blood in the urine. Has noticed slight increased frequency of urination but also has been drinking more water.  Also 3 days ago noticed a sore throat with some right ear discomfort area did also noticed some bumps in the back of her tongue but no fevers. Has been feeling chilled initially. Does have allergies with postnasal drip.      Past Medical History:   Diagnosis Date   • Allergy    • Anxiety    • Depression with anxiety    • Depressive disorder     stoped taking meds in     • History of febrile seizure     In childhood   • Obesity    • Postpartum depression     did not take meds   • Preop examination 4/15/2016    Tummy tuck and umbilical hernia repair, Dr Presley 16    • Seborrheic dermatitis of scalp 4/10/2015   • Umbilical hernia may 2015    coming for surgery         Past Surgical History:   Procedure Laterality Date   •  DELIVERY ONLY  3-18-04   •  SECTION, LOW TRANSVERSE      x1   • LIPOSUCTION  12    abdominal and flanks   • TUBAL LIGATION  5/28/15    Filschie clips   • UMBILICAL HERNIA REPAIR      and 5/28/15 with mesh   • UMBILICAL HERNIA REPAIR  5/28/15         Current Outpatient Prescriptions   Medication Sig   • buPROPion (WELLBUTRIN XL) 300 MG 24 hr tablet Take 1 tablet by mouth daily.   • BIOTIN PO Take 1 tablet by mouth daily.     No current facility-administered medications for this visit.           ALLERGIES:   Allergen  Reactions   • Hydrocodone DIZZINESS and Nausea & Vomiting   • Oxycontin DIZZINESS and Nausea & Vomiting   • Ultracet [Tramadol-Acetaminophen] Nausea & Vomiting          Alcohol Use: Yes           0 oz/week       Comment: rare      Tobacco Use: Quit          Packs/Day:       Years:           ROS:  Rest of review of systems are negative except what is stated above.    OBJECTIVE:  Visit Vitals  /80   Pulse 78   Temp 98.6 °F (37 °C) (Oral)   Ht 5' 5\" (1.651 m)   Wt 68.5 kg   LMP 11/01/2017   BMI 25.13 kg/m²     BP Readings from Last 3 Encounters:   11/08/17 118/80   09/27/17 108/58   09/20/17 112/72   ,   Wt Readings from Last 3 Encounters:   11/08/17 68.5 kg   09/27/17 69 kg   09/20/17 68 kg     GENERAL: Appears healthy, alert, in no acute distress .   HEENT:  Bilateral TMs normal,  mouth-no erythema or exudates.  NECK: No adenopathy   SKIN : warm and dry.   LUNGS: clear to auscultation  Pelvic: Slightly swollen and erythematous external genitalia. Mucousy drainage in vaginal vault. No thick white discharge or odor noted. Vaginal pathogen culture obtained as well as GC/Chlamydia The cervical os is without lesions or discharge.       No visits with results within 1 Week(s) from this visit.   Latest known visit with results is:   Office Visit on 09/27/2017   Component Date Value Ref Range Status   • TOM SP DNA PROBE 09/29/2017 NEGATIVE   Final   • GARDNERELLA DNA PRB 09/29/2017 POSITIVE   Final   • TRICH VAG DNA PROBE 09/29/2017 NEGATIVE   Final       ASSESSMENT AND PLAN:  Anat was seen today for vaginal discharge, itching, throat problem and mouth/lip problem.    Diagnoses and all orders for this visit:    Vaginal discharge-08 vaginal pathogen and GC/chlamydia results. Trial of Clortrimazole to the outside twice daily in the interim.  -     VAGINAL PATHOGENS  -     CHLAMYDIA/GC BY NUCLEIC ACID AMPLIFICATION    ST (sore throat)-reassurance that this is likely due to allergies or viral illness. No bacterial  infection found.        Orders Placed This Encounter   • Vaginal Pathogens   • Chlamydia/GC by Nucleic Acid Amplification        Return if symptoms worsen or fail to improve.    Iza Garcia MD                        maximum assist (25% patients effort)

## 2019-12-31 NOTE — PHYSICAL THERAPY INITIAL EVALUATION ADULT - CRITERIA FOR SKILLED THERAPEUTIC INTERVENTIONS
impairments found/therapy frequency/functional limitations in following categories/risk reduction/prevention/rehab potential/predicted duration of therapy intervention/anticipated equipment needs at discharge/anticipated discharge recommendation

## 2020-01-01 PROCEDURE — 99233 SBSQ HOSP IP/OBS HIGH 50: CPT

## 2020-01-01 PROCEDURE — 70450 CT HEAD/BRAIN W/O DYE: CPT | Mod: 26

## 2020-01-01 RX ORDER — THIAMINE MONONITRATE (VIT B1) 100 MG
100 TABLET ORAL DAILY
Refills: 0 | Status: DISCONTINUED | OUTPATIENT
Start: 2020-01-01 | End: 2020-01-13

## 2020-01-01 RX ORDER — GABAPENTIN 400 MG/1
200 CAPSULE ORAL EVERY 8 HOURS
Refills: 0 | Status: DISCONTINUED | OUTPATIENT
Start: 2020-01-01 | End: 2020-01-03

## 2020-01-01 RX ADMIN — Medication 1 MILLIGRAM(S): at 13:19

## 2020-01-01 RX ADMIN — ENOXAPARIN SODIUM 40 MILLIGRAM(S): 100 INJECTION SUBCUTANEOUS at 13:17

## 2020-01-01 RX ADMIN — Medication 100 MILLIGRAM(S): at 20:39

## 2020-01-01 RX ADMIN — Medication 150 MILLIGRAM(S): at 20:39

## 2020-01-01 RX ADMIN — Medication 10 MILLIGRAM(S): at 06:03

## 2020-01-01 RX ADMIN — OXYCODONE AND ACETAMINOPHEN 1 TABLET(S): 5; 325 TABLET ORAL at 17:50

## 2020-01-01 RX ADMIN — Medication 81 MILLIGRAM(S): at 13:18

## 2020-01-01 RX ADMIN — Medication 10 MILLIGRAM(S): at 18:05

## 2020-01-01 RX ADMIN — Medication 20 MILLIGRAM(S): at 13:18

## 2020-01-01 RX ADMIN — OXYCODONE AND ACETAMINOPHEN 1 TABLET(S): 5; 325 TABLET ORAL at 02:41

## 2020-01-01 RX ADMIN — PANTOPRAZOLE SODIUM 40 MILLIGRAM(S): 20 TABLET, DELAYED RELEASE ORAL at 06:03

## 2020-01-01 RX ADMIN — OXYCODONE AND ACETAMINOPHEN 1 TABLET(S): 5; 325 TABLET ORAL at 17:11

## 2020-01-01 RX ADMIN — LIDOCAINE 1 APPLICATION(S): 4 CREAM TOPICAL at 18:05

## 2020-01-01 RX ADMIN — GABAPENTIN 100 MILLIGRAM(S): 400 CAPSULE ORAL at 06:03

## 2020-01-01 RX ADMIN — PREGABALIN 1000 MICROGRAM(S): 225 CAPSULE ORAL at 13:18

## 2020-01-01 RX ADMIN — ATORVASTATIN CALCIUM 40 MILLIGRAM(S): 80 TABLET, FILM COATED ORAL at 20:38

## 2020-01-01 RX ADMIN — Medication 1 PATCH: at 08:00

## 2020-01-01 RX ADMIN — Medication 1 PATCH: at 13:18

## 2020-01-01 RX ADMIN — GABAPENTIN 200 MILLIGRAM(S): 400 CAPSULE ORAL at 20:38

## 2020-01-01 RX ADMIN — OXYCODONE AND ACETAMINOPHEN 1 TABLET(S): 5; 325 TABLET ORAL at 03:11

## 2020-01-01 RX ADMIN — Medication 1 PATCH: at 19:29

## 2020-01-01 RX ADMIN — GABAPENTIN 100 MILLIGRAM(S): 400 CAPSULE ORAL at 13:17

## 2020-01-01 RX ADMIN — Medication 1 PATCH: at 13:30

## 2020-01-01 RX ADMIN — Medication 1 TABLET(S): at 20:37

## 2020-01-01 RX ADMIN — LIDOCAINE 1 APPLICATION(S): 4 CREAM TOPICAL at 06:03

## 2020-01-01 NOTE — SWALLOW BEDSIDE ASSESSMENT ADULT - SWALLOW EVAL: ORAL MUSCULATURE
.  LABS:                         15.1   13.18 )-----------( 198      ( 08 May 2019 20:31 )             45.6     05-08    139  |  99  |  16  ----------------------------<  144<H>  4.4   |  30  |  0.90    Ca    9.3      08 May 2019 20:32  Mg     2.3     05-08      PT/INR - ( 08 May 2019 20:31 )   PT: 13.7 sec;   INR: 1.21          PTT - ( 08 May 2019 20:31 )  PTT:51.8 sec              RADIOLOGY, EKG & ADDITIONAL TESTS: Reviewed.
anomalies present

## 2020-01-01 NOTE — CHART NOTE - NSCHARTNOTEFT_GEN_A_CORE
Pt seen and examined in the AM, b/l DP/PT signals noted    Reviewed the CT angiogram w/ vascular surgery fellow. Due to recent hemorrhagic stroke, therapeutic AC and angiogram are contraindicated at this time.     Pt has signals b/l LE, limbs are currently not threatened at this time.     Vascular Surgery will sign off, please reconsult PRN when pt is able to be put back on full anticoagulation    Discussed w/ Vascular Surgery fellow     INNA Henriquez PGY-2  Vascular  [id number]

## 2020-01-01 NOTE — SWALLOW BEDSIDE ASSESSMENT ADULT - PHARYNGEAL PHASE
Within functional limits Multiple swallows/x3; although denies c/o pharyngeal stasis Delayed cough post oral intake/Throat clear post oral intake

## 2020-01-01 NOTE — SWALLOW BEDSIDE ASSESSMENT ADULT - SLP PERTINENT HISTORY OF CURRENT PROBLEM
50 y/o woman w/ history of chronic smoker, PTSD/depression, spinal disc herniation, GERD, gastric bypass (2004), ex lap for SBO (9 yrs ago) and recent CVA, who is transferred from [place] for severe PVD.

## 2020-01-01 NOTE — SWALLOW BEDSIDE ASSESSMENT ADULT - SLP GENERAL OBSERVATIONS
Pt positioned upright in bed. Family present. Pt AA+Ox4. Left side facial droop noted. Speech intelligibility is fair-good despite labial weakness; also appears to be negatively impacted by edentulous status. Able to follow directives however frequently requires repetition of directives due to impulsivity/distractibility. Daughters state that patient was reliant on them for bill paying; shopping PTA. PTA, patient stayed home and played games on her phone, shopped online, watched tv. Stopped driving > a year ago due to anxiety. Pt and daughter endorse that patient suffers from anxiety and depression as well as PTSD. Had U/L dentures however ill-fitting and stopped using them months ago. Reports eating most solids despite edentulous status, would "just take a long time to chew". Pt denies h/o dysphagia prior to stroke. Pt/family report that patient was evaluated at bedside at [facility] and put on Dysphagia 1, nectar diet consistency. No records found in [facility] relating to speech/swallowing. Call made to Reynold Calderón. Awaiting callback. Pt positioned upright in bed. Family present. Pt AA+Ox4. Left side facial droop noted. Speech intelligibility is fair-good despite labial weakness; also appears to be negatively impacted by edentulous status. Fluent verbal output. Able to follow directives however frequently requires repetition of directives due to impulsivity/distractibility. Daughter Annmarie state that patient has relied on her for bill paying; shopping for years. PTA, patient stayed home and played games on her phone, shopped online, watched tv. Stopped driving > a year ago due to anxiety. Pt and daughter endorse that patient suffers from anxiety and depression as well as PTSD. Had U/L dentures however ill-fitting and stopped using them months ago. Reports eating most solids despite edentulous status, would "just take a long time to chew". Pt denies h/o dysphagia prior to stroke. Pt/family report that patient was evaluated at bedside at [facility] and put on Dysphagia 1, nectar diet consistency. No records found in [facility] relating to speech/swallowing. Call made to Reynold Calderón. Awaiting callback.

## 2020-01-01 NOTE — SWALLOW BEDSIDE ASSESSMENT ADULT - SWALLOW EVAL: DIAGNOSIS
Patient presents with 1)signs suggestive of an oropharyngeal dysphagia and 2)cognitive impairment. Prolonged oral prep/transit is noted on conservative textures. Throat clearing is noted on thin liquids suggestive of laryngeal penetration/aspiration. Pt presents AA+Ox3, observed to be easily distracted, slowed processing, reduced attention and reduced short term recall. Formal speech/language evaluation recommended.

## 2020-01-01 NOTE — PROGRESS NOTE ADULT - SUBJECTIVE AND OBJECTIVE BOX
Patient is a 49y old  Female who presents with a chief complaint of Severe PVD (01 Jan 2020 14:04)        SUBJECTIVE / OVERNIGHT EVENTS:      MEDICATIONS  (STANDING):  aspirin  chewable 81 milliGRAM(s) Oral daily  atorvastatin 40 milliGRAM(s) Oral at bedtime  cyanocobalamin 1000 MICROGram(s) Oral daily  enalapril 10 milliGRAM(s) Oral two times a day  enoxaparin Injectable 40 milliGRAM(s) SubCutaneous daily  FLUoxetine 20 milliGRAM(s) Oral daily  folic acid 1 milliGRAM(s) Oral daily  gabapentin 200 milliGRAM(s) Oral every 8 hours  influenza   Vaccine 0.5 milliLiter(s) IntraMuscular once  lidocaine 2% Gel 1 Application(s) Topical two times a day  nicotine -   7 mG/24Hr(s) Patch 1 patch Transdermal daily  pantoprazole    Tablet 40 milliGRAM(s) Oral before breakfast  polyethylene glycol 3350 17 Gram(s) Oral daily  traZODone 150 milliGRAM(s) Oral at bedtime    MEDICATIONS  (PRN):  acetaminophen   Tablet .. 650 milliGRAM(s) Oral every 6 hours PRN Temp greater or equal to 38C (100.4F), Mild Pain (1 - 3), Moderate Pain (4 - 6)  bisacodyl Suppository 10 milliGRAM(s) Rectal daily PRN Constipation  oxycodone    5 mG/acetaminophen 325 mG 1 Tablet(s) Oral every 4 hours PRN Severe Pain (7 - 10)      Vital Signs Last 24 Hrs  T(C): 36.7 (01 Jan 2020 17:00), Max: 36.9 (31 Dec 2019 21:12)  T(F): 98.1 (01 Jan 2020 17:00), Max: 98.5 (31 Dec 2019 21:12)  HR: 92 (01 Jan 2020 17:00) (75 - 92)  BP: 139/82 (01 Jan 2020 17:00) (139/82 - 162/96)  BP(mean): --  RR: 18 (01 Jan 2020 17:00) (16 - 18)  SpO2: 98% (01 Jan 2020 17:00) (94% - 98%)  CAPILLARY BLOOD GLUCOSE        I&O's Summary    31 Dec 2019 07:01  -  01 Jan 2020 07:00  --------------------------------------------------------  IN: 1000 mL / OUT: 0 mL / NET: 1000 mL    01 Jan 2020 07:01  -  01 Jan 2020 18:05  --------------------------------------------------------  IN: 500 mL / OUT: 400 mL / NET: 100 mL          PHYSICAL EXAM:   GENERAL: NAD, well-developed  HEAD:  Atraumatic, Normocephalic  EYES: Conjunctiva and sclera clear  NECK: Supple   CHEST/LUNG: Somewhat distant BS.   HEART: S1S2 normal. Regular rate and rhythm; No murmurs, rubs, or gallops  ABDOMEN: Soft, Nontender, Nondistended; Bowel sounds present  EXTREMITIES:  No edema  PSYCH/Neuro: Awake and alert and answers questions and follows commands. Hemiplegia of left side. However, sensation reportedly intact on left side. RLE some weakness and numbness. LLE numbness. LLE some spontaneous spasms. Subjective RUE neuropathic symptoms in distal fingers. Right wrist pain.   SKIN: Right 4th toe appears to be very dark and may be starting to necrose. Pain in distal toes.       LABS:                        12.3   9.73  )-----------( 459      ( 31 Dec 2019 09:49 )             38.3     12-31    136  |  98  |  10  ----------------------------<  98  4.5   |  25  |  0.49<L>    Ca    9.3      31 Dec 2019 09:49  Phos  4.9     12-31  Mg     2.1     12-31    TPro  6.2  /  Alb  3.1<L>  /  TBili  0.4  /  DBili  x   /  AST  56<H>  /  ALT  38  /  AlkPhos  139<H>  12-31    PT/INR - ( 31 Dec 2019 09:49 )   PT: 12.7 sec;   INR: 1.11 ratio         PTT - ( 31 Dec 2019 09:49 )  PTT:28.4 sec    < from: [facility] Physiol Extremity Lower 3+ Level, BI (12.30.19 @ 12:16) >    Impression: Severe PAD of the right leg and mild PAD of the left leg.    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     < end of copied text >      RADIOLOGY & ADDITIONAL TESTS:    Imaging Personally Reviewed: CTA and VA duplex reports.   Consultant(s) Notes Reviewed:  PT, vascular  Care Discussed with Consultants/Other Providers: Patient is a 49y old  Female who presents with a chief complaint of Severe PVD (01 Jan 2020 14:04)        SUBJECTIVE / OVERNIGHT EVENTS: Patient still reports some LE neuropathic pain (R>L). She reports plegia on left side. She denies CP or SOB. She says she's noticed that she's somewhat forgetful since the stroke.       MEDICATIONS  (STANDING):  aspirin  chewable 81 milliGRAM(s) Oral daily  atorvastatin 40 milliGRAM(s) Oral at bedtime  cyanocobalamin 1000 MICROGram(s) Oral daily  enalapril 10 milliGRAM(s) Oral two times a day  enoxaparin Injectable 40 milliGRAM(s) SubCutaneous daily  FLUoxetine 20 milliGRAM(s) Oral daily  folic acid 1 milliGRAM(s) Oral daily  gabapentin 200 milliGRAM(s) Oral every 8 hours  influenza   Vaccine 0.5 milliLiter(s) IntraMuscular once  lidocaine 2% Gel 1 Application(s) Topical two times a day  nicotine -   7 mG/24Hr(s) Patch 1 patch Transdermal daily  pantoprazole    Tablet 40 milliGRAM(s) Oral before breakfast  polyethylene glycol 3350 17 Gram(s) Oral daily  traZODone 150 milliGRAM(s) Oral at bedtime    MEDICATIONS  (PRN):  acetaminophen   Tablet .. 650 milliGRAM(s) Oral every 6 hours PRN Temp greater or equal to 38C (100.4F), Mild Pain (1 - 3), Moderate Pain (4 - 6)  bisacodyl Suppository 10 milliGRAM(s) Rectal daily PRN Constipation  oxycodone    5 mG/acetaminophen 325 mG 1 Tablet(s) Oral every 4 hours PRN Severe Pain (7 - 10)      Vital Signs Last 24 Hrs  T(C): 36.7 (01 Jan 2020 17:00), Max: 36.9 (31 Dec 2019 21:12)  T(F): 98.1 (01 Jan 2020 17:00), Max: 98.5 (31 Dec 2019 21:12)  HR: 92 (01 Jan 2020 17:00) (75 - 92)  BP: 139/82 (01 Jan 2020 17:00) (139/82 - 162/96)  BP(mean): --  RR: 18 (01 Jan 2020 17:00) (16 - 18)  SpO2: 98% (01 Jan 2020 17:00) (94% - 98%)  CAPILLARY BLOOD GLUCOSE        I&O's Summary    31 Dec 2019 07:01  -  01 Jan 2020 07:00  --------------------------------------------------------  IN: 1000 mL / OUT: 0 mL / NET: 1000 mL    01 Jan 2020 07:01  -  01 Jan 2020 18:05  --------------------------------------------------------  IN: 500 mL / OUT: 400 mL / NET: 100 mL          PHYSICAL EXAM:   GENERAL: NAD, well-developed  HEAD:  Atraumatic, Normocephalic  EYES: Conjunctiva and sclera clear  NECK: Supple   CHEST/LUNG: Somewhat distant BS.   HEART: S1S2 normal. Regular rate and rhythm; No murmurs, rubs, or gallops  ABDOMEN: Soft, Nontender, Nondistended; Bowel sounds present  EXTREMITIES:  No edema  PSYCH/Neuro: Awake and alert and answers questions and follows commands. Hemiplegia of left side. However, sensation reportedly intact on left side. RLE some weakness and numbness. LLE numbness. LLE some spontaneous spasms. Subjective RUE neuropathic symptoms in distal fingers. Right wrist pain. Left facial droop.   SKIN: Right 4th toe appears to be very dark and may be starting to necrose. Pain in distal toes.       LABS:                        12.3   9.73  )-----------( 459      ( 31 Dec 2019 09:49 )             38.3     12-31    136  |  98  |  10  ----------------------------<  98  4.5   |  25  |  0.49<L>    Ca    9.3      31 Dec 2019 09:49  Phos  4.9     12-31  Mg     2.1     12-31    TPro  6.2  /  Alb  3.1<L>  /  TBili  0.4  /  DBili  x   /  AST  56<H>  /  ALT  38  /  AlkPhos  139<H>  12-31    PT/INR - ( 31 Dec 2019 09:49 )   PT: 12.7 sec;   INR: 1.11 ratio         PTT - ( 31 Dec 2019 09:49 )  PTT:28.4 sec    < from: [facility] Physiol Extremity Lower 3+ Level, BI (12.30.19 @ 12:16) >    Impression: Severe PAD of the right leg and mild PAD of the left leg.    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     < end of copied text >      RADIOLOGY & ADDITIONAL TESTS:    Imaging Personally Reviewed: CTA and VA duplex reports.   Consultant(s) Notes Reviewed:  PT, vascular  Care Discussed with Consultants/Other Providers:

## 2020-01-01 NOTE — PROGRESS NOTE ADULT - ASSESSMENT
50 y/o woman w/ history of chronic smoker, PTSD/depression, spinal disc herniation, GERD, gastric bypass (2004), ex lap for SBO (9 yrs ago) and recent CVA, who is transferred from [place] for severe PVD.

## 2020-01-01 NOTE — SWALLOW BEDSIDE ASSESSMENT ADULT - ASR SWALLOW ASPIRATION MONITOR
Rosanne Grijalva, SLP, pgr #[phone]/throat clearing/upper respiratory infection/change of breathing pattern/gurgly voice/cough/fever/pneumonia

## 2020-01-02 DIAGNOSIS — R20.0 ANESTHESIA OF SKIN: ICD-10-CM

## 2020-01-02 LAB
ANION GAP SERPL CALC-SCNC: 8 MMOL/L — SIGNIFICANT CHANGE UP (ref 5–17)
BUN SERPL-MCNC: 9 MG/DL — SIGNIFICANT CHANGE UP (ref 7–23)
CALCIUM SERPL-MCNC: 9.6 MG/DL — SIGNIFICANT CHANGE UP (ref 8.4–10.5)
CHLORIDE SERPL-SCNC: 105 MMOL/L — SIGNIFICANT CHANGE UP (ref 96–108)
CHOLEST SERPL-MCNC: 84 MG/DL — SIGNIFICANT CHANGE UP (ref 10–199)
CO2 SERPL-SCNC: 28 MMOL/L — SIGNIFICANT CHANGE UP (ref 22–31)
CREAT SERPL-MCNC: 0.54 MG/DL — SIGNIFICANT CHANGE UP (ref 0.5–1.3)
FOLATE SERPL-MCNC: 13.2 NG/ML — SIGNIFICANT CHANGE UP
GLUCOSE SERPL-MCNC: 97 MG/DL — SIGNIFICANT CHANGE UP (ref 70–99)
HBA1C BLD-MCNC: 5 % — SIGNIFICANT CHANGE UP (ref 4–5.6)
HCT VFR BLD CALC: 35.9 % — SIGNIFICANT CHANGE UP (ref 34.5–45)
HDLC SERPL-MCNC: 48 MG/DL — LOW
HGB BLD-MCNC: 11.8 G/DL — SIGNIFICANT CHANGE UP (ref 11.5–15.5)
LIPID PNL WITH DIRECT LDL SERPL: 19 MG/DL — SIGNIFICANT CHANGE UP
MCHC RBC-ENTMCNC: 32.9 GM/DL — SIGNIFICANT CHANGE UP (ref 32–36)
MCHC RBC-ENTMCNC: 33.5 PG — SIGNIFICANT CHANGE UP (ref 27–34)
MCV RBC AUTO: 102 FL — HIGH (ref 80–100)
NRBC # BLD: 0 /100 WBCS — SIGNIFICANT CHANGE UP (ref 0–0)
PLATELET # BLD AUTO: 361 K/UL — SIGNIFICANT CHANGE UP (ref 150–400)
POTASSIUM SERPL-MCNC: 3.8 MMOL/L — SIGNIFICANT CHANGE UP (ref 3.5–5.3)
POTASSIUM SERPL-SCNC: 3.8 MMOL/L — SIGNIFICANT CHANGE UP (ref 3.5–5.3)
RBC # BLD: 3.52 M/UL — LOW (ref 3.8–5.2)
RBC # FLD: 18.8 % — HIGH (ref 10.3–14.5)
SODIUM SERPL-SCNC: 141 MMOL/L — SIGNIFICANT CHANGE UP (ref 135–145)
TOTAL CHOLESTEROL/HDL RATIO MEASUREMENT: 1.8 RATIO — LOW (ref 3.3–7.1)
TRIGL SERPL-MCNC: 87 MG/DL — SIGNIFICANT CHANGE UP (ref 10–149)
TSH SERPL-MCNC: 1.79 UIU/ML — SIGNIFICANT CHANGE UP (ref 0.27–4.2)
VIT B12 SERPL-MCNC: 464 PG/ML — SIGNIFICANT CHANGE UP (ref 232–1245)
WBC # BLD: 8.59 K/UL — SIGNIFICANT CHANGE UP (ref 3.8–10.5)
WBC # FLD AUTO: 8.59 K/UL — SIGNIFICANT CHANGE UP (ref 3.8–10.5)

## 2020-01-02 PROCEDURE — 99233 SBSQ HOSP IP/OBS HIGH 50: CPT

## 2020-01-02 PROCEDURE — 70551 MRI BRAIN STEM W/O DYE: CPT | Mod: 26

## 2020-01-02 RX ORDER — HYDROMORPHONE HYDROCHLORIDE 2 MG/ML
0.5 INJECTION INTRAMUSCULAR; INTRAVENOUS; SUBCUTANEOUS ONCE
Refills: 0 | Status: DISCONTINUED | OUTPATIENT
Start: 2020-01-02 | End: 2020-01-02

## 2020-01-02 RX ADMIN — Medication 150 MILLIGRAM(S): at 21:12

## 2020-01-02 RX ADMIN — OXYCODONE AND ACETAMINOPHEN 1 TABLET(S): 5; 325 TABLET ORAL at 16:33

## 2020-01-02 RX ADMIN — Medication 10 MILLIGRAM(S): at 17:23

## 2020-01-02 RX ADMIN — Medication 1 TABLET(S): at 11:44

## 2020-01-02 RX ADMIN — PREGABALIN 1000 MICROGRAM(S): 225 CAPSULE ORAL at 11:44

## 2020-01-02 RX ADMIN — GABAPENTIN 200 MILLIGRAM(S): 400 CAPSULE ORAL at 21:12

## 2020-01-02 RX ADMIN — PANTOPRAZOLE SODIUM 40 MILLIGRAM(S): 20 TABLET, DELAYED RELEASE ORAL at 05:56

## 2020-01-02 RX ADMIN — HYDROMORPHONE HYDROCHLORIDE 0.5 MILLIGRAM(S): 2 INJECTION INTRAMUSCULAR; INTRAVENOUS; SUBCUTANEOUS at 12:15

## 2020-01-02 RX ADMIN — Medication 1 PATCH: at 09:02

## 2020-01-02 RX ADMIN — Medication 20 MILLIGRAM(S): at 11:44

## 2020-01-02 RX ADMIN — OXYCODONE AND ACETAMINOPHEN 1 TABLET(S): 5; 325 TABLET ORAL at 16:03

## 2020-01-02 RX ADMIN — LIDOCAINE 1 APPLICATION(S): 4 CREAM TOPICAL at 17:24

## 2020-01-02 RX ADMIN — OXYCODONE AND ACETAMINOPHEN 1 TABLET(S): 5; 325 TABLET ORAL at 00:03

## 2020-01-02 RX ADMIN — OXYCODONE AND ACETAMINOPHEN 1 TABLET(S): 5; 325 TABLET ORAL at 21:24

## 2020-01-02 RX ADMIN — ATORVASTATIN CALCIUM 40 MILLIGRAM(S): 80 TABLET, FILM COATED ORAL at 21:12

## 2020-01-02 RX ADMIN — OXYCODONE AND ACETAMINOPHEN 1 TABLET(S): 5; 325 TABLET ORAL at 00:33

## 2020-01-02 RX ADMIN — Medication 100 MILLIGRAM(S): at 11:44

## 2020-01-02 RX ADMIN — Medication 1 PATCH: at 11:43

## 2020-01-02 RX ADMIN — HYDROMORPHONE HYDROCHLORIDE 0.5 MILLIGRAM(S): 2 INJECTION INTRAMUSCULAR; INTRAVENOUS; SUBCUTANEOUS at 11:44

## 2020-01-02 RX ADMIN — GABAPENTIN 200 MILLIGRAM(S): 400 CAPSULE ORAL at 13:01

## 2020-01-02 RX ADMIN — LIDOCAINE 1 APPLICATION(S): 4 CREAM TOPICAL at 05:56

## 2020-01-02 RX ADMIN — Medication 81 MILLIGRAM(S): at 11:44

## 2020-01-02 RX ADMIN — Medication 650 MILLIGRAM(S): at 19:40

## 2020-01-02 RX ADMIN — Medication 10 MILLIGRAM(S): at 05:56

## 2020-01-02 RX ADMIN — Medication 650 MILLIGRAM(S): at 20:20

## 2020-01-02 RX ADMIN — GABAPENTIN 200 MILLIGRAM(S): 400 CAPSULE ORAL at 05:56

## 2020-01-02 RX ADMIN — OXYCODONE AND ACETAMINOPHEN 1 TABLET(S): 5; 325 TABLET ORAL at 22:00

## 2020-01-02 RX ADMIN — Medication 1 MILLIGRAM(S): at 11:44

## 2020-01-02 RX ADMIN — ENOXAPARIN SODIUM 40 MILLIGRAM(S): 100 INJECTION SUBCUTANEOUS at 11:44

## 2020-01-02 RX ADMIN — POLYETHYLENE GLYCOL 3350 17 GRAM(S): 17 POWDER, FOR SOLUTION ORAL at 11:44

## 2020-01-02 RX ADMIN — Medication 1 PATCH: at 21:09

## 2020-01-02 NOTE — CONSULT NOTE ADULT - ASSESSMENT
Recommend: 49Y RH F with recent L MCA infarct due to M1 occlusion with hemorrhagic transformation 12/17/19 at [facility] who is currently admitted for severe PVD with consideration for RLE bypass. Stable subacute hemorrhage in L insular region as reviewed with Dr. Hunt (Neuro-radiology). Exam significant for L hemiplegia, R gaze preference.     Recommend:   - MRI brain w/o contrast to better evaluate subacute hemorrhage vs dystrophic calcification vs cortical laminar necrosis (although too acute for necrosis to develop and would be better characterized on MR)   - if procedure is not limb threatening would defer surgery until repeat scans in 4-6 weeks to determine stability given recent stroke with hemorrhagic conversion.   - continue ASA, statin and DVT ppx     To be seen by stroke attending in AM 49Y RH F with recent L MCA infarct due to M1 occlusion with hemorrhagic transformation 12/17/19 at [facility] who is currently admitted for severe PVD with consideration for RLE bypass. Stable subacute hemorrhage in L insular region as reviewed with Dr. Hunt (Neuro-radiology). Exam significant for L hemiplegia, R gaze preference.     Recommend:   - MRI brain w/o contrast to better evaluate subacute hemorrhage vs dystrophic calcification vs cortical laminar necrosis (although too acute for necrosis to develop and would be better characterized on MR)   -   - continue ASA, statin and DVT ppx

## 2020-01-02 NOTE — PROGRESS NOTE ADULT - ASSESSMENT
48 y/o woman w/ history of chronic smoker, PTSD/depression, spinal disc herniation, GERD, gastric bypass (2004), ex lap for SBO (9 yrs ago) and recent CVA, who is transferred from [place] for severe PVD.

## 2020-01-02 NOTE — OCCUPATIONAL THERAPY INITIAL EVALUATION ADULT - PRECAUTIONS/LIMITATIONS, REHAB EVAL
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

## 2020-01-02 NOTE — PROGRESS NOTE ADULT - SUBJECTIVE AND OBJECTIVE BOX
[facility] Division of Hospital Medicine  Elizabeth Toribio MD  Pager (INNA-YOEL, 4O-5B): [phone]  Other Times:  [phone]    Patient is a 49y old  Female who presents with a chief complaint of Severe PVD (02 Jan 2020 12:58)      SUBJECTIVE / OVERNIGHT EVENTS: No acute events. Patient complaining of severe RLE pain and RUE parasthesias (reports this has been relatively chronic over the last few weeks, intermittent.)  ADDITIONAL REVIEW OF SYSTEMS:   No fevers, chills, N/V/D.    MEDICATIONS  (STANDING):  aspirin  chewable 81 milliGRAM(s) Oral daily  atorvastatin 40 milliGRAM(s) Oral at bedtime  cyanocobalamin 1000 MICROGram(s) Oral daily  enalapril 10 milliGRAM(s) Oral two times a day  enoxaparin Injectable 40 milliGRAM(s) SubCutaneous daily  FLUoxetine 20 milliGRAM(s) Oral daily  folic acid 1 milliGRAM(s) Oral daily  gabapentin 200 milliGRAM(s) Oral every 8 hours  influenza   Vaccine 0.5 milliLiter(s) IntraMuscular once  lidocaine 2% Gel 1 Application(s) Topical two times a day  multivitamin 1 Tablet(s) Oral daily  nicotine -   7 mG/24Hr(s) Patch 1 patch Transdermal daily  pantoprazole    Tablet 40 milliGRAM(s) Oral before breakfast  polyethylene glycol 3350 17 Gram(s) Oral daily  thiamine 100 milliGRAM(s) Oral daily  traZODone 150 milliGRAM(s) Oral at bedtime    MEDICATIONS  (PRN):  acetaminophen   Tablet .. 650 milliGRAM(s) Oral every 6 hours PRN Temp greater or equal to 38C (100.4F), Mild Pain (1 - 3), Moderate Pain (4 - 6)  bisacodyl Suppository 10 milliGRAM(s) Rectal daily PRN Constipation  oxycodone    5 mG/acetaminophen 325 mG 1 Tablet(s) Oral every 4 hours PRN Severe Pain (7 - 10)    CAPILLARY BLOOD GLUCOSE        I&O's Summary    01 Jan 2020 07:01  -  02 Jan 2020 07:00  --------------------------------------------------------  IN: 680 mL / OUT: 400 mL / NET: 280 mL    02 Jan 2020 07:01  -  02 Jan 2020 16:56  --------------------------------------------------------  IN: 490 mL / OUT: 0 mL / NET: 490 mL        PHYSICAL EXAM:  Vital Signs Last 24 Hrs  T(C): 36.7 (02 Jan 2020 14:09), Max: 37.2 (01 Jan 2020 20:47)  T(F): 98.1 (02 Jan 2020 14:09), Max: 98.9 (01 Jan 2020 20:47)  HR: 84 (02 Jan 2020 14:09) (59 - 92)  BP: 118/79 (02 Jan 2020 14:09) (118/79 - 139/85)  BP(mean): --  RR: 18 (02 Jan 2020 14:09) (16 - 18)  SpO2: 97% (02 Jan 2020 14:09) (95% - 98%)    CONSTITUTIONAL: Obese female, in no acute distress, well appearing  EYES: EOMI, conjunctiva and sclera clear  ENMT: Moist oral mucosa, normal dentition  RESPIRATORY: Normal respiratory effort; lungs are clear to auscultation bilaterally  CARDIOVASCULAR: Regular rate and rhythm, normal S1 and S2, no murmur/rub/gallop; No lower extremity edema. Unable to palpate DP/PT pulse on RLE	  ABDOMEN: normoactive bowel sounds, soft, nontender to palpation, no rebound/guarding; no distension   NEURO: Hemiparesis on left, 5/5 strength RUE, RLE. Facial droop.  SKIN: Right 4th toe with discoloration, tender to palpation.     LABS:                        11.8   8.59  )-----------( 361      ( 02 Jan 2020 06:31 )             35.9     01-02    141  |  105  |  9   ----------------------------<  97  3.8   |  28  |  0.54    Ca    9.6      02 Jan 2020 06:31                  RADIOLOGY & ADDITIONAL TESTS:  Results Reviewed:     Imaging Personally Reviewed:    ECG Personally Reviewed:      COORDINATION OF CARE:  Care Discussed with Consultants/Other Providers [Y/N]: Neuro, Medicine ACP  Prior or Outpatient Records Reviewed [Y/N]:

## 2020-01-02 NOTE — OCCUPATIONAL THERAPY INITIAL EVALUATION ADULT - LIVES WITH, PROFILE
children/pt lives in apartment with 3+1 VIKA and elevator access. Pt lives with 3 adult daughters. pt was admitted from [facility], where she was nonambulatory +mechanical lift device for transfers and assistance for all ADLs. Prior to CVA, pt was independent with all aspects of ADLs.

## 2020-01-02 NOTE — OCCUPATIONAL THERAPY INITIAL EVALUATION ADULT - RANGE OF MOTION EXAMINATION, LOWER EXTREMITY
Right LE Active ROM was WNL(within normal limits)/Right LE Passive ROM was WNL (within normal limits)/bilateral LE Passive ROM was WFL  (within functional limits)/No initiation LLE

## 2020-01-02 NOTE — OCCUPATIONAL THERAPY INITIAL EVALUATION ADULT - DIAGNOSIS, OT EVAL
Pt p/w impaired balance, L hemiplegia, +lateropulsion, poor trunk control, impaired cognition impacting independence with ADLs and functional mobility.

## 2020-01-02 NOTE — OCCUPATIONAL THERAPY INITIAL EVALUATION ADULT - RANGE OF MOTION EXAMINATION, UPPER EXTREMITY
No initiation LUE/Right UE Active ROM was WNL (within normal limits)/bilateral UE Passive ROM was WFL  (within functional limits)/Right UE Passive ROM was WNL (within normal limits)

## 2020-01-02 NOTE — OCCUPATIONAL THERAPY INITIAL EVALUATION ADULT - PERTINENT HX OF CURRENT PROBLEM, REHAB EVAL
48 y/o woman w/ history of chronic smoker, PTSD/depression, spinal disc herniation, GERD, gastric bypass (2004), ex lap for SBO (9 yrs ago) and recent CVA, who is transferred from [place] for severe PVD

## 2020-01-02 NOTE — OCCUPATIONAL THERAPY INITIAL EVALUATION ADULT - PLANNED THERAPY INTERVENTIONS, OT EVAL
ADL retraining/balance training/bed mobility training/cognitive, visual perceptual/transfer training/motor coordination training/neuromuscular re-education/ROM/strengthening

## 2020-01-02 NOTE — CONSULT NOTE ADULT - SUBJECTIVE AND OBJECTIVE BOX
HPI:  Patient is a 49Y F with PMH PTSD/depression, spinal disc herniation, GERD, gastric bypass (2004), ex lap for SBO (2011), chronic smoker and recent M1 ischemic stroke with hemorrhagic transformation 12/17/19 at [facility] who is currently admitted for severe PVD with ischemic pain at rest. She is currently undergoing evaluation for vascular bypass surgery however is not on full dose anticoagulation due to hemorrhagic CVA.   Her symptoms at the time of presentation to [facility] on 12/17 included L hemiparesis, R gaze preference. She was found to have R M1 occlusion and asymptomatic LCA occlusion. She developed hemorrhagic conversion in the L BG and was sent to  acute rehab.   CT Head 1/1 demonstrates stable L MCA infarct with stable hemorrhage compared to CT Head 12/25.         MEDICATIONS  (STANDING):  aspirin  chewable 81 milliGRAM(s) Oral daily  atorvastatin 40 milliGRAM(s) Oral at bedtime  cyanocobalamin 1000 MICROGram(s) Oral daily  enalapril 10 milliGRAM(s) Oral two times a day  enoxaparin Injectable 40 milliGRAM(s) SubCutaneous daily  FLUoxetine 20 milliGRAM(s) Oral daily  folic acid 1 milliGRAM(s) Oral daily  gabapentin 200 milliGRAM(s) Oral every 8 hours  influenza   Vaccine 0.5 milliLiter(s) IntraMuscular once  lidocaine 2% Gel 1 Application(s) Topical two times a day  multivitamin 1 Tablet(s) Oral daily  nicotine -   7 mG/24Hr(s) Patch 1 patch Transdermal daily  pantoprazole    Tablet 40 milliGRAM(s) Oral before breakfast  polyethylene glycol 3350 17 Gram(s) Oral daily  thiamine 100 milliGRAM(s) Oral daily  traZODone 150 milliGRAM(s) Oral at bedtime    MEDICATIONS  (PRN):  acetaminophen   Tablet .. 650 milliGRAM(s) Oral every 6 hours PRN Temp greater or equal to 38C (100.4F), Mild Pain (1 - 3), Moderate Pain (4 - 6)  bisacodyl Suppository 10 milliGRAM(s) Rectal daily PRN Constipation  oxycodone    5 mG/acetaminophen 325 mG 1 Tablet(s) Oral every 4 hours PRN Severe Pain (7 - 10)    PAST MEDICAL & SURGICAL HISTORY:  Chronic insomnia  PTSD (post-traumatic stress disorder)  ETOH abuse  Anxiety  HTN (hypertension)  Depression  Tubal Ligation Status  Status Post Gastric Bypass for Obesity  Status Post Laparoscopic Cholecystectomy    FAMILY HISTORY:  No pertinent family history in first degree relatives    Allergies    No Known Allergies    Intolerances        SHx - + chronic smoking, No ETOH, No drug abuse    Review of Systems:  A 10 system ROS was performed and is negative except for those mentioned in HPI      Vital Signs Last 24 Hrs  T(C): 36.8 (02 Jan 2020 10:20), Max: 37.2 (01 Jan 2020 20:47)  T(F): 98.2 (02 Jan 2020 10:20), Max: 98.9 (01 Jan 2020 20:47)  HR: 73 (02 Jan 2020 10:31) (59 - 92)  BP: 139/85 (02 Jan 2020 10:31) (121/71 - 157/81)  BP(mean): --  RR: 18 (02 Jan 2020 10:20) (16 - 18)  SpO2: 97% (02 Jan 2020 10:31) (95% - 98%)    Neurological (>12):  MS: Awake, alert, oriented to person, place, situation, time. Normal affect. Follows all commands.    Language: Speech is clear, fluent with good repetition & comprehension     CNs: PERRLA (R = 3mm, L = 3mm). VFF. EOMI no nystagmus, no diplopia. V1-3 intact to LT/pinprick, well developed masseter muscles b/l. No facial asymmetry b/l, full eye closure strength b/l. Hearing grossly normal (rubbing fingers) b/l. Symmetric palate elevation in midline. Gag reflex deferred. Head turning & shoulder shrug intact b/l. Tongue midline, normal movements, no atrophy.    Motor: Normal muscle bulk & tone. No noticeable tremor or seizure. No pronator drift.  LUE  LLE  RUE  RLE	     Sensation: decreases sensation on the L compared to the R     Cortical: Extinction on DSS (neglect): none    Reflexes:              Biceps(C5)       BR(C6)     Triceps(C7)               Patellar(L4)    Achilles(S1)    Plantar Resp  R	2	          2	             2		        2		    2		Down   L	2	          2	             2		        2		    2		Down     Coordination: intact rapid-alt movements. No dysmetria to FTN/HTS    Gait: Normal Romberg. No postural instability. Normal stance and tandem gait.         01-02    141  |  105  |  9   ----------------------------<  97  3.8   |  28  |  0.54    Ca    9.6      02 Jan 2020 06:31      01-02    141  |  105  |  9   ----------------------------<  97  3.8   |  28  |  0.54    Ca    9.6      02 Jan 2020 06:31                            11.8   8.59  )-----------( 361      ( 02 Jan 2020 06:31 )             35.9       Radiology  < from: CT Head No Cont (01.01.20 @ 17:46) >    EXAM:  CT BRAIN                            PROCEDURE DATE:  01/01/2020            INTERPRETATION:  History: Recent hemorrhagic right MCA stroke. Left hemiplegia. Possible new right arm numbness.    Description: A noncontrast head CT was performed.    Comparison is made to prior head CT from 12/25/2019.    An evolving subacute right middle cerebral artery territory infarct is again noted. There has been an  interval decrease in the edema and mass effect since the prior examination. The focal hemorrhagic transformation in the posterior right insular region has decreased compared to the prior study. Gyriform petechial hemorrhagic transformation and/or a laminar cortical necrosis involves the right insular cortex and right frontal parietal perisylvian cortex. There is no superimposed acute parenchymal hematoma.    No new additional vascular territory infarcts are noted over the time interval. No areas of hemorrhage outside the right MCA infarct are noted.    IMPRESSION:    An evolving right MCA territory infarct with hemorrhagic transformation is again noted as described. If the patient has a new and persistent neurologic deficit, consider short interval follow-up head CT or brain MRI follow-up.    < end of copied text > HPI:  Patient is a 49Y RH F with PMH PTSD/depression, spinal disc herniation, GERD, gastric bypass (2004), ex lap for SBO (2011), chronic smoker and recent L MCA infarct due to M1 occlusion with hemorrhagic transformation 12/17/19 at [facility] who is currently admitted for severe PVD with ischemic pain at rest. She is currently undergoing evaluation for vascular bypass surgery however is not on full dose anticoagulation due to hemorrhagic CVA.   Her symptoms at the time of presentation to [facility] on 12/17 included L hemiparesis, R gaze preference. She was found to have R M1 occlusion and asymptomatic LCA occlusion. She developed hemorrhagic conversion in the L BG and was sent to  acute rehab.   CT Head 1/1 demonstrates stable L MCA infarct with stable hemorrhage compared to CT Head 12/25.         MEDICATIONS  (STANDING):  aspirin  chewable 81 milliGRAM(s) Oral daily  atorvastatin 40 milliGRAM(s) Oral at bedtime  cyanocobalamin 1000 MICROGram(s) Oral daily  enalapril 10 milliGRAM(s) Oral two times a day  enoxaparin Injectable 40 milliGRAM(s) SubCutaneous daily  FLUoxetine 20 milliGRAM(s) Oral daily  folic acid 1 milliGRAM(s) Oral daily  gabapentin 200 milliGRAM(s) Oral every 8 hours  influenza   Vaccine 0.5 milliLiter(s) IntraMuscular once  lidocaine 2% Gel 1 Application(s) Topical two times a day  multivitamin 1 Tablet(s) Oral daily  nicotine -   7 mG/24Hr(s) Patch 1 patch Transdermal daily  pantoprazole    Tablet 40 milliGRAM(s) Oral before breakfast  polyethylene glycol 3350 17 Gram(s) Oral daily  thiamine 100 milliGRAM(s) Oral daily  traZODone 150 milliGRAM(s) Oral at bedtime    MEDICATIONS  (PRN):  acetaminophen   Tablet .. 650 milliGRAM(s) Oral every 6 hours PRN Temp greater or equal to 38C (100.4F), Mild Pain (1 - 3), Moderate Pain (4 - 6)  bisacodyl Suppository 10 milliGRAM(s) Rectal daily PRN Constipation  oxycodone    5 mG/acetaminophen 325 mG 1 Tablet(s) Oral every 4 hours PRN Severe Pain (7 - 10)    PAST MEDICAL & SURGICAL HISTORY:  Chronic insomnia  PTSD (post-traumatic stress disorder)  ETOH abuse  Anxiety  HTN (hypertension)  Depression  Tubal Ligation Status  Status Post Gastric Bypass for Obesity  Status Post Laparoscopic Cholecystectomy    FAMILY HISTORY:  No pertinent family history in first degree relatives    Allergies    No Known Allergies    Intolerances        SHx - + chronic smoking, No ETOH, No drug abuse    Review of Systems:  A 10 system ROS was performed and is negative except for those mentioned in HPI      Vital Signs Last 24 Hrs  T(C): 36.8 (02 Jan 2020 10:20), Max: 37.2 (01 Jan 2020 20:47)  T(F): 98.2 (02 Jan 2020 10:20), Max: 98.9 (01 Jan 2020 20:47)  HR: 73 (02 Jan 2020 10:31) (59 - 92)  BP: 139/85 (02 Jan 2020 10:31) (121/71 - 157/81)  BP(mean): --  RR: 18 (02 Jan 2020 10:20) (16 - 18)  SpO2: 97% (02 Jan 2020 10:31) (95% - 98%)    Neurological (>12):  MS: Awake, alert, oriented to person, place, situation, time. Normal affect. Follows all commands.    Language: Speech is mildly dysarthric but fluent with good repetition & comprehension     CNs: PERRLA (R = 3mm, L = 3mm). VFF. R gaze preference but able to be overcome, EOMI no nystagmus, no diplopia. V1-3 intact to LT/pinprick, well developed masseter muscles b/l. L facial paresis, full eye closure strength b/l. Hearing grossly normal (rubbing fingers) b/l. Symmetric palate elevation in midline. Gag reflex deferred. Head turning & shoulder shrug intact b/l. Tongue midline, normal movements, no atrophy.    Motor: Normal muscle bulk & tone. No noticeable tremor or seizure. No pronator drift.  LUE/LLE- no movement  RUE/ RLE - no drift, 5/5    Sensation: decreases sensation on the L compared to the R     Cortical: Extinction on DSS (neglect): extinction to bilateral simultaneous stimulation    Coordination: No dysmetria to FTN on the R    Gait: deferred    NIHSS: 14  pre-MRS: unknown        01-02    141  |  105  |  9   ----------------------------<  97  3.8   |  28  |  0.54    Ca    9.6      02 Jan 2020 06:31      01-02    141  |  105  |  9   ----------------------------<  97  3.8   |  28  |  0.54    Ca    9.6      02 Jan 2020 06:31                            11.8   8.59  )-----------( 361      ( 02 Jan 2020 06:31 )             35.9       Radiology  < from: CT Head No Cont (01.01.20 @ 17:46) >    EXAM:  CT BRAIN                            PROCEDURE DATE:  01/01/2020            INTERPRETATION:  History: Recent hemorrhagic right MCA stroke. Left hemiplegia. Possible new right arm numbness.    Description: A noncontrast head CT was performed.    Comparison is made to prior head CT from 12/25/2019.    An evolving subacute right middle cerebral artery territory infarct is again noted. There has been an  interval decrease in the edema and mass effect since the prior examination. The focal hemorrhagic transformation in the posterior right insular region has decreased compared to the prior study. Gyriform petechial hemorrhagic transformation and/or a laminar cortical necrosis involves the right insular cortex and right frontal parietal perisylvian cortex. There is no superimposed acute parenchymal hematoma.    No new additional vascular territory infarcts are noted over the time interval. No areas of hemorrhage outside the right MCA infarct are noted.    IMPRESSION:    An evolving right MCA territory infarct with hemorrhagic transformation is again noted as described. If the patient has a new and persistent neurologic deficit, consider short interval follow-up head CT or brain MRI follow-up.    < end of copied text >

## 2020-01-03 ENCOUNTER — TRANSCRIPTION ENCOUNTER (OUTPATIENT)
Age: 50
End: 2020-01-03

## 2020-01-03 PROCEDURE — 74230 X-RAY XM SWLNG FUNCJ C+: CPT | Mod: 26

## 2020-01-03 PROCEDURE — 99233 SBSQ HOSP IP/OBS HIGH 50: CPT

## 2020-01-03 PROCEDURE — 99223 1ST HOSP IP/OBS HIGH 75: CPT

## 2020-01-03 PROCEDURE — 93931 UPPER EXTREMITY STUDY: CPT | Mod: 26

## 2020-01-03 RX ORDER — OXYCODONE HYDROCHLORIDE 5 MG/1
5 TABLET ORAL ONCE
Refills: 0 | Status: DISCONTINUED | OUTPATIENT
Start: 2020-01-03 | End: 2020-01-03

## 2020-01-03 RX ORDER — HYDROMORPHONE HYDROCHLORIDE 2 MG/ML
0.5 INJECTION INTRAMUSCULAR; INTRAVENOUS; SUBCUTANEOUS ONCE
Refills: 0 | Status: DISCONTINUED | OUTPATIENT
Start: 2020-01-03 | End: 2020-01-03

## 2020-01-03 RX ORDER — OXYCODONE HYDROCHLORIDE 5 MG/1
10 TABLET ORAL EVERY 4 HOURS
Refills: 0 | Status: DISCONTINUED | OUTPATIENT
Start: 2020-01-03 | End: 2020-01-10

## 2020-01-03 RX ORDER — ATORVASTATIN CALCIUM 80 MG/1
80 TABLET, FILM COATED ORAL AT BEDTIME
Refills: 0 | Status: DISCONTINUED | OUTPATIENT
Start: 2020-01-03 | End: 2020-01-13

## 2020-01-03 RX ORDER — GABAPENTIN 400 MG/1
300 CAPSULE ORAL THREE TIMES A DAY
Refills: 0 | Status: DISCONTINUED | OUTPATIENT
Start: 2020-01-03 | End: 2020-01-13

## 2020-01-03 RX ADMIN — PANTOPRAZOLE SODIUM 40 MILLIGRAM(S): 20 TABLET, DELAYED RELEASE ORAL at 05:13

## 2020-01-03 RX ADMIN — OXYCODONE HYDROCHLORIDE 5 MILLIGRAM(S): 5 TABLET ORAL at 00:40

## 2020-01-03 RX ADMIN — GABAPENTIN 300 MILLIGRAM(S): 400 CAPSULE ORAL at 11:31

## 2020-01-03 RX ADMIN — Medication 1 PATCH: at 07:30

## 2020-01-03 RX ADMIN — Medication 1 PATCH: at 11:26

## 2020-01-03 RX ADMIN — Medication 10 MILLIGRAM(S): at 16:49

## 2020-01-03 RX ADMIN — OXYCODONE HYDROCHLORIDE 10 MILLIGRAM(S): 5 TABLET ORAL at 17:19

## 2020-01-03 RX ADMIN — Medication 30 MILLILITER(S): at 20:26

## 2020-01-03 RX ADMIN — GABAPENTIN 200 MILLIGRAM(S): 400 CAPSULE ORAL at 05:13

## 2020-01-03 RX ADMIN — Medication 650 MILLIGRAM(S): at 18:44

## 2020-01-03 RX ADMIN — OXYCODONE AND ACETAMINOPHEN 1 TABLET(S): 5; 325 TABLET ORAL at 08:31

## 2020-01-03 RX ADMIN — Medication 10 MILLIGRAM(S): at 05:13

## 2020-01-03 RX ADMIN — Medication 1 MILLIGRAM(S): at 11:30

## 2020-01-03 RX ADMIN — Medication 650 MILLIGRAM(S): at 05:03

## 2020-01-03 RX ADMIN — HYDROMORPHONE HYDROCHLORIDE 0.5 MILLIGRAM(S): 2 INJECTION INTRAMUSCULAR; INTRAVENOUS; SUBCUTANEOUS at 11:53

## 2020-01-03 RX ADMIN — Medication 1 TABLET(S): at 11:30

## 2020-01-03 RX ADMIN — Medication 81 MILLIGRAM(S): at 11:31

## 2020-01-03 RX ADMIN — OXYCODONE AND ACETAMINOPHEN 1 TABLET(S): 5; 325 TABLET ORAL at 05:03

## 2020-01-03 RX ADMIN — Medication 150 MILLIGRAM(S): at 21:29

## 2020-01-03 RX ADMIN — OXYCODONE HYDROCHLORIDE 5 MILLIGRAM(S): 5 TABLET ORAL at 01:10

## 2020-01-03 RX ADMIN — Medication 1 PATCH: at 11:43

## 2020-01-03 RX ADMIN — Medication 650 MILLIGRAM(S): at 04:33

## 2020-01-03 RX ADMIN — ATORVASTATIN CALCIUM 80 MILLIGRAM(S): 80 TABLET, FILM COATED ORAL at 21:27

## 2020-01-03 RX ADMIN — GABAPENTIN 300 MILLIGRAM(S): 400 CAPSULE ORAL at 21:28

## 2020-01-03 RX ADMIN — OXYCODONE HYDROCHLORIDE 10 MILLIGRAM(S): 5 TABLET ORAL at 20:57

## 2020-01-03 RX ADMIN — OXYCODONE HYDROCHLORIDE 10 MILLIGRAM(S): 5 TABLET ORAL at 16:49

## 2020-01-03 RX ADMIN — OXYCODONE AND ACETAMINOPHEN 1 TABLET(S): 5; 325 TABLET ORAL at 04:33

## 2020-01-03 RX ADMIN — Medication 650 MILLIGRAM(S): at 18:14

## 2020-01-03 RX ADMIN — Medication 1 PATCH: at 20:31

## 2020-01-03 RX ADMIN — Medication 20 MILLIGRAM(S): at 11:26

## 2020-01-03 RX ADMIN — OXYCODONE HYDROCHLORIDE 10 MILLIGRAM(S): 5 TABLET ORAL at 20:27

## 2020-01-03 RX ADMIN — POLYETHYLENE GLYCOL 3350 17 GRAM(S): 17 POWDER, FOR SOLUTION ORAL at 11:27

## 2020-01-03 RX ADMIN — ENOXAPARIN SODIUM 40 MILLIGRAM(S): 100 INJECTION SUBCUTANEOUS at 11:30

## 2020-01-03 RX ADMIN — LIDOCAINE 1 APPLICATION(S): 4 CREAM TOPICAL at 16:48

## 2020-01-03 RX ADMIN — HYDROMORPHONE HYDROCHLORIDE 0.5 MILLIGRAM(S): 2 INJECTION INTRAMUSCULAR; INTRAVENOUS; SUBCUTANEOUS at 11:23

## 2020-01-03 RX ADMIN — PREGABALIN 1000 MICROGRAM(S): 225 CAPSULE ORAL at 11:30

## 2020-01-03 RX ADMIN — LIDOCAINE 1 APPLICATION(S): 4 CREAM TOPICAL at 05:13

## 2020-01-03 RX ADMIN — Medication 100 MILLIGRAM(S): at 11:26

## 2020-01-03 RX ADMIN — OXYCODONE AND ACETAMINOPHEN 1 TABLET(S): 5; 325 TABLET ORAL at 09:01

## 2020-01-03 NOTE — CONSULT NOTE ADULT - ASSESSMENT
48 y/o F w/ right foot 4th digit gangrene  -Pt seen and evaluated  -Labs/vitals stable  -Right foot 4th digit dry gangrene, no wet conversion, no erythema or edema, no malodor, no signs of infection, dry and stable  -ROSA MARIA/PVR poor to RLE and CTA showing occlusion of R popliteal artery below knee w/ poor run-off  -Due to recent hemorrhagic stroke, therapeutic AC and angiogram are contraindicated at this time  -No podiatric surgical intervention at this time  -Will wait for toe to demarcate and will monitor  -Discussed w/ attending

## 2020-01-03 NOTE — SWALLOW VFSS/MBS ASSESSMENT ADULT - NS SWALLOW VFSS REC ASPIR MON
Monitor for s/s aspiration/laryngeal penetration. If noted:  D/C p.o. intake, provide non-oral nutrition/hydration/meds, and contact this service @ [phone]/fever/pneumonia/throat clearing/change of breathing pattern/gurgly voice/upper respiratory infection/cough

## 2020-01-03 NOTE — DISCHARGE NOTE NURSING/CASE MANAGEMENT/SOCIAL WORK - PATIENT PORTAL LINK FT
You can access the FollowMyHealth Patient Portal offered by [facility] by registering at the following website: [email or web address]. By joining [employer]’s FollowMyHealth portal, you will also be able to view your health information using other applications (apps) compatible with our system.

## 2020-01-03 NOTE — SWALLOW VFSS/MBS ASSESSMENT ADULT - COMMENTS
Presented on 12/17/19 to [facility] for left sided weakness, right gaze preference, and facial droop. 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 She developed basal ganglia hemorrhagic conversion, but was stable. discharged to acute rehab. Pt continued to have right foot pain, especially at her right 4th toe which was ashy/dusky in color. Was evaluated by vascular surgery at [place] and was found to have severe PAD. Vascular surgery team at [place] recommended transfer to [place] for angiogram and possible stenting or other vascular procedures. Seen by [facility] Vascular surgery however no intervention at this time; signed off. Limbs not threatened currently. Due to recent hemorrhagic stroke, therapeutic AC and angiogram are contraindicated at this time.

## 2020-01-03 NOTE — SWALLOW VFSS/MBS ASSESSMENT ADULT - ORAL PHASE
Residue in oral cavity Delayed oral transit time/Residue in oral cavity/Uncontrolled bolus / spillover in hypopharynx/Uncontrolled bolus / spillover in sabina-pharynx Incomplete tongue to palate contact/Uncontrolled bolus / spillover in sabina-pharynx/Uncontrolled bolus / spillover in hypopharynx Within functional limits within functional limits Uncontrolled bolus / spillover in sabina-pharynx

## 2020-01-03 NOTE — SWALLOW VFSS/MBS ASSESSMENT ADULT - SLP GENERAL OBSERVATIONS
Pt received in radiology secured in TYLER chair. AA+Ox2. Easily distracted; pleasant, cooperative but restless

## 2020-01-03 NOTE — SWALLOW VFSS/MBS ASSESSMENT ADULT - SLP PERTINENT HISTORY OF CURRENT PROBLEM
48 y/o woman w/ history of chronic smoker, PTSD/depression, spinal disc herniation, GERD, gastric bypass (2004), ex lap for SBO (9 yrs ago) and recent CVA, who is transferred from [place] for severe PVD.

## 2020-01-03 NOTE — SWALLOW VFSS/MBS ASSESSMENT ADULT - LARYNGEAL PENETRATION DURING THE SWALLOW - SILENT
Trace/over the laryngeal surface of the epiglottis; retrieved over the arytenoids, deep to the vocal cords; incomplete retrieval/Mild

## 2020-01-03 NOTE — PROGRESS NOTE ADULT - ASSESSMENT
50 y/o woman w/ history of chronic smoker, PTSD/depression, spinal disc herniation, GERD, gastric bypass (2004), ex lap for SBO (9 yrs ago) and recent CVA, who is transferred from [place] for severe PVD and ischemic toe.

## 2020-01-03 NOTE — PROGRESS NOTE ADULT - SUBJECTIVE AND OBJECTIVE BOX
[facility] Division of Hospital Medicine  Elizabeth Toribio MD  Pager (CHRISTIAN, 8N-4D): [phone]  Other Times:  [phone]    Patient is a 49y old  Female who presents with a chief complaint of Severe PVD (02 Jan 2020 16:55)      SUBJECTIVE / OVERNIGHT EVENTS: Continues to complain of severe pain in her right foot. She is crying due to pain.  Otherwise she denies any symptoms.  ADDITIONAL REVIEW OF SYSTEMS: Specifically, denies F/C, CP, SOB, palpitations, N/V/D.    MEDICATIONS  (STANDING):  aspirin  chewable 81 milliGRAM(s) Oral daily  atorvastatin 80 milliGRAM(s) Oral at bedtime  cyanocobalamin 1000 MICROGram(s) Oral daily  enalapril 10 milliGRAM(s) Oral two times a day  enoxaparin Injectable 40 milliGRAM(s) SubCutaneous daily  FLUoxetine 20 milliGRAM(s) Oral daily  folic acid 1 milliGRAM(s) Oral daily  gabapentin 300 milliGRAM(s) Oral three times a day  influenza   Vaccine 0.5 milliLiter(s) IntraMuscular once  lidocaine 2% Gel 1 Application(s) Topical two times a day  multivitamin 1 Tablet(s) Oral daily  nicotine -   7 mG/24Hr(s) Patch 1 patch Transdermal daily  oxyCODONE    IR 10 milliGRAM(s) Oral every 4 hours  pantoprazole    Tablet 40 milliGRAM(s) Oral before breakfast  polyethylene glycol 3350 17 Gram(s) Oral daily  thiamine 100 milliGRAM(s) Oral daily  traZODone 150 milliGRAM(s) Oral at bedtime    MEDICATIONS  (PRN):  acetaminophen   Tablet .. 650 milliGRAM(s) Oral every 6 hours PRN Temp greater or equal to 38C (100.4F), Mild Pain (1 - 3), Moderate Pain (4 - 6)  bisacodyl Suppository 10 milliGRAM(s) Rectal daily PRN Constipation    CAPILLARY BLOOD GLUCOSE        I&O's Summary    02 Jan 2020 07:01  -  03 Jan 2020 07:00  --------------------------------------------------------  IN: 1450 mL / OUT: 0 mL / NET: 1450 mL        PHYSICAL EXAM:  Vital Signs Last 24 Hrs  T(C): 36.7 (03 Jan 2020 09:45), Max: 36.8 (02 Jan 2020 22:40)  T(F): 98 (03 Jan 2020 09:45), Max: 98.3 (02 Jan 2020 22:40)  HR: 76 (03 Jan 2020 09:45) (66 - 84)  BP: 110/75 (03 Jan 2020 09:45) (110/75 - 126/78)  BP(mean): --  RR: 18 (03 Jan 2020 09:45) (18 - 18)  SpO2: 98% (03 Jan 2020 09:45) (94% - 98%)    CONSTITUTIONAL: Obese female, in no acute distress, well appearing  EYES: EOMI, conjunctiva and sclera clear  ENMT: Moist oral mucosa, normal dentition  RESPIRATORY: Normal respiratory effort; lungs are clear to auscultation bilaterally  CARDIOVASCULAR: Regular rate and rhythm, normal S1 and S2, no murmur/rub/gallop; No lower extremity edema. Unable to palpate DP/PT pulse on RLE	  ABDOMEN: normoactive bowel sounds, soft, nontender to palpation, no rebound/guarding; no distension   NEURO: Hemiparesis on left, 5/5 strength RUE, RLE. Facial droop.  SKIN: Right 4th toe with discoloration, tender to palpation.     LABS:                        11.8   8.59  )-----------( 361      ( 02 Jan 2020 06:31 )             35.9     01-02    141  |  105  |  9   ----------------------------<  97  3.8   |  28  |  0.54    Ca    9.6      02 Jan 2020 06:31                  RADIOLOGY & ADDITIONAL TESTS:  Results Reviewed:     Imaging Personally Reviewed:    ECG Personally Reviewed:      COORDINATION OF CARE:  Care Discussed with Consultants/Other Providers [Y/N]: Neuro, Medicine ACP  Prior or Outpatient Records Reviewed [Y/N]:

## 2020-01-03 NOTE — DISCHARGE NOTE NURSING/CASE MANAGEMENT/SOCIAL WORK - NSDCVIVACCINE_GEN_ALL_CORE_FT
Influenza , 2014/12/6 15:23 , Ira Easley (RN)  Tdap , 2015/1/25 18:23 , [place], Auto-luis alberto (IS)  Tdap , 2018/3/9 14:33 , Lissy Hawkins (RN) Influenza , 2014/12/6 15:23 , Ira Easley (RN)  Tdap , 2015/1/25 18:23 , [place], Auto-luis alberto (IS)  Tdap , 2018/3/9 14:33 , Lissy Hawkins (RN) Influenza , 2014/12/6 15:23 , Ira Easley (RN)  Tdap , 2015/1/25 18:23 , [place], Auto-luis alberto (IS)  Tdap , 2018/3/9 14:33 , Lissy Hawkins (RN)

## 2020-01-03 NOTE — CONSULT NOTE ADULT - SUBJECTIVE AND OBJECTIVE BOX
Podiatry pager #: [phone] ([place])/ [phone] ([facility])    Patient is a 49y old  Female who presents with a chief complaint of Severe PVD (03 Jan 2020 11:47)      HPI:  Mrs. Pickett is a 50 y/o woman w/ history of chronic smoker, PTSD/depression, spinal disc herniation, GERD, gastric bypass (2004), ex lap for SBO (9 yrs ago) and recent CVA, who is transferred from [place] for severe PVD.    Patient has a history consistent with ischemic rest pain. She has pain at night which gets relieved by hanging her feet down off at the side. She also reports symptoms consistent with vascular claudications of legs even with short ambulation, despite being able to ambulate without any assistance devices. She reports sharp burning pain that is severe (8~10/10 when it is bad) that shoots up from her toes to the thigh area below the knee. This has been ongoing issue, yet got worse lately. The right foot pain is worse on 4th toe which has discoloration to ashy/dusky color, and also on distal plantar surface. Also has left leg pain similar to right yet less in intensity. Since recent stroke and residual left sided weakness, she has been more reliant on right side for functioning, and the foot pain has been getting worse with movements.  Patient states that prior to the recent admission for CVA, patient did not have trouble with the ambulation in terms of muscle strength, paresthesia, or numbness. However, now, she has severe pain with paresthesia.  At [facility], dana was evaluated by vascular surgery, and an ROSA MARIA was 0.4, correlating w/ severe PAD. Vascular surgery recommended transfer to [place] for angiogram and possible stenting or other vascular procedures, and patient is now transferred to [facility].    Of note, patient had recent CVA. She presented to [facility] on 12/17/19 with left sided weakness, right gaze preference, and facial droop, found to have Right M1 occlusion and Left carotid artery occlusion. She developed basal ganglia hemorrhagic conversion, but was stable. Pt was evaluated by PT and was recommended for acute inpatient rehabilitation when stable. Pt was deemed stable for discharge, and she was transferred to [facility] on 12/24/19 for acute inpatient rehabilitation.    Upon admission, patient is hemodynamically stable.  Temp 36.8, HR 74, /80, RR 18, SpO2 99% on RA.  She continues to report the foot pain, yet is not too severe at the moment.  She has been also constipated, which gives her abdominal discomfort. Last BM today. Otherwise, denies CP, palpitation, SOB, lightheadedness, abdominal pain, melena/hematochezia, n/v/d, dysuria/hematuria, fever, chills. (30 Dec 2019 23:51)      PAST MEDICAL & SURGICAL HISTORY:  Chronic insomnia  PTSD (post-traumatic stress disorder)  ETOH abuse  Anxiety  HTN (hypertension)  Depression  Tubal Ligation Status  Status Post Gastric Bypass for Obesity  Status Post Laparoscopic Cholecystectomy      MEDICATIONS  (STANDING):  aspirin  chewable 81 milliGRAM(s) Oral daily  atorvastatin 80 milliGRAM(s) Oral at bedtime  cyanocobalamin 1000 MICROGram(s) Oral daily  enalapril 10 milliGRAM(s) Oral two times a day  enoxaparin Injectable 40 milliGRAM(s) SubCutaneous daily  FLUoxetine 20 milliGRAM(s) Oral daily  folic acid 1 milliGRAM(s) Oral daily  gabapentin 300 milliGRAM(s) Oral three times a day  influenza   Vaccine 0.5 milliLiter(s) IntraMuscular once  lidocaine 2% Gel 1 Application(s) Topical two times a day  multivitamin 1 Tablet(s) Oral daily  nicotine -   7 mG/24Hr(s) Patch 1 patch Transdermal daily  oxyCODONE    IR 10 milliGRAM(s) Oral every 4 hours  pantoprazole    Tablet 40 milliGRAM(s) Oral before breakfast  polyethylene glycol 3350 17 Gram(s) Oral daily  thiamine 100 milliGRAM(s) Oral daily  traZODone 150 milliGRAM(s) Oral at bedtime    MEDICATIONS  (PRN):  acetaminophen   Tablet .. 650 milliGRAM(s) Oral every 6 hours PRN Temp greater or equal to 38C (100.4F), Mild Pain (1 - 3), Moderate Pain (4 - 6)  bisacodyl Suppository 10 milliGRAM(s) Rectal daily PRN Constipation      Allergies    No Known Allergies    Intolerances        VITALS:    Vital Signs Last 24 Hrs  T(C): 37.2 (03 Jan 2020 16:45), Max: 37.2 (03 Jan 2020 14:00)  T(F): 99 (03 Jan 2020 16:45), Max: 99 (03 Jan 2020 16:45)  HR: 80 (03 Jan 2020 16:45) (66 - 84)  BP: 135/86 (03 Jan 2020 16:45) (105/72 - 135/86)  BP(mean): --  RR: 18 (03 Jan 2020 16:45) (18 - 18)  SpO2: 96% (03 Jan 2020 16:45) (94% - 98%)    LABS:                          11.8   8.59  )-----------( 361      ( 02 Jan 2020 06:31 )             35.9       01-02    141  |  105  |  9   ----------------------------<  97  3.8   |  28  |  0.54    Ca    9.6      02 Jan 2020 06:31        CAPILLARY BLOOD GLUCOSE              LOWER EXTREMITY PHYSICAL EXAM:    Vascular: DP/PT 2/4 on L, nonpalpable on R, CFT <5 seconds B/L (no CFT on RF 4th digit), Temperature gradient warm to cool B/L  Neuro: Epicritic sensation intact to the level of toes B/L  Skin: right foot 4th digit dry gangrene, no wet conversion, no erythema or edema, no malodor, no signs of infection, dry and stable, severe pain to touch

## 2020-01-03 NOTE — SWALLOW VFSS/MBS ASSESSMENT ADULT - DIAGNOSTIC IMPRESSIONS
Patient presents with an oropharyngeal dysphagia. Min anterior loss is noted due to left labial weakness/decreased sensation. There is prolonged mastication/bolus formation, however functional for soft solids despite edentulous status. Premature spillage and delayed pharyngeal trigger result in deep silent penetration of thin liquids to the vocal cords. Laryngeal penetration is also noted for nectar thickened liquids however material is retrieved on subsequent swallows. Use of chin tuck position improves airway protection for thin liquids however strategy is judged to be unreliable at this time given patient's impulsivity, distractibility and reduced recall. Would suggest allow thin liquids with chin tuck in a controlled therapeutic setting with SLP only. Due to risk for silent aspiration, repeat MBS is warranted prior to consideration of upgrade.    Disorders: reduced lingual strength, delay in trigger of the swallow reflex, reduced supraglottic sensation

## 2020-01-03 NOTE — DISCHARGE NOTE NURSING/CASE MANAGEMENT/SOCIAL WORK - NSDCPEPTSTRK_GEN_ALL_CORE
Signs and symptoms of stroke/Stroke warning signs and symptoms/Call 911 for stroke/Need for follow up after discharge/Stroke education booklet/Prescribed medications/Risk factors for stroke/Stroke support groups for patients, families, and friends

## 2020-01-03 NOTE — SWALLOW VFSS/MBS ASSESSMENT ADULT - ORAL PHASE COMMENTS
min left anterior loss without sensation; min oral residue cleared on repeat swallow min oral residue cleared on repeat swallow; min premature spillage to the valleculae and trace to the pyiforms min-mod spillage to the a-e folds min-mod spillage to the pyriforms

## 2020-01-04 PROCEDURE — 99233 SBSQ HOSP IP/OBS HIGH 50: CPT

## 2020-01-04 RX ADMIN — ENOXAPARIN SODIUM 40 MILLIGRAM(S): 100 INJECTION SUBCUTANEOUS at 11:51

## 2020-01-04 RX ADMIN — PANTOPRAZOLE SODIUM 40 MILLIGRAM(S): 20 TABLET, DELAYED RELEASE ORAL at 06:31

## 2020-01-04 RX ADMIN — Medication 1 PATCH: at 11:52

## 2020-01-04 RX ADMIN — Medication 30 MILLILITER(S): at 22:47

## 2020-01-04 RX ADMIN — GABAPENTIN 300 MILLIGRAM(S): 400 CAPSULE ORAL at 06:31

## 2020-01-04 RX ADMIN — OXYCODONE HYDROCHLORIDE 10 MILLIGRAM(S): 5 TABLET ORAL at 17:45

## 2020-01-04 RX ADMIN — LIDOCAINE 1 APPLICATION(S): 4 CREAM TOPICAL at 17:46

## 2020-01-04 RX ADMIN — Medication 10 MILLIGRAM(S): at 18:26

## 2020-01-04 RX ADMIN — POLYETHYLENE GLYCOL 3350 17 GRAM(S): 17 POWDER, FOR SOLUTION ORAL at 11:52

## 2020-01-04 RX ADMIN — Medication 1 PATCH: at 20:35

## 2020-01-04 RX ADMIN — GABAPENTIN 300 MILLIGRAM(S): 400 CAPSULE ORAL at 13:16

## 2020-01-04 RX ADMIN — Medication 20 MILLIGRAM(S): at 11:53

## 2020-01-04 RX ADMIN — Medication 1 MILLIGRAM(S): at 11:51

## 2020-01-04 RX ADMIN — OXYCODONE HYDROCHLORIDE 10 MILLIGRAM(S): 5 TABLET ORAL at 02:00

## 2020-01-04 RX ADMIN — OXYCODONE HYDROCHLORIDE 10 MILLIGRAM(S): 5 TABLET ORAL at 12:21

## 2020-01-04 RX ADMIN — Medication 1 PATCH: at 12:04

## 2020-01-04 RX ADMIN — LIDOCAINE 1 APPLICATION(S): 4 CREAM TOPICAL at 06:31

## 2020-01-04 RX ADMIN — Medication 100 MILLIGRAM(S): at 11:53

## 2020-01-04 RX ADMIN — OXYCODONE HYDROCHLORIDE 10 MILLIGRAM(S): 5 TABLET ORAL at 07:01

## 2020-01-04 RX ADMIN — Medication 1 PATCH: at 08:00

## 2020-01-04 RX ADMIN — ATORVASTATIN CALCIUM 80 MILLIGRAM(S): 80 TABLET, FILM COATED ORAL at 21:59

## 2020-01-04 RX ADMIN — Medication 650 MILLIGRAM(S): at 18:24

## 2020-01-04 RX ADMIN — Medication 150 MILLIGRAM(S): at 21:59

## 2020-01-04 RX ADMIN — OXYCODONE HYDROCHLORIDE 10 MILLIGRAM(S): 5 TABLET ORAL at 21:10

## 2020-01-04 RX ADMIN — OXYCODONE HYDROCHLORIDE 10 MILLIGRAM(S): 5 TABLET ORAL at 06:31

## 2020-01-04 RX ADMIN — Medication 10 MILLIGRAM(S): at 06:31

## 2020-01-04 RX ADMIN — OXYCODONE HYDROCHLORIDE 10 MILLIGRAM(S): 5 TABLET ORAL at 01:31

## 2020-01-04 RX ADMIN — OXYCODONE HYDROCHLORIDE 10 MILLIGRAM(S): 5 TABLET ORAL at 11:51

## 2020-01-04 RX ADMIN — PREGABALIN 1000 MICROGRAM(S): 225 CAPSULE ORAL at 11:51

## 2020-01-04 RX ADMIN — OXYCODONE HYDROCHLORIDE 10 MILLIGRAM(S): 5 TABLET ORAL at 20:40

## 2020-01-04 RX ADMIN — Medication 1 TABLET(S): at 11:52

## 2020-01-04 RX ADMIN — GABAPENTIN 300 MILLIGRAM(S): 400 CAPSULE ORAL at 21:59

## 2020-01-04 RX ADMIN — Medication 81 MILLIGRAM(S): at 11:50

## 2020-01-04 NOTE — PROGRESS NOTE ADULT - SUBJECTIVE AND OBJECTIVE BOX
Podiatry pager #: [phone] ([place])/ [phone] ([facility])    Patient is a 49y old  Female who presents with a chief complaint of Severe PVD (03 Jan 2020 11:47)      HPI:  Mrs. Pickett is a 48 y/o woman w/ history of chronic smoker, PTSD/depression, spinal disc herniation, GERD, gastric bypass (2004), ex lap for SBO (9 yrs ago) and recent CVA, who is transferred from [place] for severe PVD.    Patient has a history consistent with ischemic rest pain. She has pain at night which gets relieved by hanging her feet down off at the side. She also reports symptoms consistent with vascular claudications of legs even with short ambulation, despite being able to ambulate without any assistance devices. She reports sharp burning pain that is severe (8~10/10 when it is bad) that shoots up from her toes to the thigh area below the knee. This has been ongoing issue, yet got worse lately. The right foot pain is worse on 4th toe which has discoloration to ashy/dusky color, and also on distal plantar surface. Also has left leg pain similar to right yet less in intensity. Since recent stroke and residual left sided weakness, she has been more reliant on right side for functioning, and the foot pain has been getting worse with movements.  Patient states that prior to the recent admission for CVA, patient did not have trouble with the ambulation in terms of muscle strength, paresthesia, or numbness. However, now, she has severe pain with paresthesia.  At [facility], dana was evaluated by vascular surgery, and an ROSA MARIA was 0.4, correlating w/ severe PAD. Vascular surgery recommended transfer to [place] for angiogram and possible stenting or other vascular procedures, and patient is now transferred to [facility].    Of note, patient had recent CVA. 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 She developed basal ganglia hemorrhagic conversion, but was stable. Pt was evaluated by PT and was recommended for acute inpatient rehabilitation when stable. Pt was deemed stable for discharge, and she was transferred to [facility] on 12/24/19 for acute inpatient rehabilitation.    Upon admission, patient is hemodynamically stable.  Temp 36.8, HR 74, /80, RR 18, SpO2 99% on RA.  She continues to report the foot pain, yet is not too severe at the moment.  She has been also constipated, which gives her abdominal discomfort. Last BM today. Otherwise, denies CP, palpitation, SOB, lightheadedness, abdominal pain, melena/hematochezia, n/v/d, dysuria/hematuria, fever, chills. (30 Dec 2019 23:51)      PAST MEDICAL & SURGICAL HISTORY:  Chronic insomnia  PTSD (post-traumatic stress disorder)  ETOH abuse  Anxiety  HTN (hypertension)  Depression  Tubal Ligation Status  Status Post Gastric Bypass for Obesity  Status Post Laparoscopic Cholecystectomy      MEDICATIONS  (STANDING):  aspirin  chewable 81 milliGRAM(s) Oral daily  atorvastatin 80 milliGRAM(s) Oral at bedtime  cyanocobalamin 1000 MICROGram(s) Oral daily  enalapril 10 milliGRAM(s) Oral two times a day  enoxaparin Injectable 40 milliGRAM(s) SubCutaneous daily  FLUoxetine 20 milliGRAM(s) Oral daily  folic acid 1 milliGRAM(s) Oral daily  gabapentin 300 milliGRAM(s) Oral three times a day  influenza   Vaccine 0.5 milliLiter(s) IntraMuscular once  lidocaine 2% Gel 1 Application(s) Topical two times a day  multivitamin 1 Tablet(s) Oral daily  nicotine -   7 mG/24Hr(s) Patch 1 patch Transdermal daily  oxyCODONE    IR 10 milliGRAM(s) Oral every 4 hours  pantoprazole    Tablet 40 milliGRAM(s) Oral before breakfast  polyethylene glycol 3350 17 Gram(s) Oral daily  thiamine 100 milliGRAM(s) Oral daily  traZODone 150 milliGRAM(s) Oral at bedtime    MEDICATIONS  (PRN):  acetaminophen   Tablet .. 650 milliGRAM(s) Oral every 6 hours PRN Temp greater or equal to 38C (100.4F), Mild Pain (1 - 3), Moderate Pain (4 - 6)  bisacodyl Suppository 10 milliGRAM(s) Rectal daily PRN Constipation      Allergies    No Known Allergies    Intolerances        VITALS:    Vital Signs Last 24 Hrs  T(C): 37.2 (03 Jan 2020 16:45), Max: 37.2 (03 Jan 2020 14:00)  T(F): 99 (03 Jan 2020 16:45), Max: 99 (03 Jan 2020 16:45)  HR: 80 (03 Jan 2020 16:45) (66 - 84)  BP: 135/86 (03 Jan 2020 16:45) (105/72 - 135/86)  BP(mean): --  RR: 18 (03 Jan 2020 16:45) (18 - 18)  SpO2: 96% (03 Jan 2020 16:45) (94% - 98%)    LABS:                          11.8   8.59  )-----------( 361      ( 02 Jan 2020 06:31 )             35.9       01-02    141  |  105  |  9   ----------------------------<  97  3.8   |  28  |  0.54    Ca    9.6      02 Jan 2020 06:31        CAPILLARY BLOOD GLUCOSE              LOWER EXTREMITY PHYSICAL EXAM:    Vascular: DP/PT 2/4 on L, nonpalpable on R, CFT <5 seconds B/L (no CFT on RF 4th digit), Temperature gradient warm to cool B/L  Neuro: Epicritic sensation intact to the level of toes B/L  Skin: right foot 4th digit dry gangrene, no wet conversion, no erythema or edema, no malodor, no signs of infection, dry and stable, severe pain to touch

## 2020-01-04 NOTE — PROGRESS NOTE ADULT - ASSESSMENT
50 y/o woman w/ history of chronic smoker, PTSD/depression, spinal disc herniation, GERD, gastric bypass (2004), ex lap for SBO (9 yrs ago) and recent CVA, who was transferred from [place] for severe PVD and ischemic toe.

## 2020-01-04 NOTE — SBIRT NOTE ADULT - NSSBIRTBRIEFINTDET_GEN_A_CORE
reports past h/o group treatment for substance use (~10 yrs ago). Denies any present use (quit 2-3 weeks ago) and is declining resources at this time.

## 2020-01-04 NOTE — PROGRESS NOTE ADULT - ASSESSMENT
50 y/o F w/ right foot 4th digit gangrene  -Pt seen and evaluated  -Labs/vitals stable  -Right foot 4th digit dry gangrene, no wet conversion, no erythema or edema, no malodor, no signs of infection, dry and stable  -ROSA MARIA/PVR poor to RLE and CTA showing occlusion of R popliteal artery below knee w/ poor run-off  -Due to recent hemorrhagic stroke, therapeutic AC and angiogram are contraindicated at this time  -No podiatric surgical intervention at this time  -Will wait for toe to demarcate and will monitor  -will follow while in house  -podiatrically stable for DC     Follow up with Dr. Amor PRASAD within 1 week of discharge.  Call for appointment   [place] office: [phone]  [place] office: [phone]

## 2020-01-04 NOTE — PROGRESS NOTE ADULT - SUBJECTIVE AND OBJECTIVE BOX
Patient is a 49y old  Female who presents with a chief complaint of Severe PVD (04 Jan 2020 11:30)        SUBJECTIVE / OVERNIGHT EVENTS: Patient reports foot pain now L>R. Right hand fingertips neuropathic symptoms. She reports left hip pain.       MEDICATIONS  (STANDING):  aspirin  chewable 81 milliGRAM(s) Oral daily  atorvastatin 80 milliGRAM(s) Oral at bedtime  cyanocobalamin 1000 MICROGram(s) Oral daily  enalapril 10 milliGRAM(s) Oral two times a day  enoxaparin Injectable 40 milliGRAM(s) SubCutaneous daily  FLUoxetine 20 milliGRAM(s) Oral daily  folic acid 1 milliGRAM(s) Oral daily  gabapentin 300 milliGRAM(s) Oral three times a day  influenza   Vaccine 0.5 milliLiter(s) IntraMuscular once  lidocaine 2% Gel 1 Application(s) Topical two times a day  multivitamin 1 Tablet(s) Oral daily  nicotine -   7 mG/24Hr(s) Patch 1 patch Transdermal daily  oxyCODONE    IR 10 milliGRAM(s) Oral every 4 hours  pantoprazole    Tablet 40 milliGRAM(s) Oral before breakfast  polyethylene glycol 3350 17 Gram(s) Oral daily  thiamine 100 milliGRAM(s) Oral daily  traZODone 150 milliGRAM(s) Oral at bedtime    MEDICATIONS  (PRN):  acetaminophen   Tablet .. 650 milliGRAM(s) Oral every 6 hours PRN Temp greater or equal to 38C (100.4F), Mild Pain (1 - 3), Moderate Pain (4 - 6)  bisacodyl Suppository 10 milliGRAM(s) Rectal daily PRN Constipation      Vital Signs Last 24 Hrs  T(C): 36.8 (04 Jan 2020 18:27), Max: 37.2 (03 Jan 2020 21:32)  T(F): 98.3 (04 Jan 2020 18:27), Max: 98.9 (03 Jan 2020 21:32)  HR: 96 (04 Jan 2020 18:27) (68 - 96)  BP: 126/88 (04 Jan 2020 18:27) (116/81 - 126/88)  BP(mean): --  RR: 18 (04 Jan 2020 18:27) (17 - 18)  SpO2: 94% (04 Jan 2020 18:27) (93% - 96%)  CAPILLARY BLOOD GLUCOSE        I&O's Summary    03 Jan 20202020 07:01 - 04 Jan 2020 07:00  --------------------------------------------------------  IN: 920 mL / OUT: 0 mL / NET: 920 mL    04 Jan 2020 07:01  -  04 Jan 2020 19:07  --------------------------------------------------------  IN: 380 mL / OUT: 350 mL / NET: 30 mL          PHYSICAL EXAM:   GENERAL: NAD, well-developed  HEAD:  Atraumatic, Normocephalic. Left facial droop.   EYES: Conjunctiva and sclera clear  NECK: Supple  CHEST/LUNG: Clear to auscultation bilaterally; No wheeze  HEART: S1S2 normal. Regular rate and rhythm; No murmurs, rubs, or gallops  ABDOMEN: Soft, Nontender, Nondistended; Bowel sounds present  EXTREMITIES: Trace LE edema. Bilateral foot pain.   PSYCH/Neuro: AAOx3. Left hemiplegia. Sensation intact.    SKIN: Right 4th toe gangrenous.         < from: VA Duplex Upper Extrem Arterial Limited, Right (01.03.20 @ 16:14) >  IMPRESSION:    No hemodynamically significant occlusion or stenosis is visualized.. Low resistance flow is noted in the right brachial, ulnar and radial arteries of uncertain significance. This may be related to inflammation or hyperemia.    < end of copied text >      LABS:      RADIOLOGY & ADDITIONAL TESTS:    Imaging Personally Reviewed: DANIAL arterial duplex report  Consultant(s) Notes Reviewed: Neuro and podiatry   Care Discussed with Consultants/Other Providers: Vascular

## 2020-01-05 PROCEDURE — 99232 SBSQ HOSP IP/OBS MODERATE 35: CPT

## 2020-01-05 RX ORDER — CALCIUM CARBONATE 500(1250)
1 TABLET ORAL ONCE
Refills: 0 | Status: COMPLETED | OUTPATIENT
Start: 2020-01-05 | End: 2020-01-05

## 2020-01-05 RX ORDER — ONDANSETRON 8 MG/1
4 TABLET, FILM COATED ORAL ONCE
Refills: 0 | Status: COMPLETED | OUTPATIENT
Start: 2020-01-05 | End: 2020-01-05

## 2020-01-05 RX ORDER — FAMOTIDINE 10 MG/ML
20 INJECTION INTRAVENOUS DAILY
Refills: 0 | Status: DISCONTINUED | OUTPATIENT
Start: 2020-01-05 | End: 2020-01-05

## 2020-01-05 RX ORDER — FAMOTIDINE 10 MG/ML
20 INJECTION INTRAVENOUS ONCE
Refills: 0 | Status: COMPLETED | OUTPATIENT
Start: 2020-01-05 | End: 2020-01-05

## 2020-01-05 RX ADMIN — Medication 81 MILLIGRAM(S): at 11:46

## 2020-01-05 RX ADMIN — Medication 10 MILLIGRAM(S): at 17:32

## 2020-01-05 RX ADMIN — OXYCODONE HYDROCHLORIDE 10 MILLIGRAM(S): 5 TABLET ORAL at 11:45

## 2020-01-05 RX ADMIN — GABAPENTIN 300 MILLIGRAM(S): 400 CAPSULE ORAL at 20:44

## 2020-01-05 RX ADMIN — OXYCODONE HYDROCHLORIDE 10 MILLIGRAM(S): 5 TABLET ORAL at 09:03

## 2020-01-05 RX ADMIN — POLYETHYLENE GLYCOL 3350 17 GRAM(S): 17 POWDER, FOR SOLUTION ORAL at 11:45

## 2020-01-05 RX ADMIN — OXYCODONE HYDROCHLORIDE 10 MILLIGRAM(S): 5 TABLET ORAL at 08:26

## 2020-01-05 RX ADMIN — ENOXAPARIN SODIUM 40 MILLIGRAM(S): 100 INJECTION SUBCUTANEOUS at 11:44

## 2020-01-05 RX ADMIN — Medication 1 PATCH: at 20:40

## 2020-01-05 RX ADMIN — OXYCODONE HYDROCHLORIDE 10 MILLIGRAM(S): 5 TABLET ORAL at 12:15

## 2020-01-05 RX ADMIN — ATORVASTATIN CALCIUM 80 MILLIGRAM(S): 80 TABLET, FILM COATED ORAL at 20:43

## 2020-01-05 RX ADMIN — LIDOCAINE 1 APPLICATION(S): 4 CREAM TOPICAL at 17:34

## 2020-01-05 RX ADMIN — Medication 1 PATCH: at 11:45

## 2020-01-05 RX ADMIN — Medication 1 TABLET(S): at 20:43

## 2020-01-05 RX ADMIN — PREGABALIN 1000 MICROGRAM(S): 225 CAPSULE ORAL at 11:46

## 2020-01-05 RX ADMIN — Medication 20 MILLIGRAM(S): at 11:45

## 2020-01-05 RX ADMIN — GABAPENTIN 300 MILLIGRAM(S): 400 CAPSULE ORAL at 06:47

## 2020-01-05 RX ADMIN — OXYCODONE HYDROCHLORIDE 10 MILLIGRAM(S): 5 TABLET ORAL at 03:46

## 2020-01-05 RX ADMIN — OXYCODONE HYDROCHLORIDE 10 MILLIGRAM(S): 5 TABLET ORAL at 16:37

## 2020-01-05 RX ADMIN — GABAPENTIN 300 MILLIGRAM(S): 400 CAPSULE ORAL at 12:53

## 2020-01-05 RX ADMIN — Medication 150 MILLIGRAM(S): at 20:44

## 2020-01-05 RX ADMIN — Medication 100 MILLIGRAM(S): at 11:45

## 2020-01-05 RX ADMIN — Medication 1 PATCH: at 11:52

## 2020-01-05 RX ADMIN — LIDOCAINE 1 APPLICATION(S): 4 CREAM TOPICAL at 06:47

## 2020-01-05 RX ADMIN — Medication 1 PATCH: at 07:56

## 2020-01-05 RX ADMIN — Medication 1 TABLET(S): at 11:45

## 2020-01-05 RX ADMIN — OXYCODONE HYDROCHLORIDE 10 MILLIGRAM(S): 5 TABLET ORAL at 16:07

## 2020-01-05 RX ADMIN — Medication 1 MILLIGRAM(S): at 11:45

## 2020-01-05 RX ADMIN — FAMOTIDINE 20 MILLIGRAM(S): 10 INJECTION INTRAVENOUS at 22:53

## 2020-01-05 RX ADMIN — OXYCODONE HYDROCHLORIDE 10 MILLIGRAM(S): 5 TABLET ORAL at 04:16

## 2020-01-05 RX ADMIN — Medication 10 MILLIGRAM(S): at 06:47

## 2020-01-05 RX ADMIN — ONDANSETRON 4 MILLIGRAM(S): 8 TABLET, FILM COATED ORAL at 22:53

## 2020-01-05 RX ADMIN — PANTOPRAZOLE SODIUM 40 MILLIGRAM(S): 20 TABLET, DELAYED RELEASE ORAL at 06:47

## 2020-01-05 NOTE — PROGRESS NOTE ADULT - ASSESSMENT
50 y/o woman w/ history of chronic smoker, PTSD/depression, spinal disc herniation, GERD, gastric bypass (2004), ex lap for SBO (9 yrs ago) and recent CVA, who was transferred from [place] for severe PVD and ischemic toe.

## 2020-01-05 NOTE — CHART NOTE - NSCHARTNOTEFT_GEN_A_CORE
Patient with peripheral artery disease, vascular initially consulted for potential intervention, however patient had stroke that converted to hemorrhagic and therefore was not a candidate for revascularization. Per medicine primary neurology says patient is cleared from their standpoint want to avoid or not use heparin bolus. Unfortunately, if patient is contraindicated for heparin bolus then there are no revascularization options we could pursue. If patient can be cleared for multiple heparin boluses, please page back at that time and Vascular will be more than happy to start the planning process.      Vascular Surgery [id number]

## 2020-01-05 NOTE — PROGRESS NOTE ADULT - SUBJECTIVE AND OBJECTIVE BOX
Patient is a 49y old  Female who presents with a chief complaint of Severe PVD (05 Jan 2020 10:13)        SUBJECTIVE / OVERNIGHT EVENTS: Patient denies any new complaints.       MEDICATIONS  (STANDING):  aspirin  chewable 81 milliGRAM(s) Oral daily  atorvastatin 80 milliGRAM(s) Oral at bedtime  cyanocobalamin 1000 MICROGram(s) Oral daily  enalapril 10 milliGRAM(s) Oral two times a day  enoxaparin Injectable 40 milliGRAM(s) SubCutaneous daily  FLUoxetine 20 milliGRAM(s) Oral daily  folic acid 1 milliGRAM(s) Oral daily  gabapentin 300 milliGRAM(s) Oral three times a day  influenza   Vaccine 0.5 milliLiter(s) IntraMuscular once  lidocaine 2% Gel 1 Application(s) Topical two times a day  multivitamin 1 Tablet(s) Oral daily  nicotine -   7 mG/24Hr(s) Patch 1 patch Transdermal daily  oxyCODONE    IR 10 milliGRAM(s) Oral every 4 hours  pantoprazole    Tablet 40 milliGRAM(s) Oral before breakfast  polyethylene glycol 3350 17 Gram(s) Oral daily  thiamine 100 milliGRAM(s) Oral daily  traZODone 150 milliGRAM(s) Oral at bedtime    MEDICATIONS  (PRN):  acetaminophen   Tablet .. 650 milliGRAM(s) Oral every 6 hours PRN Temp greater or equal to 38C (100.4F), Mild Pain (1 - 3), Moderate Pain (4 - 6)  bisacodyl Suppository 10 milliGRAM(s) Rectal daily PRN Constipation      Vital Signs Last 24 Hrs  T(C): 36.8 (06 Jan 2020 05:10), Max: 36.8 (06 Jan 2020 01:15)  T(F): 98.3 (06 Jan 2020 05:10), Max: 98.3 (06 Jan 2020 01:15)  HR: 79 (06 Jan 2020 05:10) (79 - 104)  BP: 139/80 (06 Jan 2020 05:10) (109/75 - 139/80)  BP(mean): --  RR: 18 (06 Jan 2020 05:10) (18 - 18)  SpO2: 98% (06 Jan 2020 05:10) (94% - 98%)  CAPILLARY BLOOD GLUCOSE        I&O's Summary    05 Jan 2020 07:01  -  06 Jan 2020 07:00  --------------------------------------------------------  IN: 1150 mL / OUT: 670 mL / NET: 480 mL          PHYSICAL EXAM:   GENERAL: NAD, well-developed  HEAD:  Atraumatic, Normocephalic  EYES: Conjunctiva and sclera clear  NECK: Supple.  CHEST/LUNG: Clear to auscultation bilaterally; No wheeze  HEART: S1S2 normal. Regular rate and rhythm; No murmurs, rubs, or gallops  ABDOMEN: Soft, Nontender, Nondistended; Bowel sounds present  EXTREMITIES:  Trace LE edema  PSYCH/Neuro: AAOx3. Left facial droop. Left hemiplegia.    SKIN: Right 4th toe gangrene.       LABS:                        14.2   13.68 )-----------( 444      ( 06 Jan 2020 07:16 )             44.2           RADIOLOGY & ADDITIONAL TESTS:    Imaging Personally Reviewed:  Consultant(s) Notes Reviewed:  Vascular  Care Discussed with Consultants/Other Providers:

## 2020-01-06 LAB
ANION GAP SERPL CALC-SCNC: 13 MMOL/L — SIGNIFICANT CHANGE UP (ref 5–17)
BUN SERPL-MCNC: 14 MG/DL — SIGNIFICANT CHANGE UP (ref 7–23)
CALCIUM SERPL-MCNC: 10.3 MG/DL — SIGNIFICANT CHANGE UP (ref 8.4–10.5)
CHLORIDE SERPL-SCNC: 91 MMOL/L — LOW (ref 96–108)
CO2 SERPL-SCNC: 27 MMOL/L — SIGNIFICANT CHANGE UP (ref 22–31)
CREAT SERPL-MCNC: 0.56 MG/DL — SIGNIFICANT CHANGE UP (ref 0.5–1.3)
GLUCOSE SERPL-MCNC: 108 MG/DL — HIGH (ref 70–99)
HCT VFR BLD CALC: 44.2 % — SIGNIFICANT CHANGE UP (ref 34.5–45)
HGB BLD-MCNC: 14.2 G/DL — SIGNIFICANT CHANGE UP (ref 11.5–15.5)
MCHC RBC-ENTMCNC: 32.1 GM/DL — SIGNIFICANT CHANGE UP (ref 32–36)
MCHC RBC-ENTMCNC: 32.7 PG — SIGNIFICANT CHANGE UP (ref 27–34)
MCV RBC AUTO: 101.8 FL — HIGH (ref 80–100)
NRBC # BLD: 0 /100 WBCS — SIGNIFICANT CHANGE UP (ref 0–0)
PLATELET # BLD AUTO: 444 K/UL — HIGH (ref 150–400)
POTASSIUM SERPL-MCNC: 4.1 MMOL/L — SIGNIFICANT CHANGE UP (ref 3.5–5.3)
POTASSIUM SERPL-SCNC: 4.1 MMOL/L — SIGNIFICANT CHANGE UP (ref 3.5–5.3)
RBC # BLD: 4.34 M/UL — SIGNIFICANT CHANGE UP (ref 3.8–5.2)
RBC # FLD: 17.5 % — HIGH (ref 10.3–14.5)
SODIUM SERPL-SCNC: 131 MMOL/L — LOW (ref 135–145)
WBC # BLD: 13.68 K/UL — HIGH (ref 3.8–10.5)
WBC # FLD AUTO: 13.68 K/UL — HIGH (ref 3.8–10.5)

## 2020-01-06 PROCEDURE — 93010 ELECTROCARDIOGRAM REPORT: CPT

## 2020-01-06 PROCEDURE — 99233 SBSQ HOSP IP/OBS HIGH 50: CPT

## 2020-01-06 RX ORDER — METOCLOPRAMIDE HCL 10 MG
10 TABLET ORAL ONCE
Refills: 0 | Status: DISCONTINUED | OUTPATIENT
Start: 2020-01-06 | End: 2020-01-13

## 2020-01-06 RX ORDER — ONDANSETRON 8 MG/1
4 TABLET, FILM COATED ORAL EVERY 8 HOURS
Refills: 0 | Status: DISCONTINUED | OUTPATIENT
Start: 2020-01-06 | End: 2020-01-08

## 2020-01-06 RX ORDER — ONDANSETRON 8 MG/1
4 TABLET, FILM COATED ORAL ONCE
Refills: 0 | Status: COMPLETED | OUTPATIENT
Start: 2020-01-06 | End: 2020-01-06

## 2020-01-06 RX ORDER — METOCLOPRAMIDE HCL 10 MG
5 TABLET ORAL ONCE
Refills: 0 | Status: COMPLETED | OUTPATIENT
Start: 2020-01-06 | End: 2020-01-06

## 2020-01-06 RX ORDER — SIMETHICONE 80 MG/1
80 TABLET, CHEWABLE ORAL ONCE
Refills: 0 | Status: COMPLETED | OUTPATIENT
Start: 2020-01-06 | End: 2020-01-06

## 2020-01-06 RX ADMIN — LIDOCAINE 1 APPLICATION(S): 4 CREAM TOPICAL at 06:01

## 2020-01-06 RX ADMIN — Medication 1 PATCH: at 12:35

## 2020-01-06 RX ADMIN — Medication 10 MILLIGRAM(S): at 06:01

## 2020-01-06 RX ADMIN — OXYCODONE HYDROCHLORIDE 10 MILLIGRAM(S): 5 TABLET ORAL at 17:54

## 2020-01-06 RX ADMIN — ATORVASTATIN CALCIUM 80 MILLIGRAM(S): 80 TABLET, FILM COATED ORAL at 21:39

## 2020-01-06 RX ADMIN — GABAPENTIN 300 MILLIGRAM(S): 400 CAPSULE ORAL at 21:39

## 2020-01-06 RX ADMIN — Medication 5 MILLIGRAM(S): at 00:53

## 2020-01-06 RX ADMIN — PANTOPRAZOLE SODIUM 40 MILLIGRAM(S): 20 TABLET, DELAYED RELEASE ORAL at 06:01

## 2020-01-06 RX ADMIN — OXYCODONE HYDROCHLORIDE 10 MILLIGRAM(S): 5 TABLET ORAL at 18:24

## 2020-01-06 RX ADMIN — Medication 1 PATCH: at 12:34

## 2020-01-06 RX ADMIN — GABAPENTIN 300 MILLIGRAM(S): 400 CAPSULE ORAL at 06:01

## 2020-01-06 RX ADMIN — Medication 30 MILLILITER(S): at 06:52

## 2020-01-06 RX ADMIN — SIMETHICONE 80 MILLIGRAM(S): 80 TABLET, CHEWABLE ORAL at 04:04

## 2020-01-06 RX ADMIN — Medication 150 MILLIGRAM(S): at 21:39

## 2020-01-06 RX ADMIN — ONDANSETRON 4 MILLIGRAM(S): 8 TABLET, FILM COATED ORAL at 12:34

## 2020-01-06 RX ADMIN — OXYCODONE HYDROCHLORIDE 10 MILLIGRAM(S): 5 TABLET ORAL at 13:11

## 2020-01-06 RX ADMIN — Medication 10 MILLIGRAM(S): at 17:54

## 2020-01-06 RX ADMIN — OXYCODONE HYDROCHLORIDE 10 MILLIGRAM(S): 5 TABLET ORAL at 04:39

## 2020-01-06 RX ADMIN — ONDANSETRON 4 MILLIGRAM(S): 8 TABLET, FILM COATED ORAL at 04:04

## 2020-01-06 RX ADMIN — OXYCODONE HYDROCHLORIDE 10 MILLIGRAM(S): 5 TABLET ORAL at 12:41

## 2020-01-06 RX ADMIN — Medication 1 PATCH: at 08:37

## 2020-01-06 RX ADMIN — ENOXAPARIN SODIUM 40 MILLIGRAM(S): 100 INJECTION SUBCUTANEOUS at 12:34

## 2020-01-06 RX ADMIN — Medication 1 PATCH: at 21:33

## 2020-01-06 RX ADMIN — OXYCODONE HYDROCHLORIDE 10 MILLIGRAM(S): 5 TABLET ORAL at 04:09

## 2020-01-06 NOTE — DIETITIAN INITIAL EVALUATION ADULT. - PERTINENT LABORATORY DATA
Na 131, K+ 4.1, BUN 14, Cr 0.56, , Phos --, Alk Phos --, AST --, ALT --, Mg --, Ca 10.3, HbA1c --  A1c 5.0%

## 2020-01-06 NOTE — PROGRESS NOTE ADULT - ASSESSMENT
patient with gangrene and rest pain of right foot which is the patients functional foot  Limb Is threatened  patient needs right leg angio with subsequent intervention either endo or open   needs cardiology evaluation

## 2020-01-06 NOTE — DIETITIAN INITIAL EVALUATION ADULT. - ADD RECOMMEND
Continue Dysphagia 3 with nectar thick liquids, Encourage health shakes with meals, Monitor diet tolerance, po intake, GI tolerance, weight trends, labs, and skin integrity , check Vit D levels

## 2020-01-06 NOTE — PROGRESS NOTE ADULT - SUBJECTIVE AND OBJECTIVE BOX
Patient is a 49y old  Female who presents with a chief complaint of Severe PVD (06 Jan 2020 08:10)      SUBJECTIVE / OVERNIGHT EVENTS:    MEDICATIONS  (STANDING):  aspirin  chewable 81 milliGRAM(s) Oral daily  atorvastatin 80 milliGRAM(s) Oral at bedtime  cyanocobalamin 1000 MICROGram(s) Oral daily  enalapril 10 milliGRAM(s) Oral two times a day  enoxaparin Injectable 40 milliGRAM(s) SubCutaneous daily  FLUoxetine 20 milliGRAM(s) Oral daily  folic acid 1 milliGRAM(s) Oral daily  gabapentin 300 milliGRAM(s) Oral three times a day  influenza   Vaccine 0.5 milliLiter(s) IntraMuscular once  lidocaine 2% Gel 1 Application(s) Topical two times a day  metoclopramide 10 milliGRAM(s) Oral once  multivitamin 1 Tablet(s) Oral daily  nicotine -   7 mG/24Hr(s) Patch 1 patch Transdermal daily  oxyCODONE    IR 10 milliGRAM(s) Oral every 4 hours  pantoprazole    Tablet 40 milliGRAM(s) Oral before breakfast  polyethylene glycol 3350 17 Gram(s) Oral daily  thiamine 100 milliGRAM(s) Oral daily  traZODone 150 milliGRAM(s) Oral at bedtime    MEDICATIONS  (PRN):  acetaminophen   Tablet .. 650 milliGRAM(s) Oral every 6 hours PRN Temp greater or equal to 38C (100.4F), Mild Pain (1 - 3), Moderate Pain (4 - 6)  bisacodyl Suppository 10 milliGRAM(s) Rectal daily PRN Constipation      Vital Signs Last 24 Hrs  T(C): 36.8 (06 Jan 2020 05:10), Max: 36.8 (06 Jan 2020 01:15)  T(F): 98.3 (06 Jan 2020 05:10), Max: 98.3 (06 Jan 2020 01:15)  HR: 79 (06 Jan 2020 05:10) (79 - 104)  BP: 139/80 (06 Jan 2020 05:10) (109/75 - 139/80)  BP(mean): --  RR: 18 (06 Jan 2020 05:10) (18 - 18)  SpO2: 98% (06 Jan 2020 05:10) (94% - 98%)  CAPILLARY BLOOD GLUCOSE        I&O's Summary    05 Jan 2020 07:01  -  06 Jan 2020 07:00  --------------------------------------------------------  IN: 1150 mL / OUT: 670 mL / NET: 480 mL        PHYSICAL EXAM:  GENERAL: NAD, well-developed  HEAD:  Atraumatic, Normocephalic  EYES: EOMI, PERRLA, conjunctiva and sclera clear  NECK: Supple, No JVD  CHEST/LUNG: Clear to auscultation bilaterally; No wheeze  HEART: Regular rate and rhythm; No murmurs, rubs, or gallops  ABDOMEN: Soft, Nontender, Nondistended; Bowel sounds present  EXTREMITIES:  2+ Peripheral Pulses, No clubbing, cyanosis, or edema  PSYCH: AAOx3  NEUROLOGY: non-focal  SKIN: No rashes or lesions    LABS:                        14.2   13.68 )-----------( 444      ( 06 Jan 2020 07:16 )             44.2     01-06    131<L>  |  91<L>  |  14  ----------------------------<  108<H>  4.1   |  27  |  0.56    Ca    10.3      06 Jan 2020 07:16                RADIOLOGY & ADDITIONAL TESTS:    Imaging Personally Reviewed:    Consultant(s) Notes Reviewed:      Care Discussed with Consultants/Other Providers: Patient is a 49y old  Female who presents with a chief complaint of Severe PVD (06 Jan 2020 08:10)      SUBJECTIVE / OVERNIGHT EVENTS:  Pt seen and examined. No acute events overnight. She c/o nausea and vomiting. Also c/o right 4th toe pain and worsening discoloration.    MEDICATIONS  (STANDING):  aspirin  chewable 81 milliGRAM(s) Oral daily  atorvastatin 80 milliGRAM(s) Oral at bedtime  cyanocobalamin 1000 MICROGram(s) Oral daily  enalapril 10 milliGRAM(s) Oral two times a day  enoxaparin Injectable 40 milliGRAM(s) SubCutaneous daily  FLUoxetine 20 milliGRAM(s) Oral daily  folic acid 1 milliGRAM(s) Oral daily  gabapentin 300 milliGRAM(s) Oral three times a day  influenza   Vaccine 0.5 milliLiter(s) IntraMuscular once  lidocaine 2% Gel 1 Application(s) Topical two times a day  metoclopramide 10 milliGRAM(s) Oral once  multivitamin 1 Tablet(s) Oral daily  nicotine -   7 mG/24Hr(s) Patch 1 patch Transdermal daily  oxyCODONE    IR 10 milliGRAM(s) Oral every 4 hours  pantoprazole    Tablet 40 milliGRAM(s) Oral before breakfast  polyethylene glycol 3350 17 Gram(s) Oral daily  thiamine 100 milliGRAM(s) Oral daily  traZODone 150 milliGRAM(s) Oral at bedtime    MEDICATIONS  (PRN):  acetaminophen   Tablet .. 650 milliGRAM(s) Oral every 6 hours PRN Temp greater or equal to 38C (100.4F), Mild Pain (1 - 3), Moderate Pain (4 - 6)  bisacodyl Suppository 10 milliGRAM(s) Rectal daily PRN Constipation      Vital Signs Last 24 Hrs  T(C): 36.8 (06 Jan 2020 05:10), Max: 36.8 (06 Jan 2020 01:15)  T(F): 98.3 (06 Jan 2020 05:10), Max: 98.3 (06 Jan 2020 01:15)  HR: 79 (06 Jan 2020 05:10) (79 - 104)  BP: 139/80 (06 Jan 2020 05:10) (109/75 - 139/80)  BP(mean): --  RR: 18 (06 Jan 2020 05:10) (18 - 18)  SpO2: 98% (06 Jan 2020 05:10) (94% - 98%)  CAPILLARY BLOOD GLUCOSE        I&O's Summary    05 Jan 2020 07:01  -  06 Jan 2020 07:00  --------------------------------------------------------  IN: 1150 mL / OUT: 670 mL / NET: 480 mL        PHYSICAL EXAM:  GENERAL: NAD, anicteric, afebrile  HEAD:  Atraumatic, Normocephalic  EYES: EOMI, PERRLA, conjunctiva and sclera clear  NECK: Supple, No JVD  CHEST/LUNG: Clear to auscultation bilaterally; No wheeze  HEART: Regular rate and rhythm; No murmurs, rubs, or gallops  ABDOMEN: Soft, Nontender, Nondistended; Bowel sounds present  EXTREMITIES:  right foot 4th digit dry gangrene, no wet conversion, no erythema or edema, no malodor, no signs of infection, dry and stable, severe pain to touch  PSYCH: AAOx3  NEUROLOGY: non-focal  SKIN: No rashes or lesions    LABS:                        14.2   13.68 )-----------( 444      ( 06 Jan 2020 07:16 )             44.2     01-06    131<L>  |  91<L>  |  14  ----------------------------<  108<H>  4.1   |  27  |  0.56    Ca    10.3      06 Jan 2020 07:16                  Consultant(s) Notes Reviewed:  Podiatry, Vascular    Care Discussed with Consultants/Other Providers:  Neurology Patient is a 49y old  Female who presents with a chief complaint of Severe PVD (06 Jan 2020 08:10)      SUBJECTIVE / OVERNIGHT EVENTS:  Pt seen and examined. No acute events overnight. She c/o nausea and vomiting. Also c/o right 4th toe pain and worsening discoloration.    MEDICATIONS  (STANDING):  aspirin  chewable 81 milliGRAM(s) Oral daily  atorvastatin 80 milliGRAM(s) Oral at bedtime  cyanocobalamin 1000 MICROGram(s) Oral daily  enalapril 10 milliGRAM(s) Oral two times a day  enoxaparin Injectable 40 milliGRAM(s) SubCutaneous daily  FLUoxetine 20 milliGRAM(s) Oral daily  folic acid 1 milliGRAM(s) Oral daily  gabapentin 300 milliGRAM(s) Oral three times a day  influenza   Vaccine 0.5 milliLiter(s) IntraMuscular once  lidocaine 2% Gel 1 Application(s) Topical two times a day  metoclopramide 10 milliGRAM(s) Oral once  multivitamin 1 Tablet(s) Oral daily  nicotine -   7 mG/24Hr(s) Patch 1 patch Transdermal daily  oxyCODONE    IR 10 milliGRAM(s) Oral every 4 hours  pantoprazole    Tablet 40 milliGRAM(s) Oral before breakfast  polyethylene glycol 3350 17 Gram(s) Oral daily  thiamine 100 milliGRAM(s) Oral daily  traZODone 150 milliGRAM(s) Oral at bedtime    MEDICATIONS  (PRN):  acetaminophen   Tablet .. 650 milliGRAM(s) Oral every 6 hours PRN Temp greater or equal to 38C (100.4F), Mild Pain (1 - 3), Moderate Pain (4 - 6)  bisacodyl Suppository 10 milliGRAM(s) Rectal daily PRN Constipation      Vital Signs Last 24 Hrs  T(C): 36.8 (06 Jan 2020 05:10), Max: 36.8 (06 Jan 2020 01:15)  T(F): 98.3 (06 Jan 2020 05:10), Max: 98.3 (06 Jan 2020 01:15)  HR: 79 (06 Jan 2020 05:10) (79 - 104)  BP: 139/80 (06 Jan 2020 05:10) (109/75 - 139/80)  BP(mean): --  RR: 18 (06 Jan 2020 05:10) (18 - 18)  SpO2: 98% (06 Jan 2020 05:10) (94% - 98%)  CAPILLARY BLOOD GLUCOSE        I&O's Summary    05 Jan 2020 07:01  -  06 Jan 2020 07:00  --------------------------------------------------------  IN: 1150 mL / OUT: 670 mL / NET: 480 mL        PHYSICAL EXAM:  GENERAL: NAD, anicteric, afebrile  HEAD:  Atraumatic, Normocephalic  EYES: EOMI, PERRLA, conjunctiva and sclera clear  NECK: Supple, No JVD  CHEST/LUNG: Clear to auscultation bilaterally; No wheeze  HEART: Regular rate and rhythm; No murmurs, rubs, or gallops  ABDOMEN: Soft, Nontender, Nondistended; Bowel sounds present  EXTREMITIES:  right foot 4th digit dry gangrene, no wet conversion, no erythema or edema, no malodor, no signs of infection, dry and stable, severe pain to touch  PSYCH/Neuro: AAOx3. Left facial droop. Left hemiplegia.    SKIN: No rashes or lesions    LABS:                        14.2   13.68 )-----------( 444      ( 06 Jan 2020 07:16 )             44.2     01-06    131<L>  |  91<L>  |  14  ----------------------------<  108<H>  4.1   |  27  |  0.56    Ca    10.3      06 Jan 2020 07:16                  Consultant(s) Notes Reviewed:  Podiatry, Vascular    Care Discussed with Consultants/Other Providers:  Neurology

## 2020-01-06 NOTE — DIETITIAN INITIAL EVALUATION ADULT. - OTHER INFO
Patient seen for routine length of stay assessment.  Patient found slumped down in bed.  Friend and aunt at bedside and propping Patient up in bed.  Patient reports a poor appetite and intake related to chronic reflux, nausea and spitting up stomach acids, heaving and retching.  Patient admits a poor appetite and intake x 1 month.  Hx of ETOH use and was drinking ETOH daily to help with insomnia, hx of depression.  Lives at home with 3 daughters who assist with care.  Patient admits to being very weak with prolonged hospitalization as well as left sided weakness s/p CVA. MI.   Patient unsure of what dysphagia diet means and this RDN explained levels and how need is assessed.  PTA, patient was not following any special diet.  No history of diabetes with normal A1c.  Patient appears to have muscle loss.  Unable to complete nutrition exam as Patient nauseated and retching into bucket and difficulty with movement and strength related to CVA. Hx of gastric bypass 15 years ago.  Denies that current GI distress related to bypass.  Usual BW close to 290 pounds and lost about 100 pounds after bypass.  Recently weighed about 185 pounds.  Admit weight 195 pounds? Patient seen for routine length of stay assessment.  Patient found slumped down in bed.  Friend and aunt at bedside and propping Patient up in bed.  Patient reports a poor appetite and intake related to chronic reflux, nausea and spitting up stomach acids, heaving and retching.  Patient admits a poor appetite and intake x 1 month.  Hx of ETOH use and was drinking ETOH daily to help with insomnia, hx of depression.  Lives at home with 3 daughters who assist with care.  Patient admits to being very weak with prolonged hospitalization as well as left sided weakness s/p CVA. MI.   Patient unsure of what dysphagia diet means and this RDN explained levels and how need is assessed.  PTA, patient was not following any special diet.  No history of diabetes with normal A1c.  Patient appears to have muscle loss.  Unable to complete nutrition exam as Patient nauseated and retching into bucket and difficulty with movement and strength related to CVA. Hx of gastric bypass 15 years ago.  Denies that current GI distress related to bypass.  Usual BW close to 290 pounds and lost about 100 pounds after bypass.  Recently weighed about 185 pounds.  Admit weight 195 pounds?  Patient admits to being mostly homebound and doesn't like to go out.

## 2020-01-06 NOTE — PROGRESS NOTE ADULT - ASSESSMENT
48 y/o woman w/ history of chronic smoker, PTSD/depression, spinal disc herniation, GERD, gastric bypass (2004), ex lap for SBO (9 yrs ago) and recent CVA, who was transferred from [place] for severe PVD and ischemic toe.

## 2020-01-06 NOTE — DIETITIAN INITIAL EVALUATION ADULT. - PROBLEM SELECTOR PLAN 2
Acute M1 CVA w/ residual left sided weakness.  - PT and Speech & Swallow consult  - c/w ASA and Statin  - patient with hemorrhagic transformation at BG. Stable for now. If patient requires anticoagulation during assessment/treatment of PVD, will need to discuss with neurology team first given recent CVA with hemorrhagic transformation.   - fall precaution  - aspiration precaution  - dysphagia diet

## 2020-01-06 NOTE — DIETITIAN INITIAL EVALUATION ADULT. - PERTINENT MEDS FT
MEDICATIONS  (STANDING):  aspirin  chewable 81 milliGRAM(s) Oral daily  atorvastatin 80 milliGRAM(s) Oral at bedtime  cyanocobalamin 1000 MICROGram(s) Oral daily  enalapril 10 milliGRAM(s) Oral two times a day  enoxaparin Injectable 40 milliGRAM(s) SubCutaneous daily  FLUoxetine 20 milliGRAM(s) Oral daily  folic acid 1 milliGRAM(s) Oral daily  gabapentin 300 milliGRAM(s) Oral three times a day  influenza   Vaccine 0.5 milliLiter(s) IntraMuscular once  lidocaine 2% Gel 1 Application(s) Topical two times a day  metoclopramide 10 milliGRAM(s) Oral once  multivitamin 1 Tablet(s) Oral daily  nicotine -   7 mG/24Hr(s) Patch 1 patch Transdermal daily  oxyCODONE    IR 10 milliGRAM(s) Oral every 4 hours  pantoprazole    Tablet 40 milliGRAM(s) Oral before breakfast  polyethylene glycol 3350 17 Gram(s) Oral daily  thiamine 100 milliGRAM(s) Oral daily  traZODone 150 milliGRAM(s) Oral at bedtime    MEDICATIONS  (PRN):  acetaminophen   Tablet .. 650 milliGRAM(s) Oral every 6 hours PRN Temp greater or equal to 38C (100.4F), Mild Pain (1 - 3), Moderate Pain (4 - 6)  bisacodyl Suppository 10 milliGRAM(s) Rectal daily PRN Constipation  ondansetron Injectable 4 milliGRAM(s) IV Push every 8 hours PRN Nausea and/or Vomiting

## 2020-01-06 NOTE — DIETITIAN INITIAL EVALUATION ADULT. - PHYSICAL APPEARANCE
overweight/other (specify)/debilitated Appears to have muscle loss with increased adiposity in upper body.  Patient receptive to building up muscle with exercises in bed and to discuss with PT.

## 2020-01-06 NOTE — DIETITIAN INITIAL EVALUATION ADULT. - PROBLEM SELECTOR PLAN 3
- c/w SSRI (trazodone 150mg at bedtime for insomnia and possible augmentation for depression. fluoxetine 20mg qD)  - patient drinks alcohol daily for insomnia. Outside time window for withdrawal. However, will need discussion regarding alcohol use prior to discharge.  - EKG

## 2020-01-06 NOTE — CHART NOTE - NSCHARTNOTEFT_GEN_A_CORE
Neurology Stroke Service    Case d/w Dr. Richard Libman, attending stroke neurologist. From neurovascular standpoint, no contraindication to anticoagulation as appropriate by vascular surgery team.    Keith Stewart MD  PGY-3  Neurology Neurology Stroke Service     Case d/w Dr. Richard Libman, attending stroke neurologist. From neurovascular standpoint, no contraindication to anticoagulation as appropriate by vascular surgery team.    Keith Stewart MD  PGY-3  Neurology Neurology Stroke Service     Case d/w Dr. Richard Libman, attending stroke neurologist. From neurovascular standpoint, no contraindication to anticoagulation as appropriate by vascular surgery team.    Keith Stewart MD  PGY-3  Neurology    Stroke attending. Agree with above

## 2020-01-06 NOTE — PROGRESS NOTE ADULT - ASSESSMENT
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  Due to recent hemorrhagic stroke, therapeutic AC and angiogram are contraindicated at this time. No plans per vasc.   -No podiatric surgical intervention at this time without vasc intervention.   -Will wait for toe to demarcate and will monitor  -will follow while in house  -podiatrically stable for DC     Follow up with Dr. Amor PRASAD within 1 week of discharge.  Call for appointment   [place] office: [phone]  [place] office: [phone]

## 2020-01-06 NOTE — DIETITIAN INITIAL EVALUATION ADULT. - PROBLEM SELECTOR PLAN 1
ROSA MARIA finding c/w severe PAD of right foot w/ 4th toe discoloration.  - vascular surgery on board  - no emergent vascular surgery  - CT angiography of LE   - c/w ASA and Statin given recent CVA.  - pain control: Percocet and tylenol PRN.  - gabapentin ATC for neuropathic pain.  - currently on SSRI for depression. Hold trazodone to avoid 2 concurrent SSRI dosing.  - will need smoking cessation.  - consider cilostazole given patient's claudication symptoms.

## 2020-01-06 NOTE — DIETITIAN INITIAL EVALUATION ADULT. - ENERGY NEEDS
HT 64 inches,  pounds, Recent wt 185 pounds, BMI 33.5.  Need current accurate wt.  Dxd with S/p MI, CVA, PVD with right 4th toe discoloration, ashen.  Skin intact except for vascular issue to 4th toe.

## 2020-01-06 NOTE — CHART NOTE - NSCHARTNOTEFT_GEN_A_CORE
Upon Nutritional Assessment by the Registered Dietitian your patient was determined to meet criteria / has evidence of the following diagnosis/diagnoses:          [ ]  Mild Protein Calorie Malnutrition        [x ]  Moderate Protein Calorie Malnutrition        [ ] Severe Protein Calorie Malnutrition        [ ] Unspecified Protein Calorie Malnutrition        [ ] Underweight / BMI <19        [ ] Morbid Obesity / BMI > 40      Findings as based on:  [ x] Comprehensive nutrition assessment   [ ] Nutrition Focused Physical Exam  [x ] Other: Altered GI function, Impaired skin integrity to L 4th toe, muscle loss, eating <50% x 1 month      Nutrition Plan/Recommendations:    Dysphagia 3 with nectar thick liquids.  Health shakes with meals.  Check Vit D 25 OH in debilitated homebound female.      PROVIDER Section:     By signing this assessment you are acknowledging and agree with the diagnosis/diagnoses assigned by the Registered Dietitian    Comments:

## 2020-01-06 NOTE — PROGRESS NOTE ADULT - SUBJECTIVE AND OBJECTIVE BOX
Podiatry pager #: [phone] ([place])/ [phone] ([facility])    Patient is a 49y old  Female who presents with a chief complaint of Severe PVD (03 Jan 2020 11:47)      HPI:  Mrs. Pickett is a 50 y/o woman w/ history of chronic smoker, PTSD/depression, spinal disc herniation, GERD, gastric bypass (2004), ex lap for SBO (9 yrs ago) and recent CVA, who is transferred from [place] for severe PVD.    Patient has a history consistent with ischemic rest pain. She has pain at night which gets relieved by hanging her feet down off at the side. She also reports symptoms consistent with vascular claudications of legs even with short ambulation, despite being able to ambulate without any assistance devices. She reports sharp burning pain that is severe (8~10/10 when it is bad) that shoots up from her toes to the thigh area below the knee. This has been ongoing issue, yet got worse lately. The right foot pain is worse on 4th toe which has discoloration to ashy/dusky color, and also on distal plantar surface. Also has left leg pain similar to right yet less in intensity. Since recent stroke and residual left sided weakness, she has been more reliant on right side for functioning, and the foot pain has been getting worse with movements.  Patient states that prior to the recent admission for CVA, patient did not have trouble with the ambulation in terms of muscle strength, paresthesia, or numbness. However, now, she has severe pain with paresthesia.  At [facility], dana was evaluated by vascular surgery, and an ROSA MARIA was 0.4, correlating w/ severe PAD. Vascular surgery recommended transfer to [place] for angiogram and possible stenting or other vascular procedures, and patient is now transferred to [facility].    Of note, patient had recent CVA. She presented to [facility] on 12/17/19 with left sided weakness, right gaze preference, and facial droop, found to have Right M1 occlusion and Left carotid artery occlusion. She developed basal ganglia hemorrhagic conversion, but was stable. Pt was evaluated by PT and was recommended for acute inpatient rehabilitation when stable. Pt was deemed stable for discharge, and she was transferred to [facility] on 12/24/19 for acute inpatient rehabilitation.    Upon admission, patient is hemodynamically stable.  Temp 36.8, HR 74, /80, RR 18, SpO2 99% on RA.  She continues to report the foot pain, yet is not too severe at the moment.  She has been also constipated, which gives her abdominal discomfort. Last BM today. Otherwise, denies CP, palpitation, SOB, lightheadedness, abdominal pain, melena/hematochezia, n/v/d, dysuria/hematuria, fever, chills. (30 Dec 2019 23:51)      PAST MEDICAL & SURGICAL HISTORY:  Chronic insomnia  PTSD (post-traumatic stress disorder)  ETOH abuse  Anxiety  HTN (hypertension)  Depression  Tubal Ligation Status  Status Post Gastric Bypass for Obesity  Status Post Laparoscopic Cholecystectomy      MEDICATIONS  (STANDING):  aspirin  chewable 81 milliGRAM(s) Oral daily  atorvastatin 80 milliGRAM(s) Oral at bedtime  cyanocobalamin 1000 MICROGram(s) Oral daily  enalapril 10 milliGRAM(s) Oral two times a day  enoxaparin Injectable 40 milliGRAM(s) SubCutaneous daily  FLUoxetine 20 milliGRAM(s) Oral daily  folic acid 1 milliGRAM(s) Oral daily  gabapentin 300 milliGRAM(s) Oral three times a day  influenza   Vaccine 0.5 milliLiter(s) IntraMuscular once  lidocaine 2% Gel 1 Application(s) Topical two times a day  multivitamin 1 Tablet(s) Oral daily  nicotine -   7 mG/24Hr(s) Patch 1 patch Transdermal daily  oxyCODONE    IR 10 milliGRAM(s) Oral every 4 hours  pantoprazole    Tablet 40 milliGRAM(s) Oral before breakfast  polyethylene glycol 3350 17 Gram(s) Oral daily  thiamine 100 milliGRAM(s) Oral daily  traZODone 150 milliGRAM(s) Oral at bedtime    MEDICATIONS  (PRN):  acetaminophen   Tablet .. 650 milliGRAM(s) Oral every 6 hours PRN Temp greater or equal to 38C (100.4F), Mild Pain (1 - 3), Moderate Pain (4 - 6)  bisacodyl Suppository 10 milliGRAM(s) Rectal daily PRN Constipation      Allergies    No Known Allergies    Intolerances        VITALS:    Vital Signs Last 24 Hrs  T(C): 37.2 (03 Jan 2020 16:45), Max: 37.2 (03 Jan 2020 14:00)  T(F): 99 (03 Jan 2020 16:45), Max: 99 (03 Jan 2020 16:45)  HR: 80 (03 Jan 2020 16:45) (66 - 84)  BP: 135/86 (03 Jan 2020 16:45) (105/72 - 135/86)  BP(mean): --  RR: 18 (03 Jan 2020 16:45) (18 - 18)  SpO2: 96% (03 Jan 2020 16:45) (94% - 98%)    LABS:                          11.8   8.59  )-----------( 361      ( 02 Jan 2020 06:31 )             35.9       01-02    141  |  105  |  9   ----------------------------<  97  3.8   |  28  |  0.54    Ca    9.6      02 Jan 2020 06:31        CAPILLARY BLOOD GLUCOSE              LOWER EXTREMITY PHYSICAL EXAM:    Vascular: DP/PT 2/4 on L, nonpalpable on R, CFT <5 seconds B/L (no CFT on RF 4th digit), Temperature gradient warm to cool B/L  Neuro: Epicritic sensation intact to the level of toes B/L  Skin: right foot 4th digit dry gangrene, no wet conversion, no erythema or edema, no malodor, no signs of infection, dry and stable, severe pain to touch

## 2020-01-07 LAB
ANION GAP SERPL CALC-SCNC: 12 MMOL/L — SIGNIFICANT CHANGE UP (ref 5–17)
ANION GAP SERPL CALC-SCNC: 24 MMOL/L — HIGH (ref 5–17)
BUN SERPL-MCNC: 14 MG/DL — SIGNIFICANT CHANGE UP (ref 7–23)
BUN SERPL-MCNC: 18 MG/DL — SIGNIFICANT CHANGE UP (ref 7–23)
CALCIUM SERPL-MCNC: 10 MG/DL — SIGNIFICANT CHANGE UP (ref 8.4–10.5)
CALCIUM SERPL-MCNC: <3 MG/DL — CRITICAL LOW (ref 8.4–10.5)
CHLORIDE SERPL-SCNC: 86 MMOL/L — LOW (ref 96–108)
CHLORIDE SERPL-SCNC: 93 MMOL/L — LOW (ref 96–108)
CO2 SERPL-SCNC: 22 MMOL/L — SIGNIFICANT CHANGE UP (ref 22–31)
CO2 SERPL-SCNC: 30 MMOL/L — SIGNIFICANT CHANGE UP (ref 22–31)
CREAT SERPL-MCNC: 0.58 MG/DL — SIGNIFICANT CHANGE UP (ref 0.5–1.3)
CREAT SERPL-MCNC: 0.66 MG/DL — SIGNIFICANT CHANGE UP (ref 0.5–1.3)
GLUCOSE SERPL-MCNC: 105 MG/DL — HIGH (ref 70–99)
GLUCOSE SERPL-MCNC: 93 MG/DL — SIGNIFICANT CHANGE UP (ref 70–99)
HCT VFR BLD CALC: 45.5 % — HIGH (ref 34.5–45)
HGB BLD-MCNC: 14.6 G/DL — SIGNIFICANT CHANGE UP (ref 11.5–15.5)
MCHC RBC-ENTMCNC: 32.1 GM/DL — SIGNIFICANT CHANGE UP (ref 32–36)
MCHC RBC-ENTMCNC: 32.5 PG — SIGNIFICANT CHANGE UP (ref 27–34)
MCV RBC AUTO: 101.3 FL — HIGH (ref 80–100)
NRBC # BLD: 0 /100 WBCS — SIGNIFICANT CHANGE UP (ref 0–0)
PLATELET # BLD AUTO: 372 K/UL — SIGNIFICANT CHANGE UP (ref 150–400)
POTASSIUM SERPL-MCNC: 3.6 MMOL/L — SIGNIFICANT CHANGE UP (ref 3.5–5.3)
POTASSIUM SERPL-MCNC: >9 MMOL/L — CRITICAL HIGH (ref 3.5–5.3)
POTASSIUM SERPL-SCNC: 3.6 MMOL/L — SIGNIFICANT CHANGE UP (ref 3.5–5.3)
POTASSIUM SERPL-SCNC: >9 MMOL/L — CRITICAL HIGH (ref 3.5–5.3)
RBC # BLD: 4.49 M/UL — SIGNIFICANT CHANGE UP (ref 3.8–5.2)
RBC # FLD: 17.6 % — HIGH (ref 10.3–14.5)
SODIUM SERPL-SCNC: 132 MMOL/L — LOW (ref 135–145)
SODIUM SERPL-SCNC: 135 MMOL/L — SIGNIFICANT CHANGE UP (ref 135–145)
WBC # BLD: 14.11 K/UL — HIGH (ref 3.8–10.5)
WBC # FLD AUTO: 14.11 K/UL — HIGH (ref 3.8–10.5)

## 2020-01-07 PROCEDURE — 75625 CONTRAST EXAM ABDOMINL AORTA: CPT | Mod: 26

## 2020-01-07 PROCEDURE — 75710 ARTERY X-RAYS ARM/LEG: CPT | Mod: 26,LT

## 2020-01-07 PROCEDURE — 76937 US GUIDE VASCULAR ACCESS: CPT | Mod: 26

## 2020-01-07 PROCEDURE — 99233 SBSQ HOSP IP/OBS HIGH 50: CPT

## 2020-01-07 PROCEDURE — 36246 INS CATH ABD/L-EXT ART 2ND: CPT

## 2020-01-07 RX ORDER — HYDROMORPHONE HYDROCHLORIDE 2 MG/ML
0.5 INJECTION INTRAMUSCULAR; INTRAVENOUS; SUBCUTANEOUS ONCE
Refills: 0 | Status: DISCONTINUED | OUTPATIENT
Start: 2020-01-07 | End: 2020-01-07

## 2020-01-07 RX ORDER — POTASSIUM CHLORIDE 20 MEQ
20 PACKET (EA) ORAL ONCE
Refills: 0 | Status: DISCONTINUED | OUTPATIENT
Start: 2020-01-07 | End: 2020-01-07

## 2020-01-07 RX ORDER — POTASSIUM CHLORIDE 20 MEQ
10 PACKET (EA) ORAL ONCE
Refills: 0 | Status: COMPLETED | OUTPATIENT
Start: 2020-01-07 | End: 2020-01-07

## 2020-01-07 RX ADMIN — LIDOCAINE 1 APPLICATION(S): 4 CREAM TOPICAL at 18:15

## 2020-01-07 RX ADMIN — PREGABALIN 1000 MICROGRAM(S): 225 CAPSULE ORAL at 18:16

## 2020-01-07 RX ADMIN — Medication 1 MILLIGRAM(S): at 18:16

## 2020-01-07 RX ADMIN — OXYCODONE HYDROCHLORIDE 10 MILLIGRAM(S): 5 TABLET ORAL at 19:00

## 2020-01-07 RX ADMIN — OXYCODONE HYDROCHLORIDE 10 MILLIGRAM(S): 5 TABLET ORAL at 05:53

## 2020-01-07 RX ADMIN — Medication 100 MILLIEQUIVALENT(S): at 18:31

## 2020-01-07 RX ADMIN — OXYCODONE HYDROCHLORIDE 10 MILLIGRAM(S): 5 TABLET ORAL at 06:30

## 2020-01-07 RX ADMIN — Medication 1 PATCH: at 12:35

## 2020-01-07 RX ADMIN — Medication 10 MILLIGRAM(S): at 18:16

## 2020-01-07 RX ADMIN — HYDROMORPHONE HYDROCHLORIDE 0.5 MILLIGRAM(S): 2 INJECTION INTRAMUSCULAR; INTRAVENOUS; SUBCUTANEOUS at 11:20

## 2020-01-07 RX ADMIN — Medication 100 MILLIGRAM(S): at 18:17

## 2020-01-07 RX ADMIN — OXYCODONE HYDROCHLORIDE 10 MILLIGRAM(S): 5 TABLET ORAL at 18:21

## 2020-01-07 RX ADMIN — GABAPENTIN 300 MILLIGRAM(S): 400 CAPSULE ORAL at 20:08

## 2020-01-07 RX ADMIN — LIDOCAINE 1 APPLICATION(S): 4 CREAM TOPICAL at 05:53

## 2020-01-07 RX ADMIN — Medication 10 MILLIGRAM(S): at 05:52

## 2020-01-07 RX ADMIN — OXYCODONE HYDROCHLORIDE 10 MILLIGRAM(S): 5 TABLET ORAL at 22:55

## 2020-01-07 RX ADMIN — OXYCODONE HYDROCHLORIDE 10 MILLIGRAM(S): 5 TABLET ORAL at 22:06

## 2020-01-07 RX ADMIN — Medication 1 PATCH: at 08:00

## 2020-01-07 RX ADMIN — Medication 1 TABLET(S): at 18:21

## 2020-01-07 RX ADMIN — Medication 20 MILLIGRAM(S): at 18:16

## 2020-01-07 RX ADMIN — ENOXAPARIN SODIUM 40 MILLIGRAM(S): 100 INJECTION SUBCUTANEOUS at 20:08

## 2020-01-07 RX ADMIN — HYDROMORPHONE HYDROCHLORIDE 0.5 MILLIGRAM(S): 2 INJECTION INTRAMUSCULAR; INTRAVENOUS; SUBCUTANEOUS at 13:40

## 2020-01-07 RX ADMIN — POLYETHYLENE GLYCOL 3350 17 GRAM(S): 17 POWDER, FOR SOLUTION ORAL at 18:17

## 2020-01-07 RX ADMIN — PANTOPRAZOLE SODIUM 40 MILLIGRAM(S): 20 TABLET, DELAYED RELEASE ORAL at 05:54

## 2020-01-07 RX ADMIN — ATORVASTATIN CALCIUM 80 MILLIGRAM(S): 80 TABLET, FILM COATED ORAL at 20:08

## 2020-01-07 RX ADMIN — Medication 150 MILLIGRAM(S): at 20:08

## 2020-01-07 RX ADMIN — Medication 81 MILLIGRAM(S): at 18:15

## 2020-01-07 RX ADMIN — HYDROMORPHONE HYDROCHLORIDE 0.5 MILLIGRAM(S): 2 INJECTION INTRAMUSCULAR; INTRAVENOUS; SUBCUTANEOUS at 10:59

## 2020-01-07 RX ADMIN — GABAPENTIN 300 MILLIGRAM(S): 400 CAPSULE ORAL at 05:52

## 2020-01-07 RX ADMIN — Medication 1 PATCH: at 22:08

## 2020-01-07 RX ADMIN — Medication 1 PATCH: at 12:33

## 2020-01-07 NOTE — PROGRESS NOTE ADULT - SUBJECTIVE AND OBJECTIVE BOX
Podiatry pager #: [phone] ([place])/ [phone] ([facility])    Patient is a 49y old  Female who presents with a chief complaint of Severe PVD (03 Jan 2020 11:47)      HPI:  Mrs. Pickett is a 48 y/o woman w/ history of chronic smoker, PTSD/depression, spinal disc herniation, GERD, gastric bypass (2004), ex lap for SBO (9 yrs ago) and recent CVA, who is transferred from [place] for severe PVD.    Patient has a history consistent with ischemic rest pain. She has pain at night which gets relieved by hanging her feet down off at the side. She also reports symptoms consistent with vascular claudications of legs even with short ambulation, despite being able to ambulate without any assistance devices. She reports sharp burning pain that is severe (8~10/10 when it is bad) that shoots up from her toes to the thigh area below the knee. This has been ongoing issue, yet got worse lately. The right foot pain is worse on 4th toe which has discoloration to ashy/dusky color, and also on distal plantar surface. Also has left leg pain similar to right yet less in intensity. Since recent stroke and residual left sided weakness, she has been more reliant on right side for functioning, and the foot pain has been getting worse with movements.  Patient states that prior to the recent admission for CVA, patient did not have trouble with the ambulation in terms of muscle strength, paresthesia, or numbness. However, now, she has severe pain with paresthesia.  At [facility], dana was evaluated by vascular surgery, and an ROSA MARIA was 0.4, correlating w/ severe PAD. Vascular surgery recommended transfer to [place] for angiogram and possible stenting or other vascular procedures, and patient is now transferred to [facility].    Of note, patient had recent CVA. She presented to [facility] on 12/17/19 with left sided weakness, right gaze preference, and facial droop, found to have Right M1 occlusion and Left carotid artery occlusion. She developed basal ganglia hemorrhagic conversion, but was stable. Pt was evaluated by PT and was recommended for acute inpatient rehabilitation when stable. Pt was deemed stable for discharge, and she was transferred to [facility] on 12/24/19 for acute inpatient rehabilitation.    Upon admission, patient is hemodynamically stable.  Temp 36.8, HR 74, /80, RR 18, SpO2 99% on RA.  She continues to report the foot pain, yet is not too severe at the moment.  She has been also constipated, which gives her abdominal discomfort. Last BM today. Otherwise, denies CP, palpitation, SOB, lightheadedness, abdominal pain, melena/hematochezia, n/v/d, dysuria/hematuria, fever, chills. (30 Dec 2019 23:51)      PAST MEDICAL & SURGICAL HISTORY:  Chronic insomnia  PTSD (post-traumatic stress disorder)  ETOH abuse  Anxiety  HTN (hypertension)  Depression  Tubal Ligation Status  Status Post Gastric Bypass for Obesity  Status Post Laparoscopic Cholecystectomy      MEDICATIONS  (STANDING):  aspirin  chewable 81 milliGRAM(s) Oral daily  atorvastatin 80 milliGRAM(s) Oral at bedtime  cyanocobalamin 1000 MICROGram(s) Oral daily  enalapril 10 milliGRAM(s) Oral two times a day  enoxaparin Injectable 40 milliGRAM(s) SubCutaneous daily  FLUoxetine 20 milliGRAM(s) Oral daily  folic acid 1 milliGRAM(s) Oral daily  gabapentin 300 milliGRAM(s) Oral three times a day  influenza   Vaccine 0.5 milliLiter(s) IntraMuscular once  lidocaine 2% Gel 1 Application(s) Topical two times a day  multivitamin 1 Tablet(s) Oral daily  nicotine -   7 mG/24Hr(s) Patch 1 patch Transdermal daily  oxyCODONE    IR 10 milliGRAM(s) Oral every 4 hours  pantoprazole    Tablet 40 milliGRAM(s) Oral before breakfast  polyethylene glycol 3350 17 Gram(s) Oral daily  thiamine 100 milliGRAM(s) Oral daily  traZODone 150 milliGRAM(s) Oral at bedtime    MEDICATIONS  (PRN):  acetaminophen   Tablet .. 650 milliGRAM(s) Oral every 6 hours PRN Temp greater or equal to 38C (100.4F), Mild Pain (1 - 3), Moderate Pain (4 - 6)  bisacodyl Suppository 10 milliGRAM(s) Rectal daily PRN Constipation      Allergies    No Known Allergies    Intolerances        VITALS:    Vital Signs Last 24 Hrs  T(C): 37.2 (03 Jan 2020 16:45), Max: 37.2 (03 Jan 2020 14:00)  T(F): 99 (03 Jan 2020 16:45), Max: 99 (03 Jan 2020 16:45)  HR: 80 (03 Jan 2020 16:45) (66 - 84)  BP: 135/86 (03 Jan 2020 16:45) (105/72 - 135/86)  BP(mean): --  RR: 18 (03 Jan 2020 16:45) (18 - 18)  SpO2: 96% (03 Jan 2020 16:45) (94% - 98%)    LABS:                          11.8   8.59  )-----------( 361      ( 02 Jan 2020 06:31 )             35.9       01-02    141  |  105  |  9   ----------------------------<  97  3.8   |  28  |  0.54    Ca    9.6      02 Jan 2020 06:31        CAPILLARY BLOOD GLUCOSE              LOWER EXTREMITY PHYSICAL EXAM:    Vascular: DP/PT 2/4 on L, nonpalpable on R, CFT <5 seconds B/L (no CFT on RF 4th digit), Temperature gradient warm to cool B/L  Neuro: Epicritic sensation intact to the level of toes B/L  Skin: right foot 4th digit dry gangrene, no wet conversion, no erythema or edema, no malodor, no signs of infection, dry and stable, severe pain to touch

## 2020-01-07 NOTE — PROGRESS NOTE ADULT - SUBJECTIVE AND OBJECTIVE BOX
Interventional Radiology Pre-Procedure Note    This is a 49y Female with ischemic foot s/p CVA for angiogram and possible intervention.    Procedure:  Right LE angiogram and possible intervention.    Diagnosis/Indication: Patient is a 49y old  Female who presents with a chief complaint of Severe PVD (07 Jan 2020 12:10)      PAST MEDICAL & SURGICAL HISTORY:  Chronic insomnia  PTSD (post-traumatic stress disorder)  ETOH abuse  Anxiety  HTN (hypertension)  Depression  Tubal Ligation Status  Status Post Gastric Bypass for Obesity  Status Post Laparoscopic Cholecystectomy       CBC Full  -  ( 07 Jan 2020 07:30 )  WBC Count : 14.11 K/uL  RBC Count : 4.49 M/uL  Hemoglobin : 14.6 g/dL  Hematocrit : 45.5 %  Platelet Count - Automated : 372 K/uL  Mean Cell Volume : 101.3 fl  Mean Cell Hemoglobin : 32.5 pg  Mean Cell Hemoglobin Concentration : 32.1 gm/dL  Auto Neutrophil # : x  Auto Lymphocyte # : x  Auto Monocyte # : x  Auto Eosinophil # : x  Auto Basophil # : x  Auto Neutrophil % : x  Auto Lymphocyte % : x  Auto Monocyte % : x  Auto Eosinophil % : x  Auto Basophil % : x    01-07    135  |  93<L>  |  18  ----------------------------<  105<H>  3.6   |  30  |  0.66    Ca    10.0      07 Jan 2020 10:58          Procedure/ risks/ benefits were explained, informed consent obtained from patient, verbalizes understanding.   Will perform angiogram with sedation.

## 2020-01-07 NOTE — PROGRESS NOTE ADULT - SUBJECTIVE AND OBJECTIVE BOX
Patient is a 49y old  Female who presents with a chief complaint of Severe PVD (07 Jan 2020 06:27)      SUBJECTIVE / OVERNIGHT EVENTS:    MEDICATIONS  (STANDING):  aspirin  chewable 81 milliGRAM(s) Oral daily  atorvastatin 80 milliGRAM(s) Oral at bedtime  cyanocobalamin 1000 MICROGram(s) Oral daily  enalapril 10 milliGRAM(s) Oral two times a day  enoxaparin Injectable 40 milliGRAM(s) SubCutaneous daily  FLUoxetine 20 milliGRAM(s) Oral daily  folic acid 1 milliGRAM(s) Oral daily  gabapentin 300 milliGRAM(s) Oral three times a day  influenza   Vaccine 0.5 milliLiter(s) IntraMuscular once  lidocaine 2% Gel 1 Application(s) Topical two times a day  metoclopramide 10 milliGRAM(s) Oral once  multivitamin 1 Tablet(s) Oral daily  nicotine -   7 mG/24Hr(s) Patch 1 patch Transdermal daily  oxyCODONE    IR 10 milliGRAM(s) Oral every 4 hours  pantoprazole    Tablet 40 milliGRAM(s) Oral before breakfast  polyethylene glycol 3350 17 Gram(s) Oral daily  thiamine 100 milliGRAM(s) Oral daily  traZODone 150 milliGRAM(s) Oral at bedtime    MEDICATIONS  (PRN):  acetaminophen   Tablet .. 650 milliGRAM(s) Oral every 6 hours PRN Temp greater or equal to 38C (100.4F), Mild Pain (1 - 3), Moderate Pain (4 - 6)  bisacodyl Suppository 10 milliGRAM(s) Rectal daily PRN Constipation  ondansetron Injectable 4 milliGRAM(s) IV Push every 8 hours PRN Nausea and/or Vomiting      Vital Signs Last 24 Hrs  T(C): 36.8 (07 Jan 2020 10:45), Max: 37 (06 Jan 2020 21:59)  T(F): 98.2 (07 Jan 2020 10:45), Max: 98.6 (06 Jan 2020 21:59)  HR: 83 (07 Jan 2020 10:45) (81 - 92)  BP: 117/78 (07 Jan 2020 10:45) (117/78 - 138/85)  BP(mean): --  RR: 18 (07 Jan 2020 10:45) (18 - 18)  SpO2: 99% (07 Jan 2020 10:45) (95% - 99%)  CAPILLARY BLOOD GLUCOSE        I&O's Summary    06 Jan 2020 07:01  -  07 Jan 2020 07:00  --------------------------------------------------------  IN: 480 mL / OUT: 100 mL / NET: 380 mL    07 Jan 2020 07:01  -  07 Jan 2020 12:10  --------------------------------------------------------  IN: 0 mL / OUT: 0 mL / NET: 0 mL        PHYSICAL EXAM:  GENERAL: NAD, well-developed  HEAD:  Atraumatic, Normocephalic  EYES: EOMI, PERRLA, conjunctiva and sclera clear  NECK: Supple, No JVD  CHEST/LUNG: Clear to auscultation bilaterally; No wheeze  HEART: Regular rate and rhythm; No murmurs, rubs, or gallops  ABDOMEN: Soft, Nontender, Nondistended; Bowel sounds present  EXTREMITIES:  2+ Peripheral Pulses, No clubbing, cyanosis, or edema  PSYCH: AAOx3  NEUROLOGY: non-focal  SKIN: No rashes or lesions    LABS:                        14.6   14.11 )-----------( 372      ( 07 Jan 2020 07:30 )             45.5     01-07    135  |  93<L>  |  18  ----------------------------<  105<H>  3.6   |  30  |  0.66    Ca    10.0      07 Jan 2020 10:58                RADIOLOGY & ADDITIONAL TESTS:    Imaging Personally Reviewed:    Consultant(s) Notes Reviewed:      Care Discussed with Consultants/Other Providers: Patient is a 49y old  Female who presents with a chief complaint of Severe PVD (07 Jan 2020 06:27)      SUBJECTIVE / OVERNIGHT EVENTS: Pt seen and examined. No acute events overnight. Reports improvement in nausea/vomiting. c/o right 4th toe pain. Pt has been cleared by Neurology to receive AC ; she will undergo angiogram today.    MEDICATIONS  (STANDING):  aspirin  chewable 81 milliGRAM(s) Oral daily  atorvastatin 80 milliGRAM(s) Oral at bedtime  cyanocobalamin 1000 MICROGram(s) Oral daily  enalapril 10 milliGRAM(s) Oral two times a day  enoxaparin Injectable 40 milliGRAM(s) SubCutaneous daily  FLUoxetine 20 milliGRAM(s) Oral daily  folic acid 1 milliGRAM(s) Oral daily  gabapentin 300 milliGRAM(s) Oral three times a day  influenza   Vaccine 0.5 milliLiter(s) IntraMuscular once  lidocaine 2% Gel 1 Application(s) Topical two times a day  metoclopramide 10 milliGRAM(s) Oral once  multivitamin 1 Tablet(s) Oral daily  nicotine -   7 mG/24Hr(s) Patch 1 patch Transdermal daily  oxyCODONE    IR 10 milliGRAM(s) Oral every 4 hours  pantoprazole    Tablet 40 milliGRAM(s) Oral before breakfast  polyethylene glycol 3350 17 Gram(s) Oral daily  thiamine 100 milliGRAM(s) Oral daily  traZODone 150 milliGRAM(s) Oral at bedtime    MEDICATIONS  (PRN):  acetaminophen   Tablet .. 650 milliGRAM(s) Oral every 6 hours PRN Temp greater or equal to 38C (100.4F), Mild Pain (1 - 3), Moderate Pain (4 - 6)  bisacodyl Suppository 10 milliGRAM(s) Rectal daily PRN Constipation  ondansetron Injectable 4 milliGRAM(s) IV Push every 8 hours PRN Nausea and/or Vomiting      Vital Signs Last 24 Hrs  T(C): 36.8 (07 Jan 2020 10:45), Max: 37 (06 Jan 2020 21:59)  T(F): 98.2 (07 Jan 2020 10:45), Max: 98.6 (06 Jan 2020 21:59)  HR: 83 (07 Jan 2020 10:45) (81 - 92)  BP: 117/78 (07 Jan 2020 10:45) (117/78 - 138/85)  BP(mean): --  RR: 18 (07 Jan 2020 10:45) (18 - 18)  SpO2: 99% (07 Jan 2020 10:45) (95% - 99%)  CAPILLARY BLOOD GLUCOSE        I&O's Summary    06 Jan 2020 07:01  -  07 Jan 2020 07:00  --------------------------------------------------------  IN: 480 mL / OUT: 100 mL / NET: 380 mL    07 Jan 2020 07:01  -  07 Jan 2020 12:10  --------------------------------------------------------  IN: 0 mL / OUT: 0 mL / NET: 0 mL        PHYSICAL EXAM:  GENERAL: NAD, anicteric, afebrile  HEAD:  Atraumatic, Normocephalic  EYES: EOMI, PERRLA, conjunctiva and sclera clear  NECK: Supple, No JVD  CHEST/LUNG: Clear to auscultation bilaterally; No wheeze  HEART: Regular rate and rhythm; No murmurs, rubs, or gallops  ABDOMEN: Soft, Nontender, Nondistended; Bowel sounds present  EXTREMITIES:  right foot 4th digit dry gangrene, no wet conversion, no erythema or edema, no malodor, no signs of infection, dry and stable, severe pain to touch  PSYCH/Neuro: AAOx3. Left facial droop. Left hemiplegia.    SKIN: No rashes or lesions    LABS:                        14.6   14.11 )-----------( 372      ( 07 Jan 2020 07:30 )             45.5     01-07    135  |  93<L>  |  18  ----------------------------<  105<H>  3.6   |  30  |  0.66    Ca    10.0      07 Jan 2020 10:58                  Consultant(s) Notes Reviewed:  Interventional Radiology

## 2020-01-07 NOTE — PROGRESS NOTE ADULT - SUBJECTIVE AND OBJECTIVE BOX
Interventional Radiology Procedure Note    Procedure:  Aortogram, pelvic and right lower extremity angiogram    Indication:  gangrene of right foot    Operators: Eric    Anesthesia (type):  IV sedation    Contrast:  107 cc    EBL: minimal    Findings/Follow up Plan of Care:  Right lower angiogram via left CFA approach - 4 Bulgarian sheath demonstrates no sig stenosis in aortoiliac system.  Right lower extremity - CFA, DFA and SFA without stenosis.  Occlusion of popliteal artery at knee joint.  Segmental filling of tibial arteries via collaterals with PT reconstituted approx 5-6 cm above ankle in continuiy with plantar artery into foot.  Rec pop to PT bypass.  Full report to follow.    Specimens Removed: none    Implants: none    Complications: none     Condition/Disposition:  PACU then floors.    Please call Interventional Radiology x [phone] with any questions, concerns, or issues.

## 2020-01-07 NOTE — PROGRESS NOTE ADULT - ASSESSMENT
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  s/p angio IR rec bypass  -No podiatric surgical intervention at this time without vasc intervention.   -Will wait for toe to demarcate and will monitor  -will follow while in house  -f/u vasc plans

## 2020-01-07 NOTE — PROGRESS NOTE ADULT - ASSESSMENT
50 y/o woman w/ history of chronic smoker, PTSD/depression, spinal disc herniation, GERD, gastric bypass (2004), ex lap for SBO (9 yrs ago) and recent CVA, who was transferred from [place] for severe PVD and ischemic toe.

## 2020-01-07 NOTE — PROGRESS NOTE ADULT - SUBJECTIVE AND OBJECTIVE BOX
Interventional Radiology Pre-Procedure Note    This is a 49y Female with right lower extremity ischemia and gangrene.    Procedure: Aortagram and right lower extremity angiogram possible angioplasty and possible stent using imaging guidance.    Diagnosis/Indication: Patient is a 49y old  Female who presents with a chief complaint of Severe PVD (06 Jan 2020 16:17)      PAST MEDICAL & SURGICAL HISTORY:  Chronic insomnia  PTSD (post-traumatic stress disorder)  ETOH abuse  Anxiety  HTN (hypertension)  Depression  Tubal Ligation Status  Status Post Gastric Bypass for Obesity  Status Post Laparoscopic Cholecystectomy       CBC Full  -  ( 06 Jan 2020 07:16 )  WBC Count : 13.68 K/uL  RBC Count : 4.34 M/uL  Hemoglobin : 14.2 g/dL  Hematocrit : 44.2 %  Platelet Count - Automated : 444 K/uL  Mean Cell Volume : 101.8 fl  Mean Cell Hemoglobin : 32.7 pg  Mean Cell Hemoglobin Concentration : 32.1 gm/dL  Auto Neutrophil # : x  Auto Lymphocyte # : x  Auto Monocyte # : x  Auto Eosinophil # : x  Auto Basophil # : x  Auto Neutrophil % : x  Auto Lymphocyte % : x  Auto Monocyte % : x  Auto Eosinophil % : x  Auto Basophil % : x    01-06    131<L>  |  91<L>  |  14  ----------------------------<  108<H>  4.1   |  27  |  0.56    Ca    10.3      06 Jan 2020 07:16      Procedure/ risks/ benefits were explained, informed consent obtained from patient, verbalizes understanding.

## 2020-01-08 LAB
ANION GAP SERPL CALC-SCNC: 19 MMOL/L — HIGH (ref 5–17)
BUN SERPL-MCNC: 17 MG/DL — SIGNIFICANT CHANGE UP (ref 7–23)
CALCIUM SERPL-MCNC: 9.4 MG/DL — SIGNIFICANT CHANGE UP (ref 8.4–10.5)
CHLORIDE SERPL-SCNC: 92 MMOL/L — LOW (ref 96–108)
CO2 SERPL-SCNC: 25 MMOL/L — SIGNIFICANT CHANGE UP (ref 22–31)
CREAT SERPL-MCNC: 0.6 MG/DL — SIGNIFICANT CHANGE UP (ref 0.5–1.3)
GLUCOSE SERPL-MCNC: 92 MG/DL — SIGNIFICANT CHANGE UP (ref 70–99)
HCT VFR BLD CALC: 41.8 % — SIGNIFICANT CHANGE UP (ref 34.5–45)
HGB BLD-MCNC: 13.4 G/DL — SIGNIFICANT CHANGE UP (ref 11.5–15.5)
MCHC RBC-ENTMCNC: 32.1 GM/DL — SIGNIFICANT CHANGE UP (ref 32–36)
MCHC RBC-ENTMCNC: 32.7 PG — SIGNIFICANT CHANGE UP (ref 27–34)
MCV RBC AUTO: 102 FL — HIGH (ref 80–100)
NRBC # BLD: 0 /100 WBCS — SIGNIFICANT CHANGE UP (ref 0–0)
PLATELET # BLD AUTO: 323 K/UL — SIGNIFICANT CHANGE UP (ref 150–400)
POTASSIUM SERPL-MCNC: 3.9 MMOL/L — SIGNIFICANT CHANGE UP (ref 3.5–5.3)
POTASSIUM SERPL-SCNC: 3.9 MMOL/L — SIGNIFICANT CHANGE UP (ref 3.5–5.3)
RBC # BLD: 4.1 M/UL — SIGNIFICANT CHANGE UP (ref 3.8–5.2)
RBC # FLD: 17.4 % — HIGH (ref 10.3–14.5)
SODIUM SERPL-SCNC: 136 MMOL/L — SIGNIFICANT CHANGE UP (ref 135–145)
WBC # BLD: 8.18 K/UL — SIGNIFICANT CHANGE UP (ref 3.8–10.5)
WBC # FLD AUTO: 8.18 K/UL — SIGNIFICANT CHANGE UP (ref 3.8–10.5)

## 2020-01-08 PROCEDURE — 99231 SBSQ HOSP IP/OBS SF/LOW 25: CPT

## 2020-01-08 PROCEDURE — 93306 TTE W/DOPPLER COMPLETE: CPT | Mod: 26

## 2020-01-08 PROCEDURE — 99233 SBSQ HOSP IP/OBS HIGH 50: CPT

## 2020-01-08 PROCEDURE — 99222 1ST HOSP IP/OBS MODERATE 55: CPT

## 2020-01-08 RX ADMIN — ATORVASTATIN CALCIUM 80 MILLIGRAM(S): 80 TABLET, FILM COATED ORAL at 21:37

## 2020-01-08 RX ADMIN — OXYCODONE HYDROCHLORIDE 10 MILLIGRAM(S): 5 TABLET ORAL at 10:30

## 2020-01-08 RX ADMIN — Medication 100 MILLIGRAM(S): at 19:04

## 2020-01-08 RX ADMIN — GABAPENTIN 300 MILLIGRAM(S): 400 CAPSULE ORAL at 05:59

## 2020-01-08 RX ADMIN — Medication 1 MILLIGRAM(S): at 13:22

## 2020-01-08 RX ADMIN — ENOXAPARIN SODIUM 40 MILLIGRAM(S): 100 INJECTION SUBCUTANEOUS at 21:38

## 2020-01-08 RX ADMIN — OXYCODONE HYDROCHLORIDE 10 MILLIGRAM(S): 5 TABLET ORAL at 23:15

## 2020-01-08 RX ADMIN — Medication 81 MILLIGRAM(S): at 13:22

## 2020-01-08 RX ADMIN — Medication 1 PATCH: at 20:00

## 2020-01-08 RX ADMIN — Medication 20 MILLIGRAM(S): at 13:23

## 2020-01-08 RX ADMIN — OXYCODONE HYDROCHLORIDE 10 MILLIGRAM(S): 5 TABLET ORAL at 09:55

## 2020-01-08 RX ADMIN — LIDOCAINE 1 APPLICATION(S): 4 CREAM TOPICAL at 19:04

## 2020-01-08 RX ADMIN — OXYCODONE HYDROCHLORIDE 10 MILLIGRAM(S): 5 TABLET ORAL at 16:46

## 2020-01-08 RX ADMIN — Medication 1 PATCH: at 07:27

## 2020-01-08 RX ADMIN — LIDOCAINE 1 APPLICATION(S): 4 CREAM TOPICAL at 05:59

## 2020-01-08 RX ADMIN — OXYCODONE HYDROCHLORIDE 10 MILLIGRAM(S): 5 TABLET ORAL at 06:30

## 2020-01-08 RX ADMIN — Medication 1 PATCH: at 13:24

## 2020-01-08 RX ADMIN — Medication 1 PATCH: at 13:21

## 2020-01-08 RX ADMIN — Medication 10 MILLIGRAM(S): at 19:04

## 2020-01-08 RX ADMIN — PANTOPRAZOLE SODIUM 40 MILLIGRAM(S): 20 TABLET, DELAYED RELEASE ORAL at 05:59

## 2020-01-08 RX ADMIN — Medication 10 MILLIGRAM(S): at 05:59

## 2020-01-08 RX ADMIN — Medication 150 MILLIGRAM(S): at 21:38

## 2020-01-08 RX ADMIN — OXYCODONE HYDROCHLORIDE 10 MILLIGRAM(S): 5 TABLET ORAL at 05:59

## 2020-01-08 RX ADMIN — GABAPENTIN 300 MILLIGRAM(S): 400 CAPSULE ORAL at 21:38

## 2020-01-08 RX ADMIN — GABAPENTIN 300 MILLIGRAM(S): 400 CAPSULE ORAL at 13:23

## 2020-01-08 RX ADMIN — Medication 1 TABLET(S): at 13:24

## 2020-01-08 RX ADMIN — OXYCODONE HYDROCHLORIDE 10 MILLIGRAM(S): 5 TABLET ORAL at 17:20

## 2020-01-08 RX ADMIN — POLYETHYLENE GLYCOL 3350 17 GRAM(S): 17 POWDER, FOR SOLUTION ORAL at 13:26

## 2020-01-08 RX ADMIN — PREGABALIN 1000 MICROGRAM(S): 225 CAPSULE ORAL at 13:23

## 2020-01-08 RX ADMIN — OXYCODONE HYDROCHLORIDE 10 MILLIGRAM(S): 5 TABLET ORAL at 22:31

## 2020-01-08 NOTE — CHART NOTE - NSCHARTNOTEFT_GEN_A_CORE
Interval events    CC: RLE pain, RLE pulses non palpable/not dopplerable  Notified by RN that patient's right foot pulse not palpable/not heard on doppler  Seen and examined the patient at bedside. patient c/o pain on RLE and foot on touch and while performing doppler with numeric pain score 6/10.      Vital Signs Last 24 Hrs  T(C): 36.8 (08 Jan 2020 21:45), Max: 37.1 (08 Jan 2020 05:34)  T(F): 98.3 (08 Jan 2020 21:45), Max: 98.7 (08 Jan 2020 05:34)  HR: 80 (08 Jan 2020 21:45) (80 - 92)  BP: 109/74 (08 Jan 2020 21:45) (97/65 - 109/74)  BP(mean): --  RR: 18 (08 Jan 2020 21:45) (18 - 18)  SpO2: 95% (08 Jan 2020 21:45) (93% - 95%)    Physical exam  Neuro: A&ox3, no acute focal deficits  Vascular: RLE DP/PP pulses assessed with Doppler, DP/PP pulses not heard with doppler  RLE temp warm to cold  LLE warm with pulses palpable, faint    48 y/o woman w/ history of chronic smoker, PTSD/depression, spinal disc herniation, GERD, gastric bypass (2004), ex lap for SBO (9 yrs ago) and recent CVA, who was transferred from [place] for severe PVD and ischemic toe.  Patient s/p RLE angiogram showing severe PAD  - Podiatry following for right  4th toe discoloration.     PAD/S/P RLE angio/Right 4th toe gangrene  Patient now with RLE No DP/PP pulses not dopplerable  Worsening right foot pain on touch and on movement  Patient with RLE pulses + doppler per Interventional radiology note during day  Spoke to vascular, will see the patient per Vascular  C/W Neurovascular check   Awaiting further vascular recommendations  Will closely monitor  Will discuss with Attending    Annika House P BC  [id number] Interval events    CC: RLE pain, RLE pulses non palpable/not dopplerable  Notified by RN that patient's right foot pulse not palpable/not heard on doppler  Seen and examined the patient at bedside. patient c/o pain on RLE and foot on touch and while performing doppler with numeric pain score 6/10.      Vital Signs Last 24 Hrs  T(C): 36.8 (08 Jan 2020 21:45), Max: 37.1 (08 Jan 2020 05:34)  T(F): 98.3 (08 Jan 2020 21:45), Max: 98.7 (08 Jan 2020 05:34)  HR: 80 (08 Jan 2020 21:45) (80 - 92)  BP: 109/74 (08 Jan 2020 21:45) (97/65 - 109/74)  BP(mean): --  RR: 18 (08 Jan 2020 21:45) (18 - 18)  SpO2: 95% (08 Jan 2020 21:45) (93% - 95%)    Physical exam  GENERAL: NAD, lying in bed, in no acute distress  HEAD:  Atraumatic, Normocephalic  EYES: EOMI, PERRLA, conjunctiva and sclera clear  NECK: Supple, No JVD  CHEST/LUNG: Clear to auscultation bilaterally; No wheeze  HEART: Regular rate and rhythm; No murmurs, rubs, or gallops  ABDOMEN: Soft, Nontender, Nondistended; Bowel sounds present  EXTREMITIES:  right foot 4th digit dry gangrene, no wet conversion, no erythema or edema, no malodor, no signs of infection, tender to palpation  PSYCH/Neuro: AAOx4. Left facial droop. Left hemiplegia.    SKIN: No rashes or lesions  Vascular: RLE DP/PP pulses assessed with Doppler, DP/PP pulses not heard with doppler  RLE temp warm to cold  LLE warm with pulses palpable, faint    50 y/o woman w/ history of chronic smoker, PTSD/depression, spinal disc herniation, GERD, gastric bypass (2004), ex lap for SBO (9 yrs ago) and recent CVA, who was transferred from [place] for severe PVD and ischemic toe.  Patient s/p RLE angiogram showing severe PAD  - Podiatry following for right  4th toe discoloration.     PAD/S/P RLE angio/Right 4th toe gangrene  Patient now with RLE No DP/PP pulses not dopplerable  Worsening right foot pain on touch and on movement  Patient with RLE pulses + doppler per Interventional radiology note during day  Spoke to vascular, will see the patient per Vascular  C/W Neurovascular check   Awaiting further vascular recommendations  Will closely monitor  Will discuss with Attending    Annika House FNP BC  [id number] Interval events    CC: RLE pain, RLE pulses non palpable/not dopplerable  Notified by RN that patient's right foot pulse not palpable/not heard on doppler  Seen and examined the patient at bedside. patient c/o pain on RLE and foot on touch and while performing doppler with numeric pain score 6/10.      Vital Signs Last 24 Hrs  T(C): 36.8 (08 Jan 2020 21:45), Max: 37.1 (08 Jan 2020 05:34)  T(F): 98.3 (08 Jan 2020 21:45), Max: 98.7 (08 Jan 2020 05:34)  HR: 80 (08 Jan 2020 21:45) (80 - 92)  BP: 109/74 (08 Jan 2020 21:45) (97/65 - 109/74)  BP(mean): --  RR: 18 (08 Jan 2020 21:45) (18 - 18)  SpO2: 95% (08 Jan 2020 21:45) (93% - 95%)    Physical exam  GENERAL: NAD, lying in bed, in no acute distress  HEAD:  Atraumatic, Normocephalic  EYES: EOMI, PERRLA, conjunctiva and sclera clear  NECK: Supple, No JVD  CHEST/LUNG: Clear to auscultation bilaterally; No wheeze  HEART: Regular rate and rhythm; No murmurs, rubs, or gallops  ABDOMEN: Soft, Nontender, Nondistended; Bowel sounds present  EXTREMITIES:  right foot 4th digit dry gangrene, no wet conversion, no erythema or edema, no malodor, no signs of infection, tender to palpation  PSYCH/Neuro: AAOx4. Left facial droop. Left hemiplegia, clear speech, tongue modline  SKIN: No rashes or lesions  Vascular: RLE DP/PP pulses assessed with Doppler, DP/PP pulses not heard with doppler  RLE temp warm to cold  LLE warm with pulses palpable, faint    48 y/o woman w/ history of chronic smoker, PTSD/depression, spinal disc herniation, GERD, gastric bypass (2004), ex lap for SBO (9 yrs ago) and recent CVA, who was transferred from [place] for severe PVD and ischemic toe.  Patient s/p RLE angiogram showing severe PAD  - Podiatry following for right  4th toe discoloration.     PAD/S/P RLE angio/Right 4th toe gangrene  Patient now with RLE No DP/PP pulses not dopplerable  Worsening right foot pain on touch and on movement  Patient with RLE pulses + doppler per Interventional radiology note during day  Spoke to vascular, will see the patient per Vascular  C/W Neurovascular check   Awaiting further vascular recommendations  Will closely monitor  Will discuss with Attending    Annika House P BC  [id number]

## 2020-01-08 NOTE — CONSULT NOTE ADULT - SUBJECTIVE AND OBJECTIVE BOX
Patient seen and evaluated at bedside    Chief Complaint:    HPI:  Ms Chang is a 50 yo F smoker with a PMH significant for PTSD/Depression, PAD with R foot rest pain, recent R MCA CVA with hemorrhagic conversion in Dec 2019 who was transferred for evaluation of severe peripheral arterial disease. She is s/p angiogram with IR showing occlusion of the right popliteal artery with segmental filling of tibial arteries via collaterals with PT reconstituted approx 5-6 cm above ankle in continuity with plantar artery. Vascular surgery following and plans of peripheral bypass and requests consult for cardiac clearance.      PMHx:   Chronic insomnia  PTSD (post-traumatic stress disorder)  ETOH abuse  Anxiety  HTN (hypertension)  Depression      PSHx:   Tubal Ligation Status  Status Post Gastric Bypass for Obesity  Status Post Laparoscopic Cholecystectomy      Allergies:  No Known Allergies    Home Meds:    Current Medications:   acetaminophen   Tablet .. 650 milliGRAM(s) Oral every 6 hours PRN  aspirin  chewable 81 milliGRAM(s) Oral daily  atorvastatin 80 milliGRAM(s) Oral at bedtime  bisacodyl Suppository 10 milliGRAM(s) Rectal daily PRN  cyanocobalamin 1000 MICROGram(s) Oral daily  enalapril 10 milliGRAM(s) Oral two times a day  enoxaparin Injectable 40 milliGRAM(s) SubCutaneous daily  FLUoxetine 20 milliGRAM(s) Oral daily  folic acid 1 milliGRAM(s) Oral daily  gabapentin 300 milliGRAM(s) Oral three times a day  influenza   Vaccine 0.5 milliLiter(s) IntraMuscular once  lidocaine 2% Gel 1 Application(s) Topical two times a day  metoclopramide 10 milliGRAM(s) Oral once  multivitamin 1 Tablet(s) Oral daily  nicotine -   7 mG/24Hr(s) Patch 1 patch Transdermal daily  oxyCODONE    IR 10 milliGRAM(s) Oral every 4 hours  pantoprazole    Tablet 40 milliGRAM(s) Oral before breakfast  polyethylene glycol 3350 17 Gram(s) Oral daily  thiamine 100 milliGRAM(s) Oral daily  traZODone 150 milliGRAM(s) Oral at bedtime      FAMILY HISTORY:  No pertinent family history in first degree relatives      Social History:  Smoking History: denies  Alcohol Use: denies  Drug Use: denies    REVIEW OF SYSTEMS:  CONSTITUTIONAL: No weakness, fevers or chills  EYES/ENT: No visual changes;  No dysphagia  NECK: No pain or stiffness  RESPIRATORY: No cough, wheezing, hemoptysis; No shortness of breath  CARDIOVASCULAR: No chest pain or palpitations; No lower extremity edema  GASTROINTESTINAL: No abdominal or epigastric pain. No nausea, vomiting, or hematemesis; No diarrhea or constipation. No melena or hematochezia.  BACK: No back pain  GENITOURINARY: No dysuria, frequency or hematuria  NEUROLOGICAL: No numbness or weakness  SKIN: No itching, burning, rashes, or lesions   All other review of systems is negative unless indicated above.    Physical Exam:  T(F): 98.7 (01-08), Max: 98.7 (01-08)  HR: 92 (01-08) (92 - 99)  BP: 97/65 (01-08) (97/65 - 115/76)  RR: 18 (01-08)  SpO2: 93% (01-08)  GENERAL: No acute distress, well-developed  HEAD:  Atraumatic, Normocephalic  ENT: EOMI, PERRLA, conjunctiva and sclera clear, Neck supple, No JVD, moist mucosa  CHEST/LUNG: Clear to auscultation bilaterally; No wheeze, equal breath sounds bilaterally   BACK: No spinal tenderness  HEART: Regular rate and rhythm; No murmurs, rubs, or gallops  ABDOMEN: Soft, Nontender, Nondistended; Bowel sounds present  EXTREMITIES:  No clubbing, cyanosis, or edema  PSYCH: Nl behavior, nl affect  NEUROLOGY: AAOx3, non-focal, cranial nerves intact  SKIN: Normal color, No rashes or lesions  LINES:    Cardiovascular Diagnostic Testing:    ECG: Personally reviewed: sinus, low voltage in limb leads    Imaging:  CT Head 1/1/20    IMPRESSION:    An evolving right MCA territory infarct with hemorrhagic transformation is again noted as described. If the patient has a new and persistent neurologic deficit, consider short interval follow-up head CT or brain MRI follow-up.    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     CXR: Personally reviewed    Labs: Personally reviewed                        13.4   8.18  )-----------( 323      ( 08 Jan 2020 07:15 )             41.8     01-08    136  |  92<L>  |  17  ----------------------------<  92  3.9   |  25  |  0.60    Ca    9.4      08 Jan 2020 07:15                    Hemoglobin A1C, Whole Blood: 5.0 % (01-02 @ 09:00)    Thyroid Stimulating Hormone, Serum: 1.79 uIU/mL (01-02 @ 08:53) Patient seen and evaluated at bedside    Chief Complaint:    HPI:  Ms Chang is a 48 yo F smoker with a PMH significant for PTSD/Depression, PAD with R foot rest pain, recent R MCA CVA with hemorrhagic conversion in Dec 2019 who was transferred for evaluation of severe peripheral arterial disease. She is s/p angiogram with IR showing occlusion of the right popliteal artery with segmental filling of tibial arteries via collaterals with PT reconstituted approximately 5-6 cm above ankle in continuity with plantar artery. Vascular surgery following and plans of peripheral bypass and requests consult for cardiac clearance. Patient notes that prior to her CVA and trauma to her foot, she was sedentary but was able to walk essentially unlimited distances without stopping and was able to walk up 3 flights of stairs with minimal SOB.       PMHx:   Chronic insomnia  PTSD (post-traumatic stress disorder)  ETOH abuse  Anxiety  HTN (hypertension)  Depression      PSHx:   Tubal Ligation Status  Status Post Gastric Bypass for Obesity  Status Post Laparoscopic Cholecystectomy      Allergies:  No Known Allergies    Current Medications:   acetaminophen   Tablet .. 650 milliGRAM(s) Oral every 6 hours PRN  aspirin  chewable 81 milliGRAM(s) Oral daily  atorvastatin 80 milliGRAM(s) Oral at bedtime  bisacodyl Suppository 10 milliGRAM(s) Rectal daily PRN  cyanocobalamin 1000 MICROGram(s) Oral daily  enalapril 10 milliGRAM(s) Oral two times a day  enoxaparin Injectable 40 milliGRAM(s) SubCutaneous daily  FLUoxetine 20 milliGRAM(s) Oral daily  folic acid 1 milliGRAM(s) Oral daily  gabapentin 300 milliGRAM(s) Oral three times a day  influenza   Vaccine 0.5 milliLiter(s) IntraMuscular once  lidocaine 2% Gel 1 Application(s) Topical two times a day  metoclopramide 10 milliGRAM(s) Oral once  multivitamin 1 Tablet(s) Oral daily  nicotine -   7 mG/24Hr(s) Patch 1 patch Transdermal daily  oxyCODONE    IR 10 milliGRAM(s) Oral every 4 hours  pantoprazole    Tablet 40 milliGRAM(s) Oral before breakfast  polyethylene glycol 3350 17 Gram(s) Oral daily  thiamine 100 milliGRAM(s) Oral daily  traZODone 150 milliGRAM(s) Oral at bedtime      FAMILY HISTORY:  No pertinent family history in first degree relatives      Social History:  Smoking History: denies  Alcohol Use: denies  Drug Use: denies    REVIEW OF SYSTEMS:  CONSTITUTIONAL: No weakness, fevers or chills  EYES/ENT: No visual changes;  No dysphagia  NECK: No pain or stiffness  RESPIRATORY: No cough, wheezing, hemoptysis; No shortness of breath  CARDIOVASCULAR: No chest pain or palpitations; No lower extremity edema  GASTROINTESTINAL: No abdominal or epigastric pain. No nausea, vomiting, or hematemesis; No diarrhea or constipation. No melena or hematochezia.  BACK: No back pain  GENITOURINARY: No dysuria, frequency or hematuria  NEUROLOGICAL: No numbness or weakness  SKIN: No itching, burning, rashes, or lesions   All other review of systems is negative unless indicated above.    Physical Exam:  T(F): 98.7 (01-08), Max: 98.7 (01-08)  HR: 92 (01-08) (92 - 99)  BP: 97/65 (01-08) (97/65 - 115/76)  RR: 18 (01-08)  SpO2: 93% (01-08)  GENERAL: No acute distress, well-developed  HEAD:  Atraumatic, Normocephalic  ENT: EOMI, PERRLA, conjunctiva and sclera clear, Neck supple, No JVD, moist mucosa  CHEST/LUNG: Clear to auscultation bilaterally; No wheeze, equal breath sounds bilaterally   HEART: Regular rate and rhythm; No murmurs, rubs, or gallops  ABDOMEN: Soft, Nontender, Nondistended; Bowel sounds present  EXTREMITIES:  Right 4th toe gangrene, DP/PT nonpalpable  PSYCH: Nl behavior, nl affect  NEUROLOGY: AAOx3, non-focal, cranial nerves intact  SKIN: Normal color, No rashes or lesions    Cardiovascular Diagnostic Testing:    ECG: Personally reviewed: sinus, low voltage in limb leads    Imaging:  CT Head 1/1/20    IMPRESSION:    An evolving right MCA territory infarct with hemorrhagic transformation is again noted as described. If the patient has a new and persistent neurologic deficit, consider short interval follow-up head CT or brain MRI follow-up.    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     CXR: Personally reviewed    Labs: Personally reviewed                        13.4   8.18  )-----------( 323      ( 08 Jan 2020 07:15 )             41.8     01-08    136  |  92<L>  |  17  ----------------------------<  92  3.9   |  25  |  0.60    Ca    9.4      08 Jan 2020 07:15      Hemoglobin A1C, Whole Blood: 5.0 % (01-02 @ 09:00)    Thyroid Stimulating Hormone, Serum: 1.79 uIU/mL (01-02 @ 08:53)

## 2020-01-08 NOTE — PROGRESS NOTE ADULT - ASSESSMENT
50 y/o woman w/ history of chronic smoker, PTSD/depression, spinal disc herniation, GERD, gastric bypass (2004), ex lap for SBO (9 yrs ago) and recent CVA, who was transferred from [place] for severe PVD and ischemic toe.

## 2020-01-08 NOTE — PROGRESS NOTE ADULT - SUBJECTIVE AND OBJECTIVE BOX
Interventional Radiology Follow- Up Note    49y Female with hx of PVD s/p Aortogram, pelvic and right lower extremity angiogram demonstrating occlusion of popliteal artery at the knee joint, segmental filling of  tibial arteries via collaterals with PT reconstituted approx 5-6 cm above ankle in continuiy with plantar artery into foot on 1/7  in Interventional Radiology with Dr. Reyes. Reports b/l LE pain with paresthesias RLE > LLE.           Vitals: T(F): 98.7 (01-08-20 @ 05:34), Max: 98.7 (01-08-20 @ 05:34)  HR: 92 (01-08-20 @ 05:34) (82 - 99)  BP: 97/65 (01-08-20 @ 05:34) (97/65 - 115/76)  RR: 18 (01-08-20 @ 05:34) (18 - 18)  SpO2: 93% (01-08-20 @ 05:34) (93% - 98%)  Wt(kg): --    LABS:                        13.4   8.18  )-----------( 323      ( 08 Jan 2020 07:15 )             41.8     01-08    136  |  92<L>  |  17  ----------------------------<  92  3.9   |  25  |  0.60    Ca    9.4      08 Jan 2020 07:15      PHYSICAL EXAM:  General: Nontoxic, in NAD  RLE: right 4th digit with gangrene. Sensation intact. DP and PT pulses assessed with doppler. DP pulse faint. (+) ttp on the foot.  LLE : warm to touch. DP and PT pulses assessed with doppler. Sensation intact.     Impression: 49y Female with PVD s/p Aortogram, pelvic and right lower extremity angiogram demonstrating occlusion of popliteal artery at the knee joint, segmental filling of  tibial arteries via collaterals with PT reconstituted approx 5-6 cm above ankle in continuiy with plantar artery into foot on 1/7  in Interventional Radiology with Dr. Reyes    Plan:  - Awaiting vascular surgery follow up.   - Will discuss with IR attending.     Please call IR at extension [phone] or [phone] with any questions, concerns, or issues regarding above. Interventional Radiology Follow- Up Note    49y Female with hx of PVD s/p Aortogram, pelvic and right lower extremity angiogram demonstrating occlusion of popliteal artery at the knee joint, segmental filling of  tibial arteries via collaterals with PT reconstituted approx 5-6 cm above ankle in continuiy with plantar artery into foot on 1/7  in Interventional Radiology with Dr. Reyes. Reports b/l LE pain with paresthesias RLE > LLE.           Vitals: T(F): 98.7 (01-08-20 @ 05:34), Max: 98.7 (01-08-20 @ 05:34)  HR: 92 (01-08-20 @ 05:34) (82 - 99)  BP: 97/65 (01-08-20 @ 05:34) (97/65 - 115/76)  RR: 18 (01-08-20 @ 05:34) (18 - 18)  SpO2: 93% (01-08-20 @ 05:34) (93% - 98%)  Wt(kg): --    LABS:                        13.4   8.18  )-----------( 323      ( 08 Jan 2020 07:15 )             41.8     01-08    136  |  92<L>  |  17  ----------------------------<  92  3.9   |  25  |  0.60    Ca    9.4      08 Jan 2020 07:15      PHYSICAL EXAM:  General: Nontoxic, in NAD  RLE: right 4th digit with gangrene. Sensation intact. DP and PT pulses assessed with doppler. DP pulse faint. (+) ttp on the foot.  Left groin: no hematoma/swelling/bleeding.   LLE : warm to touch. DP and PT pulses assessed with doppler. Sensation intact.     Impression: 49y Female with PVD s/p Aortogram, pelvic and right lower extremity angiogram demonstrating occlusion of popliteal artery at the knee joint, segmental filling of  tibial arteries via collaterals with PT reconstituted approx 5-6 cm above ankle in continuiy with plantar artery into foot on 1/7  in Interventional Radiology with Dr. Reyes    Plan:  - Awaiting vascular surgery follow up.   - Will discuss with IR attending.     Please call IR at extension [phone] or [phone] with any questions, concerns, or issues regarding above.

## 2020-01-08 NOTE — PROGRESS NOTE ADULT - SUBJECTIVE AND OBJECTIVE BOX
Podiatry pager #: [phone] ([place])/ [phone] ([facility])    Patient is a 49y old  Female who presents with a chief complaint of Severe PVD (03 Jan 2020 11:47)      HPI:  Mrs. Pickett is a 48 y/o woman w/ history of chronic smoker, PTSD/depression, spinal disc herniation, GERD, gastric bypass (2004), ex lap for SBO (9 yrs ago) and recent CVA, who is transferred from [place] for severe PVD.    Patient has a history consistent with ischemic rest pain. She has pain at night which gets relieved by hanging her feet down off at the side. She also reports symptoms consistent with vascular claudications of legs even with short ambulation, despite being able to ambulate without any assistance devices. She reports sharp burning pain that is severe (8~10/10 when it is bad) that shoots up from her toes to the thigh area below the knee. This has been ongoing issue, yet got worse lately. The right foot pain is worse on 4th toe which has discoloration to ashy/dusky color, and also on distal plantar surface. Also has left leg pain similar to right yet less in intensity. Since recent stroke and residual left sided weakness, she has been more reliant on right side for functioning, and the foot pain has been getting worse with movements.  Patient states that prior to the recent admission for CVA, patient did not have trouble with the ambulation in terms of muscle strength, paresthesia, or numbness. However, now, she has severe pain with paresthesia.  At [facility], dana was evaluated by vascular surgery, and an ROSA MARIA was 0.4, correlating w/ severe PAD. Vascular surgery recommended transfer to [place] for angiogram and possible stenting or other vascular procedures, and patient is now transferred to [facility].    Of note, patient had recent CVA. She presented to [facility] on 12/17/19 with left sided weakness, right gaze preference, and facial droop, found to have Right M1 occlusion and Left carotid artery occlusion. She developed basal ganglia hemorrhagic conversion, but was stable. Pt was evaluated by PT and was recommended for acute inpatient rehabilitation when stable. Pt was deemed stable for discharge, and she was transferred to [facility] on 12/24/19 for acute inpatient rehabilitation.    Upon admission, patient is hemodynamically stable.  Temp 36.8, HR 74, /80, RR 18, SpO2 99% on RA.  She continues to report the foot pain, yet is not too severe at the moment.  She has been also constipated, which gives her abdominal discomfort. Last BM today. Otherwise, denies CP, palpitation, SOB, lightheadedness, abdominal pain, melena/hematochezia, n/v/d, dysuria/hematuria, fever, chills. (30 Dec 2019 23:51)      PAST MEDICAL & SURGICAL HISTORY:  Chronic insomnia  PTSD (post-traumatic stress disorder)  ETOH abuse  Anxiety  HTN (hypertension)  Depression  Tubal Ligation Status  Status Post Gastric Bypass for Obesity  Status Post Laparoscopic Cholecystectomy      MEDICATIONS  (STANDING):  aspirin  chewable 81 milliGRAM(s) Oral daily  atorvastatin 80 milliGRAM(s) Oral at bedtime  cyanocobalamin 1000 MICROGram(s) Oral daily  enalapril 10 milliGRAM(s) Oral two times a day  enoxaparin Injectable 40 milliGRAM(s) SubCutaneous daily  FLUoxetine 20 milliGRAM(s) Oral daily  folic acid 1 milliGRAM(s) Oral daily  gabapentin 300 milliGRAM(s) Oral three times a day  influenza   Vaccine 0.5 milliLiter(s) IntraMuscular once  lidocaine 2% Gel 1 Application(s) Topical two times a day  multivitamin 1 Tablet(s) Oral daily  nicotine -   7 mG/24Hr(s) Patch 1 patch Transdermal daily  oxyCODONE    IR 10 milliGRAM(s) Oral every 4 hours  pantoprazole    Tablet 40 milliGRAM(s) Oral before breakfast  polyethylene glycol 3350 17 Gram(s) Oral daily  thiamine 100 milliGRAM(s) Oral daily  traZODone 150 milliGRAM(s) Oral at bedtime    MEDICATIONS  (PRN):  acetaminophen   Tablet .. 650 milliGRAM(s) Oral every 6 hours PRN Temp greater or equal to 38C (100.4F), Mild Pain (1 - 3), Moderate Pain (4 - 6)  bisacodyl Suppository 10 milliGRAM(s) Rectal daily PRN Constipation      Allergies    No Known Allergies    Intolerances        VITALS:    Vital Signs Last 24 Hrs  T(C): 37.2 (03 Jan 2020 16:45), Max: 37.2 (03 Jan 2020 14:00)  T(F): 99 (03 Jan 2020 16:45), Max: 99 (03 Jan 2020 16:45)  HR: 80 (03 Jan 2020 16:45) (66 - 84)  BP: 135/86 (03 Jan 2020 16:45) (105/72 - 135/86)  BP(mean): --  RR: 18 (03 Jan 2020 16:45) (18 - 18)  SpO2: 96% (03 Jan 2020 16:45) (94% - 98%)    LABS:                          11.8   8.59  )-----------( 361      ( 02 Jan 2020 06:31 )             35.9       01-02    141  |  105  |  9   ----------------------------<  97  3.8   |  28  |  0.54    Ca    9.6      02 Jan 2020 06:31        CAPILLARY BLOOD GLUCOSE              LOWER EXTREMITY PHYSICAL EXAM:    Vascular: DP/PT 2/4 on L, nonpalpable on R, CFT <5 seconds B/L (no CFT on RF 4th digit), Temperature gradient warm to cool B/L  Neuro: Epicritic sensation intact to the level of toes B/L  Skin: right foot 4th digit dry gangrene, no wet conversion, no erythema or edema, no malodor, no signs of infection, dry and stable, severe pain to touch

## 2020-01-08 NOTE — PROGRESS NOTE ADULT - SUBJECTIVE AND OBJECTIVE BOX
Patient is a 49y old  Female who presents with a chief complaint of Severe PVD (07 Jan 2020 19:43)      SUBJECTIVE / OVERNIGHT EVENTS:    MEDICATIONS  (STANDING):  aspirin  chewable 81 milliGRAM(s) Oral daily  atorvastatin 80 milliGRAM(s) Oral at bedtime  cyanocobalamin 1000 MICROGram(s) Oral daily  enalapril 10 milliGRAM(s) Oral two times a day  enoxaparin Injectable 40 milliGRAM(s) SubCutaneous daily  FLUoxetine 20 milliGRAM(s) Oral daily  folic acid 1 milliGRAM(s) Oral daily  gabapentin 300 milliGRAM(s) Oral three times a day  influenza   Vaccine 0.5 milliLiter(s) IntraMuscular once  lidocaine 2% Gel 1 Application(s) Topical two times a day  metoclopramide 10 milliGRAM(s) Oral once  multivitamin 1 Tablet(s) Oral daily  nicotine -   7 mG/24Hr(s) Patch 1 patch Transdermal daily  oxyCODONE    IR 10 milliGRAM(s) Oral every 4 hours  pantoprazole    Tablet 40 milliGRAM(s) Oral before breakfast  polyethylene glycol 3350 17 Gram(s) Oral daily  thiamine 100 milliGRAM(s) Oral daily  traZODone 150 milliGRAM(s) Oral at bedtime    MEDICATIONS  (PRN):  acetaminophen   Tablet .. 650 milliGRAM(s) Oral every 6 hours PRN Temp greater or equal to 38C (100.4F), Mild Pain (1 - 3), Moderate Pain (4 - 6)  bisacodyl Suppository 10 milliGRAM(s) Rectal daily PRN Constipation  ondansetron Injectable 4 milliGRAM(s) IV Push every 8 hours PRN Nausea and/or Vomiting      Vital Signs Last 24 Hrs  T(C): 37.1 (08 Jan 2020 05:34), Max: 37.1 (08 Jan 2020 05:34)  T(F): 98.7 (08 Jan 2020 05:34), Max: 98.7 (08 Jan 2020 05:34)  HR: 92 (08 Jan 2020 05:34) (82 - 99)  BP: 97/65 (08 Jan 2020 05:34) (97/65 - 117/78)  BP(mean): --  RR: 18 (08 Jan 2020 05:34) (18 - 18)  SpO2: 93% (08 Jan 2020 05:34) (93% - 99%)  CAPILLARY BLOOD GLUCOSE        I&O's Summary    07 Jan 2020 07:01  -  08 Jan 2020 07:00  --------------------------------------------------------  IN: 360 mL / OUT: 450 mL / NET: -90 mL        PHYSICAL EXAM:  GENERAL: NAD, well-developed  HEAD:  Atraumatic, Normocephalic  EYES: EOMI, PERRLA, conjunctiva and sclera clear  NECK: Supple, No JVD  CHEST/LUNG: Clear to auscultation bilaterally; No wheeze  HEART: Regular rate and rhythm; No murmurs, rubs, or gallops  ABDOMEN: Soft, Nontender, Nondistended; Bowel sounds present  EXTREMITIES:  2+ Peripheral Pulses, No clubbing, cyanosis, or edema  PSYCH: AAOx3  NEUROLOGY: non-focal  SKIN: No rashes or lesions    LABS:                        13.4   8.18  )-----------( 323      ( 08 Jan 2020 07:15 )             41.8     01-08    136  |  92<L>  |  17  ----------------------------<  92  3.9   |  25  |  0.60    Ca    9.4      08 Jan 2020 07:15                RADIOLOGY & ADDITIONAL TESTS:    Imaging Personally Reviewed:    Consultant(s) Notes Reviewed:      Care Discussed with Consultants/Other Providers: Patient is a 49y old  Female who presents with a chief complaint of Severe PVD (07 Jan 2020 19:43)      SUBJECTIVE / OVERNIGHT EVENTS:  Pt seen and examined. No acute events overnight. Reports that right foot toe pain is fairly well controlled. s/p RLE angiogram ; well tolerated.  States that n/v have resolved.    MEDICATIONS  (STANDING):  aspirin  chewable 81 milliGRAM(s) Oral daily  atorvastatin 80 milliGRAM(s) Oral at bedtime  cyanocobalamin 1000 MICROGram(s) Oral daily  enalapril 10 milliGRAM(s) Oral two times a day  enoxaparin Injectable 40 milliGRAM(s) SubCutaneous daily  FLUoxetine 20 milliGRAM(s) Oral daily  folic acid 1 milliGRAM(s) Oral daily  gabapentin 300 milliGRAM(s) Oral three times a day  influenza   Vaccine 0.5 milliLiter(s) IntraMuscular once  lidocaine 2% Gel 1 Application(s) Topical two times a day  metoclopramide 10 milliGRAM(s) Oral once  multivitamin 1 Tablet(s) Oral daily  nicotine -   7 mG/24Hr(s) Patch 1 patch Transdermal daily  oxyCODONE    IR 10 milliGRAM(s) Oral every 4 hours  pantoprazole    Tablet 40 milliGRAM(s) Oral before breakfast  polyethylene glycol 3350 17 Gram(s) Oral daily  thiamine 100 milliGRAM(s) Oral daily  traZODone 150 milliGRAM(s) Oral at bedtime    MEDICATIONS  (PRN):  acetaminophen   Tablet .. 650 milliGRAM(s) Oral every 6 hours PRN Temp greater or equal to 38C (100.4F), Mild Pain (1 - 3), Moderate Pain (4 - 6)  bisacodyl Suppository 10 milliGRAM(s) Rectal daily PRN Constipation  ondansetron Injectable 4 milliGRAM(s) IV Push every 8 hours PRN Nausea and/or Vomiting      Vital Signs Last 24 Hrs  T(C): 37.1 (08 Jan 2020 05:34), Max: 37.1 (08 Jan 2020 05:34)  T(F): 98.7 (08 Jan 2020 05:34), Max: 98.7 (08 Jan 2020 05:34)  HR: 92 (08 Jan 2020 05:34) (82 - 99)  BP: 97/65 (08 Jan 2020 05:34) (97/65 - 117/78)  BP(mean): --  RR: 18 (08 Jan 2020 05:34) (18 - 18)  SpO2: 93% (08 Jan 2020 05:34) (93% - 99%)  CAPILLARY BLOOD GLUCOSE        I&O's Summary    07 Jan 2020 07:01  -  08 Jan 2020 07:00  --------------------------------------------------------  IN: 360 mL / OUT: 450 mL / NET: -90 mL        PHYSICAL EXAM:  GENERAL: NAD, anicteric, afebrile  HEAD:  Atraumatic, Normocephalic  EYES: EOMI, PERRLA, conjunctiva and sclera clear  NECK: Supple, No JVD  CHEST/LUNG: Clear to auscultation bilaterally; No wheeze  HEART: Regular rate and rhythm; No murmurs, rubs, or gallops  ABDOMEN: Soft, Nontender, Nondistended; Bowel sounds present  EXTREMITIES:  right foot 4th digit dry gangrene, no wet conversion, no erythema or edema, no malodor, no signs of infection, tender to palpation  PSYCH/Neuro: AAOx3. Left facial droop. Left hemiplegia.    SKIN: No rashes or lesions    LABS:                        13.4   8.18  )-----------( 323      ( 08 Jan 2020 07:15 )             41.8     01-08    136  |  92<L>  |  17  ----------------------------<  92  3.9   |  25  |  0.60    Ca    9.4      08 Jan 2020 07:15

## 2020-01-08 NOTE — PROGRESS NOTE ADULT - ASSESSMENT
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  s/p angio IR rec bypass. possibly planning bypass? will f/u vasc plans.   -No podiatric surgical intervention at this time without vasc intervention.   -Will wait for toe to demarcate and will monitor  -will follow while in house  -f/u vasc plans

## 2020-01-08 NOTE — CONSULT NOTE ADULT - ASSESSMENT
48 yo F smoker with a PMH significant for PTSD/Depression, PAD with R foot rest pain, recent R MCA CVA with hemorrhagic conversion in Dec 2019 who was transferred for evaluation of severe peripheral arterial disease. [facility] Cardiology consulted for cardiac optimization.    #Pre-operative cardiac evaluation  Patient without ACS, ADHF, unstable arrhythmia, or severe valvular disease notable on physical exam.  - f/u TTE  - place patient on telemetry to monitor for AF given recent stroke and now with ischemic foot  - patient may proceed to OR without further cardiac testing    Vladimir Kohler MD  Cardiology Fellow  All Cardiology service information can be found 24/7 on [email or web address], password: [email or web address] 48 yo F smoker with a PMH significant for PTSD/Depression, PAD with R foot rest pain, recent R MCA CVA with hemorrhagic conversion in Dec 2019 who was transferred for evaluation of severe peripheral arterial disease. [facility] Cardiology consulted for cardiac optimization.    #Pre-operative cardiac evaluation  Patient without ACS, ADHF, unstable arrhythmia, or severe valvular disease notable on physical exam.  -TTE reviewed.  Normal to hyperdynamic LV.    - place patient on telemetry to monitor for AF given recent stroke and now with ischemic foot  - patient may proceed to OR without further cardiac testing    Vladimir Kohler MD  Cardiology Fellow  All Cardiology service information can be found 24/7 on [email or web address], password: [email or web address]

## 2020-01-08 NOTE — CHART NOTE - NSCHARTNOTEFT_GEN_A_CORE
IR RLE angiogram results noted.    Will discuss w/ Dr. Davey regarding further surgical planning for possible bypass    INNA Henriquez PGY-2  Vascular  [phone]

## 2020-01-09 LAB
ANION GAP SERPL CALC-SCNC: 15 MMOL/L — SIGNIFICANT CHANGE UP (ref 5–17)
APTT BLD: 129.3 SEC — CRITICAL HIGH (ref 27.5–36.3)
APTT BLD: 34.7 SEC — SIGNIFICANT CHANGE UP (ref 27.5–36.3)
APTT BLD: 83.8 SEC — HIGH (ref 27.5–36.3)
BUN SERPL-MCNC: 15 MG/DL — SIGNIFICANT CHANGE UP (ref 7–23)
CALCIUM SERPL-MCNC: 9.1 MG/DL — SIGNIFICANT CHANGE UP (ref 8.4–10.5)
CHLORIDE SERPL-SCNC: 93 MMOL/L — LOW (ref 96–108)
CO2 SERPL-SCNC: 25 MMOL/L — SIGNIFICANT CHANGE UP (ref 22–31)
CREAT SERPL-MCNC: 0.54 MG/DL — SIGNIFICANT CHANGE UP (ref 0.5–1.3)
GLUCOSE SERPL-MCNC: 110 MG/DL — HIGH (ref 70–99)
HCT VFR BLD CALC: 36.9 % — SIGNIFICANT CHANGE UP (ref 34.5–45)
HCT VFR BLD CALC: 38.4 % — SIGNIFICANT CHANGE UP (ref 34.5–45)
HGB BLD-MCNC: 12.4 G/DL — SIGNIFICANT CHANGE UP (ref 11.5–15.5)
HGB BLD-MCNC: 12.5 G/DL — SIGNIFICANT CHANGE UP (ref 11.5–15.5)
INR BLD: 1.24 RATIO — HIGH (ref 0.88–1.16)
MCHC RBC-ENTMCNC: 32.5 PG — SIGNIFICANT CHANGE UP (ref 27–34)
MCHC RBC-ENTMCNC: 32.6 GM/DL — SIGNIFICANT CHANGE UP (ref 32–36)
MCHC RBC-ENTMCNC: 33.2 PG — SIGNIFICANT CHANGE UP (ref 27–34)
MCHC RBC-ENTMCNC: 33.6 GM/DL — SIGNIFICANT CHANGE UP (ref 32–36)
MCV RBC AUTO: 98.7 FL — SIGNIFICANT CHANGE UP (ref 80–100)
MCV RBC AUTO: 99.7 FL — SIGNIFICANT CHANGE UP (ref 80–100)
NRBC # BLD: 0 /100 WBCS — SIGNIFICANT CHANGE UP (ref 0–0)
NRBC # BLD: 0 /100 WBCS — SIGNIFICANT CHANGE UP (ref 0–0)
PLATELET # BLD AUTO: 303 K/UL — SIGNIFICANT CHANGE UP (ref 150–400)
PLATELET # BLD AUTO: 305 K/UL — SIGNIFICANT CHANGE UP (ref 150–400)
POTASSIUM SERPL-MCNC: 3.8 MMOL/L — SIGNIFICANT CHANGE UP (ref 3.5–5.3)
POTASSIUM SERPL-SCNC: 3.8 MMOL/L — SIGNIFICANT CHANGE UP (ref 3.5–5.3)
PROTHROM AB SERPL-ACNC: 14.4 SEC — HIGH (ref 10–12.9)
RBC # BLD: 3.74 M/UL — LOW (ref 3.8–5.2)
RBC # BLD: 3.85 M/UL — SIGNIFICANT CHANGE UP (ref 3.8–5.2)
RBC # FLD: 16.5 % — HIGH (ref 10.3–14.5)
RBC # FLD: 16.9 % — HIGH (ref 10.3–14.5)
SODIUM SERPL-SCNC: 133 MMOL/L — LOW (ref 135–145)
WBC # BLD: 6.98 K/UL — SIGNIFICANT CHANGE UP (ref 3.8–10.5)
WBC # BLD: 8.53 K/UL — SIGNIFICANT CHANGE UP (ref 3.8–10.5)
WBC # FLD AUTO: 6.98 K/UL — SIGNIFICANT CHANGE UP (ref 3.8–10.5)
WBC # FLD AUTO: 8.53 K/UL — SIGNIFICANT CHANGE UP (ref 3.8–10.5)

## 2020-01-09 PROCEDURE — 99233 SBSQ HOSP IP/OBS HIGH 50: CPT

## 2020-01-09 PROCEDURE — 93923 UPR/LXTR ART STDY 3+ LVLS: CPT | Mod: 26

## 2020-01-09 PROCEDURE — 99232 SBSQ HOSP IP/OBS MODERATE 35: CPT

## 2020-01-09 RX ORDER — HEPARIN SODIUM 5000 [USP'U]/ML
1100 INJECTION INTRAVENOUS; SUBCUTANEOUS
Qty: 25000 | Refills: 0 | Status: DISCONTINUED | OUTPATIENT
Start: 2020-01-09 | End: 2020-01-13

## 2020-01-09 RX ORDER — HEPARIN SODIUM 5000 [USP'U]/ML
3500 INJECTION INTRAVENOUS; SUBCUTANEOUS EVERY 6 HOURS
Refills: 0 | Status: DISCONTINUED | OUTPATIENT
Start: 2020-01-09 | End: 2020-01-13

## 2020-01-09 RX ORDER — FAMOTIDINE 10 MG/ML
20 INJECTION INTRAVENOUS ONCE
Refills: 0 | Status: COMPLETED | OUTPATIENT
Start: 2020-01-09 | End: 2020-01-09

## 2020-01-09 RX ORDER — HEPARIN SODIUM 5000 [USP'U]/ML
1600 INJECTION INTRAVENOUS; SUBCUTANEOUS
Qty: 25000 | Refills: 0 | Status: DISCONTINUED | OUTPATIENT
Start: 2020-01-09 | End: 2020-01-09

## 2020-01-09 RX ORDER — SODIUM CHLORIDE 9 MG/ML
1000 INJECTION INTRAMUSCULAR; INTRAVENOUS; SUBCUTANEOUS
Refills: 0 | Status: DISCONTINUED | OUTPATIENT
Start: 2020-01-09 | End: 2020-01-13

## 2020-01-09 RX ORDER — HEPARIN SODIUM 5000 [USP'U]/ML
7000 INJECTION INTRAVENOUS; SUBCUTANEOUS EVERY 6 HOURS
Refills: 0 | Status: DISCONTINUED | OUTPATIENT
Start: 2020-01-09 | End: 2020-01-13

## 2020-01-09 RX ADMIN — Medication 650 MILLIGRAM(S): at 03:58

## 2020-01-09 RX ADMIN — HEPARIN SODIUM 1100 UNIT(S)/HR: 5000 INJECTION INTRAVENOUS; SUBCUTANEOUS at 19:55

## 2020-01-09 RX ADMIN — POLYETHYLENE GLYCOL 3350 17 GRAM(S): 17 POWDER, FOR SOLUTION ORAL at 11:05

## 2020-01-09 RX ADMIN — OXYCODONE HYDROCHLORIDE 10 MILLIGRAM(S): 5 TABLET ORAL at 22:50

## 2020-01-09 RX ADMIN — Medication 650 MILLIGRAM(S): at 19:56

## 2020-01-09 RX ADMIN — Medication 1 MILLIGRAM(S): at 11:05

## 2020-01-09 RX ADMIN — HEPARIN SODIUM 16 UNIT(S)/HR: 5000 INJECTION INTRAVENOUS; SUBCUTANEOUS at 03:52

## 2020-01-09 RX ADMIN — OXYCODONE HYDROCHLORIDE 10 MILLIGRAM(S): 5 TABLET ORAL at 12:34

## 2020-01-09 RX ADMIN — SODIUM CHLORIDE 75 MILLILITER(S): 9 INJECTION INTRAMUSCULAR; INTRAVENOUS; SUBCUTANEOUS at 11:05

## 2020-01-09 RX ADMIN — OXYCODONE HYDROCHLORIDE 10 MILLIGRAM(S): 5 TABLET ORAL at 17:42

## 2020-01-09 RX ADMIN — OXYCODONE HYDROCHLORIDE 10 MILLIGRAM(S): 5 TABLET ORAL at 18:12

## 2020-01-09 RX ADMIN — Medication 1 PATCH: at 19:30

## 2020-01-09 RX ADMIN — Medication 1 PATCH: at 12:29

## 2020-01-09 RX ADMIN — PANTOPRAZOLE SODIUM 40 MILLIGRAM(S): 20 TABLET, DELAYED RELEASE ORAL at 05:33

## 2020-01-09 RX ADMIN — Medication 650 MILLIGRAM(S): at 04:33

## 2020-01-09 RX ADMIN — Medication 150 MILLIGRAM(S): at 22:18

## 2020-01-09 RX ADMIN — OXYCODONE HYDROCHLORIDE 10 MILLIGRAM(S): 5 TABLET ORAL at 22:18

## 2020-01-09 RX ADMIN — GABAPENTIN 300 MILLIGRAM(S): 400 CAPSULE ORAL at 22:18

## 2020-01-09 RX ADMIN — Medication 650 MILLIGRAM(S): at 20:26

## 2020-01-09 RX ADMIN — GABAPENTIN 300 MILLIGRAM(S): 400 CAPSULE ORAL at 05:33

## 2020-01-09 RX ADMIN — SODIUM CHLORIDE 75 MILLILITER(S): 9 INJECTION INTRAMUSCULAR; INTRAVENOUS; SUBCUTANEOUS at 22:17

## 2020-01-09 RX ADMIN — Medication 100 MILLIGRAM(S): at 11:05

## 2020-01-09 RX ADMIN — OXYCODONE HYDROCHLORIDE 10 MILLIGRAM(S): 5 TABLET ORAL at 02:17

## 2020-01-09 RX ADMIN — HEPARIN SODIUM 1100 UNIT(S)/HR: 5000 INJECTION INTRAVENOUS; SUBCUTANEOUS at 13:34

## 2020-01-09 RX ADMIN — Medication 1 TABLET(S): at 11:05

## 2020-01-09 RX ADMIN — HEPARIN SODIUM 14 UNIT(S)/HR: 5000 INJECTION INTRAVENOUS; SUBCUTANEOUS at 04:07

## 2020-01-09 RX ADMIN — LIDOCAINE 1 APPLICATION(S): 4 CREAM TOPICAL at 05:33

## 2020-01-09 RX ADMIN — OXYCODONE HYDROCHLORIDE 10 MILLIGRAM(S): 5 TABLET ORAL at 13:04

## 2020-01-09 RX ADMIN — OXYCODONE HYDROCHLORIDE 10 MILLIGRAM(S): 5 TABLET ORAL at 03:11

## 2020-01-09 RX ADMIN — Medication 20 MILLIGRAM(S): at 11:05

## 2020-01-09 RX ADMIN — FAMOTIDINE 20 MILLIGRAM(S): 10 INJECTION INTRAVENOUS at 16:14

## 2020-01-09 RX ADMIN — ATORVASTATIN CALCIUM 80 MILLIGRAM(S): 80 TABLET, FILM COATED ORAL at 22:18

## 2020-01-09 RX ADMIN — Medication 10 MILLIGRAM(S): at 11:05

## 2020-01-09 RX ADMIN — Medication 81 MILLIGRAM(S): at 11:05

## 2020-01-09 RX ADMIN — GABAPENTIN 300 MILLIGRAM(S): 400 CAPSULE ORAL at 13:33

## 2020-01-09 RX ADMIN — PREGABALIN 1000 MICROGRAM(S): 225 CAPSULE ORAL at 11:05

## 2020-01-09 RX ADMIN — Medication 1 PATCH: at 07:44

## 2020-01-09 NOTE — PROGRESS NOTE ADULT - SUBJECTIVE AND OBJECTIVE BOX
Podiatry pager #: [phone] ([place])/ [phone] ([facility])    Patient is a 49y old  Female who presents with a chief complaint of Severe PVD (03 Jan 2020 11:47)      HPI:  Mrs. Pickett is a 48 y/o woman w/ history of chronic smoker, PTSD/depression, spinal disc herniation, GERD, gastric bypass (2004), ex lap for SBO (9 yrs ago) and recent CVA, who is transferred from [place] for severe PVD.    Patient has a history consistent with ischemic rest pain. She has pain at night which gets relieved by hanging her feet down off at the side. She also reports symptoms consistent with vascular claudications of legs even with short ambulation, despite being able to ambulate without any assistance devices. She reports sharp burning pain that is severe (8~10/10 when it is bad) that shoots up from her toes to the thigh area below the knee. This has been ongoing issue, yet got worse lately. The right foot pain is worse on 4th toe which has discoloration to ashy/dusky color, and also on distal plantar surface. Also has left leg pain similar to right yet less in intensity. Since recent stroke and residual left sided weakness, she has been more reliant on right side for functioning, and the foot pain has been getting worse with movements.  Patient states that prior to the recent admission for CVA, patient did not have trouble with the ambulation in terms of muscle strength, paresthesia, or numbness. However, now, she has severe pain with paresthesia.  At [facility], dana was evaluated by vascular surgery, and an ROSA MARIA was 0.4, correlating w/ severe PAD. Vascular surgery recommended transfer to [place] for angiogram and possible stenting or other vascular procedures, and patient is now transferred to [facility].    Of note, patient had recent CVA. She presented to [facility] on 12/17/19 with left sided weakness, right gaze preference, and facial droop, found to have Right M1 occlusion and Left carotid artery occlusion. She developed basal ganglia hemorrhagic conversion, but was stable. Pt was evaluated by PT and was recommended for acute inpatient rehabilitation when stable. Pt was deemed stable for discharge, and she was transferred to [facility] on 12/24/19 for acute inpatient rehabilitation.    Upon admission, patient is hemodynamically stable.  Temp 36.8, HR 74, /80, RR 18, SpO2 99% on RA.  She continues to report the foot pain, yet is not too severe at the moment.  She has been also constipated, which gives her abdominal discomfort. Last BM today. Otherwise, denies CP, palpitation, SOB, lightheadedness, abdominal pain, melena/hematochezia, n/v/d, dysuria/hematuria, fever, chills. (30 Dec 2019 23:51)      PAST MEDICAL & SURGICAL HISTORY:  Chronic insomnia  PTSD (post-traumatic stress disorder)  ETOH abuse  Anxiety  HTN (hypertension)  Depression  Tubal Ligation Status  Status Post Gastric Bypass for Obesity  Status Post Laparoscopic Cholecystectomy      MEDICATIONS  (STANDING):  aspirin  chewable 81 milliGRAM(s) Oral daily  atorvastatin 80 milliGRAM(s) Oral at bedtime  cyanocobalamin 1000 MICROGram(s) Oral daily  enalapril 10 milliGRAM(s) Oral two times a day  enoxaparin Injectable 40 milliGRAM(s) SubCutaneous daily  FLUoxetine 20 milliGRAM(s) Oral daily  folic acid 1 milliGRAM(s) Oral daily  gabapentin 300 milliGRAM(s) Oral three times a day  influenza   Vaccine 0.5 milliLiter(s) IntraMuscular once  lidocaine 2% Gel 1 Application(s) Topical two times a day  multivitamin 1 Tablet(s) Oral daily  nicotine -   7 mG/24Hr(s) Patch 1 patch Transdermal daily  oxyCODONE    IR 10 milliGRAM(s) Oral every 4 hours  pantoprazole    Tablet 40 milliGRAM(s) Oral before breakfast  polyethylene glycol 3350 17 Gram(s) Oral daily  thiamine 100 milliGRAM(s) Oral daily  traZODone 150 milliGRAM(s) Oral at bedtime    MEDICATIONS  (PRN):  acetaminophen   Tablet .. 650 milliGRAM(s) Oral every 6 hours PRN Temp greater or equal to 38C (100.4F), Mild Pain (1 - 3), Moderate Pain (4 - 6)  bisacodyl Suppository 10 milliGRAM(s) Rectal daily PRN Constipation      Allergies    No Known Allergies    Intolerances        VITALS:    Vital Signs Last 24 Hrs  T(C): 37.2 (03 Jan 2020 16:45), Max: 37.2 (03 Jan 2020 14:00)  T(F): 99 (03 Jan 2020 16:45), Max: 99 (03 Jan 2020 16:45)  HR: 80 (03 Jan 2020 16:45) (66 - 84)  BP: 135/86 (03 Jan 2020 16:45) (105/72 - 135/86)  BP(mean): --  RR: 18 (03 Jan 2020 16:45) (18 - 18)  SpO2: 96% (03 Jan 2020 16:45) (94% - 98%)    LABS:                          11.8   8.59  )-----------( 361      ( 02 Jan 2020 06:31 )             35.9       01-02    141  |  105  |  9   ----------------------------<  97  3.8   |  28  |  0.54    Ca    9.6      02 Jan 2020 06:31        CAPILLARY BLOOD GLUCOSE              LOWER EXTREMITY PHYSICAL EXAM:    Vascular: DP/PT 2/4 on L, nonpalpable on R, CFT <5 seconds B/L (no CFT on RF 4th digit), Temperature gradient warm to cool B/L  Neuro: Epicritic sensation intact to the level of toes B/L  Skin: right foot 4th digit dry gangrene, no wet conversion, no erythema or edema, no malodor, no signs of infection, dry and stable, severe pain to touch

## 2020-01-09 NOTE — PROGRESS NOTE ADULT - ASSESSMENT
48 yo F smoker with a PMH significant for PTSD/Depression, PAD with R foot rest pain, recent R MCA CVA with hemorrhagic conversion in Dec 2019 who was transferred for evaluation of severe peripheral arterial disease. [facility] Cardiology consulted for cardiac optimization.    #Pre-operative cardiac evaluation  Patient without ACS, ADHF, unstable arrhythmia, or severe valvular disease notable on physical exam.  - TTE reviewed.  Normal to hyperdynamic LV.    - patient may proceed to OR without further cardiac testing    Vladimir Kohler MD  Cardiology Fellow  All Cardiology service information can be found 24/7 on [email or web address], password: [email or web address] 48 yo F smoker with a PMH significant for PTSD/Depression, PAD with R foot rest pain, recent R MCA CVA with hemorrhagic conversion in Dec 2019 who was transferred for evaluation of severe peripheral arterial disease. [facility] Cardiology consulted for cardiac optimization.    #Pre-operative cardiac evaluation  Patient without ACS, ADHF, unstable arrhythmia, or severe valvular disease notable on physical exam.  - TTE reviewed.  Normal to hyperdynamic LV.    - patient may proceed to OR without further cardiac testing  - She has a hyperdynamic ventricle on echo and low BP, would give IV fluids NS @ 75 cc per hour until further notice.        Vladimir Kohler MD  Cardiology Fellow  All Cardiology service information can be found 24/7 on [email or web address], password: [email or web address] 50 yo F smoker with a PMH significant for PTSD/Depression, PAD with R foot rest pain, recent R MCA CVA with hemorrhagic conversion in Dec 2019 who was transferred for evaluation of severe peripheral arterial disease. [facility] Cardiology consulted for cardiac optimization.    #Pre-operative cardiac evaluation  Patient without ACS, ADHF, unstable arrhythmia, or severe valvular disease notable on physical exam.  - TTE reviewed.  Normal to hyperdynamic LV.    - patient may proceed to OR without further cardiac testing      Vladimir Kohler MD  Cardiology Fellow  All Cardiology service information can be found 24/7 on [email or web address], password: [email or web address] 48 yo F smoker with a PMH significant for PTSD/Depression, PAD with R foot rest pain, recent R MCA CVA with hemorrhagic conversion in Dec 2019 who was transferred for evaluation of severe peripheral arterial disease. [facility] Cardiology consulted for cardiac optimization.    #Pre-operative cardiac evaluation  Patient without ACS, ADHF, unstable arrhythmia, or severe valvular disease notable on physical exam.  - TTE reviewed.  Normal to hyperdynamic LV.    - patient may proceed to OR without further cardiac testing  - NS 75ml/hr      Vladimir Kohler MD  Cardiology Fellow  All Cardiology service information can be found 24/7 on [email or web address], password: [email or web address]

## 2020-01-09 NOTE — PROVIDER CONTACT NOTE (OTHER) - ASSESSMENT
Pt RLE is cool to touch, unable to find doppler pulses. Pt had oxycodone 10mg and continues to be in 5/10 pain. VSS. Denies of CP, SOB.

## 2020-01-09 NOTE — CHART NOTE - NSCHARTNOTEFT_GEN_A_CORE
Source: Patient [x ]    Family [ x]       Patient seen for routine length of stay follow up for moderate malnutrition.  Patient found resting in bed with aunt at bedside.  Patient doing much better as diet liberalized to soft only and now allowed thin liquids.  As a result, Patient is eating much better.  Also noted with ensure Max at bedside which daughter brought in from home.  Patient enjoying but advised that taking a supplement more balanced in calories and protein would be beneficial and less taxing on her kidneys.  Patient also reports that GERD is under much better control and no gagging and retching today. Patient doing well with thin liquids and aware of need to take liquids slowly and chin tuck.     Dxd- Dxd with S/p MI, CVA, PVD with right 4th toe discoloration, ashen.           PO intake:  < 50% [ ] 50-75% [x ]   % [ ]  other :           Current Weight: Weight (kg): 88.5 (01-08 @ 21:45)  Weight stable at 195 pounds    Pertinent Medications: MEDICATIONS  (STANDING):  aspirin  chewable 81 milliGRAM(s) Oral daily  atorvastatin 80 milliGRAM(s) Oral at bedtime  cyanocobalamin 1000 MICROGram(s) Oral daily  enalapril 10 milliGRAM(s) Oral two times a day  FLUoxetine 20 milliGRAM(s) Oral daily  folic acid 1 milliGRAM(s) Oral daily  gabapentin 300 milliGRAM(s) Oral three times a day  heparin  Infusion 1600 Unit(s)/Hr (14 mL/Hr) IV Continuous <Continuous>  influenza   Vaccine 0.5 milliLiter(s) IntraMuscular once  lidocaine 2% Gel 1 Application(s) Topical two times a day  metoclopramide 10 milliGRAM(s) Oral once  multivitamin 1 Tablet(s) Oral daily  nicotine -   7 mG/24Hr(s) Patch 1 patch Transdermal daily  oxyCODONE    IR 10 milliGRAM(s) Oral every 4 hours  pantoprazole    Tablet 40 milliGRAM(s) Oral before breakfast  polyethylene glycol 3350 17 Gram(s) Oral daily  sodium chloride 0.9%. 1000 milliLiter(s) (75 mL/Hr) IV Continuous <Continuous>  thiamine 100 milliGRAM(s) Oral daily  traZODone 150 milliGRAM(s) Oral at bedtime    MEDICATIONS  (PRN):  acetaminophen   Tablet .. 650 milliGRAM(s) Oral every 6 hours PRN Temp greater or equal to 38C (100.4F), Mild Pain (1 - 3), Moderate Pain (4 - 6)  bisacodyl Suppository 10 milliGRAM(s) Rectal daily PRN Constipation    Pertinent Labs:  01-09 Na133 mmol/L<L> Glu 110 mg/dL<H> K+ 3.8 mmol/L Cr  0.54 mg/dL BUN 15 mg/dL 01-02 [id number] 5.0 % 01-02 Chol 84 mg/dL LDL 19 mg/dL HDL 48 mg/dL<L> Trig 87 mg/dL      Skin: Right foot toe wound    Estimated Needs:   [x ] no change since previous assessment  [ ] recalculated:       Previous Nutrition Diagnosis:     [x ] Malnutrition - moderate.  Improving on soft diet and thin liquids.  Patient to continue on health shakes as ordered, request Ensure Enlive 8 ounces x2.          Nutrition Diagnosis is [x ] ongoing      New Nutrition Diagnosis: [ x] not applicable    PLAN:   Soft diet  Request Ensure Enlive 8 ounces x2  Monitor diet tolerance, po intake, GI tolerance, weight trends, labs, and skin integrity  Encourage balanced and consistent po intake, health shakes  RDN remains available for follow up     Yanelis Bergeron MA,RD,CHES,CDN  Beeper [phone]

## 2020-01-09 NOTE — CHART NOTE - NSCHARTNOTEFT_GEN_A_CORE
Notified by Primary Provider that RN did not find dopplerable pulses and noted a cold limb.     Patient seen and evaluated at bedside.   She notes pain in the RLE, not significantly worse that usual.     General: NAD, Pleasant, leaning to her left  Neurology: Patient is AA&Ox3, follows commands, and speech fluent. Left-sided neural deficit  Extremities:     LLE: non-tender, externally rotated, DP signal    RLE: 5th toe and sole wounds, sloughing of digit skin, tender to palpation, cool to touch, sensorimotor intact, palp popliteal, PT signal       - Recommended to restart Heparin gtt to therapeutic level. Neurology documentation from 1/6 states no neurovascular contraindication to anticoagulation.     discussed with Royce Goncalves M.D. Vascular Fellow and Priscilla Brar M.D.     Vascular Surgery [phone]    Abdiel Herr M.D.   PGY1 - General Surgery   [facility]  Pager: [phone]

## 2020-01-09 NOTE — PROGRESS NOTE ADULT - ASSESSMENT
48 y/o woman w/ history of chronic smoker, PTSD/depression, spinal disc herniation, GERD, gastric bypass (2004), ex lap for SBO (9 yrs ago) and recent CVA, who was transferred from [place] for severe PVD and ischemic toe.

## 2020-01-09 NOTE — CHART NOTE - NSCHARTNOTEFT_GEN_A_CORE
Asked by primary team regarding appropriateness of aspirin given recent stroke had hemorrhagic conversion and for procedure Vascular wants patients to be on heparin drip.    Aspirin appropriate for secondary prevention of stroke as patient had recent MCA infarct.     Primary team and overnight Resident agreed that aspirin appropriateness may be addressed in morning when Stroke team and Attending will be here.    d/w primary team, to d/w Stroke Team

## 2020-01-09 NOTE — PROGRESS NOTE ADULT - SUBJECTIVE AND OBJECTIVE BOX
Patient seen and examined at bedside.    Overnight Events:     Review Of Systems: No chest pain, shortness of breath, or palpitations            Current Meds:  acetaminophen   Tablet .. 650 milliGRAM(s) Oral every 6 hours PRN  aspirin  chewable 81 milliGRAM(s) Oral daily  atorvastatin 80 milliGRAM(s) Oral at bedtime  bisacodyl Suppository 10 milliGRAM(s) Rectal daily PRN  cyanocobalamin 1000 MICROGram(s) Oral daily  enalapril 10 milliGRAM(s) Oral two times a day  FLUoxetine 20 milliGRAM(s) Oral daily  folic acid 1 milliGRAM(s) Oral daily  gabapentin 300 milliGRAM(s) Oral three times a day  heparin  Infusion 1600 Unit(s)/Hr IV Continuous <Continuous>  influenza   Vaccine 0.5 milliLiter(s) IntraMuscular once  lidocaine 2% Gel 1 Application(s) Topical two times a day  metoclopramide 10 milliGRAM(s) Oral once  multivitamin 1 Tablet(s) Oral daily  nicotine -   7 mG/24Hr(s) Patch 1 patch Transdermal daily  oxyCODONE    IR 10 milliGRAM(s) Oral every 4 hours  pantoprazole    Tablet 40 milliGRAM(s) Oral before breakfast  polyethylene glycol 3350 17 Gram(s) Oral daily  thiamine 100 milliGRAM(s) Oral daily  traZODone 150 milliGRAM(s) Oral at bedtime      Vitals:  T(F): 97.4 (01-09), Max: 98.3 (01-08)  HR: 78 (01-09) (78 - 90)  BP: 90/60 (01-09) (90/60 - 109/74)  RR: 18 (01-09)  SpO2: 94% (01-09)  I&O's Summary    08 Jan 2020 07:01  -  09 Jan 2020 07:00  --------------------------------------------------------  IN: 1056 mL / OUT: 450 mL / NET: 606 mL        Physical Exam:  GENERAL: No acute distress, well-developed  HEAD:  Atraumatic, Normocephalic  ENT: EOMI, PERRLA, conjunctiva and sclera clear, Neck supple, No JVD, moist mucosa  CHEST/LUNG: Clear to auscultation bilaterally; No wheeze, equal breath sounds bilaterally   HEART: Regular rate and rhythm; No murmurs, rubs, or gallops  ABDOMEN: Soft, Nontender, Nondistended; Bowel sounds present  EXTREMITIES:  Right 4th toe gangrene, DP/PT nonpalpable  PSYCH: Nl behavior, nl affect  NEUROLOGY: AAOx3, non-focal, cranial nerves intact  SKIN: Normal color, No rashes or lesions                          12.5   6.98  )-----------( 303      ( 09 Jan 2020 03:01 )             38.4     01-09    133<L>  |  93<L>  |  15  ----------------------------<  110<H>  3.8   |  25  |  0.54    Ca    9.1      09 Jan 2020 03:01      PT/INR - ( 09 Jan 2020 03:01 )   PT: 14.4 sec;   INR: 1.24 ratio    PTT - ( 09 Jan 2020 03:01 )  PTT:34.7 sec      Cardiovascular Diagnostic Testing:    ECG: Personally reviewed: sinus, low voltage in limb leads    Echo:  Conclusions:  1. Normal mitral valve. Minimal mitral regurgitation.  2. Aortic valve not well visualized; appears trileaflet  with normal opening. No aortic valve regurgitation seen.  3. Endocardial visualization enhanced with intravenous  injection of Ultrasonic Enhancing Agent (Definity).  Hyperdynamic left ventricular systolic function. No  segmental wall motion abnormalities.  4. The right ventricle is not well visualized; grossly  normal right ventricular systolic function.  5. Trace pericardial effusion.  *** No previous Echo exam.    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 If the patient has a new and persistent neurologic deficit, consider short interval follow-up head CT or brain MRI follow-up.    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       Interpretation of Telemetry: Patient seen and examined at bedside.    Overnight Events: KAILEY, had significant R foot pain yesterday and noted to have colder foot with loss of dopplerable pulses, restarted on hep gtt. This am feels improved. Nursing note with episode of AT, not found on tele.    Current Meds:  acetaminophen   Tablet .. 650 milliGRAM(s) Oral every 6 hours PRN  aspirin  chewable 81 milliGRAM(s) Oral daily  atorvastatin 80 milliGRAM(s) Oral at bedtime  bisacodyl Suppository 10 milliGRAM(s) Rectal daily PRN  cyanocobalamin 1000 MICROGram(s) Oral daily  enalapril 10 milliGRAM(s) Oral two times a day  FLUoxetine 20 milliGRAM(s) Oral daily  folic acid 1 milliGRAM(s) Oral daily  gabapentin 300 milliGRAM(s) Oral three times a day  heparin  Infusion 1600 Unit(s)/Hr IV Continuous <Continuous>  influenza   Vaccine 0.5 milliLiter(s) IntraMuscular once  lidocaine 2% Gel 1 Application(s) Topical two times a day  metoclopramide 10 milliGRAM(s) Oral once  multivitamin 1 Tablet(s) Oral daily  nicotine -   7 mG/24Hr(s) Patch 1 patch Transdermal daily  oxyCODONE    IR 10 milliGRAM(s) Oral every 4 hours  pantoprazole    Tablet 40 milliGRAM(s) Oral before breakfast  polyethylene glycol 3350 17 Gram(s) Oral daily  thiamine 100 milliGRAM(s) Oral daily  traZODone 150 milliGRAM(s) Oral at bedtime      Vitals:  T(F): 97.4 (01-09), Max: 98.3 (01-08)  HR: 78 (01-09) (78 - 90)  BP: 90/60 (01-09) (90/60 - 109/74)  RR: 18 (01-09)  SpO2: 94% (01-09)  I&O's Summary    08 Jan 2020 07:01  -  09 Jan 2020 07:00  --------------------------------------------------------  IN: 1056 mL / OUT: 450 mL / NET: 606 mL        Physical Exam:  GENERAL: No acute distress, well-developed  HEAD:  Atraumatic, Normocephalic  ENT: EOMI, PERRLA, conjunctiva and sclera clear, Neck supple, No JVD, moist mucosa  CHEST/LUNG: Clear to auscultation bilaterally; No wheeze, equal breath sounds bilaterally   HEART: Regular rate and rhythm; No murmurs, rubs, or gallops  ABDOMEN: Soft, Nontender, Nondistended; Bowel sounds present  EXTREMITIES:  Right 4th toe gangrene, ulceration on plantar aspect of foot, DP/PT nonpalpable  PSYCH: Nl behavior, nl affect  NEUROLOGY: AAOx3, non-focal, cranial nerves intact  SKIN: Normal color, No rashes or lesions                          12.5   6.98  )-----------( 303      ( 09 Jan 2020 03:01 )             38.4     01-09    133<L>  |  93<L>  |  15  ----------------------------<  110<H>  3.8   |  25  |  0.54    Ca    9.1      09 Jan 2020 03:01      PT/INR - ( 09 Jan 2020 03:01 )   PT: 14.4 sec;   INR: 1.24 ratio    PTT - ( 09 Jan 2020 03:01 )  PTT:34.7 sec      Cardiovascular Diagnostic Testing:    ECG: Personally reviewed: sinus, low voltage in limb leads    Echo:  Conclusions:  1. Normal mitral valve. Minimal mitral regurgitation.  2. Aortic valve not well visualized; appears trileaflet  with normal opening. No aortic valve regurgitation seen.  3. Endocardial visualization enhanced with intravenous  injection of Ultrasonic Enhancing Agent (Definity).  Hyperdynamic left ventricular systolic function. No  segmental wall motion abnormalities.  4. The right ventricle is not well visualized; grossly  normal right ventricular systolic function.  5. Trace pericardial effusion.  *** No previous Echo exam.    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 If the patient has a new and persistent neurologic deficit, consider short interval follow-up head CT or brain MRI follow-up.    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       Interpretation of Telemetry: Patient seen and examined at bedside.    Overnight Events: KAILEY, had significant R foot pain yesterday and noted to have colder foot with loss of dopplerable pulses, restarted on hep gtt. This am feels improved. Apparent 3 sec episode of AT.     Current Meds:  acetaminophen   Tablet .. 650 milliGRAM(s) Oral every 6 hours PRN  aspirin  chewable 81 milliGRAM(s) Oral daily  atorvastatin 80 milliGRAM(s) Oral at bedtime  bisacodyl Suppository 10 milliGRAM(s) Rectal daily PRN  cyanocobalamin 1000 MICROGram(s) Oral daily  enalapril 10 milliGRAM(s) Oral two times a day  FLUoxetine 20 milliGRAM(s) Oral daily  folic acid 1 milliGRAM(s) Oral daily  gabapentin 300 milliGRAM(s) Oral three times a day  heparin  Infusion 1600 Unit(s)/Hr IV Continuous <Continuous>  influenza   Vaccine 0.5 milliLiter(s) IntraMuscular once  lidocaine 2% Gel 1 Application(s) Topical two times a day  metoclopramide 10 milliGRAM(s) Oral once  multivitamin 1 Tablet(s) Oral daily  nicotine -   7 mG/24Hr(s) Patch 1 patch Transdermal daily  oxyCODONE    IR 10 milliGRAM(s) Oral every 4 hours  pantoprazole    Tablet 40 milliGRAM(s) Oral before breakfast  polyethylene glycol 3350 17 Gram(s) Oral daily  thiamine 100 milliGRAM(s) Oral daily  traZODone 150 milliGRAM(s) Oral at bedtime      Vitals:  T(F): 97.4 (01-09), Max: 98.3 (01-08)  HR: 78 (01-09) (78 - 90)  BP: 90/60 (01-09) (90/60 - 109/74)  RR: 18 (01-09)  SpO2: 94% (01-09)  I&O's Summary    08 Jan 2020 07:01  -  09 Jan 2020 07:00  --------------------------------------------------------  IN: 1056 mL / OUT: 450 mL / NET: 606 mL        Physical Exam:  GENERAL: No acute distress, well-developed  HEAD:  Atraumatic, Normocephalic  ENT: EOMI, PERRLA, conjunctiva and sclera clear, Neck supple, No JVD, moist mucosa  CHEST/LUNG: Clear to auscultation bilaterally; No wheeze, equal breath sounds bilaterally   HEART: Regular rate and rhythm; No murmurs, rubs, or gallops  ABDOMEN: Soft, Nontender, Nondistended; Bowel sounds present  EXTREMITIES:  Right 4th toe gangrene, ulceration on plantar aspect of foot, DP/PT nonpalpable  PSYCH: Nl behavior, nl affect  NEUROLOGY: AAOx3, non-focal, cranial nerves intact  SKIN: Normal color, No rashes or lesions                          12.5   6.98  )-----------( 303      ( 09 Jan 2020 03:01 )             38.4     01-09    133<L>  |  93<L>  |  15  ----------------------------<  110<H>  3.8   |  25  |  0.54    Ca    9.1      09 Jan 2020 03:01      PT/INR - ( 09 Jan 2020 03:01 )   PT: 14.4 sec;   INR: 1.24 ratio    PTT - ( 09 Jan 2020 03:01 )  PTT:34.7 sec      Cardiovascular Diagnostic Testing:    ECG: Personally reviewed: sinus, low voltage in limb leads    Echo:  Conclusions:  1. Normal mitral valve. Minimal mitral regurgitation.  2. Aortic valve not well visualized; appears trileaflet  with normal opening. No aortic valve regurgitation seen.  3. Endocardial visualization enhanced with intravenous  injection of Ultrasonic Enhancing Agent (Definity).  Hyperdynamic left ventricular systolic function. No  segmental wall motion abnormalities.  4. The right ventricle is not well visualized; grossly  normal right ventricular systolic function.  5. Trace pericardial effusion.  *** No previous Echo exam.    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 If the patient has a new and persistent neurologic deficit, consider short interval follow-up head CT or brain MRI follow-up.    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

## 2020-01-09 NOTE — PROGRESS NOTE ADULT - SUBJECTIVE AND OBJECTIVE BOX
Patient is a 49y old  Female who presents with a chief complaint of Severe PVD (08 Jan 2020 17:26)      SUBJECTIVE / OVERNIGHT EVENTS:    MEDICATIONS  (STANDING):  aspirin  chewable 81 milliGRAM(s) Oral daily  atorvastatin 80 milliGRAM(s) Oral at bedtime  cyanocobalamin 1000 MICROGram(s) Oral daily  enalapril 10 milliGRAM(s) Oral two times a day  FLUoxetine 20 milliGRAM(s) Oral daily  folic acid 1 milliGRAM(s) Oral daily  gabapentin 300 milliGRAM(s) Oral three times a day  heparin  Infusion 1600 Unit(s)/Hr (14 mL/Hr) IV Continuous <Continuous>  influenza   Vaccine 0.5 milliLiter(s) IntraMuscular once  lidocaine 2% Gel 1 Application(s) Topical two times a day  metoclopramide 10 milliGRAM(s) Oral once  multivitamin 1 Tablet(s) Oral daily  nicotine -   7 mG/24Hr(s) Patch 1 patch Transdermal daily  oxyCODONE    IR 10 milliGRAM(s) Oral every 4 hours  pantoprazole    Tablet 40 milliGRAM(s) Oral before breakfast  polyethylene glycol 3350 17 Gram(s) Oral daily  thiamine 100 milliGRAM(s) Oral daily  traZODone 150 milliGRAM(s) Oral at bedtime    MEDICATIONS  (PRN):  acetaminophen   Tablet .. 650 milliGRAM(s) Oral every 6 hours PRN Temp greater or equal to 38C (100.4F), Mild Pain (1 - 3), Moderate Pain (4 - 6)  bisacodyl Suppository 10 milliGRAM(s) Rectal daily PRN Constipation      Vital Signs Last 24 Hrs  T(C): 36.3 (09 Jan 2020 05:27), Max: 36.8 (08 Jan 2020 17:10)  T(F): 97.4 (09 Jan 2020 05:27), Max: 98.3 (08 Jan 2020 17:10)  HR: 78 (09 Jan 2020 05:27) (78 - 90)  BP: 90/60 (09 Jan 2020 05:27) (90/60 - 109/74)  BP(mean): --  RR: 18 (09 Jan 2020 05:27) (18 - 18)  SpO2: 94% (09 Jan 2020 05:27) (94% - 95%)  CAPILLARY BLOOD GLUCOSE        I&O's Summary    08 Jan 2020 07:01  -  09 Jan 2020 07:00  --------------------------------------------------------  IN: 1056 mL / OUT: 450 mL / NET: 606 mL        PHYSICAL EXAM:  GENERAL: NAD, well-developed  HEAD:  Atraumatic, Normocephalic  EYES: EOMI, PERRLA, conjunctiva and sclera clear  NECK: Supple, No JVD  CHEST/LUNG: Clear to auscultation bilaterally; No wheeze  HEART: Regular rate and rhythm; No murmurs, rubs, or gallops  ABDOMEN: Soft, Nontender, Nondistended; Bowel sounds present  EXTREMITIES:  2+ Peripheral Pulses, No clubbing, cyanosis, or edema  PSYCH: AAOx3  NEUROLOGY: non-focal  SKIN: No rashes or lesions    LABS:                        12.5   6.98  )-----------( 303      ( 09 Jan 2020 03:01 )             38.4     01-09    133<L>  |  93<L>  |  15  ----------------------------<  110<H>  3.8   |  25  |  0.54    Ca    9.1      09 Jan 2020 03:01      PT/INR - ( 09 Jan 2020 03:01 )   PT: 14.4 sec;   INR: 1.24 ratio         PTT - ( 09 Jan 2020 03:01 )  PTT:34.7 sec          RADIOLOGY & ADDITIONAL TESTS:    Imaging Personally Reviewed:    Consultant(s) Notes Reviewed:      Care Discussed with Consultants/Other Providers: Patient is a 49y old  Female who presents with a chief complaint of Severe PVD (08 Jan 2020 17:26)      SUBJECTIVE / OVERNIGHT EVENTS:  Pt seen and examined with family at bedside. No acute events overnight. Reports that right foot toe pain is fairly well controlled.    MEDICATIONS  (STANDING):  aspirin  chewable 81 milliGRAM(s) Oral daily  atorvastatin 80 milliGRAM(s) Oral at bedtime  cyanocobalamin 1000 MICROGram(s) Oral daily  enalapril 10 milliGRAM(s) Oral two times a day  FLUoxetine 20 milliGRAM(s) Oral daily  folic acid 1 milliGRAM(s) Oral daily  gabapentin 300 milliGRAM(s) Oral three times a day  heparin  Infusion 1600 Unit(s)/Hr (14 mL/Hr) IV Continuous <Continuous>  influenza   Vaccine 0.5 milliLiter(s) IntraMuscular once  lidocaine 2% Gel 1 Application(s) Topical two times a day  metoclopramide 10 milliGRAM(s) Oral once  multivitamin 1 Tablet(s) Oral daily  nicotine -   7 mG/24Hr(s) Patch 1 patch Transdermal daily  oxyCODONE    IR 10 milliGRAM(s) Oral every 4 hours  pantoprazole    Tablet 40 milliGRAM(s) Oral before breakfast  polyethylene glycol 3350 17 Gram(s) Oral daily  thiamine 100 milliGRAM(s) Oral daily  traZODone 150 milliGRAM(s) Oral at bedtime    MEDICATIONS  (PRN):  acetaminophen   Tablet .. 650 milliGRAM(s) Oral every 6 hours PRN Temp greater or equal to 38C (100.4F), Mild Pain (1 - 3), Moderate Pain (4 - 6)  bisacodyl Suppository 10 milliGRAM(s) Rectal daily PRN Constipation      Vital Signs Last 24 Hrs  T(C): 36.3 (09 Jan 2020 05:27), Max: 36.8 (08 Jan 2020 17:10)  T(F): 97.4 (09 Jan 2020 05:27), Max: 98.3 (08 Jan 2020 17:10)  HR: 78 (09 Jan 2020 05:27) (78 - 90)  BP: 90/60 (09 Jan 2020 05:27) (90/60 - 109/74)  BP(mean): --  RR: 18 (09 Jan 2020 05:27) (18 - 18)  SpO2: 94% (09 Jan 2020 05:27) (94% - 95%)  CAPILLARY BLOOD GLUCOSE        I&O's Summary    08 Jan 2020 07:01  -  09 Jan 2020 07:00  --------------------------------------------------------  IN: 1056 mL / OUT: 450 mL / NET: 606 mL        PHYSICAL EXAM:  GENERAL: NAD, anicteric, afebrile  HEAD:  Atraumatic, Normocephalic  EYES: EOMI, PERRLA, conjunctiva and sclera clear  NECK: Supple, No JVD  CHEST/LUNG: Clear to auscultation bilaterally; No wheeze  HEART: Regular rate and rhythm; No murmurs, rubs, or gallops  ABDOMEN: Soft, Nontender, Nondistended; Bowel sounds present  EXTREMITIES:  right foot 4th digit dry gangrene, no wet conversion, no erythema or edema, no malodor, no signs of infection, tender to palpation  PSYCH/Neuro: AAOx3. Left facial droop. Left hemiplegia.    SKIN: No rashes or lesions    LABS:                        12.5   6.98  )-----------( 303      ( 09 Jan 2020 03:01 )             38.4     01-09    133<L>  |  93<L>  |  15  ----------------------------<  110<H>  3.8   |  25  |  0.54    Ca    9.1      09 Jan 2020 03:01      PT/INR - ( 09 Jan 2020 03:01 )   PT: 14.4 sec;   INR: 1.24 ratio         PTT - ( 09 Jan 2020 03:01 )  PTT:34.7 sec            Consultant(s) Notes Reviewed:  Vascular Sx, Vascular Cardiology    Care Discussed with Consultants/Other Providers: Vascular Cardiology

## 2020-01-10 DIAGNOSIS — Z01.818 ENCOUNTER FOR OTHER PREPROCEDURAL EXAMINATION: ICD-10-CM

## 2020-01-10 LAB
APTT BLD: 105.5 SEC — HIGH (ref 27.5–36.3)
APTT BLD: 64.4 SEC — HIGH (ref 27.5–36.3)
APTT BLD: 67.8 SEC — HIGH (ref 27.5–36.3)

## 2020-01-10 PROCEDURE — 99232 SBSQ HOSP IP/OBS MODERATE 35: CPT

## 2020-01-10 PROCEDURE — 93970 EXTREMITY STUDY: CPT | Mod: 26

## 2020-01-10 PROCEDURE — 99233 SBSQ HOSP IP/OBS HIGH 50: CPT

## 2020-01-10 RX ORDER — ACETAMINOPHEN 500 MG
650 TABLET ORAL EVERY 6 HOURS
Refills: 0 | Status: DISCONTINUED | OUTPATIENT
Start: 2020-01-10 | End: 2020-01-13

## 2020-01-10 RX ORDER — OXYCODONE HYDROCHLORIDE 5 MG/1
10 TABLET ORAL EVERY 4 HOURS
Refills: 0 | Status: DISCONTINUED | OUTPATIENT
Start: 2020-01-10 | End: 2020-01-13

## 2020-01-10 RX ADMIN — PANTOPRAZOLE SODIUM 40 MILLIGRAM(S): 20 TABLET, DELAYED RELEASE ORAL at 06:06

## 2020-01-10 RX ADMIN — OXYCODONE HYDROCHLORIDE 10 MILLIGRAM(S): 5 TABLET ORAL at 12:32

## 2020-01-10 RX ADMIN — OXYCODONE HYDROCHLORIDE 10 MILLIGRAM(S): 5 TABLET ORAL at 18:30

## 2020-01-10 RX ADMIN — Medication 650 MILLIGRAM(S): at 06:06

## 2020-01-10 RX ADMIN — Medication 1 MILLIGRAM(S): at 12:12

## 2020-01-10 RX ADMIN — ATORVASTATIN CALCIUM 80 MILLIGRAM(S): 80 TABLET, FILM COATED ORAL at 21:42

## 2020-01-10 RX ADMIN — Medication 1 PATCH: at 12:15

## 2020-01-10 RX ADMIN — Medication 1 PATCH: at 12:12

## 2020-01-10 RX ADMIN — HEPARIN SODIUM 900 UNIT(S)/HR: 5000 INJECTION INTRAVENOUS; SUBCUTANEOUS at 02:59

## 2020-01-10 RX ADMIN — Medication 150 MILLIGRAM(S): at 21:42

## 2020-01-10 RX ADMIN — Medication 1 TABLET(S): at 12:12

## 2020-01-10 RX ADMIN — GABAPENTIN 300 MILLIGRAM(S): 400 CAPSULE ORAL at 06:06

## 2020-01-10 RX ADMIN — PREGABALIN 1000 MICROGRAM(S): 225 CAPSULE ORAL at 12:12

## 2020-01-10 RX ADMIN — Medication 100 MILLIGRAM(S): at 12:12

## 2020-01-10 RX ADMIN — OXYCODONE HYDROCHLORIDE 10 MILLIGRAM(S): 5 TABLET ORAL at 18:00

## 2020-01-10 RX ADMIN — Medication 1 PATCH: at 21:51

## 2020-01-10 RX ADMIN — HEPARIN SODIUM 900 UNIT(S)/HR: 5000 INJECTION INTRAVENOUS; SUBCUTANEOUS at 18:43

## 2020-01-10 RX ADMIN — OXYCODONE HYDROCHLORIDE 10 MILLIGRAM(S): 5 TABLET ORAL at 03:14

## 2020-01-10 RX ADMIN — HEPARIN SODIUM 900 UNIT(S)/HR: 5000 INJECTION INTRAVENOUS; SUBCUTANEOUS at 12:11

## 2020-01-10 RX ADMIN — Medication 1 PATCH: at 07:00

## 2020-01-10 RX ADMIN — Medication 650 MILLIGRAM(S): at 06:40

## 2020-01-10 RX ADMIN — POLYETHYLENE GLYCOL 3350 17 GRAM(S): 17 POWDER, FOR SOLUTION ORAL at 12:12

## 2020-01-10 RX ADMIN — GABAPENTIN 300 MILLIGRAM(S): 400 CAPSULE ORAL at 21:42

## 2020-01-10 RX ADMIN — OXYCODONE HYDROCHLORIDE 10 MILLIGRAM(S): 5 TABLET ORAL at 13:02

## 2020-01-10 RX ADMIN — OXYCODONE HYDROCHLORIDE 10 MILLIGRAM(S): 5 TABLET ORAL at 02:32

## 2020-01-10 RX ADMIN — SODIUM CHLORIDE 75 MILLILITER(S): 9 INJECTION INTRAMUSCULAR; INTRAVENOUS; SUBCUTANEOUS at 21:49

## 2020-01-10 RX ADMIN — Medication 81 MILLIGRAM(S): at 12:12

## 2020-01-10 RX ADMIN — GABAPENTIN 300 MILLIGRAM(S): 400 CAPSULE ORAL at 13:17

## 2020-01-10 NOTE — PROGRESS NOTE ADULT - NSHPATTENDINGPLANDISCUSS_GEN_ALL_CORE
Patient and her family at bedside, and NP Gisell, and SLP
Vascular surgery, medicine acp, daughter and patient at bedside
Neuro, medicine ACP PK
CASSY Carlson
CASSY Styles, and vascular
Medicine NP
Medicine NP April
Medicine VESTA Friedman

## 2020-01-10 NOTE — PROGRESS NOTE ADULT - PROBLEM SELECTOR PLAN 9
RCRI 1 point ; Class II risk ; 6.0% 30 day risk of death, MI or cardiac arrest  METS ~ 4   Denies cp, sob.  EKG reviewed ; NSR, Low voltage QRS  TTE with hyperdynamic left ventricular systolic function. No segmental wall motion abnormalities.  Proceed with vascular surgery as planned without any additional cardiac testing

## 2020-01-10 NOTE — PROGRESS NOTE ADULT - SUBJECTIVE AND OBJECTIVE BOX
Patient seen and examined at bedside.    Overnight Events:     Sinus on tele.  Recieved IV fluids.  BP better.       MEDICATIONS  (STANDING):  aspirin  chewable 81 milliGRAM(s) Oral daily  atorvastatin 80 milliGRAM(s) Oral at bedtime  cyanocobalamin 1000 MICROGram(s) Oral daily  FLUoxetine 20 milliGRAM(s) Oral daily  folic acid 1 milliGRAM(s) Oral daily  gabapentin 300 milliGRAM(s) Oral three times a day  heparin  Infusion. 1100 Unit(s)/Hr (11 mL/Hr) IV Continuous <Continuous>  influenza   Vaccine 0.5 milliLiter(s) IntraMuscular once  metoclopramide 10 milliGRAM(s) Oral once  multivitamin 1 Tablet(s) Oral daily  nicotine -   7 mG/24Hr(s) Patch 1 patch Transdermal daily  pantoprazole    Tablet 40 milliGRAM(s) Oral before breakfast  polyethylene glycol 3350 17 Gram(s) Oral daily  sodium chloride 0.9%. 1000 milliLiter(s) (75 mL/Hr) IV Continuous <Continuous>  thiamine 100 milliGRAM(s) Oral daily  traZODone 150 milliGRAM(s) Oral at bedtime       ICU Vital Signs Last 24 Hrs  T(C): 36.6 (10 Jm 2020 06:05), Max: 37 (09 Jan 2020 21:30)  T(F): 97.8 (10 Jm 2020 06:05), Max: 98.6 (09 Jan 2020 21:30)  HR: 64 (10 Jm 2020 06:05) (64 - 78)  BP: 123/80 (10 Jm 2020 06:05) (122/89 - 139/84)  BP(mean): --  ABP: --  ABP(mean): --  RR: 18 (10 Jm 2020 06:05) (18 - 18)  SpO2: 96% (10 Jm 2020 06:05) (95% - 96%)          Physical Exam:  GENERAL: No acute distress, well-developed  HEAD:  Atraumatic, Normocephalic  ENT: EOMI, PERRLA, conjunctiva and sclera clear, Neck supple, No JVD, moist mucosa  CHEST/LUNG: Clear to auscultation bilaterally; No wheeze, equal breath sounds bilaterally   HEART: Regular rate and rhythm; No murmurs, rubs, or gallops  ABDOMEN: Soft, Nontender, Nondistended; Bowel sounds present  EXTREMITIES:  Right 4th toe gangrene, ulceration on plantar aspect of foot, DP/PT nonpalpable  PSYCH: Nl behavior, nl affect  NEUROLOGY: AAOx3, non-focal, cranial nerves intact  SKIN: Normal color, No rashes or lesions                                    12.4   8.53  )-----------( 305      ( 09 Jan 2020 19:34 )             36.9   01-09    133<L>  |  93<L>  |  15  ----------------------------<  110<H>  3.8   |  25  |  0.54    Ca    9.1      09 Jan 2020 03:01          PT/INR - ( 09 Jan 2020 03:01 )   PT: 14.4 sec;   INR: 1.24 ratio    PTT - ( 09 Jan 2020 03:01 )  PTT:34.7 sec      Cardiovascular Diagnostic Testing:    ECG: Personally reviewed: sinus, low voltage in limb leads    Echo:  Conclusions:  1. Normal mitral valve. Minimal mitral regurgitation.  2. Aortic valve not well visualized; appears trileaflet  with normal opening. No aortic valve regurgitation seen.  3. Endocardial visualization enhanced with intravenous  injection of Ultrasonic Enhancing Agent (Definity).  Hyperdynamic left ventricular systolic function. No  segmental wall motion abnormalities.  4. The right ventricle is not well visualized; grossly  normal right ventricular systolic function.  5. Trace pericardial effusion.  *** No previous Echo exam.    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 If the patient has a new and persistent neurologic deficit, consider short interval follow-up head CT or brain MRI follow-up.    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

## 2020-01-10 NOTE — PROGRESS NOTE ADULT - ASSESSMENT
50 y/o woman w/ history of chronic smoker, PTSD/depression, spinal disc herniation, GERD, gastric bypass (2004), ex lap for SBO (9 yrs ago) and recent CVA, who was transferred from [place] for severe PVD and ischemic toe.

## 2020-01-10 NOTE — PROGRESS NOTE ADULT - ASSESSMENT
50 yo F smoker with a PMH significant for PTSD/Depression, PAD with R foot rest pain, recent R MCA CVA with hemorrhagic conversion in Dec 2019 who was transferred for evaluation of severe peripheral arterial disease. [facility]iology consulted for cardiac optimization.     Plan:  1.  Can stop IV fluids  2.  Encourage PO fluid intake  3.  She should proceed with vascular surgery as planned without any additional cardiac testing  4.  BP and HR well controlled  5.  Keep telemetry monitor on while in house    [place] [id number]

## 2020-01-10 NOTE — PROGRESS NOTE ADULT - SUBJECTIVE AND OBJECTIVE BOX
Patient is a 49y old  Female who presents with a chief complaint of Severe PVD (09 Jan 2020 19:04)      SUBJECTIVE / OVERNIGHT EVENTS:    MEDICATIONS  (STANDING):  aspirin  chewable 81 milliGRAM(s) Oral daily  atorvastatin 80 milliGRAM(s) Oral at bedtime  cyanocobalamin 1000 MICROGram(s) Oral daily  FLUoxetine 20 milliGRAM(s) Oral daily  folic acid 1 milliGRAM(s) Oral daily  gabapentin 300 milliGRAM(s) Oral three times a day  heparin  Infusion. 1100 Unit(s)/Hr (11 mL/Hr) IV Continuous <Continuous>  influenza   Vaccine 0.5 milliLiter(s) IntraMuscular once  metoclopramide 10 milliGRAM(s) Oral once  multivitamin 1 Tablet(s) Oral daily  nicotine -   7 mG/24Hr(s) Patch 1 patch Transdermal daily  pantoprazole    Tablet 40 milliGRAM(s) Oral before breakfast  polyethylene glycol 3350 17 Gram(s) Oral daily  sodium chloride 0.9%. 1000 milliLiter(s) (75 mL/Hr) IV Continuous <Continuous>  thiamine 100 milliGRAM(s) Oral daily  traZODone 150 milliGRAM(s) Oral at bedtime    MEDICATIONS  (PRN):  acetaminophen   Tablet .. 650 milliGRAM(s) Oral every 6 hours PRN Temp greater or equal to 38C (100.4F), Mild Pain (1 - 3), Moderate Pain (4 - 6)  bisacodyl Suppository 10 milliGRAM(s) Rectal daily PRN Constipation  heparin  Injectable 7000 Unit(s) IV Push every 6 hours PRN For aPTT less than 40  heparin  Injectable 3500 Unit(s) IV Push every 6 hours PRN For aPTT between 40 - 57  oxyCODONE    IR 10 milliGRAM(s) Oral every 4 hours PRN Moderate Pain (4 - 6)      Vital Signs Last 24 Hrs  T(C): 36.6 (10 Jm 2020 06:05), Max: 37 (09 Jan 2020 21:30)  T(F): 97.8 (10 Jm 2020 06:05), Max: 98.6 (09 Jan 2020 21:30)  HR: 64 (10 Jm 2020 06:05) (64 - 78)  BP: 123/80 (10 Jm 2020 06:05) (122/89 - 139/84)  BP(mean): --  RR: 18 (10 Jm 2020 06:05) (18 - 18)  SpO2: 96% (10 Jm 2020 06:05) (95% - 96%)  CAPILLARY BLOOD GLUCOSE        I&O's Summary    09 Jan 2020 07:01  -  10 Mj 2020 07:00  --------------------------------------------------------  IN: 3191 mL / OUT: 1050 mL / NET: 2141 mL        PHYSICAL EXAM:  GENERAL: NAD, well-developed  HEAD:  Atraumatic, Normocephalic  EYES: EOMI, PERRLA, conjunctiva and sclera clear  NECK: Supple, No JVD  CHEST/LUNG: Clear to auscultation bilaterally; No wheeze  HEART: Regular rate and rhythm; No murmurs, rubs, or gallops  ABDOMEN: Soft, Nontender, Nondistended; Bowel sounds present  EXTREMITIES:  2+ Peripheral Pulses, No clubbing, cyanosis, or edema  PSYCH: AAOx3  NEUROLOGY: non-focal  SKIN: No rashes or lesions    LABS:                        12.4   8.53  )-----------( 305      ( 09 Jan 2020 19:34 )             36.9     01-09    133<L>  |  93<L>  |  15  ----------------------------<  110<H>  3.8   |  25  |  0.54    Ca    9.1      09 Jan 2020 03:01      PT/INR - ( 09 Jan 2020 03:01 )   PT: 14.4 sec;   INR: 1.24 ratio         PTT - ( 10 Jm 2020 02:17 )  PTT:105.5 sec          RADIOLOGY & ADDITIONAL TESTS:    Imaging Personally Reviewed:    Consultant(s) Notes Reviewed:      Care Discussed with Consultants/Other Providers: Patient is a 49y old  Female who presents with a chief complaint of Severe PVD (09 Jan 2020 19:04)      SUBJECTIVE / OVERNIGHT EVENTS:  Pt seen and examined. No acute events overnight. RLE pain is well controlled on current pain regimen. Planned for OR on Monday 1/13/2020  per Vascular Sx.    MEDICATIONS  (STANDING):  aspirin  chewable 81 milliGRAM(s) Oral daily  atorvastatin 80 milliGRAM(s) Oral at bedtime  cyanocobalamin 1000 MICROGram(s) Oral daily  FLUoxetine 20 milliGRAM(s) Oral daily  folic acid 1 milliGRAM(s) Oral daily  gabapentin 300 milliGRAM(s) Oral three times a day  heparin  Infusion. 1100 Unit(s)/Hr (11 mL/Hr) IV Continuous <Continuous>  influenza   Vaccine 0.5 milliLiter(s) IntraMuscular once  metoclopramide 10 milliGRAM(s) Oral once  multivitamin 1 Tablet(s) Oral daily  nicotine -   7 mG/24Hr(s) Patch 1 patch Transdermal daily  pantoprazole    Tablet 40 milliGRAM(s) Oral before breakfast  polyethylene glycol 3350 17 Gram(s) Oral daily  sodium chloride 0.9%. 1000 milliLiter(s) (75 mL/Hr) IV Continuous <Continuous>  thiamine 100 milliGRAM(s) Oral daily  traZODone 150 milliGRAM(s) Oral at bedtime    MEDICATIONS  (PRN):  acetaminophen   Tablet .. 650 milliGRAM(s) Oral every 6 hours PRN Temp greater or equal to 38C (100.4F), Mild Pain (1 - 3), Moderate Pain (4 - 6)  bisacodyl Suppository 10 milliGRAM(s) Rectal daily PRN Constipation  heparin  Injectable 7000 Unit(s) IV Push every 6 hours PRN For aPTT less than 40  heparin  Injectable 3500 Unit(s) IV Push every 6 hours PRN For aPTT between 40 - 57  oxyCODONE    IR 10 milliGRAM(s) Oral every 4 hours PRN Moderate Pain (4 - 6)      Vital Signs Last 24 Hrs  T(C): 36.6 (10 Jm 2020 06:05), Max: 37 (09 Jan 2020 21:30)  T(F): 97.8 (10 Jm 2020 06:05), Max: 98.6 (09 Jan 2020 21:30)  HR: 64 (10 Jm 2020 06:05) (64 - 78)  BP: 123/80 (10 Jm 2020 06:05) (122/89 - 139/84)  BP(mean): --  RR: 18 (10 Jm 2020 06:05) (18 - 18)  SpO2: 96% (10 Jm 2020 06:05) (95% - 96%)  CAPILLARY BLOOD GLUCOSE        I&O's Summary    09 Jan 2020 07:01  -  10 Jm 2020 07:00  --------------------------------------------------------  IN: 3191 mL / OUT: 1050 mL / NET: 2141 mL        PHYSICAL EXAM:  GENERAL: NAD, anicteric, afebrile  HEAD:  Atraumatic, Normocephalic  EYES: EOMI, PERRLA, conjunctiva and sclera clear  NECK: Supple, No JVD  CHEST/LUNG: Clear to auscultation bilaterally; No wheeze  HEART: Regular rate and rhythm; No murmurs, rubs, or gallops  ABDOMEN: Soft, Nontender, Nondistended; Bowel sounds present  EXTREMITIES:  right foot 4th digit dry gangrene, no wet conversion, no erythema or edema, no malodor, no signs of infection, tender to palpation  PSYCH/Neuro: AAOx3. Left facial droop. Left hemiplegia.    SKIN: No rashes or lesions    LABS:                        12.4   8.53  )-----------( 305      ( 09 Jan 2020 19:34 )             36.9     01-09    133<L>  |  93<L>  |  15  ----------------------------<  110<H>  3.8   |  25  |  0.54    Ca    9.1      09 Jan 2020 03:01      PT/INR - ( 09 Jan 2020 03:01 )   PT: 14.4 sec;   INR: 1.24 ratio         PTT - ( 10 Jm 2020 02:17 )  PTT:105.5 sec            Consultant(s) Notes Reviewed:  Cardiology, Vascular sx

## 2020-01-11 LAB
ALBUMIN SERPL ELPH-MCNC: 2.9 G/DL — LOW (ref 3.3–5)
ALP SERPL-CCNC: 106 U/L — SIGNIFICANT CHANGE UP (ref 40–120)
ALT FLD-CCNC: 25 U/L — SIGNIFICANT CHANGE UP (ref 10–45)
ANION GAP SERPL CALC-SCNC: 12 MMOL/L — SIGNIFICANT CHANGE UP (ref 5–17)
APTT BLD: 55.8 SEC — HIGH (ref 27.5–36.3)
APTT BLD: 71.3 SEC — HIGH (ref 27.5–36.3)
APTT BLD: 83.8 SEC — HIGH (ref 27.5–36.3)
AST SERPL-CCNC: 19 U/L — SIGNIFICANT CHANGE UP (ref 10–40)
BILIRUB SERPL-MCNC: 0.2 MG/DL — SIGNIFICANT CHANGE UP (ref 0.2–1.2)
BUN SERPL-MCNC: 7 MG/DL — SIGNIFICANT CHANGE UP (ref 7–23)
CALCIUM SERPL-MCNC: 9 MG/DL — SIGNIFICANT CHANGE UP (ref 8.4–10.5)
CHLORIDE SERPL-SCNC: 103 MMOL/L — SIGNIFICANT CHANGE UP (ref 96–108)
CO2 SERPL-SCNC: 24 MMOL/L — SIGNIFICANT CHANGE UP (ref 22–31)
CREAT SERPL-MCNC: 0.53 MG/DL — SIGNIFICANT CHANGE UP (ref 0.5–1.3)
GLUCOSE SERPL-MCNC: 92 MG/DL — SIGNIFICANT CHANGE UP (ref 70–99)
HCT VFR BLD CALC: 36.4 % — SIGNIFICANT CHANGE UP (ref 34.5–45)
HGB BLD-MCNC: 11.4 G/DL — LOW (ref 11.5–15.5)
MCHC RBC-ENTMCNC: 31.3 GM/DL — LOW (ref 32–36)
MCHC RBC-ENTMCNC: 31.9 PG — SIGNIFICANT CHANGE UP (ref 27–34)
MCV RBC AUTO: 102 FL — HIGH (ref 80–100)
NRBC # BLD: 0 /100 WBCS — SIGNIFICANT CHANGE UP (ref 0–0)
PLATELET # BLD AUTO: 298 K/UL — SIGNIFICANT CHANGE UP (ref 150–400)
POTASSIUM SERPL-MCNC: 4.1 MMOL/L — SIGNIFICANT CHANGE UP (ref 3.5–5.3)
POTASSIUM SERPL-SCNC: 4.1 MMOL/L — SIGNIFICANT CHANGE UP (ref 3.5–5.3)
PROT SERPL-MCNC: 5.8 G/DL — LOW (ref 6–8.3)
RBC # BLD: 3.57 M/UL — LOW (ref 3.8–5.2)
RBC # FLD: 16.6 % — HIGH (ref 10.3–14.5)
SODIUM SERPL-SCNC: 139 MMOL/L — SIGNIFICANT CHANGE UP (ref 135–145)
WBC # BLD: 9.4 K/UL — SIGNIFICANT CHANGE UP (ref 3.8–10.5)
WBC # FLD AUTO: 9.4 K/UL — SIGNIFICANT CHANGE UP (ref 3.8–10.5)

## 2020-01-11 PROCEDURE — 99233 SBSQ HOSP IP/OBS HIGH 50: CPT

## 2020-01-11 RX ADMIN — PREGABALIN 1000 MICROGRAM(S): 225 CAPSULE ORAL at 14:07

## 2020-01-11 RX ADMIN — OXYCODONE HYDROCHLORIDE 10 MILLIGRAM(S): 5 TABLET ORAL at 00:56

## 2020-01-11 RX ADMIN — OXYCODONE HYDROCHLORIDE 10 MILLIGRAM(S): 5 TABLET ORAL at 05:50

## 2020-01-11 RX ADMIN — Medication 1 TABLET(S): at 14:06

## 2020-01-11 RX ADMIN — Medication 150 MILLIGRAM(S): at 22:14

## 2020-01-11 RX ADMIN — OXYCODONE HYDROCHLORIDE 10 MILLIGRAM(S): 5 TABLET ORAL at 08:41

## 2020-01-11 RX ADMIN — Medication 81 MILLIGRAM(S): at 14:06

## 2020-01-11 RX ADMIN — SODIUM CHLORIDE 75 MILLILITER(S): 9 INJECTION INTRAMUSCULAR; INTRAVENOUS; SUBCUTANEOUS at 23:55

## 2020-01-11 RX ADMIN — OXYCODONE HYDROCHLORIDE 10 MILLIGRAM(S): 5 TABLET ORAL at 01:56

## 2020-01-11 RX ADMIN — HEPARIN SODIUM 1100 UNIT(S)/HR: 5000 INJECTION INTRAVENOUS; SUBCUTANEOUS at 08:38

## 2020-01-11 RX ADMIN — HEPARIN SODIUM 1100 UNIT(S)/HR: 5000 INJECTION INTRAVENOUS; SUBCUTANEOUS at 23:53

## 2020-01-11 RX ADMIN — OXYCODONE HYDROCHLORIDE 10 MILLIGRAM(S): 5 TABLET ORAL at 16:00

## 2020-01-11 RX ADMIN — Medication 100 MILLIGRAM(S): at 14:06

## 2020-01-11 RX ADMIN — Medication 1 PATCH: at 14:00

## 2020-01-11 RX ADMIN — Medication 1 PATCH: at 14:07

## 2020-01-11 RX ADMIN — OXYCODONE HYDROCHLORIDE 10 MILLIGRAM(S): 5 TABLET ORAL at 15:09

## 2020-01-11 RX ADMIN — GABAPENTIN 300 MILLIGRAM(S): 400 CAPSULE ORAL at 06:31

## 2020-01-11 RX ADMIN — Medication 1 PATCH: at 19:30

## 2020-01-11 RX ADMIN — GABAPENTIN 300 MILLIGRAM(S): 400 CAPSULE ORAL at 22:14

## 2020-01-11 RX ADMIN — Medication 1 MILLIGRAM(S): at 14:07

## 2020-01-11 RX ADMIN — Medication 20 MILLIGRAM(S): at 14:06

## 2020-01-11 RX ADMIN — GABAPENTIN 300 MILLIGRAM(S): 400 CAPSULE ORAL at 14:08

## 2020-01-11 RX ADMIN — OXYCODONE HYDROCHLORIDE 10 MILLIGRAM(S): 5 TABLET ORAL at 09:40

## 2020-01-11 RX ADMIN — ATORVASTATIN CALCIUM 80 MILLIGRAM(S): 80 TABLET, FILM COATED ORAL at 22:14

## 2020-01-11 RX ADMIN — OXYCODONE HYDROCHLORIDE 10 MILLIGRAM(S): 5 TABLET ORAL at 04:50

## 2020-01-11 RX ADMIN — PANTOPRAZOLE SODIUM 40 MILLIGRAM(S): 20 TABLET, DELAYED RELEASE ORAL at 06:31

## 2020-01-11 RX ADMIN — POLYETHYLENE GLYCOL 3350 17 GRAM(S): 17 POWDER, FOR SOLUTION ORAL at 14:08

## 2020-01-11 NOTE — PROGRESS NOTE ADULT - SUBJECTIVE AND OBJECTIVE BOX
Podiatry pager #: [phone] ([place])/ [phone] ([facility])    Patient is a 49y old  Female who presents with a chief complaint of Severe PVD (03 Jan 2020 11:47)      HPI:  Mrs. Pickett is a 50 y/o woman w/ history of chronic smoker, PTSD/depression, spinal disc herniation, GERD, gastric bypass (2004), ex lap for SBO (9 yrs ago) and recent CVA, who is transferred from [place] for severe PVD.    Patient has a history consistent with ischemic rest pain. She has pain at night which gets relieved by hanging her feet down off at the side. She also reports symptoms consistent with vascular claudications of legs even with short ambulation, despite being able to ambulate without any assistance devices. She reports sharp burning pain that is severe (8~10/10 when it is bad) that shoots up from her toes to the thigh area below the knee. This has been ongoing issue, yet got worse lately. The right foot pain is worse on 4th toe which has discoloration to ashy/dusky color, and also on distal plantar surface. Also has left leg pain similar to right yet less in intensity. Since recent stroke and residual left sided weakness, she has been more reliant on right side for functioning, and the foot pain has been getting worse with movements.  Patient states that prior to the recent admission for CVA, patient did not have trouble with the ambulation in terms of muscle strength, paresthesia, or numbness. However, now, she has severe pain with paresthesia.  At [facility], dana was evaluated by vascular surgery, and an ROSA MARIA was 0.4, correlating w/ severe PAD. Vascular surgery recommended transfer to [place] for angiogram and possible stenting or other vascular procedures, and patient is now transferred to [facility].    Of note, patient had recent CVA. 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 She developed basal ganglia hemorrhagic conversion, but was stable. Pt was evaluated by PT and was recommended for acute inpatient rehabilitation when stable. Pt was deemed stable for discharge, and she was transferred to [facility] on 12/24/19 for acute inpatient rehabilitation.    Upon admission, patient is hemodynamically stable.  Temp 36.8, HR 74, /80, RR 18, SpO2 99% on RA.  She continues to report the foot pain, yet is not too severe at the moment.  She has been also constipated, which gives her abdominal discomfort. Last BM today. Otherwise, denies CP, palpitation, SOB, lightheadedness, abdominal pain, melena/hematochezia, n/v/d, dysuria/hematuria, fever, chills. (30 Dec 2019 23:51)      PAST MEDICAL & SURGICAL HISTORY:  Chronic insomnia  PTSD (post-traumatic stress disorder)  ETOH abuse  Anxiety  HTN (hypertension)  Depression  Tubal Ligation Status  Status Post Gastric Bypass for Obesity  Status Post Laparoscopic Cholecystectomy      MEDICATIONS  (STANDING):  aspirin  chewable 81 milliGRAM(s) Oral daily  atorvastatin 80 milliGRAM(s) Oral at bedtime  cyanocobalamin 1000 MICROGram(s) Oral daily  enalapril 10 milliGRAM(s) Oral two times a day  enoxaparin Injectable 40 milliGRAM(s) SubCutaneous daily  FLUoxetine 20 milliGRAM(s) Oral daily  folic acid 1 milliGRAM(s) Oral daily  gabapentin 300 milliGRAM(s) Oral three times a day  influenza   Vaccine 0.5 milliLiter(s) IntraMuscular once  lidocaine 2% Gel 1 Application(s) Topical two times a day  multivitamin 1 Tablet(s) Oral daily  nicotine -   7 mG/24Hr(s) Patch 1 patch Transdermal daily  oxyCODONE    IR 10 milliGRAM(s) Oral every 4 hours  pantoprazole    Tablet 40 milliGRAM(s) Oral before breakfast  polyethylene glycol 3350 17 Gram(s) Oral daily  thiamine 100 milliGRAM(s) Oral daily  traZODone 150 milliGRAM(s) Oral at bedtime    MEDICATIONS  (PRN):  acetaminophen   Tablet .. 650 milliGRAM(s) Oral every 6 hours PRN Temp greater or equal to 38C (100.4F), Mild Pain (1 - 3), Moderate Pain (4 - 6)  bisacodyl Suppository 10 milliGRAM(s) Rectal daily PRN Constipation      Allergies    No Known Allergies    Intolerances        VITALS:    Vital Signs Last 24 Hrs  T(C): 37.2 (03 Jan 2020 16:45), Max: 37.2 (03 Jan 2020 14:00)  T(F): 99 (03 Jan 2020 16:45), Max: 99 (03 Jan 2020 16:45)  HR: 80 (03 Jan 2020 16:45) (66 - 84)  BP: 135/86 (03 Jan 2020 16:45) (105/72 - 135/86)  BP(mean): --  RR: 18 (03 Jan 2020 16:45) (18 - 18)  SpO2: 96% (03 Jan 2020 16:45) (94% - 98%)    LABS:                          11.8   8.59  )-----------( 361      ( 02 Jan 2020 06:31 )             35.9       01-02    141  |  105  |  9   ----------------------------<  97  3.8   |  28  |  0.54    Ca    9.6      02 Jan 2020 06:31        CAPILLARY BLOOD GLUCOSE              LOWER EXTREMITY PHYSICAL EXAM:    Vascular: DP/PT 2/4 on L, nonpalpable on R, CFT <5 seconds B/L (no CFT on RF 4th digit), Temperature gradient warm to cool B/L  Neuro: Epicritic sensation intact to the level of toes B/L  Skin: right foot 4th digit dry gangrene, no wet conversion, no erythema or edema, no malodor, no signs of infection, dry and stable, severe pain to touch

## 2020-01-11 NOTE — PROGRESS NOTE ADULT - SUBJECTIVE AND OBJECTIVE BOX
Patient is a 49y old  Female who presents with a chief complaint of Severe PVD (11 Jan 2020 08:26)      INTERVAL History of Present Illness/OVERNIGHT EVENTS: await OR 1/13.  chronic foot pain from PAD    MEDICATIONS  (STANDING):  aspirin  chewable 81 milliGRAM(s) Oral daily  atorvastatin 80 milliGRAM(s) Oral at bedtime  cyanocobalamin 1000 MICROGram(s) Oral daily  FLUoxetine 20 milliGRAM(s) Oral daily  folic acid 1 milliGRAM(s) Oral daily  gabapentin 300 milliGRAM(s) Oral three times a day  heparin  Infusion. 1100 Unit(s)/Hr (11 mL/Hr) IV Continuous <Continuous>  influenza   Vaccine 0.5 milliLiter(s) IntraMuscular once  metoclopramide 10 milliGRAM(s) Oral once  multivitamin 1 Tablet(s) Oral daily  nicotine -   7 mG/24Hr(s) Patch 1 patch Transdermal daily  pantoprazole    Tablet 40 milliGRAM(s) Oral before breakfast  polyethylene glycol 3350 17 Gram(s) Oral daily  sodium chloride 0.9%. 1000 milliLiter(s) (75 mL/Hr) IV Continuous <Continuous>  thiamine 100 milliGRAM(s) Oral daily  traZODone 150 milliGRAM(s) Oral at bedtime    MEDICATIONS  (PRN):  acetaminophen   Tablet .. 650 milliGRAM(s) Oral every 6 hours PRN Mild Pain (1 - 3)  bisacodyl Suppository 10 milliGRAM(s) Rectal daily PRN Constipation  heparin  Injectable 7000 Unit(s) IV Push every 6 hours PRN For aPTT less than 40  heparin  Injectable 3500 Unit(s) IV Push every 6 hours PRN For aPTT between 40 - 57  oxyCODONE    IR 10 milliGRAM(s) Oral every 4 hours PRN Moderate Pain (4 - 6)      Allergies    No Known Allergies    Intolerances        REVIEW OF SYSTEMS:  Negative unless otherwise specified above.    Vital Signs Last 24 Hrs  T(C): 36.8 (11 Jan 2020 13:37), Max: 36.8 (10 Jm 2020 21:39)  T(F): 98.3 (11 Jan 2020 13:37), Max: 98.3 (11 Jan 2020 13:37)  HR: 69 (11 Jan 2020 13:37) (66 - 75)  BP: 141/86 (11 Jan 2020 13:37) (115/77 - 141/86)  BP(mean): --  RR: 18 (11 Jan 2020 13:37) (18 - 19)  SpO2: 98% (11 Jan 2020 13:37) (94% - 98%)        PHYSICAL EXAM:  GENERAL: No apparent distress, appears stated age  HEAD:  Atraumatic, Normocephalic  EYES: Conjunctiva and sclera clear, no discharge  ENMT: Moist mucous membranes, no nasal discharge  NECK: Supple, no JVD  CHEST/LUNG: Clear to auscultation bilaterally, no wheeze or rales  HEART: Regular rate and rhythm, +systolic murmur  ABDOMEN: Soft, Nontender, Nondistended; Bowel sounds present  EXTREMITIES:  No clubbing, but has right 4th toe dry gangrene  SKIN: No rash or new discoloration  NERVOUS SYSTEM:  Alert & Oriented; Bilateral Lower extremity mobile, sensation to light touch intact      LABS:                        11.4   9.40  )-----------( 298      ( 11 Jan 2020 07:24 )             36.4           PTT - ( 11 Jan 2020 14:52 )  PTT:71.3 sec    CAPILLARY BLOOD GLUCOSE          RADIOLOGY & ADDITIONAL TESTS:    Images reviewed personally    Consultant Notes Reviewed and Care Discussed with relevant Consultants/Other Providers.

## 2020-01-11 NOTE — PROGRESS NOTE ADULT - ASSESSMENT
48 y/o woman w/ history of chronic smoker, PTSD/depression, spinal disc herniation, GERD, gastric bypass (2004), ex lap for SBO (9 yrs ago) and recent CVA, who was transferred from [place] for severe PVD and ischemic toe.

## 2020-01-11 NOTE — PROGRESS NOTE ADULT - ASSESSMENT
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  s/p angio IR rec bypass. possibly planning bypass? will f/u vasc plans.   -No podiatric surgical intervention at this time without vasc intervention.   -Will wait for toe to demarcate and will monitor  -will follow while in house  -f/u vasc plans

## 2020-01-12 ENCOUNTER — TRANSCRIPTION ENCOUNTER (OUTPATIENT)
Age: 50
End: 2020-01-12

## 2020-01-12 LAB
ALBUMIN SERPL ELPH-MCNC: 2.8 G/DL — LOW (ref 3.3–5)
ALP SERPL-CCNC: 104 U/L — SIGNIFICANT CHANGE UP (ref 40–120)
ALT FLD-CCNC: 24 U/L — SIGNIFICANT CHANGE UP (ref 10–45)
ANION GAP SERPL CALC-SCNC: 13 MMOL/L — SIGNIFICANT CHANGE UP (ref 5–17)
APPEARANCE UR: CLEAR — SIGNIFICANT CHANGE UP
APTT BLD: 70.5 SEC — HIGH (ref 27.5–36.3)
AST SERPL-CCNC: 18 U/L — SIGNIFICANT CHANGE UP (ref 10–40)
BILIRUB SERPL-MCNC: 0.2 MG/DL — SIGNIFICANT CHANGE UP (ref 0.2–1.2)
BILIRUB UR-MCNC: NEGATIVE — SIGNIFICANT CHANGE UP
BLD GP AB SCN SERPL QL: NEGATIVE — SIGNIFICANT CHANGE UP
BUN SERPL-MCNC: 6 MG/DL — LOW (ref 7–23)
CALCIUM SERPL-MCNC: 8.8 MG/DL — SIGNIFICANT CHANGE UP (ref 8.4–10.5)
CHLORIDE SERPL-SCNC: 104 MMOL/L — SIGNIFICANT CHANGE UP (ref 96–108)
CO2 SERPL-SCNC: 22 MMOL/L — SIGNIFICANT CHANGE UP (ref 22–31)
COLOR SPEC: SIGNIFICANT CHANGE UP
CREAT SERPL-MCNC: 0.49 MG/DL — LOW (ref 0.5–1.3)
DIFF PNL FLD: NEGATIVE — SIGNIFICANT CHANGE UP
GLUCOSE SERPL-MCNC: 96 MG/DL — SIGNIFICANT CHANGE UP (ref 70–99)
GLUCOSE UR QL: NEGATIVE — SIGNIFICANT CHANGE UP
HCG UR QL: NEGATIVE — SIGNIFICANT CHANGE UP
HCT VFR BLD CALC: 37.6 % — SIGNIFICANT CHANGE UP (ref 34.5–45)
HGB BLD-MCNC: 12.1 G/DL — SIGNIFICANT CHANGE UP (ref 11.5–15.5)
INR BLD: 1.2 RATIO — HIGH (ref 0.88–1.16)
KETONES UR-MCNC: NEGATIVE — SIGNIFICANT CHANGE UP
LEUKOCYTE ESTERASE UR-ACNC: NEGATIVE — SIGNIFICANT CHANGE UP
MCHC RBC-ENTMCNC: 32.2 GM/DL — SIGNIFICANT CHANGE UP (ref 32–36)
MCHC RBC-ENTMCNC: 32.4 PG — SIGNIFICANT CHANGE UP (ref 27–34)
MCV RBC AUTO: 100.5 FL — HIGH (ref 80–100)
NITRITE UR-MCNC: NEGATIVE — SIGNIFICANT CHANGE UP
NRBC # BLD: 0 /100 WBCS — SIGNIFICANT CHANGE UP (ref 0–0)
PH UR: 6.5 — SIGNIFICANT CHANGE UP (ref 5–8)
PLATELET # BLD AUTO: 309 K/UL — SIGNIFICANT CHANGE UP (ref 150–400)
POTASSIUM SERPL-MCNC: 3.6 MMOL/L — SIGNIFICANT CHANGE UP (ref 3.5–5.3)
POTASSIUM SERPL-SCNC: 3.6 MMOL/L — SIGNIFICANT CHANGE UP (ref 3.5–5.3)
PROT SERPL-MCNC: 5.6 G/DL — LOW (ref 6–8.3)
PROT UR-MCNC: NEGATIVE — SIGNIFICANT CHANGE UP
PROTHROM AB SERPL-ACNC: 13.7 SEC — HIGH (ref 10–12.9)
RBC # BLD: 3.74 M/UL — LOW (ref 3.8–5.2)
RBC # FLD: 16.1 % — HIGH (ref 10.3–14.5)
RH IG SCN BLD-IMP: POSITIVE — SIGNIFICANT CHANGE UP
SODIUM SERPL-SCNC: 139 MMOL/L — SIGNIFICANT CHANGE UP (ref 135–145)
SP GR SPEC: 1.01 — LOW (ref 1.01–1.02)
UROBILINOGEN FLD QL: NEGATIVE — SIGNIFICANT CHANGE UP
WBC # BLD: 8.77 K/UL — SIGNIFICANT CHANGE UP (ref 3.8–10.5)
WBC # FLD AUTO: 8.77 K/UL — SIGNIFICANT CHANGE UP (ref 3.8–10.5)

## 2020-01-12 PROCEDURE — 71045 X-RAY EXAM CHEST 1 VIEW: CPT | Mod: 26

## 2020-01-12 PROCEDURE — 99233 SBSQ HOSP IP/OBS HIGH 50: CPT

## 2020-01-12 RX ADMIN — Medication 81 MILLIGRAM(S): at 13:02

## 2020-01-12 RX ADMIN — GABAPENTIN 300 MILLIGRAM(S): 400 CAPSULE ORAL at 05:57

## 2020-01-12 RX ADMIN — GABAPENTIN 300 MILLIGRAM(S): 400 CAPSULE ORAL at 23:11

## 2020-01-12 RX ADMIN — OXYCODONE HYDROCHLORIDE 10 MILLIGRAM(S): 5 TABLET ORAL at 02:57

## 2020-01-12 RX ADMIN — Medication 150 MILLIGRAM(S): at 23:11

## 2020-01-12 RX ADMIN — Medication 1 PATCH: at 18:15

## 2020-01-12 RX ADMIN — Medication 650 MILLIGRAM(S): at 17:21

## 2020-01-12 RX ADMIN — Medication 1 PATCH: at 08:44

## 2020-01-12 RX ADMIN — Medication 20 MILLIGRAM(S): at 13:01

## 2020-01-12 RX ADMIN — PANTOPRAZOLE SODIUM 40 MILLIGRAM(S): 20 TABLET, DELAYED RELEASE ORAL at 05:56

## 2020-01-12 RX ADMIN — POLYETHYLENE GLYCOL 3350 17 GRAM(S): 17 POWDER, FOR SOLUTION ORAL at 13:01

## 2020-01-12 RX ADMIN — Medication 1 PATCH: at 13:02

## 2020-01-12 RX ADMIN — Medication 1 MILLIGRAM(S): at 13:02

## 2020-01-12 RX ADMIN — Medication 1 PATCH: at 13:08

## 2020-01-12 RX ADMIN — GABAPENTIN 300 MILLIGRAM(S): 400 CAPSULE ORAL at 13:01

## 2020-01-12 RX ADMIN — OXYCODONE HYDROCHLORIDE 10 MILLIGRAM(S): 5 TABLET ORAL at 18:21

## 2020-01-12 RX ADMIN — HEPARIN SODIUM 1100 UNIT(S)/HR: 5000 INJECTION INTRAVENOUS; SUBCUTANEOUS at 08:31

## 2020-01-12 RX ADMIN — OXYCODONE HYDROCHLORIDE 10 MILLIGRAM(S): 5 TABLET ORAL at 09:20

## 2020-01-12 RX ADMIN — OXYCODONE HYDROCHLORIDE 10 MILLIGRAM(S): 5 TABLET ORAL at 01:57

## 2020-01-12 RX ADMIN — Medication 100 MILLIGRAM(S): at 13:02

## 2020-01-12 RX ADMIN — OXYCODONE HYDROCHLORIDE 10 MILLIGRAM(S): 5 TABLET ORAL at 19:20

## 2020-01-12 RX ADMIN — OXYCODONE HYDROCHLORIDE 10 MILLIGRAM(S): 5 TABLET ORAL at 08:28

## 2020-01-12 RX ADMIN — Medication 650 MILLIGRAM(S): at 18:15

## 2020-01-12 RX ADMIN — Medication 1 TABLET(S): at 13:02

## 2020-01-12 RX ADMIN — PREGABALIN 1000 MICROGRAM(S): 225 CAPSULE ORAL at 13:02

## 2020-01-12 RX ADMIN — ATORVASTATIN CALCIUM 80 MILLIGRAM(S): 80 TABLET, FILM COATED ORAL at 23:11

## 2020-01-12 NOTE — PROGRESS NOTE ADULT - ASSESSMENT
50 y/o woman w/ history of smoking, PTSD/depression, spinal disc herniation, GERD, gastric bypass (2004), ex lap for SBO (9 yrs ago) and recent CVA with left-sided deficits, who was transferred from [place] for severe PVD and ischemic toe. Her pain is currently controlled.    - Popliteal-PT bypass 1/13/2020  - NPO at Midnight  - Pre-op Labs      Vascular Surgery [phone]

## 2020-01-12 NOTE — CHART NOTE - NSCHARTNOTEFT_GEN_A_CORE
Pre-operative Dx: PAD   Procedure: R Popliteal-Tibial Bypass  Surgeon: Speedy Santos    Labs:                          12.1   8.77  )-----------( 309      ( 2020 07:42 )             37.6     01-12    139  |  104  |  6<L>  ----------------------------<  96  3.6   |  22  |  0.49<L>    Ca    8.8      2020 07:42    TPro  5.6<L>  /  Alb  2.8<L>  /  TBili  0.2  /  DBili  x   /  AST  18  /  ALT  24  /  AlkPhos  104  -12    LIVER FUNCTIONS - ( 2020 07:42 )  Alb: 2.8 g/dL / Pro: 5.6 g/dL / ALK PHOS: 104 U/L / ALT: 24 U/L / AST: 18 U/L / GGT: x           PT/INR - ( 2020 07:39 )   PT: 13.7 sec;   INR: 1.20 ratio         PTT - ( 2020 07:39 )  PTT:70.5 sec      Type & Screen:  2020: B, Positive, Negative  2019: B, Positive, Negative    Urinalysis Basic - ( 2020 07:42 )    Color: Light Yellow / Appearance: Clear / S.007 / pH: x  Gluc: x / Ketone: Negative  / Bili: Negative / Urobili: Negative   Blood: x / Protein: Negative / Nitrite: Negative   Leuk Esterase: Negative / RBC: x / WBC x   Sq Epi: x / Non Sq Epi: x / Bacteria: x      CXR: Pending      ECG: Personally reviewed: sinus, low voltage in limb leads    Echo:  Conclusions:  1. Normal mitral valve. Minimal mitral regurgitation.  2. Aortic valve not well visualized; appears trileaflet  with normal opening. No aortic valve regurgitation seen.  3. Endocardial visualization enhanced with intravenous  injection of Ultrasonic Enhancing Agent (Definity).  Hyperdynamic left ventricular systolic function. No  segmental wall motion abnormalities.  4. The right ventricle is not well visualized; grossly  normal right ventricular systolic function.  5. Trace pericardial effusion.  *** No previous Echo exam.    Plan:  - OR For R Popliteal-Tibial Bypass  - NPO at Midnight  - IVF while NPO  - Cleared by Medicine/Cardiology  - Follow up AM labs: CBC, CMP, Mg, Ph  - VTE PPX: Heparin gtt; Please hold at Midnight  - Consent Obtained from Patient Pre-operative Dx: PAD   Procedure: R Popliteal-Tibial Bypass  Surgeon: Speedy Santos    Labs:                          12.1   8.77  )-----------( 309      ( 2020 07:42 )             37.6     -    139  |  104  |  6<L>  ----------------------------<  96  3.6   |  22  |  0.49<L>    Ca    8.8      2020 07:42    TPro  5.6<L>  /  Alb  2.8<L>  /  TBili  0.2  /  DBili  x   /  AST  18  /  ALT  24  /  AlkPhos  104  -    LIVER FUNCTIONS - ( 2020 07:42 )  Alb: 2.8 g/dL / Pro: 5.6 g/dL / ALK PHOS: 104 U/L / ALT: 24 U/L / AST: 18 U/L / GGT: x           PT/INR - ( 2020 07:39 )   PT: 13.7 sec;   INR: 1.20 ratio         PTT - ( 2020 07:39 )  PTT:70.5 sec      Type & Screen:  2020: B, Positive, Negative  2019: B, Positive, Negative    Urinalysis Basic - ( 2020 07:42 )    Color: Light Yellow / Appearance: Clear / S.007 / pH: x  Gluc: x / Ketone: Negative  / Bili: Negative / Urobili: Negative   Blood: x / Protein: Negative / Nitrite: Negative   Leuk Esterase: Negative / RBC: x / WBC x   Sq Epi: x / Non Sq Epi: x / Bacteria: x      CXR:   EXAM:  XR CHEST PORTABLE ROUTINE 1V                            PROCEDURE DATE:  2020        INTERPRETATION:  EXAMINATION: XR CHEST    CLINICAL INDICATION: pre-op    TECHNIQUE: Frontal radiograph of the chest was obtained.    COMPARISON: 2012.    FINDINGS:     Cardiac silhouette normal in size. Lungs are clear. No pleural effusion or pneumothorax.    IMPRESSION:   Clear lungs.        YANCI LEMUS M.D., ATTENDING RADIOLOGIST  This document has been electronically signed. 2020  2:27PM              ECG: Personally reviewed: sinus, low voltage in limb leads    Echo:  Conclusions:  1. Normal mitral valve. Minimal mitral regurgitation.  2. Aortic valve not well visualized; appears trileaflet  with normal opening. No aortic valve regurgitation seen.  3. Endocardial visualization enhanced with intravenous  injection of Ultrasonic Enhancing Agent (Definity).  Hyperdynamic left ventricular systolic function. No  segmental wall motion abnormalities.  4. The right ventricle is not well visualized; grossly  normal right ventricular systolic function.  5. Trace pericardial effusion.  *** No previous Echo exam.    Plan:  - OR For R Popliteal-Tibial Bypass  - NPO at Midnight  - IVF while NPO  - Cleared by Medicine/Cardiology  - Follow up AM labs: CBC, CMP, Mg, Ph  - VTE PPX: Heparin gtt; Please hold at Midnight  - Consent Obtained from Patient

## 2020-01-12 NOTE — PROGRESS NOTE ADULT - SUBJECTIVE AND OBJECTIVE BOX
Vascular Surgery Progress Note     Subjective/24hour Events: No acute events overnight. Patient continues to report sustained pain in the RLE. She is anticipating the OR on Monday for bypass procedure.    Vital Signs:  Vital Signs Last 24 Hrs  T(C): 36.9 (12 Jan 2020 06:23), Max: 36.9 (12 Jan 2020 06:23)  T(F): 98.4 (12 Jan 2020 06:23), Max: 98.4 (12 Jan 2020 06:23)  HR: 66 (12 Jan 2020 06:23) (66 - 73)  BP: 150/90 (12 Jan 2020 06:23) (141/86 - 159/87)  BP(mean): --  RR: 18 (12 Jan 2020 06:23) (18 - 18)  SpO2: 96% (12 Jan 2020 06:23) (96% - 98%)        I&O's Detail    11 Jan 2020 07:01  -  12 Jan 2020 07:00  --------------------------------------------------------  IN:    heparin  Infusion.: 55 mL    Oral Fluid: 1380 mL    sodium chloride 0.9%.: 600 mL  Total IN: 2035 mL    OUT:    Voided: 1150 mL  Total OUT: 1150 mL    Total NET: 885 mL      12 Jan 2020 07:01  -  12 Jan 2020 11:20  --------------------------------------------------------  IN:    heparin  Infusion.: 11 mL  Total IN: 11 mL    OUT:  Total OUT: 0 mL    Total NET: 11 mL            MEDICATIONS  (STANDING):  aspirin  chewable 81 milliGRAM(s) Oral daily  atorvastatin 80 milliGRAM(s) Oral at bedtime  cyanocobalamin 1000 MICROGram(s) Oral daily  FLUoxetine 20 milliGRAM(s) Oral daily  folic acid 1 milliGRAM(s) Oral daily  gabapentin 300 milliGRAM(s) Oral three times a day  heparin  Infusion. 1100 Unit(s)/Hr (11 mL/Hr) IV Continuous <Continuous>  influenza   Vaccine 0.5 milliLiter(s) IntraMuscular once  metoclopramide 10 milliGRAM(s) Oral once  multivitamin 1 Tablet(s) Oral daily  nicotine -   7 mG/24Hr(s) Patch 1 patch Transdermal daily  pantoprazole    Tablet 40 milliGRAM(s) Oral before breakfast  polyethylene glycol 3350 17 Gram(s) Oral daily  sodium chloride 0.9%. 1000 milliLiter(s) (75 mL/Hr) IV Continuous <Continuous>  thiamine 100 milliGRAM(s) Oral daily  traZODone 150 milliGRAM(s) Oral at bedtime    MEDICATIONS  (PRN):  acetaminophen   Tablet .. 650 milliGRAM(s) Oral every 6 hours PRN Mild Pain (1 - 3)  bisacodyl Suppository 10 milliGRAM(s) Rectal daily PRN Constipation  heparin  Injectable 7000 Unit(s) IV Push every 6 hours PRN For aPTT less than 40  heparin  Injectable 3500 Unit(s) IV Push every 6 hours PRN For aPTT between 40 - 57  oxyCODONE    IR 10 milliGRAM(s) Oral every 4 hours PRN Moderate Pain (4 - 6)        Physical Exam:  Gen: female of stated age, in NAD, pleasant  Neurology: Patient is AA&Ox3, follows commands, and speech fluent. Left-sided neural deficit  Resp: no increased work of breathing, no SOB, no wheezing, rales, or crackles  Card: RRR, no Murmurs, Rubs, or Gallops  GI: soft, non-tender, non-distended  Extremities:     LLE: non-tender, externally rotated, DP signal    RLE: 4th toe and sole wounds, sloughing of digit skin, tender to palpation, cool to touch, sensorimotor intact, palp popliteal, PT signal     Labs:    01-12    139  |  104  |  6<L>  ----------------------------<  96  3.6   |  22  |  0.49<L>    Ca    8.8      12 Jan 2020 07:42    TPro  5.6<L>  /  Alb  2.8<L>  /  TBili  0.2  /  DBili  x   /  AST  18  /  ALT  24  /  AlkPhos  104  01-12    LIVER FUNCTIONS - ( 12 Jan 2020 07:42 )  Alb: 2.8 g/dL / Pro: 5.6 g/dL / ALK PHOS: 104 U/L / ALT: 24 U/L / AST: 18 U/L / GGT: x                                 12.1   8.77  )-----------( 309      ( 12 Jan 2020 07:42 )             37.6     PT/INR - ( 12 Jan 2020 07:39 )   PT: 13.7 sec;   INR: 1.20 ratio         PTT - ( 12 Jan 2020 07:39 )  PTT:70.5 sec

## 2020-01-13 ENCOUNTER — APPOINTMENT (OUTPATIENT)
Dept: VASCULAR SURGERY | Facility: HOSPITAL | Age: 50
End: 2020-01-13
Payer: MEDICARE

## 2020-01-13 ENCOUNTER — RESULT REVIEW (OUTPATIENT)
Age: 50
End: 2020-01-13

## 2020-01-13 PROCEDURE — 35587 VEIN BYP POP-TIBL PERONEAL: CPT | Mod: RT

## 2020-01-13 PROCEDURE — 35305 RECHANNELING OF ARTERY: CPT | Mod: RT

## 2020-01-13 PROCEDURE — 99233 SBSQ HOSP IP/OBS HIGH 50: CPT

## 2020-01-13 PROCEDURE — 88304 TISSUE EXAM BY PATHOLOGIST: CPT | Mod: 26

## 2020-01-13 RX ORDER — POLYETHYLENE GLYCOL 3350 17 G/17G
17 POWDER, FOR SOLUTION ORAL DAILY
Refills: 0 | Status: DISCONTINUED | OUTPATIENT
Start: 2020-01-13 | End: 2020-01-17

## 2020-01-13 RX ORDER — HYDROMORPHONE HYDROCHLORIDE 2 MG/ML
1 INJECTION INTRAMUSCULAR; INTRAVENOUS; SUBCUTANEOUS ONCE
Refills: 0 | Status: DISCONTINUED | OUTPATIENT
Start: 2020-01-13 | End: 2020-01-13

## 2020-01-13 RX ORDER — THIAMINE MONONITRATE (VIT B1) 100 MG
100 TABLET ORAL DAILY
Refills: 0 | Status: DISCONTINUED | OUTPATIENT
Start: 2020-01-13 | End: 2020-01-17

## 2020-01-13 RX ORDER — HYDROMORPHONE HYDROCHLORIDE 2 MG/ML
30 INJECTION INTRAMUSCULAR; INTRAVENOUS; SUBCUTANEOUS
Refills: 0 | Status: DISCONTINUED | OUTPATIENT
Start: 2020-01-13 | End: 2020-01-15

## 2020-01-13 RX ORDER — OXYCODONE HYDROCHLORIDE 5 MG/1
10 TABLET ORAL EVERY 4 HOURS
Refills: 0 | Status: DISCONTINUED | OUTPATIENT
Start: 2020-01-13 | End: 2020-01-15

## 2020-01-13 RX ORDER — NALOXONE HYDROCHLORIDE 4 MG/.1ML
0.1 SPRAY NASAL
Refills: 0 | Status: DISCONTINUED | OUTPATIENT
Start: 2020-01-13 | End: 2020-01-17

## 2020-01-13 RX ORDER — PANTOPRAZOLE SODIUM 20 MG/1
40 TABLET, DELAYED RELEASE ORAL
Refills: 0 | Status: DISCONTINUED | OUTPATIENT
Start: 2020-01-13 | End: 2020-01-17

## 2020-01-13 RX ORDER — NICOTINE POLACRILEX 2 MG
1 GUM BUCCAL DAILY
Refills: 0 | Status: DISCONTINUED | OUTPATIENT
Start: 2020-01-13 | End: 2020-01-17

## 2020-01-13 RX ORDER — PREGABALIN 225 MG/1
1000 CAPSULE ORAL DAILY
Refills: 0 | Status: DISCONTINUED | OUTPATIENT
Start: 2020-01-13 | End: 2020-01-17

## 2020-01-13 RX ORDER — ONDANSETRON 8 MG/1
4 TABLET, FILM COATED ORAL ONCE
Refills: 0 | Status: DISCONTINUED | OUTPATIENT
Start: 2020-01-13 | End: 2020-01-13

## 2020-01-13 RX ORDER — HYDROMORPHONE HYDROCHLORIDE 2 MG/ML
0.5 INJECTION INTRAMUSCULAR; INTRAVENOUS; SUBCUTANEOUS
Refills: 0 | Status: DISCONTINUED | OUTPATIENT
Start: 2020-01-13 | End: 2020-01-13

## 2020-01-13 RX ORDER — TRAZODONE HCL 50 MG
150 TABLET ORAL AT BEDTIME
Refills: 0 | Status: DISCONTINUED | OUTPATIENT
Start: 2020-01-13 | End: 2020-01-17

## 2020-01-13 RX ORDER — FLUOXETINE HCL 10 MG
20 CAPSULE ORAL DAILY
Refills: 0 | Status: DISCONTINUED | OUTPATIENT
Start: 2020-01-13 | End: 2020-01-17

## 2020-01-13 RX ORDER — ASPIRIN/CALCIUM CARB/MAGNESIUM 324 MG
81 TABLET ORAL DAILY
Refills: 0 | Status: DISCONTINUED | OUTPATIENT
Start: 2020-01-13 | End: 2020-01-17

## 2020-01-13 RX ORDER — GABAPENTIN 400 MG/1
300 CAPSULE ORAL THREE TIMES A DAY
Refills: 0 | Status: DISCONTINUED | OUTPATIENT
Start: 2020-01-13 | End: 2020-01-17

## 2020-01-13 RX ORDER — ATORVASTATIN CALCIUM 80 MG/1
80 TABLET, FILM COATED ORAL AT BEDTIME
Refills: 0 | Status: DISCONTINUED | OUTPATIENT
Start: 2020-01-13 | End: 2020-01-17

## 2020-01-13 RX ORDER — HYDROMORPHONE HYDROCHLORIDE 2 MG/ML
0.5 INJECTION INTRAMUSCULAR; INTRAVENOUS; SUBCUTANEOUS
Refills: 0 | Status: DISCONTINUED | OUTPATIENT
Start: 2020-01-13 | End: 2020-01-15

## 2020-01-13 RX ORDER — ACETAMINOPHEN 500 MG
650 TABLET ORAL EVERY 6 HOURS
Refills: 0 | Status: DISCONTINUED | OUTPATIENT
Start: 2020-01-13 | End: 2020-01-15

## 2020-01-13 RX ORDER — ACETAMINOPHEN 500 MG
1000 TABLET ORAL ONCE
Refills: 0 | Status: COMPLETED | OUTPATIENT
Start: 2020-01-13 | End: 2020-01-14

## 2020-01-13 RX ORDER — ONDANSETRON 8 MG/1
4 TABLET, FILM COATED ORAL EVERY 6 HOURS
Refills: 0 | Status: DISCONTINUED | OUTPATIENT
Start: 2020-01-13 | End: 2020-01-17

## 2020-01-13 RX ORDER — FOLIC ACID 0.8 MG
1 TABLET ORAL DAILY
Refills: 0 | Status: DISCONTINUED | OUTPATIENT
Start: 2020-01-13 | End: 2020-01-17

## 2020-01-13 RX ADMIN — HYDROMORPHONE HYDROCHLORIDE 30 MILLILITER(S): 2 INJECTION INTRAMUSCULAR; INTRAVENOUS; SUBCUTANEOUS at 16:01

## 2020-01-13 RX ADMIN — HYDROMORPHONE HYDROCHLORIDE 0.5 MILLIGRAM(S): 2 INJECTION INTRAMUSCULAR; INTRAVENOUS; SUBCUTANEOUS at 12:35

## 2020-01-13 RX ADMIN — OXYCODONE HYDROCHLORIDE 10 MILLIGRAM(S): 5 TABLET ORAL at 01:47

## 2020-01-13 RX ADMIN — Medication 150 MILLIGRAM(S): at 22:26

## 2020-01-13 RX ADMIN — HYDROMORPHONE HYDROCHLORIDE 30 MILLILITER(S): 2 INJECTION INTRAMUSCULAR; INTRAVENOUS; SUBCUTANEOUS at 19:32

## 2020-01-13 RX ADMIN — GABAPENTIN 300 MILLIGRAM(S): 400 CAPSULE ORAL at 14:58

## 2020-01-13 RX ADMIN — OXYCODONE HYDROCHLORIDE 10 MILLIGRAM(S): 5 TABLET ORAL at 01:17

## 2020-01-13 RX ADMIN — HYDROMORPHONE HYDROCHLORIDE 30 MILLILITER(S): 2 INJECTION INTRAMUSCULAR; INTRAVENOUS; SUBCUTANEOUS at 16:41

## 2020-01-13 RX ADMIN — GABAPENTIN 300 MILLIGRAM(S): 400 CAPSULE ORAL at 22:26

## 2020-01-13 RX ADMIN — ATORVASTATIN CALCIUM 80 MILLIGRAM(S): 80 TABLET, FILM COATED ORAL at 22:27

## 2020-01-13 RX ADMIN — HYDROMORPHONE HYDROCHLORIDE 0.5 MILLIGRAM(S): 2 INJECTION INTRAMUSCULAR; INTRAVENOUS; SUBCUTANEOUS at 12:14

## 2020-01-13 RX ADMIN — HYDROMORPHONE HYDROCHLORIDE 30 MILLILITER(S): 2 INJECTION INTRAMUSCULAR; INTRAVENOUS; SUBCUTANEOUS at 13:26

## 2020-01-13 NOTE — PROGRESS NOTE ADULT - SUBJECTIVE AND OBJECTIVE BOX
PROGRESS NOTE FOR 2020    Patient is a 49y old  Female who presents with a chief complaint of Severe PVD (2020 11:20)      INTERVAL History of Present Illness/OVERNIGHT EVENTS: now new issues    MEDICATIONS  (STANDING):  influenza   Vaccine 0.5 milliLiter(s) IntraMuscular once    MEDICATIONS  (PRN):      Allergies    No Known Allergies    Intolerances        REVIEW OF SYSTEMS:  Negative unless otherwise specified above.    Vital Signs Last 24 Hrs  T(C): 36.3 (2020 07:51), Max: 36.7 (2020 21:30)  T(F): 97.4 (2020 07:51), Max: 98 (2020 21:30)  HR: 63 (2020 07:51) (63 - 82)  BP: 118/79 (2020 07:51) (118/79 - 140/86)  BP(mean): --  RR: 18 (2020 07:51) (18 - 18)  SpO2: 97% (2020 07:51) (96% - 99%)    Height (cm): 162.56 ( @ 07:16)  Weight (kg): 88.5 ( @ 07:16)  BMI (kg/m2): 33.5 ( @ 07:16)  BSA (m2): 1.94 ( @ 07:16)    PHYSICAL EXAM:  GENERAL: No apparent distress, appears stated age  HEAD:  Atraumatic, Normocephalic  EYES: Conjunctiva and sclera clear, no discharge  ENMT: Moist mucous membranes, no nasal discharge  NECK: Supple, no JVD  CHEST/LUNG: Clear to auscultation bilaterally, no wheeze or rales  HEART: Regular rate and rhythm, +systolic murmur  ABDOMEN: Soft, Nontender, Nondistended; Bowel sounds present  EXTREMITIES:  No clubbing, but has right 4th toe dry gangrene  SKIN: No rash or new discoloration  NERVOUS SYSTEM:  Alert & Oriented; Bilateral Lower extremity mobile, sensation to light touch intact      LABS:      Ca    8.8        2020 07:42      PT/INR - ( 2020 07:39 )   PT: 13.7 sec;   INR: 1.20 ratio         PTT - ( 2020 07:39 )  PTT:70.5 sec  Urinalysis Basic - ( 2020 07:42 )    Color: Light Yellow / Appearance: Clear / S.007 / pH: x  Gluc: x / Ketone: Negative  / Bili: Negative / Urobili: Negative   Blood: x / Protein: Negative / Nitrite: Negative   Leuk Esterase: Negative / RBC: x / WBC x   Sq Epi: x / Non Sq Epi: x / Bacteria: x      CAPILLARY BLOOD GLUCOSE          RADIOLOGY & ADDITIONAL TESTS:    Images reviewed personally    Consultant Notes Reviewed and Care Discussed with relevant Consultants/Other Providers.

## 2020-01-13 NOTE — CHART NOTE - NSCHARTNOTEFT_GEN_A_CORE
Discussed with Vascular Fellow and to restart heparin gtt in the AM (1/14/20 at 6am).     Cooper Eaton  Spectra-link [id number]

## 2020-01-13 NOTE — PROGRESS NOTE ADULT - SUBJECTIVE AND OBJECTIVE BOX
Patient is a 49y old  Female who presents with a chief complaint of Severe PVD (2020 09:20)      INTERVAL History of Present Illness/OVERNIGHT EVENTS: seen in PACU - doing well post-op    MEDICATIONS  (STANDING):  acetaminophen  IVPB .. 1000 milliGRAM(s) IV Intermittent once  aspirin  chewable 81 milliGRAM(s) Oral daily  atorvastatin 80 milliGRAM(s) Oral at bedtime  cyanocobalamin 1000 MICROGram(s) Oral daily  FLUoxetine 20 milliGRAM(s) Oral daily  folic acid 1 milliGRAM(s) Oral daily  gabapentin 300 milliGRAM(s) Oral three times a day  HYDROmorphone PCA (1 mG/mL) 30 milliLiter(s) PCA Continuous PCA Continuous  influenza   Vaccine 0.5 milliLiter(s) IntraMuscular once  multivitamin 1 Tablet(s) Oral daily  nicotine -   7 mG/24Hr(s) Patch 1 patch Transdermal daily  pantoprazole    Tablet 40 milliGRAM(s) Oral before breakfast  polyethylene glycol 3350 17 Gram(s) Oral daily  thiamine 100 milliGRAM(s) Oral daily  traZODone 150 milliGRAM(s) Oral at bedtime    MEDICATIONS  (PRN):  acetaminophen   Tablet .. 650 milliGRAM(s) Oral every 6 hours PRN Mild Pain (1 - 3)  bisacodyl Suppository 10 milliGRAM(s) Rectal daily PRN Constipation  HYDROmorphone  Injectable 0.5 milliGRAM(s) IV Push every 10 minutes PRN Moderate Pain (4 - 6)  HYDROmorphone  Injectable 1 milliGRAM(s) IV Push once PRN Severe Pain (7 - 10)  HYDROmorphone PCA (1 mG/mL) Rescue Clinician Bolus 0.5 milliGRAM(s) IV Push every 15 minutes PRN for Pain Scale GREATER THAN 6  naloxone Injectable 0.1 milliGRAM(s) IV Push every 3 minutes PRN For ANY of the following changes in patient status:  A. RR LESS THAN 10 breaths per minute, B. Oxygen saturation LESS THAN 90%, C. Sedation score of 6  ondansetron Injectable 4 milliGRAM(s) IV Push every 6 hours PRN Nausea  ondansetron Injectable 4 milliGRAM(s) IV Push once PRN Nausea and/or Vomiting  oxyCODONE    IR 10 milliGRAM(s) Oral every 4 hours PRN Moderate Pain (4 - 6)      Allergies    No Known Allergies    Intolerances        REVIEW OF SYSTEMS:  Negative unless otherwise specified above.    Vital Signs Last 24 Hrs  T(C): 36 (2020 11:55), Max: 36.7 (2020 21:30)  T(F): 96.8 (2020 11:55), Max: 98 (2020 21:30)  HR: 76 (2020 14:30) (63 - 82)  BP: 142/76 (2020 14:30) (118/79 - 155/79)  BP(mean): 104 (2020 14:30) (97 - 109)  RR: 14 (2020 14:30) (14 - 18)  SpO2: 96% (2020 14:30) (96% - 100%)    Height (cm): 162.56 ( @ 07:16)  Weight (kg): 88.5 ( @ 07:16)  BMI (kg/m2): 33.5 ( @ 07:16)  BSA (m2): 1.94 ( @ 07:16)    PHYSICAL EXAM:  GENERAL: No apparent distress, appears stated age  HEAD:  Atraumatic, Normocephalic  EYES: Conjunctiva and sclera clear, no discharge  ENMT: Moist mucous membranes, no nasal discharge  NECK: Supple, no JVD  CHEST/LUNG: Clear to auscultation bilaterally, no wheeze or rales  HEART: Regular rate and rhythm, +systolic murmur  ABDOMEN: Soft, Nontender, Nondistended; Bowel sounds present  EXTREMITIES:  No clubbing, but has right 4th toe dry gangrene, wound vac and drain right leg  SKIN: No rash or new discoloration  NERVOUS SYSTEM:  Alert & Oriented; Bilateral Lower extremity mobile, sensation to light touch intact      LABS:      Ca    8.8        2020 07:42      PT/INR - ( 2020 07:39 )   PT: 13.7 sec;   INR: 1.20 ratio         PTT - ( 2020 07:39 )  PTT:70.5 sec  Urinalysis Basic - ( 2020 07:42 )    Color: Light Yellow / Appearance: Clear / S.007 / pH: x  Gluc: x / Ketone: Negative  / Bili: Negative / Urobili: Negative   Blood: x / Protein: Negative / Nitrite: Negative   Leuk Esterase: Negative / RBC: x / WBC x   Sq Epi: x / Non Sq Epi: x / Bacteria: x      CAPILLARY BLOOD GLUCOSE          RADIOLOGY & ADDITIONAL TESTS:    Images reviewed personally    Consultant Notes Reviewed and Care Discussed with relevant Consultants/Other Providers.

## 2020-01-13 NOTE — PRE-ANESTHESIA EVALUATION ADULT - NSRADCARDRESULTSFT_GEN_ALL_CORE
TTE 1/8/20  Conclusions:  1. Normal mitral valve. Minimal mitral regurgitation.  2. Aortic valve not well visualized; appears trileaflet  with normal opening. No aortic valve regurgitation seen.  3. Endocardial visualization enhanced with intravenous  injection of Ultrasonic Enhancing Agent (Definity).  Hyperdynamic left ventricular systolic function. No  segmental wall motion abnormalities.  4. The right ventricle is not well visualized; grossly  normal right ventricular systolic function.  5. Trace pericardial effusion.

## 2020-01-13 NOTE — CHART NOTE - NSCHARTNOTEFT_GEN_A_CORE
STATUS POST:  pop PT bypass     POST OPERATIVE DAY #: 0     SUBJECTIVE: Pt seen ans examined in PACU. Pain is moderately controlled with medication. Pain is 5/10 with PCA in place. Pain increased with movement and palpation. Wound vac in place over incision site.   SOB:  [ ] YES [ X] NO  Chest Discomfort: [ ] YES [ X] NO    Nausea: [ ] YES [ X] NO           Vomiting: [ ] YES [ X] NO        Pain (0-10):    5/10           Pain Control Adequate: [ X] YES [ ] NO  PCA in place     Vital Signs Last 24 Hrs  T(C): 36.6 (2020 15:45), Max: 36.7 (2020 21:30)  T(F): 97.9 (2020 15:45), Max: 98 (2020 21:30)  HR: 78 (2020 16:00) (63 - 82)  BP: 159/83 (2020 16:00) (118/79 - 160/88)  BP(mean): 109 (2020 14:45) (97 - 109)  RR: 16 (2020 16:00) (14 - 18)  SpO2: 97% (2020 16:00) (96% - 100%)  I&O's Summary    2020 07:  -  2020 07:00  --------------------------------------------------------  IN: 2386 mL / OUT: 1000 mL / NET: 1386 mL    2020 07:  -  2020 16:27  --------------------------------------------------------  IN: 0 mL / OUT: 505 mL / NET: -505 mL      I&O's Detail    2020 07:  -  2020 07:00  --------------------------------------------------------  IN:    heparin  Infusion.: 176 mL    Oral Fluid: 560 mL    sodium chloride 0.9%: 1650 mL  Total IN: 2386 mL    OUT:    Voided: 1000 mL  Total OUT: 1000 mL    Total NET: 1386 mL      2020 07:01  -  2020 16:27  --------------------------------------------------------  IN:  Total IN: 0 mL    OUT:    Bulb: 40 mL    Indwelling Catheter - Urethral: 465 mL  Total OUT: 505 mL    Total NET: -505 mL          MEDICATIONS  (STANDING):  acetaminophen  IVPB .. 1000 milliGRAM(s) IV Intermittent once  aspirin  chewable 81 milliGRAM(s) Oral daily  atorvastatin 80 milliGRAM(s) Oral at bedtime  cyanocobalamin 1000 MICROGram(s) Oral daily  FLUoxetine 20 milliGRAM(s) Oral daily  folic acid 1 milliGRAM(s) Oral daily  gabapentin 300 milliGRAM(s) Oral three times a day  HYDROmorphone PCA (1 mG/mL) 30 milliLiter(s) PCA Continuous PCA Continuous  influenza   Vaccine 0.5 milliLiter(s) IntraMuscular once  multivitamin 1 Tablet(s) Oral daily  nicotine -   7 mG/24Hr(s) Patch 1 patch Transdermal daily  pantoprazole    Tablet 40 milliGRAM(s) Oral before breakfast  polyethylene glycol 3350 17 Gram(s) Oral daily  thiamine 100 milliGRAM(s) Oral daily  traZODone 150 milliGRAM(s) Oral at bedtime    MEDICATIONS  (PRN):  acetaminophen   Tablet .. 650 milliGRAM(s) Oral every 6 hours PRN Mild Pain (1 - 3)  bisacodyl Suppository 10 milliGRAM(s) Rectal daily PRN Constipation  HYDROmorphone  Injectable 0.5 milliGRAM(s) IV Push every 10 minutes PRN Moderate Pain (4 - 6)  HYDROmorphone  Injectable 1 milliGRAM(s) IV Push once PRN Severe Pain (7 - 10)  HYDROmorphone PCA (1 mG/mL) Rescue Clinician Bolus 0.5 milliGRAM(s) IV Push every 15 minutes PRN for Pain Scale GREATER THAN 6  naloxone Injectable 0.1 milliGRAM(s) IV Push every 3 minutes PRN For ANY of the following changes in patient status:  A. RR LESS THAN 10 breaths per minute, B. Oxygen saturation LESS THAN 90%, C. Sedation score of 6  ondansetron Injectable 4 milliGRAM(s) IV Push every 6 hours PRN Nausea  ondansetron Injectable 4 milliGRAM(s) IV Push once PRN Nausea and/or Vomiting  oxyCODONE    IR 10 milliGRAM(s) Oral every 4 hours PRN Moderate Pain (4 - 6)      LABS:                        12.1   8.77  )-----------( 309      ( 2020 07:42 )             37.6     01-12    139  |  104  |  6<L>  ----------------------------<  96  3.6   |  22  |  0.49<L>    Ca    8.8      2020 07:42    TPro  5.6<L>  /  Alb  2.8<L>  /  TBili  0.2  /  DBili  x   /  AST  18  /  ALT  24  /  AlkPhos  104  01-12    PT/INR - ( 2020 07:39 )   PT: 13.7 sec;   INR: 1.20 ratio         PTT - ( 2020 07:39 )  PTT:70.5 sec  Urinalysis Basic - ( 2020 07:42 )    Color: Light Yellow / Appearance: Clear / S.007 / pH: x  Gluc: x / Ketone: Negative  / Bili: Negative / Urobili: Negative   Blood: x / Protein: Negative / Nitrite: Negative   Leuk Esterase: Negative / RBC: x / WBC x   Sq Epi: x / Non Sq Epi: x / Bacteria: x        RADIOLOGY & ADDITIONAL STUDIES:    PHYSICAL EXAM:      Constitutional: NAD, awake and alert     Respiratory: RA no increased WOB     Cardiovascular: RRR     Gastrointestinal: soft, NT. ND    Extremities: warm, palpable PT, wound vac in place over incision site with appropriate suction, mild right foot swelling     Psychiatric: answering questions appropriately       A/P: 49y F s/p pop- PT bypass   - Diet: regular   - Labs: CBC, BMP, Mg, Phos    - Pain medication: Tylenol and PCA  - DVT ppx: restart ASA and heparin tonight STATUS POST:  pop PT bypass     POST OPERATIVE DAY #: 0     SUBJECTIVE: Pt seen ans examined in PACU. Pain is moderately controlled with medication. Pain is 5/10 with PCA in place. Pain increased with movement and palpation. Wound vac in place over incision site.   SOB:  [ ] YES [ X] NO  Chest Discomfort: [ ] YES [ X] NO    Nausea: [ ] YES [ X] NO           Vomiting: [ ] YES [ X] NO        Pain (0-10):    5/10           Pain Control Adequate: [ X] YES [ ] NO  PCA in place     Vital Signs Last 24 Hrs  T(C): 36.6 (2020 15:45), Max: 36.7 (2020 21:30)  T(F): 97.9 (2020 15:45), Max: 98 (2020 21:30)  HR: 78 (2020 16:00) (63 - 82)  BP: 159/83 (2020 16:00) (118/79 - 160/88)  BP(mean): 109 (2020 14:45) (97 - 109)  RR: 16 (2020 16:00) (14 - 18)  SpO2: 97% (2020 16:00) (96% - 100%)  I&O's Summary    2020 07:  -  2020 07:00  --------------------------------------------------------  IN: 2386 mL / OUT: 1000 mL / NET: 1386 mL    2020 07:  -  2020 16:27  --------------------------------------------------------  IN: 0 mL / OUT: 505 mL / NET: -505 mL      I&O's Detail    2020 07:  -  2020 07:00  --------------------------------------------------------  IN:    heparin  Infusion.: 176 mL    Oral Fluid: 560 mL    sodium chloride 0.9%: 1650 mL  Total IN: 2386 mL    OUT:    Voided: 1000 mL  Total OUT: 1000 mL    Total NET: 1386 mL      2020 07:01  -  2020 16:27  --------------------------------------------------------  IN:  Total IN: 0 mL    OUT:    Bulb: 40 mL    Indwelling Catheter - Urethral: 465 mL  Total OUT: 505 mL    Total NET: -505 mL          MEDICATIONS  (STANDING):  acetaminophen  IVPB .. 1000 milliGRAM(s) IV Intermittent once  aspirin  chewable 81 milliGRAM(s) Oral daily  atorvastatin 80 milliGRAM(s) Oral at bedtime  cyanocobalamin 1000 MICROGram(s) Oral daily  FLUoxetine 20 milliGRAM(s) Oral daily  folic acid 1 milliGRAM(s) Oral daily  gabapentin 300 milliGRAM(s) Oral three times a day  HYDROmorphone PCA (1 mG/mL) 30 milliLiter(s) PCA Continuous PCA Continuous  influenza   Vaccine 0.5 milliLiter(s) IntraMuscular once  multivitamin 1 Tablet(s) Oral daily  nicotine -   7 mG/24Hr(s) Patch 1 patch Transdermal daily  pantoprazole    Tablet 40 milliGRAM(s) Oral before breakfast  polyethylene glycol 3350 17 Gram(s) Oral daily  thiamine 100 milliGRAM(s) Oral daily  traZODone 150 milliGRAM(s) Oral at bedtime    MEDICATIONS  (PRN):  acetaminophen   Tablet .. 650 milliGRAM(s) Oral every 6 hours PRN Mild Pain (1 - 3)  bisacodyl Suppository 10 milliGRAM(s) Rectal daily PRN Constipation  HYDROmorphone  Injectable 0.5 milliGRAM(s) IV Push every 10 minutes PRN Moderate Pain (4 - 6)  HYDROmorphone  Injectable 1 milliGRAM(s) IV Push once PRN Severe Pain (7 - 10)  HYDROmorphone PCA (1 mG/mL) Rescue Clinician Bolus 0.5 milliGRAM(s) IV Push every 15 minutes PRN for Pain Scale GREATER THAN 6  naloxone Injectable 0.1 milliGRAM(s) IV Push every 3 minutes PRN For ANY of the following changes in patient status:  A. RR LESS THAN 10 breaths per minute, B. Oxygen saturation LESS THAN 90%, C. Sedation score of 6  ondansetron Injectable 4 milliGRAM(s) IV Push every 6 hours PRN Nausea  ondansetron Injectable 4 milliGRAM(s) IV Push once PRN Nausea and/or Vomiting  oxyCODONE    IR 10 milliGRAM(s) Oral every 4 hours PRN Moderate Pain (4 - 6)      LABS:                        12.1   8.77  )-----------( 309      ( 2020 07:42 )             37.6     01-12    139  |  104  |  6<L>  ----------------------------<  96  3.6   |  22  |  0.49<L>    Ca    8.8      2020 07:42    TPro  5.6<L>  /  Alb  2.8<L>  /  TBili  0.2  /  DBili  x   /  AST  18  /  ALT  24  /  AlkPhos  104  01-12    PT/INR - ( 2020 07:39 )   PT: 13.7 sec;   INR: 1.20 ratio         PTT - ( 2020 07:39 )  PTT:70.5 sec  Urinalysis Basic - ( 2020 07:42 )    Color: Light Yellow / Appearance: Clear / S.007 / pH: x  Gluc: x / Ketone: Negative  / Bili: Negative / Urobili: Negative   Blood: x / Protein: Negative / Nitrite: Negative   Leuk Esterase: Negative / RBC: x / WBC x   Sq Epi: x / Non Sq Epi: x / Bacteria: x        RADIOLOGY & ADDITIONAL STUDIES:    PHYSICAL EXAM:      Constitutional: NAD, awake and alert     Respiratory: RA no increased WOB     Cardiovascular: RRR     Gastrointestinal: soft, NT. ND    Extremities: warm, palpable PT, wound vac in place over incision site with appropriate suction, mild right foot swelling     Psychiatric: answering questions appropriately       A/P: 49y F s/p pop- PT bypass   - Diet: regular   - Labs: CBC, BMP, Mg, Phos    - Pain medication: Tylenol and PCA  - can restart ASA tonight, and heparin drip 6am tomorrow if no bleeding STATUS POST:  pop PT bypass     POST OPERATIVE DAY #: 0     SUBJECTIVE: Pt seen ans examined in PACU. Pain is moderately controlled with medication. Pain is 5/10 with PCA in place. Pain increased with movement and palpation. Wound vac in place over incision site.   SOB:  [ ] YES [ X] NO  Chest Discomfort: [ ] YES [ X] NO    Nausea: [ ] YES [ X] NO           Vomiting: [ ] YES [ X] NO        Pain (0-10):    5/10           Pain Control Adequate: [ X] YES [ ] NO  PCA in place     Vital Signs Last 24 Hrs  T(C): 36.6 (2020 15:45), Max: 36.7 (2020 21:30)  T(F): 97.9 (2020 15:45), Max: 98 (2020 21:30)  HR: 78 (2020 16:00) (63 - 82)  BP: 159/83 (2020 16:00) (118/79 - 160/88)  BP(mean): 109 (2020 14:45) (97 - 109)  RR: 16 (2020 16:00) (14 - 18)  SpO2: 97% (2020 16:00) (96% - 100%)  I&O's Summary    2020 07:  -  2020 07:00  --------------------------------------------------------  IN: 2386 mL / OUT: 1000 mL / NET: 1386 mL    2020 07:  -  2020 16:27  --------------------------------------------------------  IN: 0 mL / OUT: 505 mL / NET: -505 mL      I&O's Detail    2020 07:  -  2020 07:00  --------------------------------------------------------  IN:    heparin  Infusion.: 176 mL    Oral Fluid: 560 mL    sodium chloride 0.9%: 1650 mL  Total IN: 2386 mL    OUT:    Voided: 1000 mL  Total OUT: 1000 mL    Total NET: 1386 mL      2020 07:01  -  2020 16:27  --------------------------------------------------------  IN:  Total IN: 0 mL    OUT:    Bulb: 40 mL    Indwelling Catheter - Urethral: 465 mL  Total OUT: 505 mL    Total NET: -505 mL          MEDICATIONS  (STANDING):  acetaminophen  IVPB .. 1000 milliGRAM(s) IV Intermittent once  aspirin  chewable 81 milliGRAM(s) Oral daily  atorvastatin 80 milliGRAM(s) Oral at bedtime  cyanocobalamin 1000 MICROGram(s) Oral daily  FLUoxetine 20 milliGRAM(s) Oral daily  folic acid 1 milliGRAM(s) Oral daily  gabapentin 300 milliGRAM(s) Oral three times a day  HYDROmorphone PCA (1 mG/mL) 30 milliLiter(s) PCA Continuous PCA Continuous  influenza   Vaccine 0.5 milliLiter(s) IntraMuscular once  multivitamin 1 Tablet(s) Oral daily  nicotine -   7 mG/24Hr(s) Patch 1 patch Transdermal daily  pantoprazole    Tablet 40 milliGRAM(s) Oral before breakfast  polyethylene glycol 3350 17 Gram(s) Oral daily  thiamine 100 milliGRAM(s) Oral daily  traZODone 150 milliGRAM(s) Oral at bedtime    MEDICATIONS  (PRN):  acetaminophen   Tablet .. 650 milliGRAM(s) Oral every 6 hours PRN Mild Pain (1 - 3)  bisacodyl Suppository 10 milliGRAM(s) Rectal daily PRN Constipation  HYDROmorphone  Injectable 0.5 milliGRAM(s) IV Push every 10 minutes PRN Moderate Pain (4 - 6)  HYDROmorphone  Injectable 1 milliGRAM(s) IV Push once PRN Severe Pain (7 - 10)  HYDROmorphone PCA (1 mG/mL) Rescue Clinician Bolus 0.5 milliGRAM(s) IV Push every 15 minutes PRN for Pain Scale GREATER THAN 6  naloxone Injectable 0.1 milliGRAM(s) IV Push every 3 minutes PRN For ANY of the following changes in patient status:  A. RR LESS THAN 10 breaths per minute, B. Oxygen saturation LESS THAN 90%, C. Sedation score of 6  ondansetron Injectable 4 milliGRAM(s) IV Push every 6 hours PRN Nausea  ondansetron Injectable 4 milliGRAM(s) IV Push once PRN Nausea and/or Vomiting  oxyCODONE    IR 10 milliGRAM(s) Oral every 4 hours PRN Moderate Pain (4 - 6)      LABS:                        12.1   8.77  )-----------( 309      ( 2020 07:42 )             37.6     01-12    139  |  104  |  6<L>  ----------------------------<  96  3.6   |  22  |  0.49<L>    Ca    8.8      2020 07:42    TPro  5.6<L>  /  Alb  2.8<L>  /  TBili  0.2  /  DBili  x   /  AST  18  /  ALT  24  /  AlkPhos  104  01-12    PT/INR - ( 2020 07:39 )   PT: 13.7 sec;   INR: 1.20 ratio         PTT - ( 2020 07:39 )  PTT:70.5 sec  Urinalysis Basic - ( 2020 07:42 )    Color: Light Yellow / Appearance: Clear / S.007 / pH: x  Gluc: x / Ketone: Negative  / Bili: Negative / Urobili: Negative   Blood: x / Protein: Negative / Nitrite: Negative   Leuk Esterase: Negative / RBC: x / WBC x   Sq Epi: x / Non Sq Epi: x / Bacteria: x        RADIOLOGY & ADDITIONAL STUDIES:    PHYSICAL EXAM:      Constitutional: NAD, awake and alert     Respiratory: RA no increased WOB     Cardiovascular: RRR     Gastrointestinal: soft, NT. ND    Extremities: warm, wound vac in place over incision site with appropriate suction, mild right foot swelling     Psychiatric: answering questions appropriately       A/P: 49y F s/p pop- PT bypass   - Diet: regular   - Labs: CBC, BMP, Mg, Phos    - Pain medication: Tylenol and PCA  - can restart ASA tonight, and heparin drip 6am tomorrow if no bleeding

## 2020-01-13 NOTE — PRE-ANESTHESIA EVALUATION ADULT - NSANTHPMHFT_GEN_ALL_CORE
48 y/o woman w/ history of smoking, PTSD/depression, spinal disc herniation, GERD, gastric bypass (2004), ex lap for SBO (9 yrs ago) and recent CVA with left-sided deficits, who was transferred from [place] for severe PVD and ischemic toe

## 2020-01-13 NOTE — PROGRESS NOTE ADULT - ASSESSMENT
48 y/o woman w/ history of chronic smoker, PTSD/depression, spinal disc herniation, GERD, gastric bypass (2004), ex lap for SBO (9 yrs ago) and recent CVA, who was transferred from [place] for severe PVD and ischemic toe.

## 2020-01-13 NOTE — PROGRESS NOTE ADULT - ASSESSMENT
48 y/o woman w/ history of chronic smoker, PTSD/depression, spinal disc herniation, GERD, gastric bypass (2004), ex lap for SBO (9 yrs ago) and recent CVA, who was transferred from [place] for severe PVD and ischemic toe.

## 2020-01-14 DIAGNOSIS — I47.1 SUPRAVENTRICULAR TACHYCARDIA: ICD-10-CM

## 2020-01-14 LAB
ALBUMIN SERPL ELPH-MCNC: 2.7 G/DL — LOW (ref 3.3–5)
ALP SERPL-CCNC: 98 U/L — SIGNIFICANT CHANGE UP (ref 40–120)
ALT FLD-CCNC: 18 U/L — SIGNIFICANT CHANGE UP (ref 10–45)
ANION GAP SERPL CALC-SCNC: 12 MMOL/L — SIGNIFICANT CHANGE UP (ref 5–17)
APTT BLD: 52.3 SEC — HIGH (ref 27.5–36.3)
APTT BLD: 86.9 SEC — HIGH (ref 27.5–36.3)
AST SERPL-CCNC: 11 U/L — SIGNIFICANT CHANGE UP (ref 10–40)
BILIRUB SERPL-MCNC: 0.3 MG/DL — SIGNIFICANT CHANGE UP (ref 0.2–1.2)
BUN SERPL-MCNC: 8 MG/DL — SIGNIFICANT CHANGE UP (ref 7–23)
CALCIUM SERPL-MCNC: 8.8 MG/DL — SIGNIFICANT CHANGE UP (ref 8.4–10.5)
CHLORIDE SERPL-SCNC: 100 MMOL/L — SIGNIFICANT CHANGE UP (ref 96–108)
CO2 SERPL-SCNC: 26 MMOL/L — SIGNIFICANT CHANGE UP (ref 22–31)
CREAT SERPL-MCNC: 0.55 MG/DL — SIGNIFICANT CHANGE UP (ref 0.5–1.3)
GLUCOSE SERPL-MCNC: 128 MG/DL — HIGH (ref 70–99)
HCT VFR BLD CALC: 36.9 % — SIGNIFICANT CHANGE UP (ref 34.5–45)
HGB BLD-MCNC: 11.8 G/DL — SIGNIFICANT CHANGE UP (ref 11.5–15.5)
MCHC RBC-ENTMCNC: 32 GM/DL — SIGNIFICANT CHANGE UP (ref 32–36)
MCHC RBC-ENTMCNC: 32.2 PG — SIGNIFICANT CHANGE UP (ref 27–34)
MCV RBC AUTO: 100.8 FL — HIGH (ref 80–100)
NRBC # BLD: 0 /100 WBCS — SIGNIFICANT CHANGE UP (ref 0–0)
PLATELET # BLD AUTO: 382 K/UL — SIGNIFICANT CHANGE UP (ref 150–400)
POTASSIUM SERPL-MCNC: 3.8 MMOL/L — SIGNIFICANT CHANGE UP (ref 3.5–5.3)
POTASSIUM SERPL-SCNC: 3.8 MMOL/L — SIGNIFICANT CHANGE UP (ref 3.5–5.3)
PROT SERPL-MCNC: 5.5 G/DL — LOW (ref 6–8.3)
RBC # BLD: 3.66 M/UL — LOW (ref 3.8–5.2)
RBC # FLD: 16.8 % — HIGH (ref 10.3–14.5)
SODIUM SERPL-SCNC: 138 MMOL/L — SIGNIFICANT CHANGE UP (ref 135–145)
WBC # BLD: 13.33 K/UL — HIGH (ref 3.8–10.5)
WBC # FLD AUTO: 13.33 K/UL — HIGH (ref 3.8–10.5)

## 2020-01-14 PROCEDURE — 99233 SBSQ HOSP IP/OBS HIGH 50: CPT

## 2020-01-14 PROCEDURE — 99232 SBSQ HOSP IP/OBS MODERATE 35: CPT

## 2020-01-14 RX ORDER — METOPROLOL TARTRATE 50 MG
12.5 TABLET ORAL DAILY
Refills: 0 | Status: DISCONTINUED | OUTPATIENT
Start: 2020-01-14 | End: 2020-01-17

## 2020-01-14 RX ORDER — HEPARIN SODIUM 5000 [USP'U]/ML
1100 INJECTION INTRAVENOUS; SUBCUTANEOUS
Qty: 25000 | Refills: 0 | Status: DISCONTINUED | OUTPATIENT
Start: 2020-01-14 | End: 2020-01-17

## 2020-01-14 RX ORDER — SODIUM CHLORIDE 9 MG/ML
1000 INJECTION, SOLUTION INTRAVENOUS
Refills: 0 | Status: DISCONTINUED | OUTPATIENT
Start: 2020-01-14 | End: 2020-01-15

## 2020-01-14 RX ORDER — SODIUM CHLORIDE 9 MG/ML
1000 INJECTION, SOLUTION INTRAVENOUS
Refills: 0 | Status: DISCONTINUED | OUTPATIENT
Start: 2020-01-14 | End: 2020-01-14

## 2020-01-14 RX ADMIN — PANTOPRAZOLE SODIUM 40 MILLIGRAM(S): 20 TABLET, DELAYED RELEASE ORAL at 05:31

## 2020-01-14 RX ADMIN — Medication 12.5 MILLIGRAM(S): at 17:06

## 2020-01-14 RX ADMIN — PREGABALIN 1000 MICROGRAM(S): 225 CAPSULE ORAL at 11:26

## 2020-01-14 RX ADMIN — Medication 1 PATCH: at 11:27

## 2020-01-14 RX ADMIN — Medication 650 MILLIGRAM(S): at 06:01

## 2020-01-14 RX ADMIN — Medication 1 MILLIGRAM(S): at 11:26

## 2020-01-14 RX ADMIN — GABAPENTIN 300 MILLIGRAM(S): 400 CAPSULE ORAL at 22:55

## 2020-01-14 RX ADMIN — Medication 100 MILLIGRAM(S): at 11:26

## 2020-01-14 RX ADMIN — OXYCODONE HYDROCHLORIDE 10 MILLIGRAM(S): 5 TABLET ORAL at 18:12

## 2020-01-14 RX ADMIN — HEPARIN SODIUM 13 UNIT(S)/HR: 5000 INJECTION INTRAVENOUS; SUBCUTANEOUS at 15:12

## 2020-01-14 RX ADMIN — OXYCODONE HYDROCHLORIDE 10 MILLIGRAM(S): 5 TABLET ORAL at 18:42

## 2020-01-14 RX ADMIN — Medication 81 MILLIGRAM(S): at 11:26

## 2020-01-14 RX ADMIN — Medication 1 TABLET(S): at 11:26

## 2020-01-14 RX ADMIN — GABAPENTIN 300 MILLIGRAM(S): 400 CAPSULE ORAL at 13:16

## 2020-01-14 RX ADMIN — ATORVASTATIN CALCIUM 80 MILLIGRAM(S): 80 TABLET, FILM COATED ORAL at 22:55

## 2020-01-14 RX ADMIN — Medication 1 PATCH: at 21:40

## 2020-01-14 RX ADMIN — Medication 650 MILLIGRAM(S): at 05:31

## 2020-01-14 RX ADMIN — HEPARIN SODIUM 13 UNIT(S)/HR: 5000 INJECTION INTRAVENOUS; SUBCUTANEOUS at 22:54

## 2020-01-14 RX ADMIN — Medication 1000 MILLIGRAM(S): at 23:59

## 2020-01-14 RX ADMIN — GABAPENTIN 300 MILLIGRAM(S): 400 CAPSULE ORAL at 05:31

## 2020-01-14 RX ADMIN — HEPARIN SODIUM 11 UNIT(S)/HR: 5000 INJECTION INTRAVENOUS; SUBCUTANEOUS at 06:05

## 2020-01-14 RX ADMIN — HYDROMORPHONE HYDROCHLORIDE 30 MILLILITER(S): 2 INJECTION INTRAMUSCULAR; INTRAVENOUS; SUBCUTANEOUS at 08:00

## 2020-01-14 RX ADMIN — Medication 400 MILLIGRAM(S): at 23:29

## 2020-01-14 RX ADMIN — Medication 150 MILLIGRAM(S): at 22:55

## 2020-01-14 RX ADMIN — Medication 20 MILLIGRAM(S): at 11:26

## 2020-01-14 RX ADMIN — POLYETHYLENE GLYCOL 3350 17 GRAM(S): 17 POWDER, FOR SOLUTION ORAL at 11:27

## 2020-01-14 NOTE — PROGRESS NOTE ADULT - ASSESSMENT
50 yo F presents with right 4th toe gangrene   - pt is s/p right POP-PT bypass yesterday with good PT signal   - right 4th toe gangrene with no active signs of infection. 2 plantar small hemorrhagic blisters noted   - patient is booked for right foot 4th toe amputation on 1/17 Friday with Dr. Chinchilla   - will monitor   - d/w attending

## 2020-01-14 NOTE — PROGRESS NOTE ADULT - SUBJECTIVE AND OBJECTIVE BOX
Patient is a 49y old  Female who presents with a chief complaint of Severe PVD (14 Jan 2020 14:22)      INTERVAL History of Present Illness/OVERNIGHT EVENTS: s/p OR 1/14.  stable post-op.  patient transferred to vascular service, however, recommend continued medicine f/up as consult    MEDICATIONS  (STANDING):  acetaminophen  IVPB .. 1000 milliGRAM(s) IV Intermittent once  aspirin  chewable 81 milliGRAM(s) Oral daily  atorvastatin 80 milliGRAM(s) Oral at bedtime  cyanocobalamin 1000 MICROGram(s) Oral daily  FLUoxetine 20 milliGRAM(s) Oral daily  folic acid 1 milliGRAM(s) Oral daily  gabapentin 300 milliGRAM(s) Oral three times a day  heparin  Infusion 1100 Unit(s)/Hr (13 mL/Hr) IV Continuous <Continuous>  HYDROmorphone PCA (1 mG/mL) 30 milliLiter(s) PCA Continuous PCA Continuous  influenza   Vaccine 0.5 milliLiter(s) IntraMuscular once  lactated ringers. 1000 milliLiter(s) (20 mL/Hr) IV Continuous <Continuous>  multivitamin 1 Tablet(s) Oral daily  nicotine -   7 mG/24Hr(s) Patch 1 patch Transdermal daily  pantoprazole    Tablet 40 milliGRAM(s) Oral before breakfast  polyethylene glycol 3350 17 Gram(s) Oral daily  thiamine 100 milliGRAM(s) Oral daily  traZODone 150 milliGRAM(s) Oral at bedtime    MEDICATIONS  (PRN):  acetaminophen   Tablet .. 650 milliGRAM(s) Oral every 6 hours PRN Mild Pain (1 - 3)  bisacodyl Suppository 10 milliGRAM(s) Rectal daily PRN Constipation  HYDROmorphone PCA (1 mG/mL) Rescue Clinician Bolus 0.5 milliGRAM(s) IV Push every 15 minutes PRN for Pain Scale GREATER THAN 6  naloxone Injectable 0.1 milliGRAM(s) IV Push every 3 minutes PRN For ANY of the following changes in patient status:  A. RR LESS THAN 10 breaths per minute, B. Oxygen saturation LESS THAN 90%, C. Sedation score of 6  ondansetron Injectable 4 milliGRAM(s) IV Push every 6 hours PRN Nausea  oxyCODONE    IR 10 milliGRAM(s) Oral every 4 hours PRN Moderate Pain (4 - 6)      Allergies    No Known Allergies    Intolerances        REVIEW OF SYSTEMS:  Negative unless otherwise specified above.    Vital Signs Last 24 Hrs  T(C): 37.1 (14 Jan 2020 13:50), Max: 37.1 (14 Jan 2020 13:50)  T(F): 98.7 (14 Jan 2020 13:50), Max: 98.7 (14 Jan 2020 13:50)  HR: 81 (14 Jan 2020 13:50) (74 - 89)  BP: 129/82 (14 Jan 2020 14:01) (113/72 - 160/92)  BP(mean): --  RR: 16 (14 Jan 2020 13:50) (16 - 18)  SpO2: 96% (14 Jan 2020 13:50) (94% - 99%)        PHYSICAL EXAM:  GENERAL: No apparent distress, appears stated age  HEAD:  Atraumatic, Normocephalic  EYES: Conjunctiva and sclera clear, no discharge  ENMT: Moist mucous membranes, no nasal discharge  NECK: Supple, no JVD  CHEST/LUNG: Clear to auscultation bilaterally, no wheeze or rales  HEART: Regular rate and rhythm, +systolic murmur  ABDOMEN: Soft, Nontender, Nondistended; Bowel sounds present  EXTREMITIES:  No clubbing, but has right 4th toe dry gangrene, wound vac and drain right leg  SKIN: No rash or new discoloration  NERVOUS SYSTEM:  Alert & Oriented; left-sided weakness s/p CVA      LABS:                        11.8   13.33 )-----------( 382      ( 14 Jan 2020 07:25 )             36.9     14 Jan 2020 12:43    138    |  100    |  8      ----------------------------<  128    3.8     |  26     |  0.55     Ca    8.8        14 Jan 2020 12:43    TPro  5.5    /  Alb  2.7    /  TBili  0.3    /  DBili  x      /  AST  11     /  ALT  18     /  AlkPhos  98     14 Jan 2020 12:43    PTT - ( 14 Jan 2020 12:43 )  PTT:52.3 sec    CAPILLARY BLOOD GLUCOSE          RADIOLOGY & ADDITIONAL TESTS:    Images reviewed personally    Consultant Notes Reviewed and Care Discussed with relevant Consultants/Other Providers.

## 2020-01-14 NOTE — PROGRESS NOTE ADULT - ASSESSMENT
Assessment: 49y F s/p pop-PT bypass   48 y/o woman w/ history of PTSD/depression, spinal disc herniation, GERD, gastric bypass (2004), ex lap for SBO and recent CVA, who was transferred from [place] for severe PVD and ischemic toe, now s/p popliteal-posterior tibial bypass with vein    Plan:  - start heparin drip  - monitor drain output  - PT evaluation  - d/c Garcia  - continue with regular diet   - Pain medication: Tylenol and PCA

## 2020-01-14 NOTE — PROGRESS NOTE ADULT - SUBJECTIVE AND OBJECTIVE BOX
GENERAL SURGERY DAILY PROGRESS NOTE:    Subjective:  Patient seen and examined at bedside on AM rounds. Reports leg pain with slight improvement with medication. Denies N/V. Tolerating diet. Passing flatus    Vital Signs Last 24 Hrs  T(C): 36.6 (15 Jm 2020 05:38), Max: 37.1 (14 Jan 2020 13:50)  T(F): 97.8 (15 Jm 2020 05:38), Max: 98.7 (14 Jan 2020 13:50)  HR: 73 (15 Jm 2020 05:38) (73 - 87)  BP: 139/88 (15 Jm 2020 05:38) (113/72 - 139/88)  BP(mean): --  RR: 17 (15 Jm 2020 05:38) (16 - 18)  SpO2: 97% (15 Jm 2020 05:38) (94% - 97%)    Exam:  Gen: NAD, awake and alert   Respiratory: RA no increased WOB   Cardiovascular: RRR   Gastrointestinal: soft, NT. ND  Extremities: warm, wound vac in place over incision site with appropriate suction, mild right foot swelling; +R DP signal     I&O's Detail    14 Jan 2020 07:01  -  15 Jm 2020 07:00  --------------------------------------------------------  IN:    heparin Infusion: 298 mL    lactated ringers.: 30 mL    lactated ringers.: 360 mL    Oral Fluid: 1180 mL  Total IN: 1868 mL    OUT:    Bulb: 40 mL    Indwelling Catheter - Urethral: 700 mL    Voided: 900 mL  Total OUT: 1640 mL    Total NET: 228 mL          Daily     Daily     MEDICATIONS  (STANDING):  aspirin  chewable 81 milliGRAM(s) Oral daily  atorvastatin 80 milliGRAM(s) Oral at bedtime  cyanocobalamin 1000 MICROGram(s) Oral daily  FLUoxetine 20 milliGRAM(s) Oral daily  folic acid 1 milliGRAM(s) Oral daily  gabapentin 300 milliGRAM(s) Oral three times a day  heparin  Infusion 1100 Unit(s)/Hr (13 mL/Hr) IV Continuous <Continuous>  HYDROmorphone PCA (1 mG/mL) 30 milliLiter(s) PCA Continuous PCA Continuous  influenza   Vaccine 0.5 milliLiter(s) IntraMuscular once  lactated ringers. 1000 milliLiter(s) (20 mL/Hr) IV Continuous <Continuous>  metoprolol succinate ER 12.5 milliGRAM(s) Oral daily  multivitamin 1 Tablet(s) Oral daily  nicotine -   7 mG/24Hr(s) Patch 1 patch Transdermal daily  pantoprazole    Tablet 40 milliGRAM(s) Oral before breakfast  polyethylene glycol 3350 17 Gram(s) Oral daily  thiamine 100 milliGRAM(s) Oral daily  traZODone 150 milliGRAM(s) Oral at bedtime    MEDICATIONS  (PRN):  acetaminophen   Tablet .. 650 milliGRAM(s) Oral every 6 hours PRN Mild Pain (1 - 3)  bisacodyl Suppository 10 milliGRAM(s) Rectal daily PRN Constipation  HYDROmorphone PCA (1 mG/mL) Rescue Clinician Bolus 0.5 milliGRAM(s) IV Push every 15 minutes PRN for Pain Scale GREATER THAN 6  naloxone Injectable 0.1 milliGRAM(s) IV Push every 3 minutes PRN For ANY of the following changes in patient status:  A. RR LESS THAN 10 breaths per minute, B. Oxygen saturation LESS THAN 90%, C. Sedation score of 6  ondansetron Injectable 4 milliGRAM(s) IV Push every 6 hours PRN Nausea  oxyCODONE    IR 10 milliGRAM(s) Oral every 4 hours PRN Moderate Pain (4 - 6)      LABS:                        11.8   13.33 )-----------( 382      ( 14 Jan 2020 07:25 )             36.9     01-14    138  |  100  |  8   ----------------------------<  128<H>  3.8   |  26  |  0.55    Ca    8.8      14 Jan 2020 12:43    TPro  5.5<L>  /  Alb  2.7<L>  /  TBili  0.3  /  DBili  x   /  AST  11  /  ALT  18  /  AlkPhos  98  01-14    PTT - ( 14 Jan 2020 21:07 )  PTT:86.9 sec

## 2020-01-14 NOTE — CHART NOTE - NSCHARTNOTEFT_GEN_A_CORE
Source: Patient [x ]       Patient seen for routine length of stay follow up for moderate malnutrition.  Patient found resting in bed, somewhat flat affect and sloped to the right side, with large plate of lunch in front.  Patient admitted to needing some help moving in bed and PCA asked to assist.  Patient reports that she has been eating so much more on soft diet with thin liquids but today is not a good day.  Patient is s/p Bypass graft, popliteal-tibial, with vein yesterday and admits to being in pain today.  No dysphagia noted and able to manage soft diet without denture.      Diet : Soft. Previously on Ensure Enlive 8 ounces x2 but not resumed after NPO status.    Dxd- Peripheral vascular Disease s/p bypass graft.      Patient reports pain.  Usually eating Pt to meet >75% of meals.            Current Weight: Weight (kg): 88.5 (01-13 @ 07:16)  Weight has been stable at 195 pounds.  % Weight Change    Pertinent Medications: MEDICATIONS  (STANDING):  acetaminophen  IVPB .. 1000 milliGRAM(s) IV Intermittent once  aspirin  chewable 81 milliGRAM(s) Oral daily  atorvastatin 80 milliGRAM(s) Oral at bedtime  cyanocobalamin 1000 MICROGram(s) Oral daily  FLUoxetine 20 milliGRAM(s) Oral daily  folic acid 1 milliGRAM(s) Oral daily  gabapentin 300 milliGRAM(s) Oral three times a day  heparin  Infusion 1100 Unit(s)/Hr (11 mL/Hr) IV Continuous <Continuous>  HYDROmorphone PCA (1 mG/mL) 30 milliLiter(s) PCA Continuous PCA Continuous  influenza   Vaccine 0.5 milliLiter(s) IntraMuscular once  lactated ringers. 1000 milliLiter(s) (20 mL/Hr) IV Continuous <Continuous>  multivitamin 1 Tablet(s) Oral daily  nicotine -   7 mG/24Hr(s) Patch 1 patch Transdermal daily  pantoprazole    Tablet 40 milliGRAM(s) Oral before breakfast  polyethylene glycol 3350 17 Gram(s) Oral daily  thiamine 100 milliGRAM(s) Oral daily  traZODone 150 milliGRAM(s) Oral at bedtime    MEDICATIONS  (PRN):  acetaminophen   Tablet .. 650 milliGRAM(s) Oral every 6 hours PRN Mild Pain (1 - 3)  bisacodyl Suppository 10 milliGRAM(s) Rectal daily PRN Constipation  HYDROmorphone PCA (1 mG/mL) Rescue Clinician Bolus 0.5 milliGRAM(s) IV Push every 15 minutes PRN for Pain Scale GREATER THAN 6  naloxone Injectable 0.1 milliGRAM(s) IV Push every 3 minutes PRN For ANY of the following changes in patient status:  A. RR LESS THAN 10 breaths per minute, B. Oxygen saturation LESS THAN 90%, C. Sedation score of 6  ondansetron Injectable 4 milliGRAM(s) IV Push every 6 hours PRN Nausea  oxyCODONE    IR 10 milliGRAM(s) Oral every 4 hours PRN Moderate Pain (4 - 6)    Pertinent Labs:  01-14 Na138 mmol/L Glu 128 mg/dL<H> K+ 3.8 mmol/L Cr  0.55 mg/dL BUN 8 mg/dL 01-14 Alb 2.7 g/dL<L> 01-02 [id number] 5.0 % 01-02 Chol 84 mg/dL LDL 19 mg/dL HDL 48 mg/dL<L> Trig 87 mg/dL      Skin: Intact except for surgical wounds to right leg, foot    Estimated Needs:   [x ] no change since previous assessment  [ ] recalculated:       Previous Nutrition Diagnosis:     t [x ] Malnutrition moderate  Improving with improved po intake and ensure supplements, health shakes.         Nutrition Diagnosis is [ x] ongoing and resolving.  Care plan in place.             Recommend:  Continue Soft diet  Resume Ensure Enlive 8 ounces x2  Encourage optimal po intake in healing process.  Continue micronutrient supplementation  RDN remains available for follow up          Yanelis Bergeron, MA,RD,CHES,CDN  Beeper [phone]

## 2020-01-14 NOTE — PROGRESS NOTE ADULT - SUBJECTIVE AND OBJECTIVE BOX
Podiatry pager #: [facility] [phone]/ [phone]    Patient is a 49y old  Female who presents with a chief complaint of Severe PVD (14 Jan 2020 15:37)       INTERVAL HPI/OVERNIGHT EVENTS:  Patient seen and evaluated at bedside.  Pt is resting comfortable in NAD. Denies N/V/F/C.      Allergies    No Known Allergies    Intolerances        Vital Signs Last 24 Hrs  T(C): 36.9 (14 Jan 2020 17:07), Max: 37.1 (14 Jan 2020 13:50)  T(F): 98.4 (14 Jan 2020 17:07), Max: 98.7 (14 Jan 2020 13:50)  HR: 84 (14 Jan 2020 17:07) (74 - 89)  BP: 116/74 (14 Jan 2020 17:20) (113/72 - 156/98)  BP(mean): --  RR: 17 (14 Jan 2020 17:07) (16 - 18)  SpO2: 96% (14 Jan 2020 17:07) (94% - 97%)    LABS:                        11.8   13.33 )-----------( 382      ( 14 Jan 2020 07:25 )             36.9     01-14    138  |  100  |  8   ----------------------------<  128<H>  3.8   |  26  |  0.55    Ca    8.8      14 Jan 2020 12:43    TPro  5.5<L>  /  Alb  2.7<L>  /  TBili  0.3  /  DBili  x   /  AST  11  /  ALT  18  /  AlkPhos  98  01-14    PTT - ( 14 Jan 2020 12:43 )  PTT:52.3 sec    CAPILLARY BLOOD GLUCOSE          Lower Extremity Physical Exam:  Vascular: DP/PT 2/4 on L, nonpalpable on R, CFT <5 seconds B/L (no CFT on RF 4th digit), Temperature gradient warm to cool B/L  Neuro: Epicritic sensation intact to the level of toes B/L  Skin: right foot 4th digit dry gangrene, no erythema or edema, no malodor, no signs of infection. Small plantar hemorrhagic blisters noted with no signs of infection.       RADIOLOGY & ADDITIONAL TESTS:

## 2020-01-14 NOTE — PROGRESS NOTE ADULT - SUBJECTIVE AND OBJECTIVE BOX
Day 1 of Anesthesia Pain Management Service    SUBJECTIVE: Patient asleep    Pain Scale Score:	[X] Refer to charted pain scores    THERAPY:    [ ] IV PCA Morphine		[ ] 5 mg/mL	[ ] 1 mg/mL  [X] IV PCA Hydromorphone	[ ] 5 mg/mL	[X] 1 mg/mL  [ ] IV PCA Fentanyl		[ ] 50 micrograms/mL    Demand dose: 0.2 mg     Lockout: 6 minutes   Continuous Rate: 0 mg/hr  4 Hour Limit: 4 mg    MEDICATIONS  (STANDING):  acetaminophen  IVPB .. 1000 milliGRAM(s) IV Intermittent once  aspirin  chewable 81 milliGRAM(s) Oral daily  atorvastatin 80 milliGRAM(s) Oral at bedtime  cyanocobalamin 1000 MICROGram(s) Oral daily  FLUoxetine 20 milliGRAM(s) Oral daily  folic acid 1 milliGRAM(s) Oral daily  gabapentin 300 milliGRAM(s) Oral three times a day  heparin  Infusion 1100 Unit(s)/Hr (11 mL/Hr) IV Continuous <Continuous>  HYDROmorphone PCA (1 mG/mL) 30 milliLiter(s) PCA Continuous PCA Continuous  influenza   Vaccine 0.5 milliLiter(s) IntraMuscular once  multivitamin 1 Tablet(s) Oral daily  nicotine -   7 mG/24Hr(s) Patch 1 patch Transdermal daily  pantoprazole    Tablet 40 milliGRAM(s) Oral before breakfast  polyethylene glycol 3350 17 Gram(s) Oral daily  thiamine 100 milliGRAM(s) Oral daily  traZODone 150 milliGRAM(s) Oral at bedtime    MEDICATIONS  (PRN):  acetaminophen   Tablet .. 650 milliGRAM(s) Oral every 6 hours PRN Mild Pain (1 - 3)  bisacodyl Suppository 10 milliGRAM(s) Rectal daily PRN Constipation  HYDROmorphone PCA (1 mG/mL) Rescue Clinician Bolus 0.5 milliGRAM(s) IV Push every 15 minutes PRN for Pain Scale GREATER THAN 6  naloxone Injectable 0.1 milliGRAM(s) IV Push every 3 minutes PRN For ANY of the following changes in patient status:  A. RR LESS THAN 10 breaths per minute, B. Oxygen saturation LESS THAN 90%, C. Sedation score of 6  ondansetron Injectable 4 milliGRAM(s) IV Push every 6 hours PRN Nausea  oxyCODONE    IR 10 milliGRAM(s) Oral every 4 hours PRN Moderate Pain (4 - 6)      OBJECTIVE:    Sedation Score:	[  ] Alert 	[ ] Drowsy 	[ ] Arousable	[ X] Asleep	[ ] Unresponsive    Side Effects:	[X ] None	[ ] Nausea	[ ] Vomiting	[ ] Pruritus  		[ ] Other:    Vital Signs Last 24 Hrs  T(C): 36.8 (14 Jan 2020 05:23), Max: 37 (13 Jan 2020 17:51)  T(F): 98.2 (14 Jan 2020 05:23), Max: 98.6 (13 Jan 2020 17:51)  HR: 74 (14 Jan 2020 05:23) (71 - 89)  BP: 149/92 (14 Jan 2020 05:23) (135/76 - 160/92)  BP(mean): 109 (13 Jan 2020 14:45) (97 - 109)  RR: 18 (14 Jan 2020 05:23) (14 - 18)  SpO2: 97% (14 Jan 2020 05:23) (94% - 100%)    ASSESSMENT/ PLAN    Therapy to  be:               [X] Continued   [ ] Discontinued   [ ] Changed to PRN Analgesics    Documentation and Verification of current medications:   [X] Done	[ ] Not done, not eligible    Comments: PCA use 1-2x/hr. Continue

## 2020-01-14 NOTE — PROGRESS NOTE ADULT - ASSESSMENT
50 y/o woman w/ history of chronic smoker, PTSD/depression, spinal disc herniation, GERD, gastric bypass (2004), ex lap for SBO (9 yrs ago) and recent CVA, who was transferred from [place] for severe PVD and ischemic toe.

## 2020-01-14 NOTE — PROGRESS NOTE ADULT - SUBJECTIVE AND OBJECTIVE BOX
Vascular Cardiology Consult Note     SPECTRA [id number]              EMAIL [email or web address]   OFFICE [phone]    CC:  Severe PVD    Interval Events: Status Post R Popliteal-Tibial Bypass 1/13/20. Doing well. Denies C/P or SOB. Incisional site discomfort controlled. Breathing well. 3 seconds of PAT noted, remained asymptomatic. No palpitations.      Allergies  No Known Allergies    MEDICATIONS:  aspirin  chewable 81 milliGRAM(s) Oral daily  heparin  Infusion 1100 Unit(s)/Hr IV Continuous <Continuous>  acetaminophen   Tablet .. 650 milliGRAM(s) Oral every 6 hours PRN  acetaminophen  IVPB .. 1000 milliGRAM(s) IV Intermittent once  FLUoxetine 20 milliGRAM(s) Oral daily  gabapentin 300 milliGRAM(s) Oral three times a day  HYDROmorphone PCA (1 mG/mL) 30 milliLiter(s) PCA Continuous PCA Continuous  HYDROmorphone PCA (1 mG/mL) Rescue Clinician Bolus 0.5 milliGRAM(s) IV Push every 15 minutes PRN  ondansetron Injectable 4 milliGRAM(s) IV Push every 6 hours PRN  oxyCODONE    IR 10 milliGRAM(s) Oral every 4 hours PRN  traZODone 150 milliGRAM(s) Oral at bedtime  bisacodyl Suppository 10 milliGRAM(s) Rectal daily PRN  pantoprazole    Tablet 40 milliGRAM(s) Oral before breakfast  polyethylene glycol 3350 17 Gram(s) Oral daily  atorvastatin 80 milliGRAM(s) Oral at bedtime  cyanocobalamin 1000 MICROGram(s) Oral daily  folic acid 1 milliGRAM(s) Oral daily  influenza   Vaccine 0.5 milliLiter(s) IntraMuscular once  lactated ringers. 1000 milliLiter(s) IV Continuous <Continuous>  multivitamin 1 Tablet(s) Oral daily  thiamine 100 milliGRAM(s) Oral daily    PAST MEDICAL & SURGICAL HISTORY:  Chronic insomnia  PTSD (post-traumatic stress disorder)  ETOH abuse  Anxiety  HTN (hypertension)  Depression  Tubal Ligation Status  Status Post Gastric Bypass for Obesity  Status Post Laparoscopic Cholecystectomy    FAMILY HISTORY:  No pertinent family history in first degree relatives    SOCIAL HISTORY:  unchanged    REVIEW OF SYSTEMS:  CONSTITUTIONAL: No fever  EYES: No eye pain, visual disturbances,  ENT:   No sinus or throat pain  NECK: No pain or stiffness  RESPIRATORY: No SOB  CARDIOVASCULAR: No C/P   GASTROINTESTINAL: No abdominal or epigastric pain. No nausea, vomiting, or hematemesis;  No melena or hematochezia.  GENITOURINARY: No dysuria, hematuria  NEUROLOGICAL: L sided facial droop and L UE/ LE weakness noted  SKIN: wound vac and LOREN drain in place to R LE procedure site   LYMPH Nodes: No enlarged glands  ENDOCRINE: No heat or cold intolerance  MUSCULOSKELETAL: Tolerated R LE incisional discomfort   PSYCHIATRIC: No depression, anxiety  HEME/LYMPH: No easy bruising, or bleeding gums  ALLERGY AND IMMUNOLOGIC: No hives or eczema	    [ x] All others negative	    PHYSICAL EXAM:  T(C): 37.1 (01-14-20 @ 13:50), Max: 37.1 (01-14-20 @ 13:50)  HR: 81 (01-14-20 @ 13:50) (74 - 89)  BP: 129/82 (01-14-20 @ 13:50) (113/72 - 160/92)  RR: 16 (01-14-20 @ 13:50) (14 - 18)  SpO2: 96% (01-14-20 @ 13:50) (94% - 99%)  I&O's Summary    13 Jan 2020 07:01  -  14 Jan 2020 07:00  --------------------------------------------------------  IN: 502 mL / OUT: 1805 mL / NET: -1303 mL    14 Jan 2020 07:01  -  14 Jan 2020 14:22  --------------------------------------------------------  IN: 480 mL / OUT: 710 mL / NET: -230 mL    Appearance: NAD  HEENT:   Normal oral mucosa	  Cardiovascular: RRR, S1 and S2   Respiratory: CTA B/L 	  Psychiatry:  AAO x 3  Gastrointestinal:  Soft, Non-tender, + BS	  Skin: No rashes  Neurologic: L sided facial droop and L UE/ LE weakness noted in setting of recent CVA  Extremities:  wound vac and LOREN drain in place to R LE medical incision site, dressing intact  R 4th toe gangrene noted    Vascular Pulse Exam: Doppler R DP and PT    LABS:	 	    CBC Full  -  ( 14 Jan 2020 07:25 )  WBC Count : 13.33 K/uL  Hemoglobin : 11.8 g/dL  Hematocrit : 36.9 %  Platelet Count - Automated : 382 K/uL  Mean Cell Volume : 100.8 fl  Mean Cell Hemoglobin : 32.2 pg  Mean Cell Hemoglobin Concentration : 32.0 gm/dL  Auto Neutrophil # : x  Auto Lymphocyte # : x  Auto Monocyte # : x  Auto Eosinophil # : x  Auto Basophil # : x  Auto Neutrophil % : x  Auto Lymphocyte % : x  Auto Monocyte % : x  Auto Eosinophil % : x  Auto Basophil % : x    01-14    138  |  100  |  8   ----------------------------<  128<H>  3.8   |  26  |  0.55    Ca    8.8      14 Jan 2020 12:43    TPro  5.5<L>  /  Alb  2.7<L>  /  TBili  0.3  /  DBili  x   /  AST  11  /  ALT  18  /  AlkPhos  98  01-14    Assessment:  1. Severe PAD and R 4th Toe Necrosis s/p R Popliteal-Tibial Bypass 1/13/20      - Hemodynamically Stable  2.  R MCA CVA with hemorrhagic conversion in 12/2019   3. Hyperdynamic EF      - S/p IV Fluid Hydration  4. 3 seconds of PAT this AM       - currently in SR    Plan:  1. Doing well post procedure.  2. Continue Heparin gtt. Transition to Xarelto 20mg with evening meal when appropriate from vascular surgery perspective (wound vac and drain(s) removed, etc).   3. Start Toprol XL 12.5mg daily and monitor telemetry.     Thank you  VESTA Ferris  Vascular Cardiology Service    Please call with any questions:   SPECTRA - [phone]  Office [phone]  email:  [email or web address] Vascular Cardiology Consult Note     SPECTRA [id number]              EMAIL [email or web address]   OFFICE [phone]    CC:  Severe PVD    Interval Events: Status Post R Popliteal-Tibial Bypass 1/13/20. Doing well. Denies C/P or SOB. Incisional site discomfort controlled. Breathing well. 3 seconds of PAT noted, remained asymptomatic. No palpitations.      Allergies  No Known Allergies    MEDICATIONS:  aspirin  chewable 81 milliGRAM(s) Oral daily  heparin  Infusion 1100 Unit(s)/Hr IV Continuous <Continuous>  acetaminophen   Tablet .. 650 milliGRAM(s) Oral every 6 hours PRN  acetaminophen  IVPB .. 1000 milliGRAM(s) IV Intermittent once  FLUoxetine 20 milliGRAM(s) Oral daily  gabapentin 300 milliGRAM(s) Oral three times a day  HYDROmorphone PCA (1 mG/mL) 30 milliLiter(s) PCA Continuous PCA Continuous  HYDROmorphone PCA (1 mG/mL) Rescue Clinician Bolus 0.5 milliGRAM(s) IV Push every 15 minutes PRN  ondansetron Injectable 4 milliGRAM(s) IV Push every 6 hours PRN  oxyCODONE    IR 10 milliGRAM(s) Oral every 4 hours PRN  traZODone 150 milliGRAM(s) Oral at bedtime  bisacodyl Suppository 10 milliGRAM(s) Rectal daily PRN  pantoprazole    Tablet 40 milliGRAM(s) Oral before breakfast  polyethylene glycol 3350 17 Gram(s) Oral daily  atorvastatin 80 milliGRAM(s) Oral at bedtime  cyanocobalamin 1000 MICROGram(s) Oral daily  folic acid 1 milliGRAM(s) Oral daily  influenza   Vaccine 0.5 milliLiter(s) IntraMuscular once  lactated ringers. 1000 milliLiter(s) IV Continuous <Continuous>  multivitamin 1 Tablet(s) Oral daily  thiamine 100 milliGRAM(s) Oral daily    PAST MEDICAL & SURGICAL HISTORY:  Chronic insomnia  PTSD (post-traumatic stress disorder)  ETOH abuse  Anxiety  HTN (hypertension)  Depression  Tubal Ligation Status  Status Post Gastric Bypass for Obesity  Status Post Laparoscopic Cholecystectomy    FAMILY HISTORY:  No pertinent family history in first degree relatives    SOCIAL HISTORY:  unchanged    REVIEW OF SYSTEMS:  CONSTITUTIONAL: No fever  EYES: No eye pain, visual disturbances,  ENT:   No sinus or throat pain  NECK: No pain or stiffness  RESPIRATORY: No SOB  CARDIOVASCULAR: No C/P   GASTROINTESTINAL: No abdominal or epigastric pain. No nausea, vomiting, or hematemesis;  No melena or hematochezia.  GENITOURINARY: No dysuria, hematuria  NEUROLOGICAL: L sided facial droop and L UE/ LE weakness noted  SKIN: wound vac and LOREN drain in place to R LE procedure site   LYMPH Nodes: No enlarged glands  ENDOCRINE: No heat or cold intolerance  MUSCULOSKELETAL: Tolerated R LE incisional discomfort   PSYCHIATRIC: No depression, anxiety  HEME/LYMPH: No easy bruising, or bleeding gums  ALLERGY AND IMMUNOLOGIC: No hives or eczema	    [ x] All others negative	    PHYSICAL EXAM:  T(C): 37.1 (01-14-20 @ 13:50), Max: 37.1 (01-14-20 @ 13:50)  HR: 81 (01-14-20 @ 13:50) (74 - 89)  BP: 129/82 (01-14-20 @ 13:50) (113/72 - 160/92)  RR: 16 (01-14-20 @ 13:50) (14 - 18)  SpO2: 96% (01-14-20 @ 13:50) (94% - 99%)  I&O's Summary    13 Jan 2020 07:01  -  14 Jan 2020 07:00  --------------------------------------------------------  IN: 502 mL / OUT: 1805 mL / NET: -1303 mL    14 Jan 2020 07:01  -  14 Jan 2020 14:22  --------------------------------------------------------  IN: 480 mL / OUT: 710 mL / NET: -230 mL    Appearance: NAD  HEENT:   Normal oral mucosa	  Cardiovascular: RRR, S1 and S2   Respiratory: CTA B/L 	  Psychiatry:  AAO x 3  Gastrointestinal:  Soft, Non-tender, + BS	  Skin: No rashes  Neurologic: L sided facial droop and L UE/ LE weakness noted in setting of recent CVA  Extremities:  wound vac and LOREN drain in place to R LE medical incision site, dressing intact  R 4th toe gangrene noted    Vascular Pulse Exam: Doppler R DP and PT    LABS:	 	    CBC Full  -  ( 14 Jan 2020 07:25 )  WBC Count : 13.33 K/uL  Hemoglobin : 11.8 g/dL  Hematocrit : 36.9 %  Platelet Count - Automated : 382 K/uL  Mean Cell Volume : 100.8 fl  Mean Cell Hemoglobin : 32.2 pg  Mean Cell Hemoglobin Concentration : 32.0 gm/dL  Auto Neutrophil # : x  Auto Lymphocyte # : x  Auto Monocyte # : x  Auto Eosinophil # : x  Auto Basophil # : x  Auto Neutrophil % : x  Auto Lymphocyte % : x  Auto Monocyte % : x  Auto Eosinophil % : x  Auto Basophil % : x    01-14    138  |  100  |  8   ----------------------------<  128<H>  3.8   |  26  |  0.55    Ca    8.8      14 Jan 2020 12:43    TPro  5.5<L>  /  Alb  2.7<L>  /  TBili  0.3  /  DBili  x   /  AST  11  /  ALT  18  /  AlkPhos  98  01-14    Assessment:  1. Severe PAD and R 4th Toe Necrosis s/p R Popliteal-Tibial Bypass 1/13/20      - Hemodynamically Stable  2.  R MCA CVA with hemorrhagic conversion in 12/2019   3. Hyperdynamic EF      - S/p IV Fluid Hydration  4. 3 seconds of PAT this AM       - currently in SR    Plan:  1. Doing well post procedure.  2. Continue Heparin gtt. Transition to Xarelto 20mg when appropriate from vascular surgery perspective (wound vac and drain(s) removed, etc).   3. Start Toprol XL 12.5mg daily and monitor telemetry.     Thank you  VESTA Ferris  Vascular Cardiology Service    Please call with any questions:   SPECTRA - [phone]  Office [phone]  email:  [email or web address]

## 2020-01-15 ENCOUNTER — TRANSCRIPTION ENCOUNTER (OUTPATIENT)
Age: 50
End: 2020-01-15

## 2020-01-15 LAB
ANION GAP SERPL CALC-SCNC: 10 MMOL/L — SIGNIFICANT CHANGE UP (ref 5–17)
APTT BLD: 59.9 SEC — HIGH (ref 27.5–36.3)
BUN SERPL-MCNC: 8 MG/DL — SIGNIFICANT CHANGE UP (ref 7–23)
CALCIUM SERPL-MCNC: 8.9 MG/DL — SIGNIFICANT CHANGE UP (ref 8.4–10.5)
CHLORIDE SERPL-SCNC: 101 MMOL/L — SIGNIFICANT CHANGE UP (ref 96–108)
CO2 SERPL-SCNC: 28 MMOL/L — SIGNIFICANT CHANGE UP (ref 22–31)
CREAT SERPL-MCNC: 0.51 MG/DL — SIGNIFICANT CHANGE UP (ref 0.5–1.3)
GLUCOSE SERPL-MCNC: 102 MG/DL — HIGH (ref 70–99)
HCT VFR BLD CALC: 33.4 % — LOW (ref 34.5–45)
HGB BLD-MCNC: 10.5 G/DL — LOW (ref 11.5–15.5)
INR BLD: 1.36 RATIO — HIGH (ref 0.88–1.16)
MAGNESIUM SERPL-MCNC: 1.9 MG/DL — SIGNIFICANT CHANGE UP (ref 1.6–2.6)
MCHC RBC-ENTMCNC: 31.4 GM/DL — LOW (ref 32–36)
MCHC RBC-ENTMCNC: 32.1 PG — SIGNIFICANT CHANGE UP (ref 27–34)
MCV RBC AUTO: 102.1 FL — HIGH (ref 80–100)
NRBC # BLD: 0 /100 WBCS — SIGNIFICANT CHANGE UP (ref 0–0)
PLATELET # BLD AUTO: 348 K/UL — SIGNIFICANT CHANGE UP (ref 150–400)
POTASSIUM SERPL-MCNC: 3.6 MMOL/L — SIGNIFICANT CHANGE UP (ref 3.5–5.3)
POTASSIUM SERPL-SCNC: 3.6 MMOL/L — SIGNIFICANT CHANGE UP (ref 3.5–5.3)
PROTHROM AB SERPL-ACNC: 15.8 SEC — HIGH (ref 10–12.9)
RBC # BLD: 3.27 M/UL — LOW (ref 3.8–5.2)
RBC # FLD: 17 % — HIGH (ref 10.3–14.5)
SODIUM SERPL-SCNC: 139 MMOL/L — SIGNIFICANT CHANGE UP (ref 135–145)
WBC # BLD: 12.36 K/UL — HIGH (ref 3.8–10.5)
WBC # FLD AUTO: 12.36 K/UL — HIGH (ref 3.8–10.5)

## 2020-01-15 PROCEDURE — 99233 SBSQ HOSP IP/OBS HIGH 50: CPT

## 2020-01-15 PROCEDURE — 99222 1ST HOSP IP/OBS MODERATE 55: CPT | Mod: GC

## 2020-01-15 PROCEDURE — 99232 SBSQ HOSP IP/OBS MODERATE 35: CPT

## 2020-01-15 RX ORDER — ACETAMINOPHEN 500 MG
650 TABLET ORAL EVERY 6 HOURS
Refills: 0 | Status: DISCONTINUED | OUTPATIENT
Start: 2020-01-15 | End: 2020-01-17

## 2020-01-15 RX ORDER — OXYCODONE HYDROCHLORIDE 5 MG/1
5 TABLET ORAL EVERY 4 HOURS
Refills: 0 | Status: DISCONTINUED | OUTPATIENT
Start: 2020-01-15 | End: 2020-01-17

## 2020-01-15 RX ORDER — OXYCODONE HYDROCHLORIDE 5 MG/1
10 TABLET ORAL EVERY 4 HOURS
Refills: 0 | Status: DISCONTINUED | OUTPATIENT
Start: 2020-01-15 | End: 2020-01-17

## 2020-01-15 RX ADMIN — Medication 1 TABLET(S): at 12:47

## 2020-01-15 RX ADMIN — OXYCODONE HYDROCHLORIDE 10 MILLIGRAM(S): 5 TABLET ORAL at 13:14

## 2020-01-15 RX ADMIN — GABAPENTIN 300 MILLIGRAM(S): 400 CAPSULE ORAL at 06:15

## 2020-01-15 RX ADMIN — Medication 1 PATCH: at 13:15

## 2020-01-15 RX ADMIN — OXYCODONE HYDROCHLORIDE 10 MILLIGRAM(S): 5 TABLET ORAL at 21:54

## 2020-01-15 RX ADMIN — GABAPENTIN 300 MILLIGRAM(S): 400 CAPSULE ORAL at 13:13

## 2020-01-15 RX ADMIN — OXYCODONE HYDROCHLORIDE 10 MILLIGRAM(S): 5 TABLET ORAL at 12:44

## 2020-01-15 RX ADMIN — OXYCODONE HYDROCHLORIDE 5 MILLIGRAM(S): 5 TABLET ORAL at 15:57

## 2020-01-15 RX ADMIN — OXYCODONE HYDROCHLORIDE 5 MILLIGRAM(S): 5 TABLET ORAL at 16:27

## 2020-01-15 RX ADMIN — GABAPENTIN 300 MILLIGRAM(S): 400 CAPSULE ORAL at 21:54

## 2020-01-15 RX ADMIN — HYDROMORPHONE HYDROCHLORIDE 30 MILLILITER(S): 2 INJECTION INTRAMUSCULAR; INTRAVENOUS; SUBCUTANEOUS at 07:27

## 2020-01-15 RX ADMIN — Medication 81 MILLIGRAM(S): at 12:47

## 2020-01-15 RX ADMIN — Medication 1 PATCH: at 06:51

## 2020-01-15 RX ADMIN — Medication 1 PATCH: at 20:15

## 2020-01-15 RX ADMIN — OXYCODONE HYDROCHLORIDE 10 MILLIGRAM(S): 5 TABLET ORAL at 22:24

## 2020-01-15 RX ADMIN — Medication 150 MILLIGRAM(S): at 21:54

## 2020-01-15 RX ADMIN — Medication 1 PATCH: at 12:48

## 2020-01-15 RX ADMIN — HEPARIN SODIUM 13 UNIT(S)/HR: 5000 INJECTION INTRAVENOUS; SUBCUTANEOUS at 09:12

## 2020-01-15 RX ADMIN — POLYETHYLENE GLYCOL 3350 17 GRAM(S): 17 POWDER, FOR SOLUTION ORAL at 12:49

## 2020-01-15 RX ADMIN — Medication 20 MILLIGRAM(S): at 12:47

## 2020-01-15 RX ADMIN — ATORVASTATIN CALCIUM 80 MILLIGRAM(S): 80 TABLET, FILM COATED ORAL at 21:54

## 2020-01-15 RX ADMIN — OXYCODONE HYDROCHLORIDE 10 MILLIGRAM(S): 5 TABLET ORAL at 09:17

## 2020-01-15 RX ADMIN — Medication 1 MILLIGRAM(S): at 12:47

## 2020-01-15 RX ADMIN — PREGABALIN 1000 MICROGRAM(S): 225 CAPSULE ORAL at 12:45

## 2020-01-15 RX ADMIN — OXYCODONE HYDROCHLORIDE 10 MILLIGRAM(S): 5 TABLET ORAL at 08:47

## 2020-01-15 RX ADMIN — Medication 12.5 MILLIGRAM(S): at 06:14

## 2020-01-15 RX ADMIN — Medication 650 MILLIGRAM(S): at 12:47

## 2020-01-15 RX ADMIN — Medication 100 MILLIGRAM(S): at 12:49

## 2020-01-15 RX ADMIN — PANTOPRAZOLE SODIUM 40 MILLIGRAM(S): 20 TABLET, DELAYED RELEASE ORAL at 06:15

## 2020-01-15 NOTE — PROGRESS NOTE ADULT - ASSESSMENT
48 yo F presents with right 4th toe gangrene    - pt is s/p right POP-PT bypass with now palpable pedal pulses  - right 4th toe gangrene with no active signs of infection. 2 plantar small hemorrhagic blisters noted   - tentatively booked for right foot 4th toe amputation, this Friday 11:00  - will follow

## 2020-01-15 NOTE — DISCHARGE NOTE PROVIDER - CARE PROVIDER_API CALL
Speedy Santos)  Vascular Surgery  [address] [facility], [address]  [place] [address]  Phone: [phone]  Fax: [phone]  Follow Up Time: 2 weeks    Annmarie Vaz (DPM)  Surgery  [address] [facility], [address] [place] [address]  Phone: [phone]  Fax: [phone]  Follow Up Time: 2 weeks    Malachi Logan)  Neurology; Vascular Neurology  [address] [facility], [address]  [place] [address]  Phone: [phone]  Fax: [phone]  Follow Up Time: 2 weeks Speedy Santos (MD)  Vascular Surgery  [address] [facility], [address]  [place] [address]  Phone: [phone]  Fax: [phone]  Follow Up Time: 2 weeks    Annmarie Vaz (DPM)  Surgery  [address] [facility], [address] [place] [address]  Phone: [phone]  Fax: [phone]  Follow Up Time: 2 weeks    Malachi Logan)  Neurology; Vascular Neurology  [address] [facility], [address]  [place] [address]  Phone: [phone]  Fax: [phone]  Follow Up Time: 2 weeks    Andre Trammell (DO)  Cardiovascular Disease; Internal Medicine; Nuclear Cardiology  [address] [place] [address]  Phone: [phone]  Fax: [phone]  Follow Up Time: 1 week

## 2020-01-15 NOTE — DISCHARGE NOTE PROVIDER - NSDCCPCAREPLAN_GEN_ALL_CORE_FT
PRINCIPAL DISCHARGE DIAGNOSIS  Diagnosis: Peripheral vascular disease  Assessment and Plan of Treatment: WOUND CARE: Staples will be removed at follow up office visit.    BATHING: Please do not submerge wound underwater. You may shower and/or sponge bathe.  ACTIVITY: No heavy lifting anything more than 10-15lbs or straining. Otherwise, you may return to your usual level of physical activity. If you are taking narcotic pain medication (such as Percocet), do NOT drive a car, operate machinery or make important decisions.  DIET: Soft diet  NOTIFY YOUR SURGEON IF: You have any bleeding that does not stop, any pus draining from your wound, any fever (over 100.4 F) or chills, persistent nausea/vomiting with inability to tolerate food or liquids, persistent diarrhea, or if your pain is not controlled on your discharge pain medications.  FOLLOW-UP:  1. Please call to make a follow-up appointment within one week of discharge   2. Please follow up with your primary care physician in one week regarding your hospitalization.      SECONDARY DISCHARGE DIAGNOSES  Diagnosis: CVA (cerebral vascular accident)  Assessment and Plan of Treatment: Continue home medications as prescribed and please follow up with your Neurologist within 1-2 weeks of discharge.    Diagnosis: HTN (hypertension)  Assessment and Plan of Treatment: Continue home medications as prescribed and please follow up with your primary care physician within 1-2 weeks of discharge PRINCIPAL DISCHARGE DIAGNOSIS  Diagnosis: Peripheral vascular disease  Assessment and Plan of Treatment: WOUND CARE: Staples will be removed at follow up office visit.    BATHING: Please do not submerge wound underwater. You may shower and/or sponge bathe.  ACTIVITY: No heavy lifting anything more than 10-15lbs or straining. Otherwise, you may return to your usual level of physical activity. If you are taking narcotic pain medication (such as Percocet), do NOT drive a car, operate machinery or make important decisions.  DIET: Soft diet  NOTIFY YOUR SURGEON IF: You have any bleeding that does not stop, any pus draining from your wound, any fever (over 100.4 F) or chills, persistent nausea/vomiting with inability to tolerate food or liquids, persistent diarrhea, or if your pain is not controlled on your discharge pain medications.  FOLLOW-UP:  1. Please call to make a follow-up appointment within one week of discharge   2. Please follow up with your primary care physician in one week regarding your hospitalization.      SECONDARY DISCHARGE DIAGNOSES  Diagnosis: HTN (hypertension)  Assessment and Plan of Treatment: Continue home medications as prescribed and please follow up with your primary care physician within 1-2 weeks of discharge    Diagnosis: CVA (cerebral vascular accident)  Assessment and Plan of Treatment: Continue home medications as prescribed and please follow up with your Neurologist within 1-2 weeks of discharge.    Diagnosis: PAT (paroxysmal atrial tachycardia)  Assessment and Plan of Treatment: Follow up with DR. CINTRON (Vascular Cardiology) in 1 week. Placed Zio patch to evaluate paroxysmal tachycardia.

## 2020-01-15 NOTE — DISCHARGE NOTE PROVIDER - PROVIDER TOKENS
PROVIDER:[TOKEN:[[id number]],FOLLOWUP:[2 weeks]],PROVIDER:[TOKEN:[[id number]],FOLLOWUP:[2 weeks]],PROVIDER:[TOKEN:[[id number]],FOLLOWUP:[2 weeks]] PROVIDER:[TOKEN:[[id number]],FOLLOWUP:[2 weeks]],PROVIDER:[TOKEN:[[id number]],FOLLOWUP:[2 weeks]],PROVIDER:[TOKEN:[[id number]],FOLLOWUP:[2 weeks]],PROVIDER:[TOKEN:[[id number]],FOLLOWUP:[1 week]]

## 2020-01-15 NOTE — PROGRESS NOTE ADULT - SUBJECTIVE AND OBJECTIVE BOX
VASCULAR SURGERY DAILY PROGRESS NOTE:    Subjective:  Patient seen and examined at bedside on AM rounds. 1 episode of PAT lasted for 3 seconds yesterday, cardiology following. Reports leg pain with slight improvement with medication. Denies N/V. Tolerating diet. Passing flatus, worked with PT yesterday    Vital Signs Last 24 Hrs  T(C): 36.6 (15 Jm 2020 05:38), Max: 37.1 (14 Jan 2020 13:50)  T(F): 97.8 (15 Jm 2020 05:38), Max: 98.7 (14 Jan 2020 13:50)  HR: 73 (15 Jm 2020 05:38) (73 - 87)  BP: 139/88 (15 Jm 2020 05:38) (113/72 - 139/88)  BP(mean): --  RR: 17 (15 Jm 2020 05:38) (16 - 18)  SpO2: 97% (15 Jm 2020 05:38) (94% - 97%)    Exam:  Gen: NAD, awake and alert   Respiratory: RA no increased WOB   Cardiovascular: RRR   Gastrointestinal: soft, NT. ND  Extremities: warm, wound vac in place over incision site with appropriate suction, mild right foot swelling; +R DP signal     I&O's Detail    14 Jan 2020 07:01  -  15 Jm 2020 07:00  --------------------------------------------------------  IN:    heparin Infusion: 298 mL    lactated ringers.: 30 mL    lactated ringers.: 360 mL    Oral Fluid: 1180 mL  Total IN: 1868 mL    OUT:    Bulb: 40 mL    Indwelling Catheter - Urethral: 700 mL    Voided: 900 mL  Total OUT: 1640 mL    Total NET: 228 mL          Daily     Daily     MEDICATIONS  (STANDING):  acetaminophen   Tablet .. 650 milliGRAM(s) Oral every 6 hours  aspirin  chewable 81 milliGRAM(s) Oral daily  atorvastatin 80 milliGRAM(s) Oral at bedtime  cyanocobalamin 1000 MICROGram(s) Oral daily  FLUoxetine 20 milliGRAM(s) Oral daily  folic acid 1 milliGRAM(s) Oral daily  gabapentin 300 milliGRAM(s) Oral three times a day  heparin  Infusion 1100 Unit(s)/Hr (13 mL/Hr) IV Continuous <Continuous>  influenza   Vaccine 0.5 milliLiter(s) IntraMuscular once  lactated ringers. 1000 milliLiter(s) (20 mL/Hr) IV Continuous <Continuous>  metoprolol succinate ER 12.5 milliGRAM(s) Oral daily  multivitamin 1 Tablet(s) Oral daily  nicotine -   7 mG/24Hr(s) Patch 1 patch Transdermal daily  pantoprazole    Tablet 40 milliGRAM(s) Oral before breakfast  polyethylene glycol 3350 17 Gram(s) Oral daily  thiamine 100 milliGRAM(s) Oral daily  traZODone 150 milliGRAM(s) Oral at bedtime    MEDICATIONS  (PRN):  bisacodyl Suppository 10 milliGRAM(s) Rectal daily PRN Constipation  naloxone Injectable 0.1 milliGRAM(s) IV Push every 3 minutes PRN For ANY of the following changes in patient status:  A. RR LESS THAN 10 breaths per minute, B. Oxygen saturation LESS THAN 90%, C. Sedation score of 6  ondansetron Injectable 4 milliGRAM(s) IV Push every 6 hours PRN Nausea  oxyCODONE    IR 5 milliGRAM(s) Oral every 4 hours PRN Moderate Pain (4 - 6)  oxyCODONE    IR 10 milliGRAM(s) Oral every 4 hours PRN Severe Pain (7 - 10)      LABS:                        11.8   13.33 )-----------( 382      ( 14 Jan 2020 07:25 )             36.9     01-14    138  |  100  |  8   ----------------------------<  128<H>  3.8   |  26  |  0.55    Ca    8.8      14 Jan 2020 12:43    TPro  5.5<L>  /  Alb  2.7<L>  /  TBili  0.3  /  DBili  x   /  AST  11  /  ALT  18  /  AlkPhos  98  01-14    PTT - ( 14 Jan 2020 21:07 )  PTT:86.9 sec

## 2020-01-15 NOTE — CONSULT NOTE ADULT - ATTENDING COMMENTS
Initiate telemetry monitoring to assess for arrythmia  Echo without significant findings, she had good exercise tolerance prior to her event.    She may proceed with planned vascular surgery without any additional cardiac testing.  Encourage PO hydration  Neuro followup for A/C safety    [employer] App [id number]
Subacute right MCA  infarction with hemorrhagic conversion ; no new areas of ischemia or infarction upon review of recent CT and MRI  Therefore if , a vascular bypass procedures is limb saving, no objection to proceeding with surgery with caution. Try to avoid heparin bolus. Ct head if change in neurological status
Seen and examined with resident. Agree with note.   48yo Female admitted for PAD s/p right popliteal-tibial bypass and thrombectomy with planned right 4th toe amputation 1/17, now with gait instability, ADL impairments and functional impairments.   Patient will need subacute rehabilitation when stable.

## 2020-01-15 NOTE — DISCHARGE NOTE PROVIDER - HOSPITAL COURSE
12/30 - Mrs. Pickett is a 50 y/o woman w/ history of chronic smoker, PTSD/depression, spinal disc herniation, GERD, gastric bypass (2004), ex lap for SBO (9 yrs ago) and recent CVA, who is transferred from [place] for severe PVD.        Patient has a history consistent with ischemic rest pain. She has pain at night which gets relieved by hanging her feet down off at the side. She also reports symptoms consistent with vascular claudications of legs even with short ambulation, despite being able to ambulate without any assistance devices. She reports sharp burning pain that is severe (8~10/10 when it is bad) that shoots up from her toes to the thigh area below the knee. This has been ongoing issue, yet got worse lately. The right foot pain is worse on 4th toe which has discoloration to ashy/dusky color, and also on distal plantar surface. Also has left leg pain similar to right yet less in intensity. Since recent stroke and residual left sided weakness, she has been more reliant on right side for functioning, and the foot pain has been getting worse with movements.    Patient states that prior to the recent admission for CVA, patient did not have trouble with the ambulation in terms of muscle strength, paresthesia, or numbness. However, now, she has severe pain with paresthesia.    At [facility], dana was evaluated by vascular surgery, and an ROSA MARIA was 0.4, correlating w/ severe PAD. Vascular surgery recommended transfer to [place] for angiogram and possible stenting or other vascular procedures, and patient is now transferred to [facility].        Of note, patient had recent CVA. She presented to [facility] on 12/17/19 with left sided weakness, right gaze preference, and facial droop, found to have Right M1 occlusion and Left carotid artery occlusion. She developed basal ganglia hemorrhagic conversion, but was stable. Pt was evaluated by PT and was recommended for acute inpatient rehabilitation when stable. Pt was deemed stable for discharge, and she was transferred to [facility] on 12/24/19 for acute inpatient rehabilitation.        Upon admission, patient is hemodynamically stable.    Temp 36.8, HR 74, /80, RR 18, SpO2 99% on RA.    She continues to report the foot pain, yet is not too severe at the moment.  She has been also constipated, which gives her abdominal discomfort. Last BM today. Otherwise, denies CP, palpitation, SOB, lightheadedness, abdominal pain, melena/hematochezia, n/v/d, dysuria/hematuria, fever, chills.        IMAGING    CTA: Obtained showing IMPRESSION: Occlusion of the right popliteal artery below the knee with poor runoff.     CT HEAD: An evolving right MCA territory infarct with hemorrhagic transformation is again noted as described. If the patient has a new and persistent neurologic deficit, consider short interval follow-up head CT or brain MRI follow-up.        CT angiogram reviewed:  Due to recent hemorrhagic stroke, therapeutic AC and angiogram are contraindicated at this time. Pt has signals b/l LE, limbs are currently not threatened at this time.         Physical therapy/OT was consulted and recommended acute rehab when patient medically cleared for discharge.        1/1- Swallow eval performed, recommending MBS.        1/2- Neurology consulted, recommended MRI Brain- Reidentified are hemorrhagic blood products within the right caudate and right globus pallidus and right putamen, compatible with known hemorrhagic transformation of a right MCA infarct. Extensive T2/FLAIR hyperintense signal extending from the right frontal lobe involving the right basal ganglia and the right anterior/mid temporal lobe, compatible with chronic infarct. No significant mass effect midline shift or herniation pattern. No hydrocephalus. There is a tiny focus of DWI hyperintensity signal within the cortex of the left posterior temporal lobe, without definite corresponding ADC hypointensity, suggestive of a tiny subacute to chronic cortical infarct. There is loss of the signal flow void within the entire visualized portion of the left intracranial internal carotid artery, suggestive of proximal occlusion, and confirmation may be obtained with MRA brain and MRA carotid.         1/3- MBS performed- Dsyphagia 3, nectar diet recommended        1/4- PODS consulted for R 4th toe wound, No podiatric surgical intervention at this time.        1/6- From neurovascular standpoint, no contraindication to anticoagulation as appropriate by vascular surgery team.        1/7 - Patient underwent IR Right lower angiogram via left CFA approach - 4 [place] sheath demonstrates no sig stenosis in aortoiliac system.  Right lower extremity - CFA, DFA and SFA without stenosis.  Occlusion of popliteal artery at knee joint.  Segmental filling of tibial arteries via collaterals with PT reconstituted approx 5-6 cm above ankle in continuiy with plantar artery into foot.  Rec pop to PT bypass.         1/8- Cardiology consulted for clearance/optimization for planned bypass.  Patient started on hep gtt for non-dopplerable pulses.   Patient requesting to try soft diet, will advance diet to Soft ; c/w aspiration precautions.  OR planning for bypass 1/13.        1/13- Patient underwent R. pop- PT bypass. The patient tolerated the procedure well without complications, was extubated, and transferred to the PACU in stable condition. In the PACU, the patients' pain was controlled, vitals stable.  The patient was transferred to the surgical floor in stable condition.         1/15- Planning for right foot 4th toe amputation with PODs 1/17. 12/30 - Mrs. Pickett is a 48 y/o woman w/ history of chronic smoker, PTSD/depression, spinal disc herniation, GERD, gastric bypass (2004), ex lap for SBO (9 yrs ago) and recent CVA, who is transferred from [place] for severe PVD.        Patient has a history consistent with ischemic rest pain. She has pain at night which gets relieved by hanging her feet down off at the side. She also reports symptoms consistent with vascular claudications of legs even with short ambulation, despite being able to ambulate without any assistance devices. She reports sharp burning pain that is severe (8~10/10 when it is bad) that shoots up from her toes to the thigh area below the knee. This has been ongoing issue, yet got worse lately. The right foot pain is worse on 4th toe which has discoloration to ashy/dusky color, and also on distal plantar surface. Also has left leg pain similar to right yet less in intensity. Since recent stroke and residual left sided weakness, she has been more reliant on right side for functioning, and the foot pain has been getting worse with movements.    Patient states that prior to the recent admission for CVA, patient did not have trouble with the ambulation in terms of muscle strength, paresthesia, or numbness. However, now, she has severe pain with paresthesia.    At [facility], dana was evaluated by vascular surgery, and an ROSA MARIA was 0.4, correlating w/ severe PAD. Vascular surgery recommended transfer to [place] for angiogram and possible stenting or other vascular procedures, and patient is now transferred to [facility].        Of note, patient had recent CVA. She presented to [facility] on 12/17/19 with left sided weakness, right gaze preference, and facial droop, found to have Right M1 occlusion and Left carotid artery occlusion. She developed basal ganglia hemorrhagic conversion, but was stable. Pt was evaluated by PT and was recommended for acute inpatient rehabilitation when stable. Pt was deemed stable for discharge, and she was transferred to [facility] on 12/24/19 for acute inpatient rehabilitation.        Upon admission, patient is hemodynamically stable.    Temp 36.8, HR 74, /80, RR 18, SpO2 99% on RA.    She continues to report the foot pain, yet is not too severe at the moment.  She has been also constipated, which gives her abdominal discomfort. Last BM today. Otherwise, denies CP, palpitation, SOB, lightheadedness, abdominal pain, melena/hematochezia, n/v/d, dysuria/hematuria, fever, chills.        IMAGING    CTA: Obtained showing IMPRESSION: Occlusion of the right popliteal artery below the knee with poor runoff.     CT HEAD: An evolving right MCA territory infarct with hemorrhagic transformation is again noted as described. If the patient has a new and persistent neurologic deficit, consider short interval follow-up head CT or brain MRI follow-up.        CT angiogram reviewed:  Due to recent hemorrhagic stroke, therapeutic AC and angiogram are contraindicated at this time. Pt has signals b/l LE, limbs are currently not threatened at this time.         Physical therapy/OT was consulted and recommended acute rehab when patient medically cleared for discharge.        1/1- Swallow eval performed, recommending MBS.        1/2- Neurology consulted, recommended MRI Brain- Reidentified are hemorrhagic blood products within the right caudate and right globus pallidus and right putamen, compatible with known hemorrhagic transformation of a right MCA infarct. Extensive T2/FLAIR hyperintense signal extending from the right frontal lobe involving the right basal ganglia and the right anterior/mid temporal lobe, compatible with chronic infarct. No significant mass effect midline shift or herniation pattern. No hydrocephalus. There is a tiny focus of DWI hyperintensity signal within the cortex of the left posterior temporal lobe, without definite corresponding ADC hypointensity, suggestive of a tiny subacute to chronic cortical infarct. There is loss of the signal flow void within the entire visualized portion of the left intracranial internal carotid artery, suggestive of proximal occlusion, and confirmation may be obtained with MRA brain and MRA carotid.         1/3- MBS performed- Dsyphagia 3, nectar diet recommended        1/4- PODS consulted for R 4th toe wound, No podiatric surgical intervention at this time.        1/6- From neurovascular standpoint, no contraindication to anticoagulation as appropriate by vascular surgery team.        1/7 - Patient underwent IR Right lower angiogram via left CFA approach - 4 [place] sheath demonstrates no sig stenosis in aortoiliac system.  Right lower extremity - CFA, DFA and SFA without stenosis.  Occlusion of popliteal artery at knee joint.  Segmental filling of tibial arteries via collaterals with PT reconstituted approx 5-6 cm above ankle in continuiy with plantar artery into foot.  Rec pop to PT bypass.         1/8- Cardiology consulted for clearance/optimization for planned bypass.  Patient started on hep gtt for non-dopplerable pulses.   Patient requesting to try soft diet, will advance diet to Soft ; c/w aspiration precautions.  OR planning for bypass 1/13.        1/13- Patient underwent R. pop- PT bypass. The patient tolerated the procedure well without complications, was extubated, and transferred to the PACU in stable condition. In the PACU, the patients' pain was controlled, vitals stable.  The patient was transferred to the surgical floor in stable condition.         1/15- Planning for right foot 4th toe amputation with PODs 1/17.    1/17- LOREN drain removed. CTH performed (rec by [facility] cards) revealed normal temporal evolution of recent extensive R MCA territory infarct, no acute intracranial hemorrhage, mass effect, shift or effacement of the basal cisterns. Pt underwent amputation of R 4th toe. Pt tolerated the procedure well. Hep gtt resumed.     1/18- Hep gtt transitioned to Xarelto (per [facility] neuro recs).    1/21 - Per [facility] Cards Dr. Trammell, Sydnee patch placed for paroxysmal tachycardia prior to discharge.         PT recommended [facility]. Pt continued to do well on the floor. Pt currenlty hemodynamically stable for discharge to [facility]. Pt instructed to follow up with Dr. Chinchilla (Podiatry), Dr. Santos, Dr. Garnett 12/30 - a 50 y/o woman w/ history of chronic smoker, PTSD/depression, spinal disc herniation, GERD, gastric bypass (2004), ex lap for SBO (9 yrs ago) and recent CVA, who is transferred from [place] for severe PVD.        Patient has a history consistent with ischemic rest pain. She has pain at night which gets relieved by hanging her feet down off at the side. She also reports symptoms consistent with vascular claudications of legs even with short ambulation, despite being able to ambulate without any assistance devices. She reports sharp burning pain that is severe (8~10/10 when it is bad) that shoots up from her toes to the thigh area below the knee. This has been ongoing issue, yet got worse lately. The right foot pain is worse on 4th toe which has discoloration to ashy/dusky color, and also on distal plantar surface. Also has left leg pain similar to right yet less in intensity. Since recent stroke and residual left sided weakness, she has been more reliant on right side for functioning, and the foot pain has been getting worse with movements.    Patient states that prior to the recent admission for CVA, patient did not have trouble with the ambulation in terms of muscle strength, paresthesia, or numbness. However, now, she has severe pain with paresthesia.    At [facility], dana was evaluated by vascular surgery, and an ROSA MARIA was 0.4, correlating w/ severe PAD. Vascular surgery recommended transfer to [place] for angiogram and possible stenting or other vascular procedures, and patient is now transferred to [facility].        Of note, patient had recent CVA. She presented to [facility] on 12/17/19 with left sided weakness, right gaze preference, and facial droop, found to have Right M1 occlusion and Left carotid artery occlusion. She developed basal ganglia hemorrhagic conversion, but was stable. Pt was evaluated by PT and was recommended for acute inpatient rehabilitation when stable. Pt was deemed stable for discharge, and she was transferred to [facility] on 12/24/19 for acute inpatient rehabilitation.        Upon admission, patient is hemodynamically stable.    Temp 36.8, HR 74, /80, RR 18, SpO2 99% on RA.    She continues to report the foot pain, yet is not too severe at the moment.  She has been also constipated, which gives her abdominal discomfort. Last BM today. Otherwise, denies CP, palpitation, SOB, lightheadedness, abdominal pain, melena/hematochezia, n/v/d, dysuria/hematuria, fever, chills.        IMAGING    CTA: Obtained showing IMPRESSION: Occlusion of the right popliteal artery below the knee with poor runoff.     CT HEAD: An evolving right MCA territory infarct with hemorrhagic transformation is again noted as described. If the patient has a new and persistent neurologic deficit, consider short interval follow-up head CT or brain MRI follow-up.        CT angiogram reviewed:  Due to recent hemorrhagic stroke, therapeutic AC and angiogram are contraindicated at this time. Pt has signals b/l LE, limbs are currently not threatened at this time.         Physical therapy/OT was consulted and recommended acute rehab when patient medically cleared for discharge.        1/1- Swallow eval performed, recommending MBS.        1/2- Neurology consulted, recommended MRI Brain- Reidentified are hemorrhagic blood products within the right caudate and right globus pallidus and right putamen, compatible with known hemorrhagic transformation of a right MCA infarct. Extensive T2/FLAIR hyperintense signal extending from the right frontal lobe involving the right basal ganglia and the right anterior/mid temporal lobe, compatible with chronic infarct. No significant mass effect midline shift or herniation pattern. No hydrocephalus. There is a tiny focus of DWI hyperintensity signal within the cortex of the left posterior temporal lobe, without definite corresponding ADC hypointensity, suggestive of a tiny subacute to chronic cortical infarct. There is loss of the signal flow void within the entire visualized portion of the left intracranial internal carotid artery, suggestive of proximal occlusion, and confirmation may be obtained with MRA brain and MRA carotid.         1/3- MBS performed- Dsyphagia 3, nectar diet recommended        1/4- PODS consulted for R 4th toe wound, No podiatric surgical intervention at this time.        1/6- From neurovascular standpoint, no contraindication to anticoagulation as appropriate by vascular surgery team.        1/7 - Patient underwent IR Right lower angiogram via left CFA approach - 4 [place] sheath demonstrates no sig stenosis in aortoiliac system.  Right lower extremity - CFA, DFA and SFA without stenosis.  Occlusion of popliteal artery at knee joint.  Segmental filling of tibial arteries via collaterals with PT reconstituted approx 5-6 cm above ankle in continuiy with plantar artery into foot.  Rec pop to PT bypass.         1/8- Cardiology consulted for clearance/optimization for planned bypass.  Patient started on hep gtt for non-dopplerable pulses.   Patient requesting to try soft diet, will advance diet to Soft ; c/w aspiration precautions.  OR planning for bypass 1/13.        1/13- Patient underwent R. pop- PT bypass. The patient tolerated the procedure well without complications, was extubated, and transferred to the PACU in stable condition. In the PACU, the patients' pain was controlled, vitals stable.  The patient was transferred to the surgical floor in stable condition.         1/15- Planning for right foot 4th toe amputation with PODs 1/17.  PMNR consulted for acute rehab, and changed recommendation to [facility].    1/17- LOREN drain removed. CTH performed (rec by [facility] cards) revealed normal temporal evolution of recent extensive R MCA territory infarct, no acute intracranial hemorrhage, mass effect, shift or effacement of the basal cisterns. Pt underwent amputation of R 4th toe. Pt tolerated the procedure well. Hep gtt resumed.     1/18- Hep gtt transitioned to Xarelto (per [facility] neuro recs).      1/21 - Per [facility] Cards Dr. Trammell, Sydnee patch placed for paroxysmal tachycardia prior to discharge.         PT recommended [facility]. Pt continued to do well on the floor. Pt currenlty hemodynamically stable for discharge to [facility]. Pt instructed to follow up with Dr. Chinchilla (Podiatry), Dr. Santos, Dr. Garnett

## 2020-01-15 NOTE — PROGRESS NOTE ADULT - SUBJECTIVE AND OBJECTIVE BOX
Day 2 of Anesthesia Pain Management Service    SUBJECTIVE: Doing well    Pain Scale Score:	[X] Refer to charted pain scores    THERAPY:    [ ] IV PCA Morphine		        [ ] 5 mg/mL	[ ] 1 mg/mL  [X] IV PCA Hydromorphone	[ ] 5 mg/mL	[X] 1 mg/mL  [ ] IV PCA Fentanyl		        [ ] 50 micrograms/mL    Demand dose: 0.2 mg     Lockout: 6 minutes   Continuous Rate: 0 mg/hr  4 Hour Limit: 4 mg    MEDICATIONS  (STANDING):  acetaminophen   Tablet .. 650 milliGRAM(s) Oral every 6 hours  aspirin  chewable 81 milliGRAM(s) Oral daily  atorvastatin 80 milliGRAM(s) Oral at bedtime  cyanocobalamin 1000 MICROGram(s) Oral daily  FLUoxetine 20 milliGRAM(s) Oral daily  folic acid 1 milliGRAM(s) Oral daily  gabapentin 300 milliGRAM(s) Oral three times a day  heparin  Infusion 1100 Unit(s)/Hr (13 mL/Hr) IV Continuous <Continuous>  influenza   Vaccine 0.5 milliLiter(s) IntraMuscular once  metoprolol succinate ER 12.5 milliGRAM(s) Oral daily  multivitamin 1 Tablet(s) Oral daily  nicotine -   7 mG/24Hr(s) Patch 1 patch Transdermal daily  pantoprazole    Tablet 40 milliGRAM(s) Oral before breakfast  polyethylene glycol 3350 17 Gram(s) Oral daily  thiamine 100 milliGRAM(s) Oral daily  traZODone 150 milliGRAM(s) Oral at bedtime    MEDICATIONS  (PRN):  bisacodyl Suppository 10 milliGRAM(s) Rectal daily PRN Constipation  naloxone Injectable 0.1 milliGRAM(s) IV Push every 3 minutes PRN For ANY of the following changes in patient status:  A. RR LESS THAN 10 breaths per minute, B. Oxygen saturation LESS THAN 90%, C. Sedation score of 6  ondansetron Injectable 4 milliGRAM(s) IV Push every 6 hours PRN Nausea  oxyCODONE    IR 5 milliGRAM(s) Oral every 4 hours PRN Moderate Pain (4 - 6)  oxyCODONE    IR 10 milliGRAM(s) Oral every 4 hours PRN Severe Pain (7 - 10)      OBJECTIVE:    Sedation Score:	[ X] Alert	 [ ] Drowsy 	[ ] Arousable	[ ] Asleep	[ ] Unresponsive    Side Effects:	[X ] None	[ ] Nausea	[ ] Vomiting	[ ] Pruritus  		[ ] Other:    Vital Signs Last 24 Hrs  T(C): 36.6 (15 Jm 2020 05:38), Max: 37.1 (14 Jan 2020 13:50)  T(F): 97.8 (15 Jm 2020 05:38), Max: 98.7 (14 Jan 2020 13:50)  HR: 73 (15 Jm 2020 05:38) (73 - 87)  BP: 139/88 (15 Jm 2020 05:38) (116/74 - 139/88)  BP(mean): --  RR: 17 (15 Jm 2020 05:38) (16 - 18)  SpO2: 97% (15 Jm 2020 05:38) (94% - 97%)    ASSESSMENT/ PLAN    Therapy to  be:               [  ] Continued   [X ] Discontinued   [ X] Changed to PRN Analgesics    Documentation and Verification of current medications:   [X] Done	[ ] Not done, not eligible    Comments: PCA D\C'd by surgical service

## 2020-01-15 NOTE — DISCHARGE NOTE PROVIDER - NSDCACTIVITY_GEN_ALL_CORE
Walking - Indoors allowed/No heavy lifting/straining/Showering allowed/Walking - Outdoors allowed/Stairs allowed No heavy lifting/straining/Walking - Outdoors allowed/Do not make important decisions/Stairs allowed/Do not drive or operate machinery/Showering allowed/Walking - Indoors allowed

## 2020-01-15 NOTE — DISCHARGE NOTE PROVIDER - NSDCMRMEDTOKEN_GEN_ALL_CORE_FT
acetaminophen 325 mg oral tablet: 2 tab(s) orally every 6 hours, As needed, Temp greater or equal to 38C (100.4F), Mild Pain (1 - 3)  aspirin 81 mg oral tablet, chewable: 1 tab(s) orally once a day  atorvastatin 40 mg oral tablet: 1 tab(s) orally once a day (at bedtime)  bisacodyl 10 mg rectal suppository: 1 suppository(ies) rectal once a day, As needed, Constipation  cyanocobalamin 1000 mcg oral tablet: 1 tab(s) orally once a day  enalapril 10 mg oral tablet: 1 tab(s) orally 2 times a day  enoxaparin: 40 milligram(s) subcutaneously once a day  FLUoxetine 20 mg oral capsule: 1 cap(s) orally once a day  folic acid 1 mg oral tablet: 1 tab(s) orally once a day  gabapentin 100 mg oral capsule: 1 cap(s) orally every 8 hours  lidocaine 2% topical gel with applicator: 1 application topically 2 times a day  nystatin 100,000 units/g topical cream: 1 application topically 2 times a day  oxycodone-acetaminophen 5 mg-325 mg oral tablet: 1 tab(s) orally every 4 hours, As needed, Moderate Pain (4 - 6)  pantoprazole 40 mg oral delayed release tablet: 1 tab(s) orally once a day (before a meal)  traZODone 150 mg oral tablet: 1 tab(s) orally once a day (at bedtime) acetaminophen 325 mg oral tablet: 3 tab(s) orally every 6 hours, As Needed - 3)  aspirin 81 mg oral tablet, chewable: 1 tab(s) orally once a day  atorvastatin 40 mg oral tablet: 1 tab(s) orally once a day (at bedtime)  bisacodyl 10 mg rectal suppository: 1 suppository(ies) rectal once a day, As needed, Constipation  cyanocobalamin 1000 mcg oral tablet: 1 tab(s) orally once a day  enalapril 10 mg oral tablet: 1 tab(s) orally 2 times a day  FLUoxetine 20 mg oral capsule: 1 cap(s) orally once a day  folic acid 1 mg oral tablet: 1 tab(s) orally once a day  gabapentin 100 mg oral capsule: 1 cap(s) orally every 8 hours  Multiple Vitamins oral tablet: 1 tab(s) orally once a day  nicotine 7 mg/24 hr transdermal film, extended release: 1 patch transdermal once a day  nystatin 100,000 units/g topical cream: 1 application topically 2 times a day  oxyCODONE 5 mg oral tablet: 1 tab(s) orally every 4 hours, As needed, Moderate Pain (4 - 6)  pantoprazole 40 mg oral delayed release tablet: 1 tab(s) orally once a day (before a meal)  polyethylene glycol 3350 oral powder for reconstitution: 17 gram(s) orally once a day  thiamine 100 mg oral tablet: 1 tab(s) orally once a day  Toprol-XL: 12.5 milligram(s) orally once a day  traZODone 150 mg oral tablet: 1 tab(s) orally once a day (at bedtime)

## 2020-01-15 NOTE — DISCHARGE NOTE PROVIDER - CARE PROVIDERS DIRECT ADDRESSES
,[email or web address],DirectAddress_Unknown,[email or web address]

## 2020-01-15 NOTE — CONSULT NOTE ADULT - ASSESSMENT
ASSESSMENT/PLAN    49y Female with functional deficits after    Pain - Tylenol  DVT PPX - SCDs  Rehab -   Continue bedside therapy as well as OOB throughout the day with mobilization by staff to maintain cardiopulmonary function and prevention of secondary complications related to debility.     Will continue to follow for ongoing rehab needs and recommendations. . Functional progress will determine ongoing rehab dispo recommendations, which may change. IMTIAZ TYSON is a 48yo Female admitted for PAD s/p right popliteal-tibial bypass and thrombectomy with planned right 4th toe amputation 1/17, now with gait instability, ADL impairments and functional impairments.     Pain - increase gabapentin dosing progressively as tolerated  Bowel - No BM in 4 days. Start senna qhs and consider switching bisacodyl suppository to daily.    Rehab:  - Recommend IRON, patient DOES NOT meet acute inpatient rehabilitation criteria as she would not tolerate 3hrs of therapy daily.  - Continue bedside therapy as well as OOB throughout the day with mobilization with staff to maintain cardiopulmonary function and prevention of secondary complications related to debility.

## 2020-01-15 NOTE — PROGRESS NOTE ADULT - SUBJECTIVE AND OBJECTIVE BOX
Vascular Cardiology Consult Note     [facility] [id number]              EMAIL [email or web address]   OFFICE [phone]    CC:  Severe PAD    Interval Events: No PAT on telemetry overnight. Denies C/P or SOB. Incisional site discomfort controlled. Breathing well.  No palpitations.      Allergies  No Known Allergies    MEDICATIONS:  aspirin  chewable 81 milliGRAM(s) Oral daily  heparin  Infusion 1100 Unit(s)/Hr IV Continuous <Continuous>  metoprolol succinate ER 12.5 milliGRAM(s) Oral daily  acetaminophen   Tablet .. 650 milliGRAM(s) Oral every 6 hours  FLUoxetine 20 milliGRAM(s) Oral daily  gabapentin 300 milliGRAM(s) Oral three times a day  ondansetron Injectable 4 milliGRAM(s) IV Push every 6 hours PRN  oxyCODONE    IR 5 milliGRAM(s) Oral every 4 hours PRN  oxyCODONE    IR 10 milliGRAM(s) Oral every 4 hours PRN  traZODone 150 milliGRAM(s) Oral at bedtime  bisacodyl Suppository 10 milliGRAM(s) Rectal daily PRN  pantoprazole    Tablet 40 milliGRAM(s) Oral before breakfast  polyethylene glycol 3350 17 Gram(s) Oral daily  atorvastatin 80 milliGRAM(s) Oral at bedtime  cyanocobalamin 1000 MICROGram(s) Oral daily  folic acid 1 milliGRAM(s) Oral daily  influenza   Vaccine 0.5 milliLiter(s) IntraMuscular once  multivitamin 1 Tablet(s) Oral daily  thiamine 100 milliGRAM(s) Oral daily    PAST MEDICAL & SURGICAL HISTORY:  Chronic insomnia  PTSD (post-traumatic stress disorder)  ETOH abuse  Anxiety  HTN (hypertension)  Depression  Tubal Ligation Status  Status Post Gastric Bypass for Obesity  Status Post Laparoscopic Cholecystectomy    FAMILY HISTORY:  No pertinent family history in first degree relatives    SOCIAL HISTORY:  unchanged    REVIEW OF SYSTEMS:  CONSTITUTIONAL: No fever  EYES: No eye pain, visual disturbances,  ENT:   No sinus or throat pain  NECK: No pain or stiffness  RESPIRATORY: No SOB  CARDIOVASCULAR: No C/P   GASTROINTESTINAL: No abdominal or epigastric pain. No nausea, vomiting, or hematemesis;  No melena or hematochezia.  GENITOURINARY: No dysuria, hematuria  NEUROLOGICAL: L sided facial droop and L UE/ LE weakness noted  SKIN: wound vac and LOREN drain in place to R LE procedure site   LYMPH Nodes: No enlarged glands  ENDOCRINE: No heat or cold intolerance  MUSCULOSKELETAL: Tolerated R LE incisional discomfort   PSYCHIATRIC: No depression, anxiety  HEME/LYMPH: No easy bruising, or bleeding gums  ALLERGY AND IMMUNOLOGIC: No hives or eczema	    [ x] All others negative	    PHYSICAL EXAM:  T(C): 36.9 (01-15-20 @ 09:29), Max: 37.1 (01-14-20 @ 13:50)  HR: 72 (01-15-20 @ 09:29) (72 - 87)  BP: 139/83 (01-15-20 @ 09:29) (116/74 - 139/88)  RR: 18 (01-15-20 @ 09:29) (16 - 18)  SpO2: 97% (01-15-20 @ 09:29) (94% - 97%)  I&O's Summary    14 Jan 2020 07:01  -  15 Jm 2020 07:00  --------------------------------------------------------  IN: 1868 mL / OUT: 1640 mL / NET: 228 mL    15 Jm 2020 07:01  -  15 Jm 2020 11:38  --------------------------------------------------------  IN: 240 mL / OUT: 0 mL / NET: 240 mL    Appearance: NAD  HEENT:   Normal oral mucosa	  Cardiovascular: RRR, S1 and S2   Respiratory: CTA B/L 	  Psychiatry:  AAO x 3  Gastrointestinal:  Soft, Non-tender, + BS	  Skin: No rashes  Neurologic: L sided facial droop and L UE/ LE weakness noted in setting of recent CVA  Extremities:  wound vac and LOREN drain in place to R LE medical incision site, dressing intact  R 4th toe gangrene noted    Vascular Pulse Exam: Doppler R DP and PT    LABS:	 	    CBC Full  -  ( 15 Jm 2020 08:20 )  WBC Count : 12.36 K/uL  Hemoglobin : 10.5 g/dL  Hematocrit : 33.4 %  Platelet Count - Automated : 348 K/uL  Mean Cell Volume : 102.1 fl  Mean Cell Hemoglobin : 32.1 pg  Mean Cell Hemoglobin Concentration : 31.4 gm/dL  Auto Neutrophil # : x  Auto Lymphocyte # : x  Auto Monocyte # : x  Auto Eosinophil # : x  Auto Basophil # : x  Auto Neutrophil % : x  Auto Lymphocyte % : x  Auto Monocyte % : x  Auto Eosinophil % : x  Auto Basophil % : x    01-15    139  |  101  |  8   ----------------------------<  102<H>  3.6   |  28  |  0.51  01-14    138  |  100  |  8   ----------------------------<  128<H>  3.8   |  26  |  0.55    Ca    8.9      15 Jm 2020 08:20  Ca    8.8      14 Jan 2020 12:43  Mg     1.9     01-15    TPro  5.5<L>  /  Alb  2.7<L>  /  TBili  0.3  /  DBili  x   /  AST  11  /  ALT  18  /  AlkPhos  98  01-14      Assessment:  1. Severe PAD and R 4th Toe Necrosis s/p R Popliteal-Tibial Bypass 1/13/20      - Hemodynamically Stable      - Podiatry planning on tentative 4th R toe amputation on this Friday  2.  R MCA CVA with hemorrhagic conversion in 12/2019   3. Hyperdynamic EF      - S/p IV Fluid Hydration  4. 3 seconds of PAT 1/14/20       - currently in SR, started on BB, no PAT overnight     Plan:  1. Doing well post procedure.  2. Continue Heparin gtt.  3. No further cardiac testing required prior to Podiatry procedure this Friday.  4. Continue Toprol XL 12.5mg daily  5. Transition to Xarelto 20mg when appropriate from vascular surgery and podiatry perspective.      Thank you  VESTA Ferris  Vascular Cardiology Service  Please call with any questions:   S[facility] - [phone]  Office [phone]  email:  [email or web address]

## 2020-01-15 NOTE — PROGRESS NOTE ADULT - ASSESSMENT
Assessment: 49y F s/p pop-PT bypass   50 y/o woman w/ history of PTSD/depression, spinal disc herniation, GERD, gastric bypass (2004), ex lap for SBO and recent CVA, who was transferred from [place] for severe PVD and ischemic toe, now s/p popliteal-posterior tibial bypass with vein, recovering well    Plan:  - d/c PCA  - continue with heparin drip; start Xarelto before discharge  - monitor drain output  - f/u PM&R re acute reheb  - continue with regular diet

## 2020-01-15 NOTE — PROGRESS NOTE ADULT - ASSESSMENT
48 y/o woman w/ history of chronic smoker, PTSD/depression, spinal disc herniation, GERD, gastric bypass (2004), ex lap for SBO (9 yrs ago) and recent CVA, who was transferred from [place] for severe PVD and ischemic toe.

## 2020-01-15 NOTE — PROGRESS NOTE ADULT - SUBJECTIVE AND OBJECTIVE BOX
Podiatry pager #: [facility] [phone]/ [phone]    Patient is a 49y old  Female who presents with a chief complaint of Severe PVD (14 Jan 2020 15:37)       INTERVAL HPI/OVERNIGHT EVENTS:  Patient seen and evaluated at bedside.  Pt is resting comfortable in NAD. Denies N/V/F/C.      Allergies    No Known Allergies    Intolerances        Vital Signs Last 24 Hrs  T(C): 36.9 (14 Jan 2020 17:07), Max: 37.1 (14 Jan 2020 13:50)  T(F): 98.4 (14 Jan 2020 17:07), Max: 98.7 (14 Jan 2020 13:50)  HR: 84 (14 Jan 2020 17:07) (74 - 89)  BP: 116/74 (14 Jan 2020 17:20) (113/72 - 156/98)  BP(mean): --  RR: 17 (14 Jan 2020 17:07) (16 - 18)  SpO2: 96% (14 Jan 2020 17:07) (94% - 97%)    LABS:                        11.8   13.33 )-----------( 382      ( 14 Jan 2020 07:25 )             36.9     01-14    138  |  100  |  8   ----------------------------<  128<H>  3.8   |  26  |  0.55    Ca    8.8      14 Jan 2020 12:43    TPro  5.5<L>  /  Alb  2.7<L>  /  TBili  0.3  /  DBili  x   /  AST  11  /  ALT  18  /  AlkPhos  98  01-14    PTT - ( 14 Jan 2020 12:43 )  PTT:52.3 sec    CAPILLARY BLOOD GLUCOSE          Lower Extremity Physical Exam:  Vascular: DP/PT 2/4 on L, nonpalpable on R, CFT <5 seconds B/L (no CFT on RF 4th digit), Temperature gradient warm to cool B/L  Neuro: Epicritic sensation intact to the level of toes B/L  Skin: right foot 4th digit dry gangrene, no erythema or edema, no malodor, no signs of infection. Small plantar hemorrhagic blisters noted with no signs of infection.       RADIOLOGY & ADDITIONAL TESTS:

## 2020-01-15 NOTE — CONSULT NOTE ADULT - SUBJECTIVE AND OBJECTIVE BOX
HPI:  Mrs. Pickett is a 50 y/o woman w/ history of chronic smoker, PTSD/depression, spinal disc herniation, GERD, gastric bypass (2004), ex lap for SBO (9 yrs ago) and recent CVA, who is transferred from [place] to [facility] on 12/30 for severe PVD. Patient has a history consistent with ischemic rest pain. She has pain at night which gets relieved by hanging her feet down off at the side. She also reported symptoms consistent with vascular claudications of legs even with short ambulation, despite being able to ambulate without any assistance devices. She reported sharp burning pain that is severe (8~10/10 when it is bad) that shoots up from her toes to the thigh area below the knee. The right foot pain is worse on 4th toe which has discoloration to ashy/dusky color, and also on distal plantar surface. Also has left leg pain similar to right yet less in intensity. Since recent stroke and residual left sided weakness, she has been more reliant on right side for functioning, and the foot pain has been getting worse with movements.    Patient noted to have recent CVA. She had initially presented to [facility] on 12/17/19 with left sided weakness, right gaze preference, and facial droop, found to have Right M1 occlusion and Left carotid artery occlusion. She developed basal ganglia hemorrhagic conversion, but was stable. Pt was eventually transferred to [facility] on 12/24/19 for acute inpatient rehabilitation.    At [facility], patient was evaluated by vascular surgery, and an ROSA MARIA was 0.4, correlating w/ severe PAD. Vascular surgery recommended transfer to [place] for further management. Patient transferred to [facility] on 12/30. Vascuar surgery was consulted and recommended CT angio of LE which showed occlusion of the right popliteal artery below the knee. As per vascular, therapeutic AC and angiogram are contraindicated at this time due to recent hemorrhagic stroke. They say bilateral LE pulse signals present and limbs are currently not threatened and recommended reconsulting when patient is able to be put back on full AC.  Neurology also following patient for recent CVA with hemorrhagic transformation. Pt recommended to continue on ASA and statin. MRI brain showed known hemorrhagic transformation of a right MCA infarct. Chronic infarcts involving the right basal ganglia and the right anterior/mid temporal lobe. No significant mass effect midline shift. Tiny subacute to chronic cortical infarct  cortex of the left posterior temporal lobe. Neurology initially recommended no objection to proceeding with any vascular procedure as long as heparin bolus can be avoided. Neurology later recommended no contraindication to anticoagulation.   Patient is s/p Aortogram, pelvic and right lower extremity angiogram demonstrating occlusion of popliteal artery at the knee joint, segmental filling of  tibial arteries via collaterals with PT reconstituted approx 5-6 cm above ankle in continuity with plantar artery into foot on 1/7. Pt was eventually started on heparin drip after neurology clearance. Pt is s/p RLE popliteal-tibial bypass and thrombectomy on 1/13. Heparin drip was restarted and pt was recommended to start Xarelto prior to discharge.   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 Pt is planned for right foot 4th toe amputation on 1/17. Hospital course complicated by RUE numbness. Doppler was negative.   Bedside swallow assessment was completed and pt to continue to dysphagia 1 nectar diet. MBS completed on 1/3 and pt was found to have oropharyngeal dysphagia and was placed on dysphagia 3 and nectar.     REVIEW OF SYSTEMS  Constitutional - No fever, No weight loss, No fatigue  HEENT - No eye pain, No visual disturbances, No difficulty hearing, No tinnitus, No vertigo, No neck pain  Respiratory - No cough, No wheezing, No shortness of breath  Cardiovascular - No chest pain, No palpitations  Gastrointestinal - No abdominal pain, No nausea, No vomiting, No diarrhea, No constipation  Genitourinary - No dysuria, No frequency, No hematuria, No incontinence  Neurological - No headaches, No memory loss, No loss of strength, No numbness, No tremors  Skin - No itching, No rashes, No lesions   Endocrine - No temperature intolerance  Musculoskeletal - No joint pain, No joint swelling, No muscle pain  Psychiatric - No depression, No anxiety    PAST MEDICAL & SURGICAL HISTORY  Chronic insomnia  PTSD (post-traumatic stress disorder)  ETOH abuse  Anxiety  HTN (hypertension)  Depression  Tubal Ligation Status  Status Post Gastric Bypass for Obesity  Status Post Laparoscopic Cholecystectomy      SOCIAL HISTORY  Smoking - every day smoker 0.5 packs per day  EtOH - 1 large can of beer per day   Drugs - Denied    PRIOR FUNCTIONAL HISTORY  Ambulation: independent prior to recent CVA  ADLs: independent prior to recent CVA    Previous Home Equipment:    HOME ENVIRONMENT:  Type of Home: apartment with elevator  Stairs: no stairs; + elevator  Living With: 2 adult daughters  Caregiver's availability:     Special Needs or Precautions:  Weight bearing status: WBAT    FAMILY HISTORY   No pertinent family history in first degree relatives    RECENT LABS/IMAGING                        10.5   12.36 )-----------( 348      ( 15 Jm 2020 08:20 )             33.4     01-15    139  |  101  |  8   ----------------------------<  102<H>  3.6   |  28  |  0.51    Ca    8.9      15 Jm 2020 08:20  Mg     1.9     01-15  TPro  5.5<L>  /  Alb  2.7<L>  /  TBili  0.3  /  DBili  x   /  AST  11  /  ALT  18  /  AlkPhos  98  01-14  PT/INR - ( 15 Jm 2020 08:20 )   PT: 15.8 sec;   INR: 1.36 ratio    PTT - ( 15 Jm 2020 08:20 )  PTT:59.9 sec    < from: CT Angio Abd Aorta w/run-off w/ IV Cont (12.31.19 @ 15:07) >  Occlusion of the right popliteal artery below the knee with poor runoff.     < from: CT Head No Cont (01.01.20 @ 17:46) >  An evolving rightMCA territory infarct with hemorrhagic transformation is again noted as described. If the patient has a new and persistent neurologic deficit, consider short interval follow-up head CT or brain MRI follow-up.    < from: MR Head No Cont (01.02.20 @ 22:28) >  Reidentified are hemorrhagic blood products within the right caudate and right globus pallidus and right putamen, compatible with known hemorrhagic transformation of a right MCA infarct. Extensive T2/FLAIR hyperintense signal extending from the right frontal lobe involving the right basal ganglia and the right anterior/mid temporal lobe, compatible with chronic infarct. No significant mass effect midline shift or herniation pattern. No hydrocephalus. There is a tiny focus of DWI hyperintensity signal within the cortex of the left posterior temporal lobe, without definite corresponding ADC hypointensity, suggestive of a tiny subacute to chronic cortical infarct.   There is loss of the signal flow void within the entire visualized portion of the left intracranial internal carotid artery, suggestive of proximal occlusion, and confirmation maybe obtained with MRA brain and MRA carotid.     < from: VA Duplex Upper Extrem Arterial Limited, Right (01.03.20 @ 16:14) >  No hemodynamically significant occlusion or stenosis is visualized.. Low resistance flow is noted in the right brachial, ulnar and radial arteries of uncertain significance. This may be related to inflammation or hyperemia.    < from: VA Duplex Lower Ext Vein Scan, Bilat (01.10.20 @ 10:49) >  No evidence of DVT or superficial vein thrombosis in either lower extremity.    VITALS  T(C): 36.9 (01-15-20 @ 09:29), Max: 37.1 (01-14-20 @ 13:50)  HR: 72 (01-15-20 @ 09:29) (72 - 87)  BP: 139/83 (01-15-20 @ 09:29) (116/74 - 139/88)  RR: 18 (01-15-20 @ 09:29) (16 - 18)  SpO2: 97% (01-15-20 @ 09:29) (94% - 97%)  Wt(kg): --    ALLERGIES  No Known Allergies      MEDICATIONS   acetaminophen   Tablet .. 650 milliGRAM(s) Oral every 6 hours  aspirin  chewable 81 milliGRAM(s) Oral daily  atorvastatin 80 milliGRAM(s) Oral at bedtime  bisacodyl Suppository 10 milliGRAM(s) Rectal daily PRN  cyanocobalamin 1000 MICROGram(s) Oral daily  FLUoxetine 20 milliGRAM(s) Oral daily  folic acid 1 milliGRAM(s) Oral daily  gabapentin 300 milliGRAM(s) Oral three times a day  heparin  Infusion 1100 Unit(s)/Hr IV Continuous <Continuous>  influenza   Vaccine 0.5 milliLiter(s) IntraMuscular once  metoprolol succinate ER 12.5 milliGRAM(s) Oral daily  multivitamin 1 Tablet(s) Oral daily  naloxone Injectable 0.1 milliGRAM(s) IV Push every 3 minutes PRN  nicotine -   7 mG/24Hr(s) Patch 1 patch Transdermal daily  ondansetron Injectable 4 milliGRAM(s) IV Push every 6 hours PRN  oxyCODONE    IR 5 milliGRAM(s) Oral every 4 hours PRN  oxyCODONE    IR 10 milliGRAM(s) Oral every 4 hours PRN  pantoprazole    Tablet 40 milliGRAM(s) Oral before breakfast  polyethylene glycol 3350 17 Gram(s) Oral daily  thiamine 100 milliGRAM(s) Oral daily  traZODone 150 milliGRAM(s) Oral at bedtime  ----------------------------------------------------------------------------------------  PHYSICAL EXAM      CURRENT FUNCTIONAL STATUS  PT 1/14  Bed Mobility  Bed Mobility Training Sit-to-Supine: maximum assist (25% patient effort)  Bed Mobility Training Supine-to-Sit: maximum assist (25% patient effort)  Bed Mobility Training Limitations: decreased flexibility;  decreased strength;  decreased ability to use legs for bridging/pushing;  decreased ability to use arms for pushing/pulling;  impaired balance;  pain    Therapeutic Exercise  Therapeutic Exercise Detail: seated at EOB x 10 min, pt req min to maintain upright sitting balance, left lateral lean requiring VC and min assist to correct     PT 1/8  Bed Mobility  Bed Mobility Training Sit-to-Supine: maximum assist (25% patient effort);  bed rails;  verbal cues  Bed Mobility Training Supine-to-Sit: maximum assist (25% patient effort);  bed rails  Bed Mobility Training Limitations: decreased flexibility;  decreased strength;  impaired balance;  decreased ability to use arms for pushing/pulling;  decreased ability to use legs for bridging/pushing;  impaired ability to control trunk for mobility    Sit-Stand Transfer Training  Transfer Training Sit-to-Stand Transfer: maximum assist (25% patient effort);  2 person assist;  PTs in front;  weight-bearing as tolerated  Transfer Training Stand-to-Sit Transfer: maximum assist (25% patient effort);  2 person assist;  PTs in front  Sit-to-Stand Transfer Training Transfer Safety Analysis: decreased balance;  decreased flexibility;  decreased strength;  impaired balance;  impaired coordination    Therapeutic Exercise  Therapeutic Exercise Detail: sitting at EOB x 8 min; weight shifting in sitting, seated balance exercises: elbow props with inc cues to perform task at hand; sit-zstand transfers x 2 trials     OT 1/14  Bed Mobility  Bed Mobility Training Sit-to-Supine: maximum assist (25% patient effort);  verbal cues;  bed rails  Bed Mobility Training Supine-to-Sit: maximum assist (25% patient effort);  verbal cues;  bed rails  Bed Mobility Training Limitations: decreased strength;  impaired balance;  impaired coordination    Therapeutic Exercise  Therapeutic Exercise Detail: Seated on EOB, pt maintains static sitting balance with min-mod A, +L lateral loss of balance, requiring cues for upright position. Performed R targeted reaching in R field with min A for maintaining upright. Fair tolerance for exercise.     Lower Body Dressing Training  Lower Body Dressing Training Assistance: maximum assist (25% patient effort);  abnormal muscle tone;  decreased flexibility;  decreased ROM;  decreased sensation;  decreased strength;  impaired balance;  impaired coordination;  impaired motor control;  impaired postural control;  cognitive, decreased safety awareness;  impaired vision    Upper Body Dressing Training  Upper Body Dressing Training Assistance: maximum assist (25% patient effort);  verbal cues;  decreased strength;  impaired balance;  decreased sensation;  decreased ROM;  decreased flexibility;  abnormal muscle tone;  impaired coordination;  impaired motor control;  impaired postural control;  cognitive, decreased safety awareness;  impaired vision    MBS 1/3  · Diagnostic Impressions	Patient presents with an oropharyngeal dysphagia. Min anterior loss is noted due to left labial weakness/decreased sensation. There is prolonged mastication/bolus formation, however functional for soft solids despite edentulous status. Premature spillage and delayed pharyngeal trigger result in deep silent penetration of thin liquids to the vocal cords. Laryngeal penetration is also noted for nectar thickened liquids however material is retrieved on subsequent swallows. Use of chin tuck position improves airway protection for thin liquids however strategy is judged to be unreliable at this time given patient's impulsivity, distractibility and reduced recall. Would suggest allow thin liquids with chin tuck in a controlled therapeutic setting with SLP only. Due to risk for silent aspiration, repeat MBS is warranted prior to consideration of upgrade.  Disorders: reduced lingual strength, delay in trigger of the swallow reflex, reduced supraglottic sensation	  · Recommended Consistencies	Dsyphagia 3, nectar	  · Recommended Feeding/Eating Techniques	maintain upright posture during/after eating for 30 mins; position upright (90 degrees); small sips/bites	  · Feeding Assistance	minimal assistance required	  · Aspiration Precautions	yes	    ----------------------------------------------------------------------------------------  ASSESSMENT/PLAN    49y Female with functional deficits after    Pain - Tylenol  DVT PPX - SCDs  Rehab -   Continue bedside therapy as well as OOB throughout the day with mobilization by staff to maintain cardiopulmonary function and prevention of secondary complications related to debility.     Will continue to follow for ongoing rehab needs and recommendations. . Functional progress will determine ongoing rehab dispo recommendations, which may change. HPI: 48 y/o woman w/ history of chronic smoker, PTSD/depression, spinal disc herniation, GERD, gastric bypass (2004), ex lap for SBO (9 yrs ago) and recent CVA, who is transferred from [place] to [facility] on 12/30 for severe PVD. Patient has a history consistent with ischemic rest pain. She has pain at night which gets relieved by hanging her feet down off at the side. She also reported symptoms consistent with vascular claudications of legs even with short ambulation, despite being able to ambulate without any assistance devices. She reported sharp burning pain that is severe (8~10/10 when it is bad) that shoots up from her toes to the thigh area below the knee. The right foot pain is worse on 4th toe which has discoloration to ashy/dusky color, and also on distal plantar surface. Also has left leg pain similar to right yet less in intensity. Since recent stroke and residual left sided weakness, she has been more reliant on right side for functioning, and the foot pain has been getting worse with movements.    Patient noted to have recent CVA. She had initially presented to [facility] on 12/17/19 with left sided weakness, right gaze preference, and facial droop, found to have Right M1 occlusion and Left carotid artery occlusion. She developed basal ganglia hemorrhagic conversion, but was stable. Pt was eventually transferred to [facility] on 12/24/19 for acute inpatient rehabilitation.    At [facility], patient was evaluated by vascular surgery, and an ROSA MARIA was 0.4, correlating w/ severe PAD. Vascular surgery recommended transfer to [place] for further management. Patient transferred to [facility] on 12/30. Vascuar surgery was consulted and recommended CT angio of LE which showed occlusion of the right popliteal artery below the knee. As per vascular, therapeutic AC and angiogram are contraindicated at this time due to recent hemorrhagic stroke. They say bilateral LE pulse signals present and limbs are currently not threatened and recommended reconsulting when patient is able to be put back on full AC.  Neurology also following patient for recent CVA with hemorrhagic transformation. Pt recommended to continue on ASA and statin. MRI brain showed known hemorrhagic transformation of a right MCA infarct. Chronic infarcts involving the right basal ganglia and the right anterior/mid temporal lobe. No significant mass effect midline shift. Tiny subacute to chronic cortical infarct  cortex of the left posterior temporal lobe. Neurology initially recommended no objection to proceeding with any vascular procedure as long as heparin bolus can be avoided. Neurology later recommended no contraindication to anticoagulation.   Patient is s/p Aortogram, pelvic and right lower extremity angiogram demonstrating occlusion of popliteal artery at the knee joint, segmental filling of  tibial arteries via collaterals with PT reconstituted approx 5-6 cm above ankle in continuity with plantar artery into foot on 1/7. Pt was eventually started on heparin drip after neurology clearance. Pt is s/p RLE popliteal-tibial bypass and thrombectomy on 1/13. Heparin drip was restarted and pt was recommended to start Xarelto prior to discharge.   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 Pt is planned for right foot 4th toe amputation on 1/17. Hospital course complicated by RUE numbness. Doppler was negative.   Bedside swallow assessment was completed and pt to continue to dysphagia 1 nectar diet. MBS completed on 1/3 and pt was found to have oropharyngeal dysphagia and was placed on dysphagia 3 and nectar.     REVIEW OF SYSTEMS  Constitutional - No fever, No weight loss, No fatigue  HEENT - No eye pain, No visual disturbances, No difficulty hearing, No tinnitus, No vertigo, No neck pain  Respiratory - No cough, No wheezing, No shortness of breath  Cardiovascular - No chest pain, No palpitations  Gastrointestinal - No abdominal pain, No nausea, No vomiting, No diarrhea, No constipation  Genitourinary - No dysuria, No frequency, No hematuria, No incontinence  Neurological - No headaches, No memory loss, No loss of strength, No numbness, No tremors  Skin - No itching, No rashes, No lesions   Endocrine - No temperature intolerance  Musculoskeletal - No joint pain, No joint swelling, No muscle pain  Psychiatric - No depression, No anxiety    PAST MEDICAL & SURGICAL HISTORY  Chronic insomnia  PTSD (post-traumatic stress disorder)  ETOH abuse  Anxiety  HTN (hypertension)  Depression  Tubal Ligation Status  Status Post Gastric Bypass for Obesity  Status Post Laparoscopic Cholecystectomy      SOCIAL HISTORY  Smoking - every day smoker 0.5 packs per day  EtOH - 1 large can of beer per day   Drugs - Denied    PRIOR FUNCTIONAL HISTORY  Ambulation: independent prior to recent CVA  ADLs: independent prior to recent CVA    Previous Home Equipment:    HOME ENVIRONMENT:  Type of Home: apartment with elevator  Stairs: no stairs; + elevator  Living With: 2 adult daughters  Caregiver's availability:     Special Needs or Precautions:  Weight bearing status: WBAT    FAMILY HISTORY   No pertinent family history in first degree relatives    RECENT LABS/IMAGING                        10.5   12.36 )-----------( 348      ( 15 Jm 2020 08:20 )             33.4     01-15    139  |  101  |  8   ----------------------------<  102<H>  3.6   |  28  |  0.51    Ca    8.9      15 Jm 2020 08:20  Mg     1.9     01-15  TPro  5.5<L>  /  Alb  2.7<L>  /  TBili  0.3  /  DBili  x   /  AST  11  /  ALT  18  /  AlkPhos  98  01-14  PT/INR - ( 15 Jm 2020 08:20 )   PT: 15.8 sec;   INR: 1.36 ratio    PTT - ( 15 Jm 2020 08:20 )  PTT:59.9 sec    < from: CT Angio Abd Aorta w/run-off w/ IV Cont (12.31.19 @ 15:07) >  Occlusion of the right popliteal artery below the knee with poor runoff.     < from: CT Head No Cont (01.01.20 @ 17:46) >  An evolving rightMCA territory infarct with hemorrhagic transformation is again noted as described. If the patient has a new and persistent neurologic deficit, consider short interval follow-up head CT or brain MRI follow-up.    < from: MR Head No Cont (01.02.20 @ 22:28) >  Reidentified are hemorrhagic blood products within the right caudate and right globus pallidus and right putamen, compatible with known hemorrhagic transformation of a right MCA infarct. Extensive T2/FLAIR hyperintense signal extending from the right frontal lobe involving the right basal ganglia and the right anterior/mid temporal lobe, compatible with chronic infarct. No significant mass effect midline shift or herniation pattern. No hydrocephalus. There is a tiny focus of DWI hyperintensity signal within the cortex of the left posterior temporal lobe, without definite corresponding ADC hypointensity, suggestive of a tiny subacute to chronic cortical infarct.   There is loss of the signal flow void within the entire visualized portion of the left intracranial internal carotid artery, suggestive of proximal occlusion, and confirmation maybe obtained with MRA brain and MRA carotid.     < from: VA Duplex Upper Extrem Arterial Limited, Right (01.03.20 @ 16:14) >  No hemodynamically significant occlusion or stenosis is visualized.. Low resistance flow is noted in the right brachial, ulnar and radial arteries of uncertain significance. This may be related to inflammation or hyperemia.    < from: VA Duplex Lower Ext Vein Scan, Bilat (01.10.20 @ 10:49) >  No evidence of DVT or superficial vein thrombosis in either lower extremity.    VITALS  T(C): 36.9 (01-15-20 @ 09:29), Max: 37.1 (01-14-20 @ 13:50)  HR: 72 (01-15-20 @ 09:29) (72 - 87)  BP: 139/83 (01-15-20 @ 09:29) (116/74 - 139/88)  RR: 18 (01-15-20 @ 09:29) (16 - 18)  SpO2: 97% (01-15-20 @ 09:29) (94% - 97%)  Wt(kg): --    ALLERGIES  No Known Allergies      MEDICATIONS   acetaminophen   Tablet .. 650 milliGRAM(s) Oral every 6 hours  aspirin  chewable 81 milliGRAM(s) Oral daily  atorvastatin 80 milliGRAM(s) Oral at bedtime  bisacodyl Suppository 10 milliGRAM(s) Rectal daily PRN  cyanocobalamin 1000 MICROGram(s) Oral daily  FLUoxetine 20 milliGRAM(s) Oral daily  folic acid 1 milliGRAM(s) Oral daily  gabapentin 300 milliGRAM(s) Oral three times a day  heparin  Infusion 1100 Unit(s)/Hr IV Continuous <Continuous>  influenza   Vaccine 0.5 milliLiter(s) IntraMuscular once  metoprolol succinate ER 12.5 milliGRAM(s) Oral daily  multivitamin 1 Tablet(s) Oral daily  naloxone Injectable 0.1 milliGRAM(s) IV Push every 3 minutes PRN  nicotine -   7 mG/24Hr(s) Patch 1 patch Transdermal daily  ondansetron Injectable 4 milliGRAM(s) IV Push every 6 hours PRN  oxyCODONE    IR 5 milliGRAM(s) Oral every 4 hours PRN  oxyCODONE    IR 10 milliGRAM(s) Oral every 4 hours PRN  pantoprazole    Tablet 40 milliGRAM(s) Oral before breakfast  polyethylene glycol 3350 17 Gram(s) Oral daily  thiamine 100 milliGRAM(s) Oral daily  traZODone 150 milliGRAM(s) Oral at bedtime  ----------------------------------------------------------------------------------------  PHYSICAL EXAM      CURRENT FUNCTIONAL STATUS  PT 1/14  Bed Mobility  Bed Mobility Training Sit-to-Supine: maximum assist (25% patient effort)  Bed Mobility Training Supine-to-Sit: maximum assist (25% patient effort)  Bed Mobility Training Limitations: decreased flexibility;  decreased strength;  decreased ability to use legs for bridging/pushing;  decreased ability to use arms for pushing/pulling;  impaired balance;  pain    Therapeutic Exercise  Therapeutic Exercise Detail: seated at EOB x 10 min, pt req min to maintain upright sitting balance, left lateral lean requiring VC and min assist to correct     PT 1/8  Bed Mobility  Bed Mobility Training Sit-to-Supine: maximum assist (25% patient effort);  bed rails;  verbal cues  Bed Mobility Training Supine-to-Sit: maximum assist (25% patient effort);  bed rails  Bed Mobility Training Limitations: decreased flexibility;  decreased strength;  impaired balance;  decreased ability to use arms for pushing/pulling;  decreased ability to use legs for bridging/pushing;  impaired ability to control trunk for mobility    Sit-Stand Transfer Training  Transfer Training Sit-to-Stand Transfer: maximum assist (25% patient effort);  2 person assist;  PTs in front;  weight-bearing as tolerated  Transfer Training Stand-to-Sit Transfer: maximum assist (25% patient effort);  2 person assist;  PTs in front  Sit-to-Stand Transfer Training Transfer Safety Analysis: decreased balance;  decreased flexibility;  decreased strength;  impaired balance;  impaired coordination    Therapeutic Exercise  Therapeutic Exercise Detail: sitting at EOB x 8 min; weight shifting in sitting, seated balance exercises: elbow props with inc cues to perform task at hand; sit-zstand transfers x 2 trials     OT 1/14  Bed Mobility  Bed Mobility Training Sit-to-Supine: maximum assist (25% patient effort);  verbal cues;  bed rails  Bed Mobility Training Supine-to-Sit: maximum assist (25% patient effort);  verbal cues;  bed rails  Bed Mobility Training Limitations: decreased strength;  impaired balance;  impaired coordination    Therapeutic Exercise  Therapeutic Exercise Detail: Seated on EOB, pt maintains static sitting balance with min-mod A, +L lateral loss of balance, requiring cues for upright position. Performed R targeted reaching in R field with min A for maintaining upright. Fair tolerance for exercise.     Lower Body Dressing Training  Lower Body Dressing Training Assistance: maximum assist (25% patient effort);  abnormal muscle tone;  decreased flexibility;  decreased ROM;  decreased sensation;  decreased strength;  impaired balance;  impaired coordination;  impaired motor control;  impaired postural control;  cognitive, decreased safety awareness;  impaired vision    Upper Body Dressing Training  Upper Body Dressing Training Assistance: maximum assist (25% patient effort);  verbal cues;  decreased strength;  impaired balance;  decreased sensation;  decreased ROM;  decreased flexibility;  abnormal muscle tone;  impaired coordination;  impaired motor control;  impaired postural control;  cognitive, decreased safety awareness;  impaired vision    MBS 1/3  · Diagnostic Impressions	Patient presents with an oropharyngeal dysphagia. Min anterior loss is noted due to left labial weakness/decreased sensation. There is prolonged mastication/bolus formation, however functional for soft solids despite edentulous status. Premature spillage and delayed pharyngeal trigger result in deep silent penetration of thin liquids to the vocal cords. Laryngeal penetration is also noted for nectar thickened liquids however material is retrieved on subsequent swallows. Use of chin tuck position improves airway protection for thin liquids however strategy is judged to be unreliable at this time given patient's impulsivity, distractibility and reduced recall. Would suggest allow thin liquids with chin tuck in a controlled therapeutic setting with SLP only. Due to risk for silent aspiration, repeat MBS is warranted prior to consideration of upgrade.  Disorders: reduced lingual strength, delay in trigger of the swallow reflex, reduced supraglottic sensation	  · Recommended Consistencies	Dsyphagia 3, nectar	  · Recommended Feeding/Eating Techniques	maintain upright posture during/after eating for 30 mins; position upright (90 degrees); small sips/bites	  · Feeding Assistance	minimal assistance required	  · Aspiration Precautions	yes	    ----------------------------------------------------------------------------------------  ASSESSMENT/PLAN    49y Female with functional deficits after    Pain - Tylenol  DVT PPX - SCDs  Rehab -   Continue bedside therapy as well as OOB throughout the day with mobilization by staff to maintain cardiopulmonary function and prevention of secondary complications related to debility.     Will continue to follow for ongoing rehab needs and recommendations. . Functional progress will determine ongoing rehab dispo recommendations, which may change. IMTIAZ TYSON is a 48yo Female with history of chronic smoker, PTSD/depression, spinal disc herniation, GERD, gastric bypass (2004), ex lap for SBO (9 yrs ago) and recent CVA, who is transferred from [place] to [facility] on 12/30 for severe PVD. Patient has a history consistent with ischemic rest pain. She has pain at night which gets relieved by hanging her feet down off at the side. She also reported symptoms consistent with vascular claudications of legs even with short ambulation, despite being able to ambulate without any assistance devices. She reported sharp burning pain that is severe (8~10/10 when it is bad) that shoots up from her toes to the thigh area below the knee. The right foot pain is worse on 4th toe which has discoloration to ashy/dusky color, and also on distal plantar surface. Also has left leg pain similar to right yet less in intensity. Since recent stroke and residual left sided weakness, she has been more reliant on right side for functioning, and the foot pain has been getting worse with movements.    Patient noted to have recent CVA. She had initially presented to [facility] on 12/17/19 with left sided weakness, right gaze preference, and facial droop, found to have Right M1 occlusion and Left carotid artery occlusion. She developed basal ganglia hemorrhagic conversion, but was stable. Pt was eventually transferred to [facility] on 12/24/19 for acute inpatient rehabilitation.    At [facility], patient was evaluated by vascular surgery, and an ROSA MARIA was 0.4, correlating w/ severe PAD. Vascular surgery recommended transfer to [place] for further management. Patient transferred to [facility] on 12/30. Vascuar surgery was consulted and recommended CT angio of LE which showed occlusion of the right popliteal artery below the knee. As per vascular, therapeutic AC and angiogram are contraindicated at this time due to recent hemorrhagic stroke. They say bilateral LE pulse signals present and limbs are currently not threatened and recommended reconsulting when patient is able to be put back on full AC.  Neurology also following patient for recent CVA with hemorrhagic transformation. Pt recommended to continue on ASA and statin. MRI brain showed known hemorrhagic transformation of a right MCA infarct. Chronic infarcts involving the right basal ganglia and the right anterior/mid temporal lobe. No significant mass effect midline shift. Tiny subacute to chronic cortical infarct  cortex of the left posterior temporal lobe. Neurology initially recommended no objection to proceeding with any vascular procedure as long as heparin bolus can be avoided. Neurology later recommended no contraindication to anticoagulation.   Patient is s/p Aortogram, pelvic and right lower extremity angiogram demonstrating occlusion of popliteal artery at the knee joint, segmental filling of  tibial arteries via collaterals with PT reconstituted approx 5-6 cm above ankle in continuity with plantar artery into foot on 1/7. Pt was eventually started on heparin drip after neurology clearance. Pt is s/p RLE popliteal-tibial bypass and thrombectomy on 1/13. Heparin drip was restarted and pt was recommended to start Xarelto prior to discharge.   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 Pt is planned for right foot 4th toe amputation on 1/17. Hospital course complicated by RUE numbness. Doppler was negative.   Bedside swallow assessment was completed and pt to continue to dysphagia 1 nectar diet. MBS completed on 1/3 and pt was found to have oropharyngeal dysphagia and was placed on dysphagia 3 and nectar.     REVIEW OF SYSTEMS  Constitutional - No fever, No weight loss, No fatigue  HEENT - No eye pain, No visual disturbances, No difficulty hearing, No tinnitus, No vertigo, No neck pain  Respiratory - No cough, No wheezing, No shortness of breath  Cardiovascular - No chest pain, No palpitations  Gastrointestinal - No abdominal pain, No nausea, No vomiting, No diarrhea, No constipation  Genitourinary - No dysuria, No frequency, No hematuria, No incontinence  Neurological - No headaches, No memory loss, No loss of strength, No numbness, No tremors  Skin - No itching, No rashes, No lesions   Endocrine - No temperature intolerance  Musculoskeletal - No joint pain, No joint swelling, No muscle pain  Psychiatric - No depression, No anxiety    PAST MEDICAL & SURGICAL HISTORY  Chronic insomnia  PTSD (post-traumatic stress disorder)  ETOH abuse  Anxiety  HTN (hypertension)  Depression  Tubal Ligation Status  Status Post Gastric Bypass for Obesity  Status Post Laparoscopic Cholecystectomy      SOCIAL HISTORY  Smoking - every day smoker 0.5 packs per day  EtOH - 1 large can of beer per day   Drugs - Denied    PRIOR FUNCTIONAL HISTORY  Ambulation: independent prior to recent CVA  ADLs: independent prior to recent CVA    Previous Home Equipment:    HOME ENVIRONMENT:  Type of Home: apartment with elevator  Stairs: no stairs; + elevator  Living With: 2 adult daughters  Caregiver's availability:     Special Needs or Precautions:  Weight bearing status: WBAT    FAMILY HISTORY   No pertinent family history in first degree relatives    RECENT LABS/IMAGING                        10.5   12.36 )-----------( 348      ( 15 Jm 2020 08:20 )             33.4     01-15    139  |  101  |  8   ----------------------------<  102<H>  3.6   |  28  |  0.51    Ca    8.9      15 Jm 2020 08:20  Mg     1.9     01-15  TPro  5.5<L>  /  Alb  2.7<L>  /  TBili  0.3  /  DBili  x   /  AST  11  /  ALT  18  /  AlkPhos  98  01-14  PT/INR - ( 15 Jm 2020 08:20 )   PT: 15.8 sec;   INR: 1.36 ratio    PTT - ( 15 Jm 2020 08:20 )  PTT:59.9 sec    < from: CT Angio Abd Aorta w/run-off w/ IV Cont (12.31.19 @ 15:07) >  Occlusion of the right popliteal artery below the knee with poor runoff.     < from: CT Head No Cont (01.01.20 @ 17:46) >  An evolving rightMCA territory infarct with hemorrhagic transformation is again noted as described. If the patient has a new and persistent neurologic deficit, consider short interval follow-up head CT or brain MRI follow-up.    < from: MR Head No Cont (01.02.20 @ 22:28) >  Reidentified are hemorrhagic blood products within the right caudate and right globus pallidus and right putamen, compatible with known hemorrhagic transformation of a right MCA infarct. Extensive T2/FLAIR hyperintense signal extending from the right frontal lobe involving the right basal ganglia and the right anterior/mid temporal lobe, compatible with chronic infarct. No significant mass effect midline shift or herniation pattern. No hydrocephalus. There is a tiny focus of DWI hyperintensity signal within the cortex of the left posterior temporal lobe, without definite corresponding ADC hypointensity, suggestive of a tiny subacute to chronic cortical infarct.   There is loss of the signal flow void within the entire visualized portion of the left intracranial internal carotid artery, suggestive of proximal occlusion, and confirmation maybe obtained with MRA brain and MRA carotid.     < from: VA Duplex Upper Extrem Arterial Limited, Right (01.03.20 @ 16:14) >  No hemodynamically significant occlusion or stenosis is visualized.. Low resistance flow is noted in the right brachial, ulnar and radial arteries of uncertain significance. This may be related to inflammation or hyperemia.    < from: VA Duplex Lower Ext Vein Scan, Bilat (01.10.20 @ 10:49) >  No evidence of DVT or superficial vein thrombosis in either lower extremity.    VITALS  T(C): 36.9 (01-15-20 @ 09:29), Max: 37.1 (01-14-20 @ 13:50)  HR: 72 (01-15-20 @ 09:29) (72 - 87)  BP: 139/83 (01-15-20 @ 09:29) (116/74 - 139/88)  RR: 18 (01-15-20 @ 09:29) (16 - 18)  SpO2: 97% (01-15-20 @ 09:29) (94% - 97%)  Wt(kg): --    ALLERGIES  No Known Allergies      MEDICATIONS   acetaminophen   Tablet .. 650 milliGRAM(s) Oral every 6 hours  aspirin  chewable 81 milliGRAM(s) Oral daily  atorvastatin 80 milliGRAM(s) Oral at bedtime  bisacodyl Suppository 10 milliGRAM(s) Rectal daily PRN  cyanocobalamin 1000 MICROGram(s) Oral daily  FLUoxetine 20 milliGRAM(s) Oral daily  folic acid 1 milliGRAM(s) Oral daily  gabapentin 300 milliGRAM(s) Oral three times a day  heparin  Infusion 1100 Unit(s)/Hr IV Continuous <Continuous>  influenza   Vaccine 0.5 milliLiter(s) IntraMuscular once  metoprolol succinate ER 12.5 milliGRAM(s) Oral daily  multivitamin 1 Tablet(s) Oral daily  naloxone Injectable 0.1 milliGRAM(s) IV Push every 3 minutes PRN  nicotine -   7 mG/24Hr(s) Patch 1 patch Transdermal daily  ondansetron Injectable 4 milliGRAM(s) IV Push every 6 hours PRN  oxyCODONE    IR 5 milliGRAM(s) Oral every 4 hours PRN  oxyCODONE    IR 10 milliGRAM(s) Oral every 4 hours PRN  pantoprazole    Tablet 40 milliGRAM(s) Oral before breakfast  polyethylene glycol 3350 17 Gram(s) Oral daily  thiamine 100 milliGRAM(s) Oral daily  traZODone 150 milliGRAM(s) Oral at bedtime  ----------------------------------------------------------------------------------------    PHYSICAL EXAM    Vitals  T(C): 37 (01-15 @ 13:25)  HR: 76 (01-15 @ 13:25)  BP: 128/85 (01-15 @ 13:25)  RR: 18 (01-15 @ 13:25)  SpO2: 95% (01-15 @ 13:25)    Constitutional - NAD, Comfortable  HEENT - NCAT  Chest - No respiratory distress. No use of accessory muscles of respiration.  Cardiovascular - Warm, well perfused  Abdomen - nondistended  Extremities - no C/C/E  Neurologic Exam -                    Cognitive - AAOx3     Communication - Fluent, No dysarthria     Motor - No focal deficits      Sensory - Intact to LT  Psychiatric - Mood stable, Affect WNL        CURRENT FUNCTIONAL STATUS  PT 1/14  Bed Mobility  Bed Mobility Training Sit-to-Supine: maximum assist (25% patient effort)  Bed Mobility Training Supine-to-Sit: maximum assist (25% patient effort)  Bed Mobility Training Limitations: decreased flexibility;  decreased strength;  decreased ability to use legs for bridging/pushing;  decreased ability to use arms for pushing/pulling;  impaired balance;  pain    Therapeutic Exercise  Therapeutic Exercise Detail: seated at EOB x 10 min, pt req min to maintain upright sitting balance, left lateral lean requiring VC and min assist to correct     PT 1/8  Bed Mobility  Bed Mobility Training Sit-to-Supine: maximum assist (25% patient effort);  bed rails;  verbal cues  Bed Mobility Training Supine-to-Sit: maximum assist (25% patient effort);  bed rails  Bed Mobility Training Limitations: decreased flexibility;  decreased strength;  impaired balance;  decreased ability to use arms for pushing/pulling;  decreased ability to use legs for bridging/pushing;  impaired ability to control trunk for mobility    Sit-Stand Transfer Training  Transfer Training Sit-to-Stand Transfer: maximum assist (25% patient effort);  2 person assist;  PTs in front;  weight-bearing as tolerated  Transfer Training Stand-to-Sit Transfer: maximum assist (25% patient effort);  2 person assist;  PTs in front  Sit-to-Stand Transfer Training Transfer Safety Analysis: decreased balance;  decreased flexibility;  decreased strength;  impaired balance;  impaired coordination    Therapeutic Exercise  Therapeutic Exercise Detail: sitting at EOB x 8 min; weight shifting in sitting, seated balance exercises: elbow props with inc cues to perform task at hand; sit-zstand transfers x 2 trials     OT 1/14  Bed Mobility  Bed Mobility Training Sit-to-Supine: maximum assist (25% patient effort);  verbal cues;  bed rails  Bed Mobility Training Supine-to-Sit: maximum assist (25% patient effort);  verbal cues;  bed rails  Bed Mobility Training Limitations: decreased strength;  impaired balance;  impaired coordination    Therapeutic Exercise  Therapeutic Exercise Detail: Seated on EOB, pt maintains static sitting balance with min-mod A, +L lateral loss of balance, requiring cues for upright position. Performed R targeted reaching in R field with min A for maintaining upright. Fair tolerance for exercise.     Lower Body Dressing Training  Lower Body Dressing Training Assistance: maximum assist (25% patient effort);  abnormal muscle tone;  decreased flexibility;  decreased ROM;  decreased sensation;  decreased strength;  impaired balance;  impaired coordination;  impaired motor control;  impaired postural control;  cognitive, decreased safety awareness;  impaired vision    Upper Body Dressing Training  Upper Body Dressing Training Assistance: maximum assist (25% patient effort);  verbal cues;  decreased strength;  impaired balance;  decreased sensation;  decreased ROM;  decreased flexibility;  abnormal muscle tone;  impaired coordination;  impaired motor control;  impaired postural control;  cognitive, decreased safety awareness;  impaired vision    MBS 1/3  · Diagnostic Impressions	Patient presents with an oropharyngeal dysphagia. Min anterior loss is noted due to left labial weakness/decreased sensation. There is prolonged mastication/bolus formation, however functional for soft solids despite edentulous status. Premature spillage and delayed pharyngeal trigger result in deep silent penetration of thin liquids to the vocal cords. Laryngeal penetration is also noted for nectar thickened liquids however material is retrieved on subsequent swallows. Use of chin tuck position improves airway protection for thin liquids however strategy is judged to be unreliable at this time given patient's impulsivity, distractibility and reduced recall. Would suggest allow thin liquids with chin tuck in a controlled therapeutic setting with SLP only. Due to risk for silent aspiration, repeat MBS is warranted prior to consideration of upgrade.  Disorders: reduced lingual strength, delay in trigger of the swallow reflex, reduced supraglottic sensation	  · Recommended Consistencies	Dsyphagia 3, nectar	  · Recommended Feeding/Eating Techniques	maintain upright posture during/after eating for 30 mins; position upright (90 degrees); small sips/bites	  · Feeding Assistance	minimal assistance required	  · Aspiration Precautions	yes IMTIAZ TYSON is a 50yo Female with history of chronic smoker, PTSD/depression, spinal disc herniation, GERD, gastric bypass (2004), ex lap for SBO (9 yrs ago) and recent CVA, who is transferred from [place] to [facility] on 12/30 for severe PVD. Patient has a history consistent with ischemic rest pain. She has pain at night which gets relieved by hanging her feet down off at the side. She also reported symptoms consistent with vascular claudications of legs even with short ambulation, despite being able to ambulate without any assistance devices. She reported sharp burning pain that is severe (8~10/10 when it is bad) that shoots up from her toes to the thigh area below the knee. The right foot pain is worse on 4th toe which has discoloration to ashy/dusky color, and also on distal plantar surface. Also has left leg pain similar to right yet less in intensity. Since recent stroke and residual left sided weakness, she has been more reliant on right side for functioning, and the foot pain has been getting worse with movements.    Patient noted to have recent CVA. She had initially presented to [facility] on 12/17/19 with left sided weakness, right gaze preference, and facial droop, found to have Right M1 occlusion and Left carotid artery occlusion. She developed basal ganglia hemorrhagic conversion, but was stable. Pt was eventually transferred to [facility] on 12/24/19 for acute inpatient rehabilitation.    At [facility], patient was evaluated by vascular surgery, and an ROSA MARIA was 0.4, correlating w/ severe PAD. Vascular surgery recommended transfer to [place] for further management. Patient transferred to [facility] on 12/30. Vascuar surgery was consulted and recommended CT angio of LE which showed occlusion of the right popliteal artery below the knee. As per vascular, therapeutic AC and angiogram are contraindicated at this time due to recent hemorrhagic stroke. They say bilateral LE pulse signals present and limbs are currently not threatened and recommended reconsulting when patient is able to be put back on full AC.  Neurology also following patient for recent CVA with hemorrhagic transformation. Pt recommended to continue on ASA and statin. MRI brain showed known hemorrhagic transformation of a right MCA infarct. Chronic infarcts involving the right basal ganglia and the right anterior/mid temporal lobe. No significant mass effect midline shift. Tiny subacute to chronic cortical infarct  cortex of the left posterior temporal lobe. Neurology initially recommended no objection to proceeding with any vascular procedure as long as heparin bolus can be avoided. Neurology later recommended no contraindication to anticoagulation.   Patient is s/p Aortogram, pelvic and right lower extremity angiogram demonstrating occlusion of popliteal artery at the knee joint, segmental filling of  tibial arteries via collaterals with PT reconstituted approx 5-6 cm above ankle in continuity with plantar artery into foot on 1/7. Pt was eventually started on heparin drip after neurology clearance. Pt is s/p RLE popliteal-tibial bypass and thrombectomy on 1/13. Heparin drip was restarted and pt was recommended to start Xarelto prior to discharge.   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 Pt is planned for right foot 4th toe amputation on 1/17. Hospital course complicated by RUE numbness. Doppler was negative.   Bedside swallow assessment was completed and pt to continue to dysphagia 1 nectar diet. MBS completed on 1/3 and pt was found to have oropharyngeal dysphagia and was placed on dysphagia 3 and nectar.     REVIEW OF SYSTEMS  Constitutional - No fever, No weight loss, No fatigue  HEENT - +chronic poor hearing and vision, premorbid  Respiratory - No cough, No wheezing, No shortness of breath  Cardiovascular - No chest pain, No palpitations. +ischemic pain at bilateral feet/toes  Gastrointestinal - +constipation +dysphagia, last BM 4 days ago  Genitourinary - +Undergoing TOV today  Neurological - +left hemiparesis, +tingling pain  Skin - No itching, No rashes, No lesions   Endocrine - No temperature intolerance  Musculoskeletal - +left knee pain  Psychiatric - No depression, No anxiety    PAST MEDICAL & SURGICAL HISTORY  Chronic insomnia  PTSD (post-traumatic stress disorder)  ETOH abuse  Anxiety  HTN (hypertension)  Depression  Tubal Ligation Status  Status Post Gastric Bypass for Obesity  Status Post Laparoscopic Cholecystectomy      SOCIAL HISTORY  Smoking - every day smoker 0.5 packs per day  EtOH - 1 large can of beer per day   Drugs - Denied    PRIOR FUNCTIONAL HISTORY  Ambulation: independent prior to recent CVA  ADLs: independent prior to recent CVA    Previous Home Equipment:    HOME ENVIRONMENT:  Type of Home: apartment with elevator  Stairs: no stairs; + elevator  Living With: 2 adult daughters  Caregiver's availability:     Special Needs or Precautions:  Weight bearing status: WBAT    FAMILY HISTORY   No pertinent family history in first degree relatives    RECENT LABS/IMAGING                        10.5   12.36 )-----------( 348      ( 15 Jm 2020 08:20 )             33.4     01-15    139  |  101  |  8   ----------------------------<  102<H>  3.6   |  28  |  0.51    Ca    8.9      15 Jm 2020 08:20  Mg     1.9     01-15  TPro  5.5<L>  /  Alb  2.7<L>  /  TBili  0.3  /  DBili  x   /  AST  11  /  ALT  18  /  AlkPhos  98  01-14  PT/INR - ( 15 Jm 2020 08:20 )   PT: 15.8 sec;   INR: 1.36 ratio    PTT - ( 15 Jm 2020 08:20 )  PTT:59.9 sec    < from: CT Angio Abd Aorta w/run-off w/ IV Cont (12.31.19 @ 15:07) >  Occlusion of the right popliteal artery below the knee with poor runoff.     < from: CT Head No Cont (01.01.20 @ 17:46) >  An evolving rightMCA territory infarct with hemorrhagic transformation is again noted as described. If the patient has a new and persistent neurologic deficit, consider short interval follow-up head CT or brain MRI follow-up.    < from: MR Head No Cont (01.02.20 @ 22:28) >  Reidentified are hemorrhagic blood products within the right caudate and right globus pallidus and right putamen, compatible with known hemorrhagic transformation of a right MCA infarct. Extensive T2/FLAIR hyperintense signal extending from the right frontal lobe involving the right basal ganglia and the right anterior/mid temporal lobe, compatible with chronic infarct. No significant mass effect midline shift or herniation pattern. No hydrocephalus. There is a tiny focus of DWI hyperintensity signal within the cortex of the left posterior temporal lobe, without definite corresponding ADC hypointensity, suggestive of a tiny subacute to chronic cortical infarct.   There is loss of the signal flow void within the entire visualized portion of the left intracranial internal carotid artery, suggestive of proximal occlusion, and confirmation maybe obtained with MRA brain and MRA carotid.     < from: [facility] Duplex Upper Extrem Arterial Limited, Right (01.03.20 @ 16:14) >  No hemodynamically significant occlusion or stenosis is visualized.. Low resistance flow is noted in the right brachial, ulnar and radial arteries of uncertain significance. This may be related to inflammation or hyperemia.    < from: [facility] Duplex Lower Ext Vein Scan, Bilat (01.10.20 @ 10:49) >  No evidence of DVT or superficial vein thrombosis in either lower extremity.    VITALS  T(C): 36.9 (01-15-20 @ 09:29), Max: 37.1 (01-14-20 @ 13:50)  HR: 72 (01-15-20 @ 09:29) (72 - 87)  BP: 139/83 (01-15-20 @ 09:29) (116/74 - 139/88)  RR: 18 (01-15-20 @ 09:29) (16 - 18)  SpO2: 97% (01-15-20 @ 09:29) (94% - 97%)  Wt(kg): --    ALLERGIES  No Known Allergies      MEDICATIONS   acetaminophen   Tablet .. 650 milliGRAM(s) Oral every 6 hours  aspirin  chewable 81 milliGRAM(s) Oral daily  atorvastatin 80 milliGRAM(s) Oral at bedtime  bisacodyl Suppository 10 milliGRAM(s) Rectal daily PRN  cyanocobalamin 1000 MICROGram(s) Oral daily  FLUoxetine 20 milliGRAM(s) Oral daily  folic acid 1 milliGRAM(s) Oral daily  gabapentin 300 milliGRAM(s) Oral three times a day  heparin  Infusion 1100 Unit(s)/Hr IV Continuous <Continuous>  influenza   Vaccine 0.5 milliLiter(s) IntraMuscular once  metoprolol succinate ER 12.5 milliGRAM(s) Oral daily  multivitamin 1 Tablet(s) Oral daily  naloxone Injectable 0.1 milliGRAM(s) IV Push every 3 minutes PRN  nicotine -   7 mG/24Hr(s) Patch 1 patch Transdermal daily  ondansetron Injectable 4 milliGRAM(s) IV Push every 6 hours PRN  oxyCODONE    IR 5 milliGRAM(s) Oral every 4 hours PRN  oxyCODONE    IR 10 milliGRAM(s) Oral every 4 hours PRN  pantoprazole    Tablet 40 milliGRAM(s) Oral before breakfast  polyethylene glycol 3350 17 Gram(s) Oral daily  thiamine 100 milliGRAM(s) Oral daily  traZODone 150 milliGRAM(s) Oral at bedtime  ----------------------------------------------------------------------------------------    PHYSICAL EXAM    Vitals  T(C): 37 (01-15 @ 13:25)  HR: 76 (01-15 @ 13:25)  BP: 128/85 (01-15 @ 13:25)  RR: 18 (01-15 @ 13:25)  SpO2: 95% (01-15 @ 13:25)    Constitutional - NAD, Comfortable  HEENT - NCAT  Chest - No respiratory distress. No use of accessory muscles of respiration.  Cardiovascular - Warm, well perfused  Abdomen - nondistended  Extremities - right bypass incision wound vac intact, necrotic right 4th toe, diffusely tender to palpation in lower extremities distally  Neurologic Exam - left sided neglect     Cognitive - AAOx3     Communication - Fluent, No dysarthria     Motor - 0/5 on left, 5/5 in right UE, right LE limited by incisional pain and ischemic pain, moves spontaneously     Sensory - Intact to LT bilateral   Psychiatric - Mood stable, Affect WNL    CURRENT FUNCTIONAL STATUS  PT 1/14  Bed Mobility  Bed Mobility Training Sit-to-Supine: maximum assist (25% patient effort)  Bed Mobility Training Supine-to-Sit: maximum assist (25% patient effort)  Bed Mobility Training Limitations: decreased flexibility;  decreased strength;  decreased ability to use legs for bridging/pushing;  decreased ability to use arms for pushing/pulling;  impaired balance;  pain    Therapeutic Exercise  Therapeutic Exercise Detail: seated at EOB x 10 min, pt req min to maintain upright sitting balance, left lateral lean requiring VC and min assist to correct     PT 1/8  Bed Mobility  Bed Mobility Training Sit-to-Supine: maximum assist (25% patient effort);  bed rails;  verbal cues  Bed Mobility Training Supine-to-Sit: maximum assist (25% patient effort);  bed rails  Bed Mobility Training Limitations: decreased flexibility;  decreased strength;  impaired balance;  decreased ability to use arms for pushing/pulling;  decreased ability to use legs for bridging/pushing;  impaired ability to control trunk for mobility    Sit-Stand Transfer Training  Transfer Training Sit-to-Stand Transfer: maximum assist (25% patient effort);  2 person assist;  PTs in front;  weight-bearing as tolerated  Transfer Training Stand-to-Sit Transfer: maximum assist (25% patient effort);  2 person assist;  PTs in front  Sit-to-Stand Transfer Training Transfer Safety Analysis: decreased balance;  decreased flexibility;  decreased strength;  impaired balance;  impaired coordination    Therapeutic Exercise  Therapeutic Exercise Detail: sitting at EOB x 8 min; weight shifting in sitting, seated balance exercises: elbow props with inc cues to perform task at hand; sit-zstand transfers x 2 trials     OT 1/14  Bed Mobility  Bed Mobility Training Sit-to-Supine: maximum assist (25% patient effort);  verbal cues;  bed rails  Bed Mobility Training Supine-to-Sit: maximum assist (25% patient effort);  verbal cues;  bed rails  Bed Mobility Training Limitations: decreased strength;  impaired balance;  impaired coordination    Therapeutic Exercise  Therapeutic Exercise Detail: Seated on EOB, pt maintains static sitting balance with min-mod A, +L lateral loss of balance, requiring cues for upright position. Performed R targeted reaching in R field with min A for maintaining upright. Fair tolerance for exercise.     Lower Body Dressing Training  Lower Body Dressing Training Assistance: maximum assist (25% patient effort);  abnormal muscle tone;  decreased flexibility;  decreased ROM;  decreased sensation;  decreased strength;  impaired balance;  impaired coordination;  impaired motor control;  impaired postural control;  cognitive, decreased safety awareness;  impaired vision    Upper Body Dressing Training  Upper Body Dressing Training Assistance: maximum assist (25% patient effort);  verbal cues;  decreased strength;  impaired balance;  decreased sensation;  decreased ROM;  decreased flexibility;  abnormal muscle tone;  impaired coordination;  impaired motor control;  impaired postural control;  cognitive, decreased safety awareness;  impaired vision    MBS 1/3  · Diagnostic Impressions	Patient presents with an oropharyngeal dysphagia. Min anterior loss is noted due to left labial weakness/decreased sensation. There is prolonged mastication/bolus formation, however functional for soft solids despite edentulous status. Premature spillage and delayed pharyngeal trigger result in deep silent penetration of thin liquids to the vocal cords. Laryngeal penetration is also noted for nectar thickened liquids however material is retrieved on subsequent swallows. Use of chin tuck position improves airway protection for thin liquids however strategy is judged to be unreliable at this time given patient's impulsivity, distractibility and reduced recall. Would suggest allow thin liquids with chin tuck in a controlled therapeutic setting with SLP only. Due to risk for silent aspiration, repeat MBS is warranted prior to consideration of upgrade.  Disorders: reduced lingual strength, delay in trigger of the swallow reflex, reduced supraglottic sensation	  · Recommended Consistencies	Dsyphagia 3, nectar	  · Recommended Feeding/Eating Techniques	maintain upright posture during/after eating for 30 mins; position upright (90 degrees); small sips/bites	  · Feeding Assistance	minimal assistance required	  · Aspiration Precautions	yes

## 2020-01-15 NOTE — PROGRESS NOTE ADULT - SUBJECTIVE AND OBJECTIVE BOX
Patient is a 49y old  Female who presents with a chief complaint of Severe PVD (15 Jm 2020 12:52)      INTERVAL History of Present Illness/OVERNIGHT EVENTS: no new medical issues    MEDICATIONS  (STANDING):  acetaminophen   Tablet .. 650 milliGRAM(s) Oral every 6 hours  aspirin  chewable 81 milliGRAM(s) Oral daily  atorvastatin 80 milliGRAM(s) Oral at bedtime  cyanocobalamin 1000 MICROGram(s) Oral daily  FLUoxetine 20 milliGRAM(s) Oral daily  folic acid 1 milliGRAM(s) Oral daily  gabapentin 300 milliGRAM(s) Oral three times a day  heparin  Infusion 1100 Unit(s)/Hr (13 mL/Hr) IV Continuous <Continuous>  influenza   Vaccine 0.5 milliLiter(s) IntraMuscular once  metoprolol succinate ER 12.5 milliGRAM(s) Oral daily  multivitamin 1 Tablet(s) Oral daily  nicotine -   7 mG/24Hr(s) Patch 1 patch Transdermal daily  pantoprazole    Tablet 40 milliGRAM(s) Oral before breakfast  polyethylene glycol 3350 17 Gram(s) Oral daily  thiamine 100 milliGRAM(s) Oral daily  traZODone 150 milliGRAM(s) Oral at bedtime    MEDICATIONS  (PRN):  bisacodyl Suppository 10 milliGRAM(s) Rectal daily PRN Constipation  naloxone Injectable 0.1 milliGRAM(s) IV Push every 3 minutes PRN For ANY of the following changes in patient status:  A. RR LESS THAN 10 breaths per minute, B. Oxygen saturation LESS THAN 90%, C. Sedation score of 6  ondansetron Injectable 4 milliGRAM(s) IV Push every 6 hours PRN Nausea  oxyCODONE    IR 5 milliGRAM(s) Oral every 4 hours PRN Moderate Pain (4 - 6)  oxyCODONE    IR 10 milliGRAM(s) Oral every 4 hours PRN Severe Pain (7 - 10)      Allergies    No Known Allergies    Intolerances        REVIEW OF SYSTEMS:  Negative unless otherwise specified above.    Vital Signs Last 24 Hrs  T(C): 37 (15 Jm 2020 13:25), Max: 37 (14 Jan 2020 21:38)  T(F): 98.6 (15 Jm 2020 13:25), Max: 98.6 (14 Jan 2020 21:38)  HR: 76 (15 Jm 2020 13:25) (72 - 87)  BP: 128/85 (15 Jm 2020 13:25) (116/74 - 139/88)  BP(mean): --  RR: 18 (15 Jm 2020 13:25) (17 - 18)  SpO2: 95% (15 Jm 2020 13:25) (94% - 97%)        PHYSICAL EXAM:  GENERAL: No apparent distress, appears stated age  HEAD:  Atraumatic, Normocephalic  EYES: Conjunctiva and sclera clear, no discharge  ENMT: Moist mucous membranes, no nasal discharge  NECK: Supple, no JVD  CHEST/LUNG: Clear to auscultation bilaterally, no wheeze or rales  HEART: Regular rate and rhythm, +systolic murmur  ABDOMEN: Soft, Nontender, Nondistended; Bowel sounds present  EXTREMITIES:  No clubbing, but has right 4th toe dry gangrene, wound vac and drain right leg  SKIN: No rash or new discoloration  NERVOUS SYSTEM:  Alert & Oriented; left hemiplegia s/p CVA      LABS:                        10.5   12.36 )-----------( 348      ( 15 Jm 2020 08:20 )             33.4     15 Jm 2020 08:20    139    |  101    |  8      ----------------------------<  102    3.6     |  28     |  0.51     Ca    8.9        15 Jm 2020 08:20  Mg     1.9       15 Jm 2020 08:20      PT/INR - ( 15 Jm 2020 08:20 )   PT: 15.8 sec;   INR: 1.36 ratio         PTT - ( 15 Jm 2020 08:20 )  PTT:59.9 sec    CAPILLARY BLOOD GLUCOSE          RADIOLOGY & ADDITIONAL TESTS:    Images reviewed personally    Consultant Notes Reviewed and Care Discussed with relevant Consultants/Other Providers.

## 2020-01-15 NOTE — DISCHARGE NOTE PROVIDER - NSDCFUADDINST_GEN_ALL_CORE_FT
Podiatry Discharge Instructions:  Follow up: Please follow up with Dr. Chinchilla within 1 week of discharge from the hospital, please call [phone] ([facility]) [phone] ([place]) for appointment and discuss that you recently were seen in the hospital.  Wound Care: Please leave your dressing clean dry intact until your follow up appointment   Weight bearing: Please weight bear as tolerated in a surgical shoe.  Antibiotics: Please continue as instructed. Podiatry Discharge Instructions:  Follow up: Please follow up with Dr. Chinchilla within 1 week of discharge from the hospital, please call [phone] ([facility]) [phone] ([place]) for appointment and discuss that you recently were seen in the hospital.  Wound Care: Please leave your dressing clean dry intact until your follow up appointment. If stay is greater than one week at rehab, Nurses to change dressing with dry sterile dressing once/week.  Weight bearing: Please weight bear as tolerated in a surgical shoe.  Antibiotics: Please continue as instructed.

## 2020-01-16 ENCOUNTER — TRANSCRIPTION ENCOUNTER (OUTPATIENT)
Age: 50
End: 2020-01-16

## 2020-01-16 LAB
ANION GAP SERPL CALC-SCNC: 12 MMOL/L — SIGNIFICANT CHANGE UP (ref 5–17)
APTT BLD: 83.8 SEC — HIGH (ref 27.5–36.3)
BUN SERPL-MCNC: 10 MG/DL — SIGNIFICANT CHANGE UP (ref 7–23)
CALCIUM SERPL-MCNC: 8.7 MG/DL — SIGNIFICANT CHANGE UP (ref 8.4–10.5)
CHLORIDE SERPL-SCNC: 101 MMOL/L — SIGNIFICANT CHANGE UP (ref 96–108)
CO2 SERPL-SCNC: 26 MMOL/L — SIGNIFICANT CHANGE UP (ref 22–31)
CREAT SERPL-MCNC: 0.48 MG/DL — LOW (ref 0.5–1.3)
GLUCOSE SERPL-MCNC: 97 MG/DL — SIGNIFICANT CHANGE UP (ref 70–99)
HCG UR QL: NEGATIVE — SIGNIFICANT CHANGE UP
HCT VFR BLD CALC: 32.8 % — LOW (ref 34.5–45)
HGB BLD-MCNC: 10.2 G/DL — LOW (ref 11.5–15.5)
MAGNESIUM SERPL-MCNC: 1.8 MG/DL — SIGNIFICANT CHANGE UP (ref 1.6–2.6)
MCHC RBC-ENTMCNC: 31.1 GM/DL — LOW (ref 32–36)
MCHC RBC-ENTMCNC: 31.5 PG — SIGNIFICANT CHANGE UP (ref 27–34)
MCV RBC AUTO: 101.2 FL — HIGH (ref 80–100)
NRBC # BLD: 0 /100 WBCS — SIGNIFICANT CHANGE UP (ref 0–0)
PHOSPHATE SERPL-MCNC: 3.8 MG/DL — SIGNIFICANT CHANGE UP (ref 2.5–4.5)
PLATELET # BLD AUTO: 393 K/UL — SIGNIFICANT CHANGE UP (ref 150–400)
POTASSIUM SERPL-MCNC: 3.7 MMOL/L — SIGNIFICANT CHANGE UP (ref 3.5–5.3)
POTASSIUM SERPL-SCNC: 3.7 MMOL/L — SIGNIFICANT CHANGE UP (ref 3.5–5.3)
RBC # BLD: 3.24 M/UL — LOW (ref 3.8–5.2)
RBC # FLD: 16.9 % — HIGH (ref 10.3–14.5)
SODIUM SERPL-SCNC: 139 MMOL/L — SIGNIFICANT CHANGE UP (ref 135–145)
SURGICAL PATHOLOGY STUDY: SIGNIFICANT CHANGE UP
WBC # BLD: 12.14 K/UL — HIGH (ref 3.8–10.5)
WBC # FLD AUTO: 12.14 K/UL — HIGH (ref 3.8–10.5)

## 2020-01-16 PROCEDURE — 70450 CT HEAD/BRAIN W/O DYE: CPT

## 2020-01-16 PROCEDURE — 92526 ORAL FUNCTION THERAPY: CPT

## 2020-01-16 PROCEDURE — 97124 MASSAGE THERAPY: CPT

## 2020-01-16 PROCEDURE — 80048 BASIC METABOLIC PNL TOTAL CA: CPT

## 2020-01-16 PROCEDURE — 92610 EVALUATE SWALLOWING FUNCTION: CPT

## 2020-01-16 PROCEDURE — 97112 NEUROMUSCULAR REEDUCATION: CPT

## 2020-01-16 PROCEDURE — 97163 PT EVAL HIGH COMPLEX 45 MIN: CPT

## 2020-01-16 PROCEDURE — 97530 THERAPEUTIC ACTIVITIES: CPT

## 2020-01-16 PROCEDURE — 97110 THERAPEUTIC EXERCISES: CPT

## 2020-01-16 PROCEDURE — 92523 SPEECH SOUND LANG COMPREHEN: CPT

## 2020-01-16 PROCEDURE — 92507 TX SP LANG VOICE COMM INDIV: CPT

## 2020-01-16 PROCEDURE — 97140 MANUAL THERAPY 1/> REGIONS: CPT

## 2020-01-16 PROCEDURE — 85027 COMPLETE CBC AUTOMATED: CPT

## 2020-01-16 PROCEDURE — 99233 SBSQ HOSP IP/OBS HIGH 50: CPT

## 2020-01-16 PROCEDURE — 99232 SBSQ HOSP IP/OBS MODERATE 35: CPT

## 2020-01-16 PROCEDURE — 93923 UPR/LXTR ART STDY 3+ LVLS: CPT

## 2020-01-16 PROCEDURE — 97167 OT EVAL HIGH COMPLEX 60 MIN: CPT

## 2020-01-16 RX ORDER — HYDROMORPHONE HYDROCHLORIDE 2 MG/ML
0.5 INJECTION INTRAMUSCULAR; INTRAVENOUS; SUBCUTANEOUS EVERY 4 HOURS
Refills: 0 | Status: DISCONTINUED | OUTPATIENT
Start: 2020-01-16 | End: 2020-01-17

## 2020-01-16 RX ORDER — SODIUM CHLORIDE 9 MG/ML
1000 INJECTION INTRAMUSCULAR; INTRAVENOUS; SUBCUTANEOUS
Refills: 0 | Status: DISCONTINUED | OUTPATIENT
Start: 2020-01-16 | End: 2020-01-17

## 2020-01-16 RX ADMIN — PANTOPRAZOLE SODIUM 40 MILLIGRAM(S): 20 TABLET, DELAYED RELEASE ORAL at 05:52

## 2020-01-16 RX ADMIN — Medication 1 PATCH: at 19:45

## 2020-01-16 RX ADMIN — Medication 1 PATCH: at 13:09

## 2020-01-16 RX ADMIN — OXYCODONE HYDROCHLORIDE 10 MILLIGRAM(S): 5 TABLET ORAL at 13:23

## 2020-01-16 RX ADMIN — Medication 650 MILLIGRAM(S): at 18:02

## 2020-01-16 RX ADMIN — OXYCODONE HYDROCHLORIDE 10 MILLIGRAM(S): 5 TABLET ORAL at 13:14

## 2020-01-16 RX ADMIN — HYDROMORPHONE HYDROCHLORIDE 0.5 MILLIGRAM(S): 2 INJECTION INTRAMUSCULAR; INTRAVENOUS; SUBCUTANEOUS at 14:29

## 2020-01-16 RX ADMIN — GABAPENTIN 300 MILLIGRAM(S): 400 CAPSULE ORAL at 22:03

## 2020-01-16 RX ADMIN — OXYCODONE HYDROCHLORIDE 10 MILLIGRAM(S): 5 TABLET ORAL at 08:37

## 2020-01-16 RX ADMIN — Medication 650 MILLIGRAM(S): at 13:14

## 2020-01-16 RX ADMIN — PREGABALIN 1000 MICROGRAM(S): 225 CAPSULE ORAL at 13:14

## 2020-01-16 RX ADMIN — OXYCODONE HYDROCHLORIDE 10 MILLIGRAM(S): 5 TABLET ORAL at 08:18

## 2020-01-16 RX ADMIN — HYDROMORPHONE HYDROCHLORIDE 0.5 MILLIGRAM(S): 2 INJECTION INTRAMUSCULAR; INTRAVENOUS; SUBCUTANEOUS at 20:41

## 2020-01-16 RX ADMIN — Medication 12.5 MILLIGRAM(S): at 05:52

## 2020-01-16 RX ADMIN — HYDROMORPHONE HYDROCHLORIDE 0.5 MILLIGRAM(S): 2 INJECTION INTRAMUSCULAR; INTRAVENOUS; SUBCUTANEOUS at 09:01

## 2020-01-16 RX ADMIN — Medication 650 MILLIGRAM(S): at 18:01

## 2020-01-16 RX ADMIN — OXYCODONE HYDROCHLORIDE 10 MILLIGRAM(S): 5 TABLET ORAL at 02:21

## 2020-01-16 RX ADMIN — HYDROMORPHONE HYDROCHLORIDE 0.5 MILLIGRAM(S): 2 INJECTION INTRAMUSCULAR; INTRAVENOUS; SUBCUTANEOUS at 20:11

## 2020-01-16 RX ADMIN — Medication 100 MILLIGRAM(S): at 13:14

## 2020-01-16 RX ADMIN — HYDROMORPHONE HYDROCHLORIDE 0.5 MILLIGRAM(S): 2 INJECTION INTRAMUSCULAR; INTRAVENOUS; SUBCUTANEOUS at 09:11

## 2020-01-16 RX ADMIN — Medication 81 MILLIGRAM(S): at 13:16

## 2020-01-16 RX ADMIN — Medication 650 MILLIGRAM(S): at 05:52

## 2020-01-16 RX ADMIN — Medication 1 MILLIGRAM(S): at 13:15

## 2020-01-16 RX ADMIN — Medication 20 MILLIGRAM(S): at 13:14

## 2020-01-16 RX ADMIN — ATORVASTATIN CALCIUM 80 MILLIGRAM(S): 80 TABLET, FILM COATED ORAL at 22:03

## 2020-01-16 RX ADMIN — OXYCODONE HYDROCHLORIDE 10 MILLIGRAM(S): 5 TABLET ORAL at 01:51

## 2020-01-16 RX ADMIN — OXYCODONE HYDROCHLORIDE 10 MILLIGRAM(S): 5 TABLET ORAL at 18:02

## 2020-01-16 RX ADMIN — GABAPENTIN 300 MILLIGRAM(S): 400 CAPSULE ORAL at 05:52

## 2020-01-16 RX ADMIN — OXYCODONE HYDROCHLORIDE 10 MILLIGRAM(S): 5 TABLET ORAL at 18:07

## 2020-01-16 RX ADMIN — Medication 1 PATCH: at 13:13

## 2020-01-16 RX ADMIN — Medication 1 PATCH: at 08:15

## 2020-01-16 RX ADMIN — Medication 650 MILLIGRAM(S): at 13:15

## 2020-01-16 RX ADMIN — OXYCODONE HYDROCHLORIDE 10 MILLIGRAM(S): 5 TABLET ORAL at 22:32

## 2020-01-16 RX ADMIN — HYDROMORPHONE HYDROCHLORIDE 0.5 MILLIGRAM(S): 2 INJECTION INTRAMUSCULAR; INTRAVENOUS; SUBCUTANEOUS at 14:23

## 2020-01-16 RX ADMIN — GABAPENTIN 300 MILLIGRAM(S): 400 CAPSULE ORAL at 13:13

## 2020-01-16 RX ADMIN — Medication 650 MILLIGRAM(S): at 01:08

## 2020-01-16 RX ADMIN — Medication 1 TABLET(S): at 13:14

## 2020-01-16 RX ADMIN — OXYCODONE HYDROCHLORIDE 10 MILLIGRAM(S): 5 TABLET ORAL at 22:02

## 2020-01-16 RX ADMIN — Medication 150 MILLIGRAM(S): at 22:03

## 2020-01-16 NOTE — PROGRESS NOTE ADULT - ASSESSMENT
50 yo F presents with right 4th toe gangrene    - pt is s/p right POP-PT bypass with now palpable pedal pulses  - right 4th toe gangrene with no active signs of infection. 2 plantar small hemorrhagic blisters noted   - tentatively booked for right foot 4th toe amputation, this Friday 1/17 @ 11:00 am  - patient refused the surgery for tomorrow because of pain to her back and whole body. Patient may benefit from pain management. Will follow up her decision again tomorrow morning   - d/w attending 48 yo F presents with right 4th toe gangrene    - pt is s/p right POP-PT bypass with now palpable pedal pulses  - right 4th toe gangrene with no active signs of infection. 2 plantar small hemorrhagic blisters noted   - tentatively booked for right foot 4th toe amputation, this Friday 1/17 @ 11:00 am  - Please hold heparin drip 6 hours before the case, around 5 am on 1/17  - patient refused the surgery for tomorrow because of pain to her back and whole body. Patient may benefit from pain management. Will follow up her decision again tomorrow morning   - d/w attending 48 yo F presents with right 4th toe gangrene    - pt is s/p right POP-PT bypass with now palpable pedal pulses  - right 4th toe gangrene with no active signs of infection. 2 plantar small hemorrhagic blisters noted   - tentatively booked for right foot 4th toe amputation, this Friday 1/17 @ 11:00 am  - Please hold heparin drip 6 hours before the case, around 5 am on 1/17  - patient refused the surgery for tomorrow because of pain to her back and whole body. Patient may benefit from pain management. Will follow up her decision again tomorrow morning.   - d/w attending

## 2020-01-16 NOTE — PROGRESS NOTE ADULT - SUBJECTIVE AND OBJECTIVE BOX
VASCULAR SURGERY DAILY PROGRESS NOTE:    Subjective:  Patient seen and examined at bedside on AM rounds. Reports leg pain with slight improvement with medication. Denies N/V. Tolerating diet. Passing flatus    Vital Signs Last 24 Hrs  T(C): 36.8 (16 Jan 2020 17:28), Max: 37.4 (15 Jm 2020 21:13)  T(F): 98.2 (16 Jan 2020 17:28), Max: 99.3 (15 Jm 2020 21:13)  HR: 87 (16 Jan 2020 17:28) (73 - 87)  BP: 128/90 (16 Jan 2020 17:28) (102/64 - 141/85)  BP(mean): --  RR: 17 (16 Jan 2020 17:28) (17 - 18)  SpO2: 95% (16 Jan 2020 17:28) (92% - 96%)    Exam:  Gen: NAD, awake and alert   Respiratory: RA no increased WOB   Cardiovascular: RRR   Gastrointestinal: soft, NT. ND  Extremities: warm, wound vac in place over incision site with appropriate suction, mild right foot swelling; +R DP signal       I&O's Detail    15 Jm 2020 07:01  -  16 Jan 2020 07:00  --------------------------------------------------------  IN:    heparin Infusion: 286 mL    Oral Fluid: 1140 mL  Total IN: 1426 mL    OUT:    Bulb: 35 mL    Voided: 1500 mL  Total OUT: 1535 mL    Total NET: -109 mL      16 Jan 2020 07:01  -  16 Jan 2020 17:45  --------------------------------------------------------  IN:    heparin Infusion: 130 mL    Oral Fluid: 480 mL  Total IN: 610 mL    OUT:    Bulb: 10 mL    Voided: 650 mL  Total OUT: 660 mL    Total NET: -50 mL          Daily     Daily     MEDICATIONS  (STANDING):  acetaminophen   Tablet .. 650 milliGRAM(s) Oral every 6 hours  aspirin  chewable 81 milliGRAM(s) Oral daily  atorvastatin 80 milliGRAM(s) Oral at bedtime  cyanocobalamin 1000 MICROGram(s) Oral daily  FLUoxetine 20 milliGRAM(s) Oral daily  folic acid 1 milliGRAM(s) Oral daily  gabapentin 300 milliGRAM(s) Oral three times a day  heparin  Infusion 1100 Unit(s)/Hr (13 mL/Hr) IV Continuous <Continuous>  influenza   Vaccine 0.5 milliLiter(s) IntraMuscular once  metoprolol succinate ER 12.5 milliGRAM(s) Oral daily  multivitamin 1 Tablet(s) Oral daily  nicotine -   7 mG/24Hr(s) Patch 1 patch Transdermal daily  pantoprazole    Tablet 40 milliGRAM(s) Oral before breakfast  polyethylene glycol 3350 17 Gram(s) Oral daily  sodium chloride 0.9%. 1000 milliLiter(s) (75 mL/Hr) IV Continuous <Continuous>  thiamine 100 milliGRAM(s) Oral daily  traZODone 150 milliGRAM(s) Oral at bedtime    MEDICATIONS  (PRN):  bisacodyl Suppository 10 milliGRAM(s) Rectal daily PRN Constipation  HYDROmorphone  Injectable 0.5 milliGRAM(s) IV Push every 4 hours PRN breakthrough pain  naloxone Injectable 0.1 milliGRAM(s) IV Push every 3 minutes PRN For ANY of the following changes in patient status:  A. RR LESS THAN 10 breaths per minute, B. Oxygen saturation LESS THAN 90%, C. Sedation score of 6  ondansetron Injectable 4 milliGRAM(s) IV Push every 6 hours PRN Nausea  oxyCODONE    IR 5 milliGRAM(s) Oral every 4 hours PRN Moderate Pain (4 - 6)  oxyCODONE    IR 10 milliGRAM(s) Oral every 4 hours PRN Severe Pain (7 - 10)      LABS:                        10.2   12.14 )-----------( 393      ( 16 Jan 2020 07:18 )             32.8     01-16    139  |  101  |  10  ----------------------------<  97  3.7   |  26  |  0.48<L>    Ca    8.7      16 Jan 2020 07:17  Phos  3.8     01-16  Mg     1.8     01-16      PT/INR - ( 15 Jm 2020 08:20 )   PT: 15.8 sec;   INR: 1.36 ratio         PTT - ( 16 Jan 2020 07:21 )  PTT:83.8 sec

## 2020-01-16 NOTE — PROGRESS NOTE ADULT - ASSESSMENT
Assessment: 49y F s/p pop-PT bypass   50 y/o woman w/ history of PTSD/depression, spinal disc herniation, GERD, gastric bypass (2004), ex lap for SBO and recent CVA, who was transferred from [place] for severe PVD and ischemic toe, now s/p popliteal-posterior tibial bypass with vein, recovering well    Plan:  - Continue with PO pain control  - continue with heparin drip; start Xarelto before discharge  - d/c LOREN drain  - PM&R rec IRON  - pre-op for toe amputation tomorrow with podiatry  - continue with regular diet, NPOpMN

## 2020-01-16 NOTE — PROGRESS NOTE ADULT - SUBJECTIVE AND OBJECTIVE BOX
Vascular Cardiology  Progress note     SPECTRA [id number]              EMAIL [email or web address]   OFFICE [phone]    CC:      INTERVAL HISTORY:  Patient states that shes doing well with no complaints of any shortness of breath or palpitations          Allergies  No Known Allergies  Intolerances      MEDICATIONS:  aspirin  chewable 81 milliGRAM(s) Oral daily  heparin  Infusion 1100 Unit(s)/Hr IV Continuous <Continuous>  metoprolol succinate ER 12.5 milliGRAM(s) Oral daily  acetaminophen   Tablet .. 650 milliGRAM(s) Oral every 6 hours  FLUoxetine 20 milliGRAM(s) Oral daily  gabapentin 300 milliGRAM(s) Oral three times a day  HYDROmorphone  Injectable 0.5 milliGRAM(s) IV Push every 4 hours PRN  ondansetron Injectable 4 milliGRAM(s) IV Push every 6 hours PRN  oxyCODONE    IR 5 milliGRAM(s) Oral every 4 hours PRN  oxyCODONE    IR 10 milliGRAM(s) Oral every 4 hours PRN  traZODone 150 milliGRAM(s) Oral at bedtime  bisacodyl Suppository 10 milliGRAM(s) Rectal daily PRN  pantoprazole    Tablet 40 milliGRAM(s) Oral before breakfast  polyethylene glycol 3350 17 Gram(s) Oral daily  atorvastatin 80 milliGRAM(s) Oral at bedtime  cyanocobalamin 1000 MICROGram(s) Oral daily  folic acid 1 milliGRAM(s) Oral daily  influenza   Vaccine 0.5 milliLiter(s) IntraMuscular once  multivitamin 1 Tablet(s) Oral daily  thiamine 100 milliGRAM(s) Oral daily      PAST MEDICAL & SURGICAL HISTORY:  Chronic insomnia  PTSD (post-traumatic stress disorder)  ETOH abuse  Anxiety  HTN (hypertension)  Depression  Tubal Ligation Status  Status Post Gastric Bypass for Obesity  Status Post Laparoscopic Cholecystectomy      FAMILY HISTORY:  No pertinent family history in first degree relatives      SOCIAL HISTORY:  unchanged    REVIEW OF SYSTEMS:  CONSTITUTIONAL: No fever, weight loss, or fatigue  EYES: No eye pain, visual disturbances, or discharge  ENMT:  No difficulty hearing, tinnitus, vertigo; No sinus or throat pain  NECK: No pain or stiffness  RESPIRATORY: No cough, wheezing, chills or hemoptysis; No Shortness of Breath  CARDIOVASCULAR: No chest pain, palpitations, passing out, dizziness, or leg swelling  GASTROINTESTINAL: No abdominal or epigastric pain. No nausea, vomiting, or hematemesis; No diarrhea or constipation. No melena or hematochezia.  GENITOURINARY: No dysuria, frequency, hematuria, or incontinence  NEUROLOGICAL: No headaches, memory loss, loss of strength, numbness, or tremors  SKIN: No itching, burning, rashes, or lesions   LYMPH Nodes: No enlarged glands  ENDOCRINE: No heat or cold intolerance; No hair loss  MUSCULOSKELETAL: No joint pain or swelling; No muscle, back, or extremity pain  PSYCHIATRIC: No depression, anxiety, mood swings, or difficulty sleeping  HEME/LYMPH: No easy bruising, or bleeding gums  ALLERY AND IMMUNOLOGIC: No hives or eczema	    [ x] All others negative	  [ ] Unable to obtain    PHYSICAL EXAM:  T(C): 36.7 (01-16-20 @ 09:12), Max: 37.4 (01-15-20 @ 21:13)  HR: 85 (01-16-20 @ 09:12) (74 - 85)  BP: 115/74 (01-16-20 @ 09:12) (102/64 - 136/78)  RR: 18 (01-16-20 @ 09:12) (18 - 18)  SpO2: 92% (01-16-20 @ 09:12) (92% - 96%)  Wt(kg): --  I&O's Summary    15 Jm 2020 07:01  -  16 Jan 2020 07:00  --------------------------------------------------------  IN: 1426 mL / OUT: 1535 mL / NET: -109 mL    16 Jan 2020 07:01  -  16 Jan 2020 10:23  --------------------------------------------------------  IN: 26 mL / OUT: 260 mL / NET: -234 mL        Appearance: NAD  HEENT:   Normal oral mucosa	  Cardiovascular: RRR, S1 and S2   Respiratory: CTA B/L 	  Psychiatry:  AAO x 3  Gastrointestinal:  Soft, Non-tender, + BS	  Skin: No rashes  Neurologic: L sided facial droop and L UE/ LE weakness noted in setting of recent CVA  Extremities:  wound vac and LOREN drain in place to R LE medical incision site, dressing intact  R 4th toe gangrene noted    Vascular Pulse Exam: Doppler R DP and PT      LABS:	 	    CBC Full  -  ( 16 Jan 2020 07:18 )  WBC Count : 12.14 K/uL  Hemoglobin : 10.2 g/dL  Hematocrit : 32.8 %  Platelet Count - Automated : 393 K/uL  Mean Cell Volume : 101.2 fl  Mean Cell Hemoglobin : 31.5 pg  Mean Cell Hemoglobin Concentration : 31.1 gm/dL  Auto Neutrophil # : x  Auto Lymphocyte # : x  Auto Monocyte # : x  Auto Eosinophil # : x  Auto Basophil # : x  Auto Neutrophil % : x  Auto Lymphocyte % : x  Auto Monocyte % : x  Auto Eosinophil % : x  Auto Basophil % : x    01-16    139  |  101  |  10  ----------------------------<  97  3.7   |  26  |  0.48<L>  01-15    139  |  101  |  8   ----------------------------<  102<H>  3.6   |  28  |  0.51    Ca    8.7      16 Jan 2020 07:17  Ca    8.9      15 Jm 2020 08:20  Phos  3.8     01-16  Mg     1.8     01-16  Mg     1.9     01-15    TPro  5.5<L>  /  Alb  2.7<L>  /  TBili  0.3  /  DBili  x   /  AST  11  /  ALT  18  /  AlkPhos  98  01-14        Assessment:  1. Severe PAD and R 4th Toe Necrosis s/p R Popliteal-Tibial Bypass 1/13/20      - Hemodynamically Stable      - Podiatry planning on tentative 4th R toe amputation on this Friday  2.  R MCA CVA with hemorrhagic conversion in 12/2019   3. Hyperdynamic EF      - S/p IV Fluid Hydration  4. Atrial Tachycardia   - last episode  1/14/20       - currently in SR, started on BB, no PAT overnight     Plan:  1. Doing well post procedure.  2. Continue Heparin gtt in lieu of patient going for procedure on Friday   3. No further cardiac testing required prior to Podiatry procedure this Friday.  4. Continue Toprol XL 12.5mg daily  5. Transition to Xarelto 20mg when appropriate from vascular surgery and podiatry perspective.     Lindsay Zepeda D.O.  Vascular   Thank you      Vascular Cardiology Service   [id number]  [id number]  Office [phone]  email:   [email or web address] Vascular Cardiology  Progress note     SPECTRA [id number]              EMAIL [email or web address]   OFFICE [phone]    CC:  Severe PVD    INTERVAL HISTORY:  Patient states that shes doing well with no complaints of any shortness of breath or palpitations   No telemetry events overnight     Allergies  No Known Allergies  Intolerances      MEDICATIONS:  aspirin  chewable 81 milliGRAM(s) Oral daily  heparin  Infusion 1100 Unit(s)/Hr IV Continuous <Continuous>  metoprolol succinate ER 12.5 milliGRAM(s) Oral daily  acetaminophen   Tablet .. 650 milliGRAM(s) Oral every 6 hours  FLUoxetine 20 milliGRAM(s) Oral daily  gabapentin 300 milliGRAM(s) Oral three times a day  HYDROmorphone  Injectable 0.5 milliGRAM(s) IV Push every 4 hours PRN  ondansetron Injectable 4 milliGRAM(s) IV Push every 6 hours PRN  oxyCODONE    IR 5 milliGRAM(s) Oral every 4 hours PRN  oxyCODONE    IR 10 milliGRAM(s) Oral every 4 hours PRN  traZODone 150 milliGRAM(s) Oral at bedtime  bisacodyl Suppository 10 milliGRAM(s) Rectal daily PRN  pantoprazole    Tablet 40 milliGRAM(s) Oral before breakfast  polyethylene glycol 3350 17 Gram(s) Oral daily  atorvastatin 80 milliGRAM(s) Oral at bedtime  cyanocobalamin 1000 MICROGram(s) Oral daily  folic acid 1 milliGRAM(s) Oral daily  influenza   Vaccine 0.5 milliLiter(s) IntraMuscular once  multivitamin 1 Tablet(s) Oral daily  thiamine 100 milliGRAM(s) Oral daily      PAST MEDICAL & SURGICAL HISTORY:  Chronic insomnia  PTSD (post-traumatic stress disorder)  ETOH abuse  Anxiety  HTN (hypertension)  Depression  Tubal Ligation Status  Status Post Gastric Bypass for Obesity  Status Post Laparoscopic Cholecystectomy      FAMILY HISTORY:  No pertinent family history in first degree relatives      SOCIAL HISTORY:  unchanged    REVIEW OF SYSTEMS:  CONSTITUTIONAL: No fever, weight loss, or fatigue  EYES: No eye pain, visual disturbances, or discharge  ENMT:  No difficulty hearing, tinnitus, vertigo; No sinus or throat pain  NECK: No pain or stiffness  RESPIRATORY: No cough, wheezing, chills or hemoptysis; No Shortness of Breath  CARDIOVASCULAR: No chest pain, palpitations, passing out, dizziness, or leg swelling  GASTROINTESTINAL: No abdominal or epigastric pain. No nausea, vomiting, or hematemesis; No diarrhea or constipation. No melena or hematochezia.  GENITOURINARY: No dysuria, frequency, hematuria, or incontinence  NEUROLOGICAL: No headaches, memory loss, loss of strength, numbness, or tremors  SKIN: No itching, burning, rashes, or lesions   LYMPH Nodes: No enlarged glands  ENDOCRINE: No heat or cold intolerance; No hair loss  MUSCULOSKELETAL: No joint pain or swelling; No muscle, back, or extremity pain  PSYCHIATRIC: No depression, anxiety, mood swings, or difficulty sleeping  HEME/LYMPH: No easy bruising, or bleeding gums  ALLERY AND IMMUNOLOGIC: No hives or eczema	    [ x] All others negative	  [ ] Unable to obtain    PHYSICAL EXAM:  T(C): 36.7 (01-16-20 @ 09:12), Max: 37.4 (01-15-20 @ 21:13)  HR: 85 (01-16-20 @ 09:12) (74 - 85)  BP: 115/74 (01-16-20 @ 09:12) (102/64 - 136/78)  RR: 18 (01-16-20 @ 09:12) (18 - 18)  SpO2: 92% (01-16-20 @ 09:12) (92% - 96%)  Wt(kg): --  I&O's Summary    15 Jm 2020 07:01  -  16 Jan 2020 07:00  --------------------------------------------------------  IN: 1426 mL / OUT: 1535 mL / NET: -109 mL    16 Jan 2020 07:01  -  16 Jan 2020 10:23  --------------------------------------------------------  IN: 26 mL / OUT: 260 mL / NET: -234 mL        Appearance: NAD  HEENT:   Normal oral mucosa	  Cardiovascular: RRR, S1 and S2   Respiratory: CTA B/L 	  Psychiatry:  AAO x 3  Gastrointestinal:  Soft, Non-tender, + BS	  Skin: No rashes  Neurologic: L sided facial droop and L UE/ LE weakness noted in setting of recent CVA  Extremities:  wound vac and LOREN drain in place to R LE medical incision site, dressing intact  R 4th toe gangrene noted    Vascular Pulse Exam: Doppler R DP and PT      LABS:	 	    CBC Full  -  ( 16 Jan 2020 07:18 )  WBC Count : 12.14 K/uL  Hemoglobin : 10.2 g/dL  Hematocrit : 32.8 %  Platelet Count - Automated : 393 K/uL  Mean Cell Volume : 101.2 fl  Mean Cell Hemoglobin : 31.5 pg  Mean Cell Hemoglobin Concentration : 31.1 gm/dL  Auto Neutrophil # : x  Auto Lymphocyte # : x  Auto Monocyte # : x  Auto Eosinophil # : x  Auto Basophil # : x  Auto Neutrophil % : x  Auto Lymphocyte % : x  Auto Monocyte % : x  Auto Eosinophil % : x  Auto Basophil % : x    01-16    139  |  101  |  10  ----------------------------<  97  3.7   |  26  |  0.48<L>  01-15    139  |  101  |  8   ----------------------------<  102<H>  3.6   |  28  |  0.51    Ca    8.7      16 Jan 2020 07:17  Ca    8.9      15 Jm 2020 08:20  Phos  3.8     01-16  Mg     1.8     01-16  Mg     1.9     01-15    TPro  5.5<L>  /  Alb  2.7<L>  /  TBili  0.3  /  DBili  x   /  AST  11  /  ALT  18  /  AlkPhos  98  01-14        Assessment:  1. Severe PAD and R 4th Toe Necrosis s/p R Popliteal-Tibial Bypass 1/13/20      - Hemodynamically Stable      - Podiatry planning on tentative 4th R toe amputation on this Friday    2.  R MCA CVA with hemorrhagic conversion in 12/2019     3. Hyperdynamic EF      - S/p IV Fluid Hydration    4. Atrial Tachycardia   - last episode  1/14/20  - currently in SR, on BB, no PAT overnight     Plan:  - Doing well post procedure.  - c/w ASA   - Continue Heparin gtt in lieu of patient going for procedure on Friday   - No further cardiac testing required prior to Podiatry procedure this Friday.  - Continue Toprol XL 12.5mg daily  - Transition to Xarelto 20mg when appropriate from vascular surgery and podiatry perspective post procedure on Friday.     Lindsay Zepeda D.O.  Vascular   Thank you      Vascular Cardiology Service   [email or web address]  [id number]  Office [phone]  email:   [email or web address] Vascular Cardiology  Progress note     SPECTRA [id number]              EMAIL [email or web address]   OFFICE [phone]    CC:  Severe PVD    INTERVAL HISTORY:  Patient states that shes doing well with no complaints of any shortness of breath or palpitations   No telemetry events overnight     Allergies  No Known Allergies  Intolerances      MEDICATIONS:  aspirin  chewable 81 milliGRAM(s) Oral daily  heparin  Infusion 1100 Unit(s)/Hr IV Continuous <Continuous>  metoprolol succinate ER 12.5 milliGRAM(s) Oral daily  acetaminophen   Tablet .. 650 milliGRAM(s) Oral every 6 hours  FLUoxetine 20 milliGRAM(s) Oral daily  gabapentin 300 milliGRAM(s) Oral three times a day  HYDROmorphone  Injectable 0.5 milliGRAM(s) IV Push every 4 hours PRN  ondansetron Injectable 4 milliGRAM(s) IV Push every 6 hours PRN  oxyCODONE    IR 5 milliGRAM(s) Oral every 4 hours PRN  oxyCODONE    IR 10 milliGRAM(s) Oral every 4 hours PRN  traZODone 150 milliGRAM(s) Oral at bedtime  bisacodyl Suppository 10 milliGRAM(s) Rectal daily PRN  pantoprazole    Tablet 40 milliGRAM(s) Oral before breakfast  polyethylene glycol 3350 17 Gram(s) Oral daily  atorvastatin 80 milliGRAM(s) Oral at bedtime  cyanocobalamin 1000 MICROGram(s) Oral daily  folic acid 1 milliGRAM(s) Oral daily  influenza   Vaccine 0.5 milliLiter(s) IntraMuscular once  multivitamin 1 Tablet(s) Oral daily  thiamine 100 milliGRAM(s) Oral daily      PAST MEDICAL & SURGICAL HISTORY:  Chronic insomnia  PTSD (post-traumatic stress disorder)  ETOH abuse  Anxiety  HTN (hypertension)  Depression  Tubal Ligation Status  Status Post Gastric Bypass for Obesity  Status Post Laparoscopic Cholecystectomy      FAMILY HISTORY:  No pertinent family history in first degree relatives      SOCIAL HISTORY:  unchanged    REVIEW OF SYSTEMS:  CONSTITUTIONAL: No fever, weight loss, or fatigue  EYES: No eye pain, visual disturbances, or discharge  ENMT:  No difficulty hearing, tinnitus, vertigo; No sinus or throat pain  NECK: No pain or stiffness  RESPIRATORY: No cough, wheezing, chills or hemoptysis; No Shortness of Breath  CARDIOVASCULAR: No chest pain, palpitations, passing out, dizziness, or leg swelling  GASTROINTESTINAL: No abdominal or epigastric pain. No nausea, vomiting, or hematemesis; No diarrhea or constipation. No melena or hematochezia.  GENITOURINARY: No dysuria, frequency, hematuria, or incontinence  NEUROLOGICAL: No headaches, memory loss, loss of strength, numbness, or tremors  SKIN: No itching, burning, rashes, or lesions   LYMPH Nodes: No enlarged glands  ENDOCRINE: No heat or cold intolerance; No hair loss  MUSCULOSKELETAL: No joint pain or swelling; No muscle, back, or extremity pain  PSYCHIATRIC: No depression, anxiety, mood swings, or difficulty sleeping  HEME/LYMPH: No easy bruising, or bleeding gums  ALLERY AND IMMUNOLOGIC: No hives or eczema	    [ x] All others negative	  [ ] Unable to obtain    PHYSICAL EXAM:  T(C): 36.7 (01-16-20 @ 09:12), Max: 37.4 (01-15-20 @ 21:13)  HR: 85 (01-16-20 @ 09:12) (74 - 85)  BP: 115/74 (01-16-20 @ 09:12) (102/64 - 136/78)  RR: 18 (01-16-20 @ 09:12) (18 - 18)  SpO2: 92% (01-16-20 @ 09:12) (92% - 96%)  Wt(kg): --  I&O's Summary    15 Jm 2020 07:01  -  16 Jan 2020 07:00  --------------------------------------------------------  IN: 1426 mL / OUT: 1535 mL / NET: -109 mL    16 Jan 2020 07:01  -  16 Jan 2020 10:23  --------------------------------------------------------  IN: 26 mL / OUT: 260 mL / NET: -234 mL        Appearance: NAD  HEENT:   Normal oral mucosa	  Cardiovascular: RRR, S1 and S2   Respiratory: CTA B/L 	  Psychiatry:  AAO x 3  Gastrointestinal:  Soft, Non-tender, + BS	  Skin: No rashes  Neurologic: L sided facial droop and L UE/ LE weakness noted in setting of recent CVA  Extremities:  wound vac and LOREN drain in place to R LE medical incision site, dressing intact  R 4th toe gangrene noted    Vascular Pulse Exam: Doppler R DP and PT      LABS:	 	    CBC Full  -  ( 16 Jan 2020 07:18 )  WBC Count : 12.14 K/uL  Hemoglobin : 10.2 g/dL  Hematocrit : 32.8 %  Platelet Count - Automated : 393 K/uL  Mean Cell Volume : 101.2 fl  Mean Cell Hemoglobin : 31.5 pg  Mean Cell Hemoglobin Concentration : 31.1 gm/dL  Auto Neutrophil # : x  Auto Lymphocyte # : x  Auto Monocyte # : x  Auto Eosinophil # : x  Auto Basophil # : x  Auto Neutrophil % : x  Auto Lymphocyte % : x  Auto Monocyte % : x  Auto Eosinophil % : x  Auto Basophil % : x    01-16    139  |  101  |  10  ----------------------------<  97  3.7   |  26  |  0.48<L>  01-15    139  |  101  |  8   ----------------------------<  102<H>  3.6   |  28  |  0.51    Ca    8.7      16 Jan 2020 07:17  Ca    8.9      15 Jm 2020 08:20  Phos  3.8     01-16  Mg     1.8     01-16  Mg     1.9     01-15    TPro  5.5<L>  /  Alb  2.7<L>  /  TBili  0.3  /  DBili  x   /  AST  11  /  ALT  18  /  AlkPhos  98  01-14        Assessment:  1. Severe PAD and R 4th Toe Necrosis s/p R Popliteal-Tibial Bypass 1/13/20      - Hemodynamically Stable      - Podiatry planning on tentative 4th R toe amputation on this Friday    2.  R MCA CVA with hemorrhagic conversion in 12/2019     3. Hyperdynamic EF      - S/p IV Fluid Hydration    4. Atrial Tachycardia   - last episode  1/14/20  - currently in SR, on BB, no PAT overnight     Plan:  - Doing well post procedure.  - c/w ASA   - Continue Heparin gtt in lieu of patient going for procedure on Friday   - No further cardiac testing required prior to Podiatry procedure this Friday.  - Continue Toprol XL 12.5mg daily  - Transition to Xarelto 20mg when appropriate from vascular surgery and podiatry perspective post procedure      Lindsay Zepeda D.O.     Thank you      Vascular Cardiology Service   [facility] [id number]  Office [phone]  email:   [email or web address]

## 2020-01-16 NOTE — PROGRESS NOTE ADULT - SUBJECTIVE AND OBJECTIVE BOX
Podiatry pager #: [facility] [phone]/ [id number] [phone]    Patient is a 49y old  Female who presents with a chief complaint of Severe PVD (16 Jan 2020 10:22)       INTERVAL HPI/OVERNIGHT EVENTS:  Patient seen and evaluated at bedside.  Pt is resting comfortable in NAD. Denies N/V/F/C.      Allergies    No Known Allergies    Intolerances        Vital Signs Last 24 Hrs  T(C): 36.7 (16 Jan 2020 09:12), Max: 37.4 (15 Jm 2020 21:13)  T(F): 98 (16 Jan 2020 09:12), Max: 99.3 (15 Jm 2020 21:13)  HR: 85 (16 Jan 2020 09:12) (74 - 85)  BP: 115/74 (16 Jan 2020 09:12) (102/64 - 136/78)  BP(mean): --  RR: 18 (16 Jan 2020 09:12) (18 - 18)  SpO2: 92% (16 Jan 2020 09:12) (92% - 96%)    LABS:                        10.2   12.14 )-----------( 393      ( 16 Jan 2020 07:18 )             32.8     01-16    139  |  101  |  10  ----------------------------<  97  3.7   |  26  |  0.48<L>    Ca    8.7      16 Jan 2020 07:17  Phos  3.8     01-16  Mg     1.8     01-16    TPro  5.5<L>  /  Alb  2.7<L>  /  TBili  0.3  /  DBili  x   /  AST  11  /  ALT  18  /  AlkPhos  98  01-14    PT/INR - ( 15 Jm 2020 08:20 )   PT: 15.8 sec;   INR: 1.36 ratio         PTT - ( 16 Jan 2020 07:21 )  PTT:83.8 sec    CAPILLARY BLOOD GLUCOSE          Lower Extremity Physical Exam:  Vascular: DP/PT 2/4 on L, nonpalpable on R, CFT <5 seconds B/L (no CFT on RF 4th digit), Temperature gradient warm to cool B/L  Neuro: Epicritic sensation intact to the level of toes B/L  Skin: right foot 4th digit dry gangrene, no erythema or edema, no malodor, no signs of infection. Small plantar hemorrhagic blisters noted with no signs of infection.     RADIOLOGY & ADDITIONAL TESTS:

## 2020-01-16 NOTE — PROGRESS NOTE ADULT - SUBJECTIVE AND OBJECTIVE BOX
Patient is a 49y old  Female who presents with a chief complaint of Severe PVD (16 Jan 2020 12:40)      INTERVAL History of Present Illness/OVERNIGHT EVENTS:    MEDICATIONS  (STANDING):  acetaminophen   Tablet .. 650 milliGRAM(s) Oral every 6 hours  aspirin  chewable 81 milliGRAM(s) Oral daily  atorvastatin 80 milliGRAM(s) Oral at bedtime  cyanocobalamin 1000 MICROGram(s) Oral daily  FLUoxetine 20 milliGRAM(s) Oral daily  folic acid 1 milliGRAM(s) Oral daily  gabapentin 300 milliGRAM(s) Oral three times a day  heparin  Infusion 1100 Unit(s)/Hr (13 mL/Hr) IV Continuous <Continuous>  influenza   Vaccine 0.5 milliLiter(s) IntraMuscular once  metoprolol succinate ER 12.5 milliGRAM(s) Oral daily  multivitamin 1 Tablet(s) Oral daily  nicotine -   7 mG/24Hr(s) Patch 1 patch Transdermal daily  pantoprazole    Tablet 40 milliGRAM(s) Oral before breakfast  polyethylene glycol 3350 17 Gram(s) Oral daily  sodium chloride 0.9%. 1000 milliLiter(s) (75 mL/Hr) IV Continuous <Continuous>  thiamine 100 milliGRAM(s) Oral daily  traZODone 150 milliGRAM(s) Oral at bedtime    MEDICATIONS  (PRN):  bisacodyl Suppository 10 milliGRAM(s) Rectal daily PRN Constipation  HYDROmorphone  Injectable 0.5 milliGRAM(s) IV Push every 4 hours PRN breakthrough pain  naloxone Injectable 0.1 milliGRAM(s) IV Push every 3 minutes PRN For ANY of the following changes in patient status:  A. RR LESS THAN 10 breaths per minute, B. Oxygen saturation LESS THAN 90%, C. Sedation score of 6  ondansetron Injectable 4 milliGRAM(s) IV Push every 6 hours PRN Nausea  oxyCODONE    IR 5 milliGRAM(s) Oral every 4 hours PRN Moderate Pain (4 - 6)  oxyCODONE    IR 10 milliGRAM(s) Oral every 4 hours PRN Severe Pain (7 - 10)      Allergies    No Known Allergies    Intolerances        REVIEW OF SYSTEMS:  Negative unless otherwise specified above.    Vital Signs Last 24 Hrs  T(C): 36.7 (16 Jan 2020 13:21), Max: 37.4 (15 Jm 2020 21:13)  T(F): 98.1 (16 Jan 2020 13:21), Max: 99.3 (15 Jm 2020 21:13)  HR: 73 (16 Jan 2020 13:21) (73 - 85)  BP: 141/85 (16 Jan 2020 13:21) (102/64 - 141/85)  BP(mean): --  RR: 18 (16 Jan 2020 13:21) (18 - 18)  SpO2: 96% (16 Jan 2020 13:21) (92% - 96%)        PHYSICAL EXAM:  GENERAL: No apparent distress, appears stated age  HEAD:  Atraumatic, Normocephalic  EYES: Conjunctiva and sclera clear, no discharge  ENMT: Moist mucous membranes, no nasal discharge  NECK: Supple, no JVD  CHEST/LUNG: Clear to auscultation bilaterally, no wheeze or rales  HEART: Regular rate and rhythm, no murmurs, rubs or gallops  ABDOMEN: Soft, Nontender, Nondistended; Bowel sounds present  EXTREMITIES:  No clubbing, cyanosis or edema  SKIN: No rash or new discoloration  NERVOUS SYSTEM:  Alert & Oriented; Bilateral Lower extremity mobile, sensation to light touch intact      LABS:                        10.2   12.14 )-----------( 393      ( 16 Jan 2020 07:18 )             32.8     16 Jan 2020 07:17    139    |  101    |  10     ----------------------------<  97     3.7     |  26     |  0.48     Ca    8.7        16 Jan 2020 07:17  Phos  3.8       16 Jan 2020 07:17  Mg     1.8       16 Jan 2020 07:17      PT/INR - ( 15 Jm 2020 08:20 )   PT: 15.8 sec;   INR: 1.36 ratio         PTT - ( 16 Jan 2020 07:21 )  PTT:83.8 sec    CAPILLARY BLOOD GLUCOSE          RADIOLOGY & ADDITIONAL TESTS:    Images reviewed personally    Consultant Notes Reviewed and Care Discussed with relevant Consultants/Other Providers. Patient is a 49y old  Female who presents with a chief complaint of Severe PVD (16 Jan 2020 12:40)      INTERVAL History of Present Illness/OVERNIGHT EVENTS: toe amputation in AM.  c/o body pain    MEDICATIONS  (STANDING):  acetaminophen   Tablet .. 650 milliGRAM(s) Oral every 6 hours  aspirin  chewable 81 milliGRAM(s) Oral daily  atorvastatin 80 milliGRAM(s) Oral at bedtime  cyanocobalamin 1000 MICROGram(s) Oral daily  FLUoxetine 20 milliGRAM(s) Oral daily  folic acid 1 milliGRAM(s) Oral daily  gabapentin 300 milliGRAM(s) Oral three times a day  heparin  Infusion 1100 Unit(s)/Hr (13 mL/Hr) IV Continuous <Continuous>  influenza   Vaccine 0.5 milliLiter(s) IntraMuscular once  metoprolol succinate ER 12.5 milliGRAM(s) Oral daily  multivitamin 1 Tablet(s) Oral daily  nicotine -   7 mG/24Hr(s) Patch 1 patch Transdermal daily  pantoprazole    Tablet 40 milliGRAM(s) Oral before breakfast  polyethylene glycol 3350 17 Gram(s) Oral daily  sodium chloride 0.9%. 1000 milliLiter(s) (75 mL/Hr) IV Continuous <Continuous>  thiamine 100 milliGRAM(s) Oral daily  traZODone 150 milliGRAM(s) Oral at bedtime    MEDICATIONS  (PRN):  bisacodyl Suppository 10 milliGRAM(s) Rectal daily PRN Constipation  HYDROmorphone  Injectable 0.5 milliGRAM(s) IV Push every 4 hours PRN breakthrough pain  naloxone Injectable 0.1 milliGRAM(s) IV Push every 3 minutes PRN For ANY of the following changes in patient status:  A. RR LESS THAN 10 breaths per minute, B. Oxygen saturation LESS THAN 90%, C. Sedation score of 6  ondansetron Injectable 4 milliGRAM(s) IV Push every 6 hours PRN Nausea  oxyCODONE    IR 5 milliGRAM(s) Oral every 4 hours PRN Moderate Pain (4 - 6)  oxyCODONE    IR 10 milliGRAM(s) Oral every 4 hours PRN Severe Pain (7 - 10)      Allergies    No Known Allergies    Intolerances        REVIEW OF SYSTEMS:  Negative unless otherwise specified above.    Vital Signs Last 24 Hrs  T(C): 36.7 (16 Jan 2020 13:21), Max: 37.4 (15 Jm 2020 21:13)  T(F): 98.1 (16 Jan 2020 13:21), Max: 99.3 (15 Jm 2020 21:13)  HR: 73 (16 Jan 2020 13:21) (73 - 85)  BP: 141/85 (16 Jan 2020 13:21) (102/64 - 141/85)  BP(mean): --  RR: 18 (16 Jan 2020 13:21) (18 - 18)  SpO2: 96% (16 Jan 2020 13:21) (92% - 96%)        PHYSICAL EXAM:  GENERAL: No apparent distress, appears stated age  HEAD:  Atraumatic, Normocephalic  EYES: Conjunctiva and sclera clear, no discharge  ENMT: Moist mucous membranes, no nasal discharge  NECK: Supple, no JVD  CHEST/LUNG: Clear to auscultation bilaterally, no wheeze or rales  HEART: Regular rate and rhythm, +systolic murmur  ABDOMEN: Soft, Nontender, Nondistended; Bowel sounds present  EXTREMITIES:  No clubbing, but has right 4th toe dry gangrene, wound vac and drain right leg  SKIN: No rash or new discoloration  NERVOUS SYSTEM:  Alert & Oriented; left hemiplegia s/p CVA      LABS:                        10.2   12.14 )-----------( 393      ( 16 Jan 2020 07:18 )             32.8     16 Jan 2020 07:17    139    |  101    |  10     ----------------------------<  97     3.7     |  26     |  0.48     Ca    8.7        16 Jan 2020 07:17  Phos  3.8       16 Jan 2020 07:17  Mg     1.8       16 Jan 2020 07:17      PT/INR - ( 15 Jm 2020 08:20 )   PT: 15.8 sec;   INR: 1.36 ratio         PTT - ( 16 Jan 2020 07:21 )  PTT:83.8 sec    CAPILLARY BLOOD GLUCOSE          RADIOLOGY & ADDITIONAL TESTS:    Images reviewed personally    Consultant Notes Reviewed and Care Discussed with relevant Consultants/Other Providers.

## 2020-01-16 NOTE — PROGRESS NOTE ADULT - ASSESSMENT
48 y/o woman w/ history of chronic smoker, PTSD/depression, spinal disc herniation, GERD, gastric bypass (2004), ex lap for SBO (9 yrs ago) and recent CVA, who was transferred from [place] for severe PVD and ischemic toe.

## 2020-01-17 ENCOUNTER — RESULT REVIEW (OUTPATIENT)
Age: 50
End: 2020-01-17

## 2020-01-17 LAB
ANION GAP SERPL CALC-SCNC: 13 MMOL/L — SIGNIFICANT CHANGE UP (ref 5–17)
APTT BLD: 31 SEC — SIGNIFICANT CHANGE UP (ref 27.5–36.3)
BLD GP AB SCN SERPL QL: NEGATIVE — SIGNIFICANT CHANGE UP
BUN SERPL-MCNC: 9 MG/DL — SIGNIFICANT CHANGE UP (ref 7–23)
CALCIUM SERPL-MCNC: 9.2 MG/DL — SIGNIFICANT CHANGE UP (ref 8.4–10.5)
CHLORIDE SERPL-SCNC: 100 MMOL/L — SIGNIFICANT CHANGE UP (ref 96–108)
CO2 SERPL-SCNC: 25 MMOL/L — SIGNIFICANT CHANGE UP (ref 22–31)
CREAT SERPL-MCNC: 0.48 MG/DL — LOW (ref 0.5–1.3)
GLUCOSE SERPL-MCNC: 98 MG/DL — SIGNIFICANT CHANGE UP (ref 70–99)
HCG SERPL-ACNC: <2 MIU/ML — SIGNIFICANT CHANGE UP
HCT VFR BLD CALC: 35.6 % — SIGNIFICANT CHANGE UP (ref 34.5–45)
HGB BLD-MCNC: 11.4 G/DL — LOW (ref 11.5–15.5)
INR BLD: 1.2 RATIO — HIGH (ref 0.88–1.16)
MAGNESIUM SERPL-MCNC: 1.9 MG/DL — SIGNIFICANT CHANGE UP (ref 1.6–2.6)
MCHC RBC-ENTMCNC: 32 GM/DL — SIGNIFICANT CHANGE UP (ref 32–36)
MCHC RBC-ENTMCNC: 32.3 PG — SIGNIFICANT CHANGE UP (ref 27–34)
MCV RBC AUTO: 100.8 FL — HIGH (ref 80–100)
NRBC # BLD: 0 /100 WBCS — SIGNIFICANT CHANGE UP (ref 0–0)
PHOSPHATE SERPL-MCNC: 4.2 MG/DL — SIGNIFICANT CHANGE UP (ref 2.5–4.5)
PLATELET # BLD AUTO: 535 K/UL — HIGH (ref 150–400)
POTASSIUM SERPL-MCNC: 4.2 MMOL/L — SIGNIFICANT CHANGE UP (ref 3.5–5.3)
POTASSIUM SERPL-SCNC: 4.2 MMOL/L — SIGNIFICANT CHANGE UP (ref 3.5–5.3)
PROTHROM AB SERPL-ACNC: 13.7 SEC — HIGH (ref 10–12.9)
RBC # BLD: 3.53 M/UL — LOW (ref 3.8–5.2)
RBC # FLD: 16.8 % — HIGH (ref 10.3–14.5)
RH IG SCN BLD-IMP: POSITIVE — SIGNIFICANT CHANGE UP
SODIUM SERPL-SCNC: 138 MMOL/L — SIGNIFICANT CHANGE UP (ref 135–145)
WBC # BLD: 13.09 K/UL — HIGH (ref 3.8–10.5)
WBC # FLD AUTO: 13.09 K/UL — HIGH (ref 3.8–10.5)

## 2020-01-17 PROCEDURE — 99233 SBSQ HOSP IP/OBS HIGH 50: CPT

## 2020-01-17 PROCEDURE — 88311 DECALCIFY TISSUE: CPT | Mod: 26

## 2020-01-17 PROCEDURE — 88305 TISSUE EXAM BY PATHOLOGIST: CPT | Mod: 26

## 2020-01-17 PROCEDURE — 99232 SBSQ HOSP IP/OBS MODERATE 35: CPT

## 2020-01-17 PROCEDURE — 70450 CT HEAD/BRAIN W/O DYE: CPT | Mod: 26

## 2020-01-17 RX ORDER — OXYCODONE HYDROCHLORIDE 5 MG/1
10 TABLET ORAL EVERY 4 HOURS
Refills: 0 | Status: DISCONTINUED | OUTPATIENT
Start: 2020-01-17 | End: 2020-01-22

## 2020-01-17 RX ORDER — ASPIRIN/CALCIUM CARB/MAGNESIUM 324 MG
81 TABLET ORAL DAILY
Refills: 0 | Status: DISCONTINUED | OUTPATIENT
Start: 2020-01-17 | End: 2020-01-22

## 2020-01-17 RX ORDER — HYDROMORPHONE HYDROCHLORIDE 2 MG/ML
0.5 INJECTION INTRAMUSCULAR; INTRAVENOUS; SUBCUTANEOUS EVERY 4 HOURS
Refills: 0 | Status: DISCONTINUED | OUTPATIENT
Start: 2020-01-17 | End: 2020-01-18

## 2020-01-17 RX ORDER — OXYCODONE HYDROCHLORIDE 5 MG/1
5 TABLET ORAL EVERY 4 HOURS
Refills: 0 | Status: DISCONTINUED | OUTPATIENT
Start: 2020-01-17 | End: 2020-01-22

## 2020-01-17 RX ORDER — THIAMINE MONONITRATE (VIT B1) 100 MG
100 TABLET ORAL DAILY
Refills: 0 | Status: DISCONTINUED | OUTPATIENT
Start: 2020-01-17 | End: 2020-01-22

## 2020-01-17 RX ORDER — ATORVASTATIN CALCIUM 80 MG/1
80 TABLET, FILM COATED ORAL AT BEDTIME
Refills: 0 | Status: DISCONTINUED | OUTPATIENT
Start: 2020-01-17 | End: 2020-01-22

## 2020-01-17 RX ORDER — PREGABALIN 225 MG/1
1000 CAPSULE ORAL DAILY
Refills: 0 | Status: DISCONTINUED | OUTPATIENT
Start: 2020-01-17 | End: 2020-01-22

## 2020-01-17 RX ORDER — GABAPENTIN 400 MG/1
400 CAPSULE ORAL THREE TIMES A DAY
Refills: 0 | Status: DISCONTINUED | OUTPATIENT
Start: 2020-01-17 | End: 2020-01-17

## 2020-01-17 RX ORDER — HYDROMORPHONE HYDROCHLORIDE 2 MG/ML
0.5 INJECTION INTRAMUSCULAR; INTRAVENOUS; SUBCUTANEOUS
Refills: 0 | Status: DISCONTINUED | OUTPATIENT
Start: 2020-01-17 | End: 2020-01-17

## 2020-01-17 RX ORDER — GABAPENTIN 400 MG/1
300 CAPSULE ORAL THREE TIMES A DAY
Refills: 0 | Status: DISCONTINUED | OUTPATIENT
Start: 2020-01-17 | End: 2020-01-22

## 2020-01-17 RX ORDER — PANTOPRAZOLE SODIUM 20 MG/1
40 TABLET, DELAYED RELEASE ORAL
Refills: 0 | Status: DISCONTINUED | OUTPATIENT
Start: 2020-01-17 | End: 2020-01-22

## 2020-01-17 RX ORDER — ACETAMINOPHEN 500 MG
650 TABLET ORAL EVERY 6 HOURS
Refills: 0 | Status: DISCONTINUED | OUTPATIENT
Start: 2020-01-17 | End: 2020-01-17

## 2020-01-17 RX ORDER — POLYETHYLENE GLYCOL 3350 17 G/17G
17 POWDER, FOR SOLUTION ORAL DAILY
Refills: 0 | Status: DISCONTINUED | OUTPATIENT
Start: 2020-01-17 | End: 2020-01-22

## 2020-01-17 RX ORDER — TRAZODONE HCL 50 MG
150 TABLET ORAL AT BEDTIME
Refills: 0 | Status: DISCONTINUED | OUTPATIENT
Start: 2020-01-17 | End: 2020-01-22

## 2020-01-17 RX ORDER — NALOXONE HYDROCHLORIDE 4 MG/.1ML
0.1 SPRAY NASAL
Refills: 0 | Status: DISCONTINUED | OUTPATIENT
Start: 2020-01-17 | End: 2020-01-22

## 2020-01-17 RX ORDER — ACETAMINOPHEN 500 MG
975 TABLET ORAL EVERY 6 HOURS
Refills: 0 | Status: DISCONTINUED | OUTPATIENT
Start: 2020-01-17 | End: 2020-01-22

## 2020-01-17 RX ORDER — METOPROLOL TARTRATE 50 MG
12.5 TABLET ORAL DAILY
Refills: 0 | Status: DISCONTINUED | OUTPATIENT
Start: 2020-01-17 | End: 2020-01-22

## 2020-01-17 RX ORDER — HEPARIN SODIUM 5000 [USP'U]/ML
1100 INJECTION INTRAVENOUS; SUBCUTANEOUS
Qty: 25000 | Refills: 0 | Status: DISCONTINUED | OUTPATIENT
Start: 2020-01-17 | End: 2020-01-18

## 2020-01-17 RX ORDER — SODIUM CHLORIDE 9 MG/ML
1000 INJECTION INTRAMUSCULAR; INTRAVENOUS; SUBCUTANEOUS
Refills: 0 | Status: DISCONTINUED | OUTPATIENT
Start: 2020-01-17 | End: 2020-01-18

## 2020-01-17 RX ORDER — ONDANSETRON 8 MG/1
4 TABLET, FILM COATED ORAL EVERY 6 HOURS
Refills: 0 | Status: DISCONTINUED | OUTPATIENT
Start: 2020-01-17 | End: 2020-01-18

## 2020-01-17 RX ORDER — FLUOXETINE HCL 10 MG
20 CAPSULE ORAL DAILY
Refills: 0 | Status: DISCONTINUED | OUTPATIENT
Start: 2020-01-17 | End: 2020-01-22

## 2020-01-17 RX ORDER — ACETAMINOPHEN 500 MG
975 TABLET ORAL EVERY 6 HOURS
Refills: 0 | Status: DISCONTINUED | OUTPATIENT
Start: 2020-01-17 | End: 2020-01-17

## 2020-01-17 RX ORDER — FOLIC ACID 0.8 MG
1 TABLET ORAL DAILY
Refills: 0 | Status: DISCONTINUED | OUTPATIENT
Start: 2020-01-17 | End: 2020-01-22

## 2020-01-17 RX ORDER — NICOTINE POLACRILEX 2 MG
1 GUM BUCCAL DAILY
Refills: 0 | Status: DISCONTINUED | OUTPATIENT
Start: 2020-01-17 | End: 2020-01-22

## 2020-01-17 RX ADMIN — HEPARIN SODIUM 11 UNIT(S)/HR: 5000 INJECTION INTRAVENOUS; SUBCUTANEOUS at 20:17

## 2020-01-17 RX ADMIN — GABAPENTIN 300 MILLIGRAM(S): 400 CAPSULE ORAL at 16:11

## 2020-01-17 RX ADMIN — OXYCODONE HYDROCHLORIDE 10 MILLIGRAM(S): 5 TABLET ORAL at 05:08

## 2020-01-17 RX ADMIN — Medication 975 MILLIGRAM(S): at 22:57

## 2020-01-17 RX ADMIN — Medication 150 MILLIGRAM(S): at 22:57

## 2020-01-17 RX ADMIN — OXYCODONE HYDROCHLORIDE 10 MILLIGRAM(S): 5 TABLET ORAL at 20:51

## 2020-01-17 RX ADMIN — OXYCODONE HYDROCHLORIDE 10 MILLIGRAM(S): 5 TABLET ORAL at 04:38

## 2020-01-17 RX ADMIN — Medication 1 MILLIGRAM(S): at 16:11

## 2020-01-17 RX ADMIN — GABAPENTIN 300 MILLIGRAM(S): 400 CAPSULE ORAL at 05:31

## 2020-01-17 RX ADMIN — SODIUM CHLORIDE 70 MILLILITER(S): 9 INJECTION INTRAMUSCULAR; INTRAVENOUS; SUBCUTANEOUS at 14:39

## 2020-01-17 RX ADMIN — Medication 20 MILLIGRAM(S): at 16:11

## 2020-01-17 RX ADMIN — HYDROMORPHONE HYDROCHLORIDE 0.5 MILLIGRAM(S): 2 INJECTION INTRAMUSCULAR; INTRAVENOUS; SUBCUTANEOUS at 06:00

## 2020-01-17 RX ADMIN — Medication 12.5 MILLIGRAM(S): at 05:31

## 2020-01-17 RX ADMIN — Medication 1 TABLET(S): at 16:11

## 2020-01-17 RX ADMIN — Medication 1 PATCH: at 07:54

## 2020-01-17 RX ADMIN — Medication 1 PATCH: at 19:14

## 2020-01-17 RX ADMIN — OXYCODONE HYDROCHLORIDE 10 MILLIGRAM(S): 5 TABLET ORAL at 16:12

## 2020-01-17 RX ADMIN — PREGABALIN 1000 MICROGRAM(S): 225 CAPSULE ORAL at 16:11

## 2020-01-17 RX ADMIN — SODIUM CHLORIDE 75 MILLILITER(S): 9 INJECTION INTRAMUSCULAR; INTRAVENOUS; SUBCUTANEOUS at 01:00

## 2020-01-17 RX ADMIN — Medication 650 MILLIGRAM(S): at 01:34

## 2020-01-17 RX ADMIN — Medication 975 MILLIGRAM(S): at 19:00

## 2020-01-17 RX ADMIN — ATORVASTATIN CALCIUM 80 MILLIGRAM(S): 80 TABLET, FILM COATED ORAL at 22:57

## 2020-01-17 RX ADMIN — Medication 100 MILLIGRAM(S): at 16:11

## 2020-01-17 RX ADMIN — OXYCODONE HYDROCHLORIDE 10 MILLIGRAM(S): 5 TABLET ORAL at 17:00

## 2020-01-17 RX ADMIN — Medication 975 MILLIGRAM(S): at 18:33

## 2020-01-17 RX ADMIN — Medication 81 MILLIGRAM(S): at 16:11

## 2020-01-17 RX ADMIN — OXYCODONE HYDROCHLORIDE 10 MILLIGRAM(S): 5 TABLET ORAL at 20:21

## 2020-01-17 RX ADMIN — Medication 1 PATCH: at 12:07

## 2020-01-17 RX ADMIN — GABAPENTIN 300 MILLIGRAM(S): 400 CAPSULE ORAL at 22:57

## 2020-01-17 RX ADMIN — Medication 1 PATCH: at 16:11

## 2020-01-17 RX ADMIN — HYDROMORPHONE HYDROCHLORIDE 0.5 MILLIGRAM(S): 2 INJECTION INTRAMUSCULAR; INTRAVENOUS; SUBCUTANEOUS at 05:31

## 2020-01-17 NOTE — PROGRESS NOTE ADULT - PROBLEM SELECTOR PROBLEM 7
Discharge planning issues
Discharge planning issues
Prophylactic measure
Discharge planning issues

## 2020-01-17 NOTE — PROGRESS NOTE ADULT - SUBJECTIVE AND OBJECTIVE BOX
Patient is a 49y old  Female who presents with a chief complaint of Severe PVD (16 Jan 2020 14:36)      INTERVAL HPI/OVERNIGHT EVENTS:   Pt is scheduled for Right foot partial 4th ray resection with Dr. abraham at 1/17 11AM. Patient is aware of procedure and is NPO since midnight.    MEDICATIONS  (STANDING):  acetaminophen   Tablet .. 650 milliGRAM(s) Oral every 6 hours  aspirin  chewable 81 milliGRAM(s) Oral daily  atorvastatin 80 milliGRAM(s) Oral at bedtime  cyanocobalamin 1000 MICROGram(s) Oral daily  FLUoxetine 20 milliGRAM(s) Oral daily  folic acid 1 milliGRAM(s) Oral daily  gabapentin 300 milliGRAM(s) Oral three times a day  influenza   Vaccine 0.5 milliLiter(s) IntraMuscular once  metoprolol succinate ER 12.5 milliGRAM(s) Oral daily  multivitamin 1 Tablet(s) Oral daily  nicotine -   7 mG/24Hr(s) Patch 1 patch Transdermal daily  pantoprazole    Tablet 40 milliGRAM(s) Oral before breakfast  polyethylene glycol 3350 17 Gram(s) Oral daily  sodium chloride 0.9%. 1000 milliLiter(s) (75 mL/Hr) IV Continuous <Continuous>  thiamine 100 milliGRAM(s) Oral daily  traZODone 150 milliGRAM(s) Oral at bedtime    MEDICATIONS  (PRN):  bisacodyl Suppository 10 milliGRAM(s) Rectal daily PRN Constipation  HYDROmorphone  Injectable 0.5 milliGRAM(s) IV Push every 4 hours PRN breakthrough pain  naloxone Injectable 0.1 milliGRAM(s) IV Push every 3 minutes PRN For ANY of the following changes in patient status:  A. RR LESS THAN 10 breaths per minute, B. Oxygen saturation LESS THAN 90%, C. Sedation score of 6  ondansetron Injectable 4 milliGRAM(s) IV Push every 6 hours PRN Nausea  oxyCODONE    IR 5 milliGRAM(s) Oral every 4 hours PRN Moderate Pain (4 - 6)  oxyCODONE    IR 10 milliGRAM(s) Oral every 4 hours PRN Severe Pain (7 - 10)      Allergies    No Known Allergies    Intolerances        Vital Signs Last 24 Hrs  T(C): 36.7 (17 Jan 2020 05:15), Max: 36.8 (16 Jan 2020 17:28)  T(F): 98 (17 Jan 2020 05:15), Max: 98.2 (16 Jan 2020 17:28)  HR: 86 (17 Jan 2020 05:15) (73 - 87)  BP: 127/84 (17 Jan 2020 05:15) (105/65 - 141/85)  BP(mean): --  RR: 18 (17 Jan 2020 05:15) (17 - 18)  SpO2: 96% (17 Jan 2020 05:15) (92% - 96%)    LABS:                        10.2   12.14 )-----------( 393      ( 16 Jan 2020 07:18 )             32.8     01-16    139  |  101  |  10  ----------------------------<  97  3.7   |  26  |  0.48<L>    Ca    8.7      16 Jan 2020 07:17  Phos  3.8     01-16  Mg     1.8     01-16      PTT - ( 16 Jan 2020 07:21 )  PTT:83.8 sec    CAPILLARY BLOOD GLUCOSE          RADIOLOGY & ADDITIONAL TESTS:

## 2020-01-17 NOTE — PROGRESS NOTE ADULT - ASSESSMENT
Assessment: 49y F s/p pop-PT bypass on 1/13  48 y/o woman w/ history of PTSD/depression, spinal disc herniation, GERD, gastric bypass (2004), ex lap for SBO and recent CVA, who was transferred from [place] for severe PVD and ischemic toe, now s/p popliteal-posterior tibial bypass with vein, recovering well    Plan:  - Continue with PO pain control  - heparin drip held for OR; start Xarelto before discharge  - LOREN drain removed   - PM&R rec IRON - can potentially leave right after amputation   - continue with regular diet, NPOMN

## 2020-01-17 NOTE — BRIEF OPERATIVE NOTE - NSICDXBRIEFPOSTOP_GEN_ALL_CORE_FT
POST-OP DIAGNOSIS:  Gangrene of toe of right foot 17-Jan-2020 13:53:39  Bhumika Fan
POST-OP DIAGNOSIS:  Peripheral arterial disease 13-Jan-2020 12:21:28  Zeferino Fountain

## 2020-01-17 NOTE — BRIEF OPERATIVE NOTE - NSICDXBRIEFPREOP_GEN_ALL_CORE_FT
PRE-OP DIAGNOSIS:  Gangrene of toe of right foot 17-Jan-2020 13:53:31  Bhumika Fan
PRE-OP DIAGNOSIS:  Peripheral arterial disease 13-Jan-2020 12:21:17  Zeferino Fountain

## 2020-01-17 NOTE — PROGRESS NOTE ADULT - SUBJECTIVE AND OBJECTIVE BOX
Vascular Cardiology  Progress note     [facility] [id number]              EMAIL [email or web address]   OFFICE [phone]    CC:  Severe PVD    INTERVAL HISTORY:  Patient states that shes doing well with no complaints of any shortness of breath or palpitations   No telemetry events overnight.  Complains of generalized body aches    Allergies  No Known Allergies  Intolerances     MEDICATIONS  (STANDING):  acetaminophen   Tablet .. 650 milliGRAM(s) Oral every 6 hours  aspirin  chewable 81 milliGRAM(s) Oral daily  atorvastatin 80 milliGRAM(s) Oral at bedtime  cyanocobalamin 1000 MICROGram(s) Oral daily  FLUoxetine 20 milliGRAM(s) Oral daily  folic acid 1 milliGRAM(s) Oral daily  gabapentin 300 milliGRAM(s) Oral three times a day  influenza   Vaccine 0.5 milliLiter(s) IntraMuscular once  metoprolol succinate ER 12.5 milliGRAM(s) Oral daily  multivitamin 1 Tablet(s) Oral daily  nicotine -   7 mG/24Hr(s) Patch 1 patch Transdermal daily  pantoprazole    Tablet 40 milliGRAM(s) Oral before breakfast  polyethylene glycol 3350 17 Gram(s) Oral daily  sodium chloride 0.9%. 1000 milliLiter(s) (75 mL/Hr) IV Continuous <Continuous>  thiamine 100 milliGRAM(s) Oral daily  traZODone 150 milliGRAM(s) Oral at bedtime        PAST MEDICAL & SURGICAL HISTORY:  Chronic insomnia  PTSD (post-traumatic stress disorder)  ETOH abuse  Anxiety  HTN (hypertension)  Depression  Tubal Ligation Status  Status Post Gastric Bypass for Obesity  Status Post Laparoscopic Cholecystectomy      FAMILY HISTORY:  No pertinent family history in first degree relatives      SOCIAL HISTORY:  unchanged    REVIEW OF SYSTEMS:  CONSTITUTIONAL: No fever, weight loss, or fatigue  EYES: No eye pain, visual disturbances, or discharge  ENMT:  No difficulty hearing, tinnitus, vertigo; No sinus or throat pain  NECK: No pain or stiffness  RESPIRATORY: No cough, wheezing, chills or hemoptysis; No Shortness of Breath  CARDIOVASCULAR: No chest pain, palpitations, passing out, dizziness, or leg swelling  GASTROINTESTINAL: No abdominal or epigastric pain. No nausea, vomiting, or hematemesis; No diarrhea or constipation. No melena or hematochezia.  GENITOURINARY: No dysuria, frequency, hematuria, or incontinence  NEUROLOGICAL: Left sided weakness  SKIN: No itching, burning, rashes, or lesions   LYMPH Nodes: No enlarged glands  ENDOCRINE: No heat or cold intolerance; No hair loss  MUSCULOSKELETAL: generalized muscle aches  PSYCHIATRIC: No depression, anxiety, mood swings, or difficulty sleeping  HEME/LYMPH: No easy bruising, or bleeding gums  ALLERY AND IMMUNOLOGIC: No hives or eczema	    [ x] All others negative	  [ ] Unable to obtain     ICU Vital Signs Last 24 Hrs  T(C): 36.6 (17 Jan 2020 09:09), Max: 36.8 (16 Jan 2020 17:28)  T(F): 97.8 (17 Jan 2020 09:09), Max: 98.2 (16 Jan 2020 17:28)  HR: 80 (17 Jan 2020 09:09) (73 - 87)  BP: 153/86 (17 Jan 2020 09:09) (105/65 - 153/86)  BP(mean): --  ABP: --  ABP(mean): --  RR: 18 (17 Jan 2020 09:09) (17 - 18)  SpO2: 96% (17 Jan 2020 09:09) (95% - 96%)      Appearance: NAD  HEENT:   Normal oral mucosa	  Cardiovascular: RRR, S1 and S2   Respiratory: CTA B/L 	  Psychiatry:  AAO x 3  Gastrointestinal:  Soft, Non-tender, + BS	  Skin: No rashes  Neurologic: L sided facial droop and L UE/ LE weakness noted in setting of recent CVA  Extremities:   dressing intact  R 4th toe gangrene noted    Vascular Pulse Exam: Doppler R DP and PT                             11.4   13.09 )-----------( 535      ( 17 Jan 2020 09:13 )             35.6   01-17    138  |  100  |  9   ----------------------------<  98  4.2   |  25  |  0.48<L>    Ca    9.2      17 Jan 2020 09:13  Phos  4.2     01-17  Mg     1.9     01-17           Assessment:  1. Severe PAD and R 4th Toe Necrosis s/p R Popliteal-Tibial Bypass 1/13/20      - Hemodynamically Stable      - Podiatry planning on tentative 4th R toe amputation on this Friday    2.  R MCA CVA with hemorrhagic conversion in 12/2019     3. Hyperdynamic EF      - S/p IV Fluid Hydration    4. Atrial Tachycardia   - last episode  1/14/20  - currently in SR, on BB, no PAT overnight        Plan:  1.  No cardiac objection to toe amputation  2.  would consider surveillance CT head given history of CVA / ICH  3.  If CT head ok and surgical bleeding risk is acceptable, then transition to either eliquis 5mg BID vs Xarelto 20mg daily tomorrow    Thanks    AG [id number]

## 2020-01-17 NOTE — CHART NOTE - NSCHARTNOTEFT_GEN_A_CORE
POST-OPERATIVE NOTE    Subjective:  Patient is s/p Amputation of 4th toe of right foot . Recovering appropriately. Pain well controlled. denies n/v. CTH stable    Vital Signs Last 24 Hrs  T(C): 37 (17 Jan 2020 18:39), Max: 37.3 (17 Jan 2020 16:49)  T(F): 98.6 (17 Jan 2020 18:39), Max: 99.2 (17 Jan 2020 16:49)  HR: 84 (17 Jan 2020 18:39) (78 - 89)  BP: 140/84 (17 Jan 2020 18:39) (105/65 - 183/81)  BP(mean): 111 (17 Jan 2020 14:45) (103 - 117)  RR: 16 (17 Jan 2020 18:39) (16 - 18)  SpO2: 96% (17 Jan 2020 18:39) (95% - 100%)  I&O's Detail    16 Jan 2020 07:01  -  17 Jan 2020 07:00  --------------------------------------------------------  IN:    heparin Infusion: 273 mL    Oral Fluid: 1080 mL    sodium chloride 0.9%: 600 mL  Total IN: 1953 mL    OUT:    Bulb: 10 mL    Voided: 1300 mL  Total OUT: 1310 mL    Total NET: 643 mL      17 Jan 2020 07:01  -  17 Jan 2020 19:56  --------------------------------------------------------  IN:    Oral Fluid: 360 mL    sodium chloride 0.9%: 375 mL    sodium chloride 0.9%.: 280 mL  Total IN: 1015 mL    OUT:    Voided: 200 mL  Total OUT: 200 mL    Total NET: 815 mL        aspirin  chewable 81  heparin  Infusion 1100  metoprolol succinate ER 12.5    PAST MEDICAL & SURGICAL HISTORY:  Chronic insomnia  PTSD (post-traumatic stress disorder)  ETOH abuse  Anxiety  HTN (hypertension)  Depression  Tubal Ligation Status  Status Post Gastric Bypass for Obesity  Status Post Laparoscopic Cholecystectomy        Physical Exam:  General: NAD, resting comfortably in bed  Pulmonary: Nonlabored breathing, no respiratory distress  Cardiovascular: NSR  Abdominal: soft, NT/ND  Extremities: WWP, right foot wrapped with kurlex. +PT pulse      LABS:                        11.4   13.09 )-----------( 535      ( 17 Jan 2020 09:13 )             35.6     01-17    138  |  100  |  9   ----------------------------<  98  4.2   |  25  |  0.48<L>    Ca    9.2      17 Jan 2020 09:13  Phos  4.2     01-17  Mg     1.9     01-17      PT/INR - ( 17 Jan 2020 09:13 )   PT: 13.7 sec;   INR: 1.20 ratio         PTT - ( 17 Jan 2020 09:13 )  PTT:31.0 sec  CAPILLARY BLOOD GLUCOSE          Radiology and Additional Studies:    Assessment:  The patient is a 49y Female who is now several hours post-op from a right 4th toe amputation    Plan:  - Pain control as needed  - CTH stable Resume hep gtt  - OOB and ambulating as tolerated  - F/u AM labs

## 2020-01-17 NOTE — PROGRESS NOTE ADULT - PROBLEM SELECTOR PLAN 7
- DVT ppx: Lovenox  - Diet: Dysphagia I diet w/ nectar thickened pending MBS.
- DVT ppx: Lovenox  - Diet: Dysphagia I diet w/ nectar thickened pending MBS.
- DVT ppx: on heparin gtt
- DVT ppx: on heparin gtt to be stopped at midnight for OR in AM
Transitions of Care Status:  1.  Name of PCP: No known PCP  2.  PCP Contacted on Admission: [ ] Y    [x] N    3.  PCP contacted at Discharge: [ ] Y    [ ] N    [ ] N/A  4.  Post-Discharge Appointment Date and Location:  5.  Summary of Handoff given to PCP:
Transitions of Care Status:  1.  Name of PCP: No known PCP  2.  PCP Contacted on Admission: [ ] Y    [x] N    3.  PCP contacted at Discharge: [ ] Y    [ ] N    [ ] N/A  4.  Post-Discharge Appointment Date and Location:  5.  Summary of Handoff given to PCP:
heparin gtt as per vascular
- DVT ppx: Lovenox
- DVT ppx: Lovenox  - Diet: Dysphagia 3 nectar, per MBS results.
- DVT ppx: on heparin gtt
- DVT ppx: on heparin gtt
heparin gtt as per vascular
Transitions of Care Status:  1.  Name of PCP: No known PCP  2.  PCP Contacted on Admission: [ ] Y    [x] N    3.  PCP contacted at Discharge: [ ] Y    [ ] N    [ ] N/A  4.  Post-Discharge Appointment Date and Location:  5.  Summary of Handoff given to PCP:

## 2020-01-17 NOTE — PROGRESS NOTE ADULT - ASSESSMENT
50 y/o woman w/ history of chronic smoker, PTSD/depression, spinal disc herniation, GERD, gastric bypass (2004), ex lap for SBO (9 yrs ago) and recent CVA, who was transferred from [place] for severe PVD and ischemic toe.

## 2020-01-17 NOTE — PROGRESS NOTE ADULT - PROBLEM SELECTOR PROBLEM 8
Discharge planning issues

## 2020-01-17 NOTE — PROGRESS NOTE ADULT - PROBLEM SELECTOR PLAN 8
Transitions of Care Status:  1.  Name of PCP: No known PCP  2.  PCP Contacted on Admission: [ ] Y    [x] N    3.  PCP contacted at Discharge: [ ] Y    [ ] N    [ ] N/A  4.  Post-Discharge Appointment Date and Location:  5.  Summary of Handoff given to PCP:

## 2020-01-17 NOTE — PROGRESS NOTE ADULT - SUBJECTIVE AND OBJECTIVE BOX
Patient is a 49y old  Female who presents with a chief complaint of Severe PVD (17 Jan 2020 09:53)      INTERVAL History of Present Illness/OVERNIGHT EVENTS:  await toe amputation    MEDICATIONS  (STANDING):  acetaminophen   Tablet .. 975 milliGRAM(s) Oral every 6 hours  aspirin  chewable 81 milliGRAM(s) Oral daily  atorvastatin 80 milliGRAM(s) Oral at bedtime  cyanocobalamin 1000 MICROGram(s) Oral daily  FLUoxetine 20 milliGRAM(s) Oral daily  folic acid 1 milliGRAM(s) Oral daily  gabapentin 400 milliGRAM(s) Oral three times a day  influenza   Vaccine 0.5 milliLiter(s) IntraMuscular once  metoprolol succinate ER 12.5 milliGRAM(s) Oral daily  multivitamin 1 Tablet(s) Oral daily  nicotine -   7 mG/24Hr(s) Patch 1 patch Transdermal daily  pantoprazole    Tablet 40 milliGRAM(s) Oral before breakfast  polyethylene glycol 3350 17 Gram(s) Oral daily  sodium chloride 0.9%. 1000 milliLiter(s) (75 mL/Hr) IV Continuous <Continuous>  thiamine 100 milliGRAM(s) Oral daily  traZODone 150 milliGRAM(s) Oral at bedtime    MEDICATIONS  (PRN):  bisacodyl Suppository 10 milliGRAM(s) Rectal daily PRN Constipation  HYDROmorphone  Injectable 0.5 milliGRAM(s) IV Push every 4 hours PRN breakthrough pain  naloxone Injectable 0.1 milliGRAM(s) IV Push every 3 minutes PRN For ANY of the following changes in patient status:  A. RR LESS THAN 10 breaths per minute, B. Oxygen saturation LESS THAN 90%, C. Sedation score of 6  ondansetron Injectable 4 milliGRAM(s) IV Push every 6 hours PRN Nausea  oxyCODONE    IR 5 milliGRAM(s) Oral every 4 hours PRN Moderate Pain (4 - 6)  oxyCODONE    IR 10 milliGRAM(s) Oral every 4 hours PRN Severe Pain (7 - 10)      Allergies    No Known Allergies    Intolerances        REVIEW OF SYSTEMS:  Negative unless otherwise specified above.    Vital Signs Last 24 Hrs  T(C): 36.6 (17 Jan 2020 09:09), Max: 36.8 (16 Jan 2020 17:28)  T(F): 97.8 (17 Jan 2020 09:09), Max: 98.2 (16 Jan 2020 17:28)  HR: 80 (17 Jan 2020 09:09) (73 - 87)  BP: 153/86 (17 Jan 2020 09:09) (105/65 - 153/86)  BP(mean): --  RR: 18 (17 Jan 2020 09:09) (17 - 18)  SpO2: 96% (17 Jan 2020 09:09) (95% - 96%)        PHYSICAL EXAM:  GENERAL: No apparent distress, appears stated age, aunt at bedside  HEAD:  Atraumatic, Normocephalic  EYES: Conjunctiva and sclera clear, no discharge  ENMT: Moist mucous membranes, no nasal discharge  NECK: Supple, no JVD  CHEST/LUNG: Clear to auscultation bilaterally, no wheeze or rales  HEART: Regular rate and rhythm, +systolic murmur  ABDOMEN: Soft, Nontender, Nondistended; Bowel sounds present  EXTREMITIES:  No clubbing, but has right 4th toe dry gangrene, wound vac and drain right leg  SKIN: No rash or new discoloration  NERVOUS SYSTEM:  Alert & Oriented; left hemiplegia s/p CVA      LABS:                        11.4   13.09 )-----------( 535      ( 17 Jan 2020 09:13 )             35.6     17 Jan 2020 09:13    138    |  100    |  9      ----------------------------<  98     4.2     |  25     |  0.48     Ca    9.2        17 Jan 2020 09:13  Phos  4.2       17 Jan 2020 09:13  Mg     1.9       17 Jan 2020 09:13      PT/INR - ( 17 Jan 2020 09:13 )   PT: 13.7 sec;   INR: 1.20 ratio         PTT - ( 17 Jan 2020 09:13 )  PTT:31.0 sec    CAPILLARY BLOOD GLUCOSE          RADIOLOGY & ADDITIONAL TESTS:    Images reviewed personally    Consultant Notes Reviewed and Care Discussed with relevant Consultants/Other Providers.

## 2020-01-17 NOTE — PROGRESS NOTE ADULT - SUBJECTIVE AND OBJECTIVE BOX
Surgery Progress Note  Patient is a 49y old  Female who presents with a chief complaint of Severe PVD (17 Jan 2020 08:53)      SUBJECTIVE: Patient seen and examined at bedside with surgical team. Wound vac removed.   Dressing changed at bedside. Pain better controlled with AM. Going with podiatry today for 4th toe amp.       Vital Signs Last 24 Hrs  T(C): 36.7 (17 Jan 2020 05:15), Max: 36.8 (16 Jan 2020 17:28)  T(F): 98 (17 Jan 2020 05:15), Max: 98.2 (16 Jan 2020 17:28)  HR: 86 (17 Jan 2020 05:15) (73 - 87)  BP: 127/84 (17 Jan 2020 05:15) (105/65 - 141/85)  BP(mean): --  RR: 18 (17 Jan 2020 05:15) (17 - 18)  SpO2: 96% (17 Jan 2020 05:15) (92% - 96%)    Physical Exam  Constitutional: NAD  Respiratory: breathing comfortably on RA  Ext: warm, well approximated incision site with staples - 4x4 guaze with kerlex dressing applied, mild right foot swelling; +R DP signal     I&O's Detail    16 Jan 2020 07:01  -  17 Jan 2020 07:00  --------------------------------------------------------  IN:    heparin Infusion: 273 mL    Oral Fluid: 1080 mL    sodium chloride 0.9%.: 600 mL  Total IN: 1953 mL    OUT:    Bulb: 10 mL    Voided: 1300 mL  Total OUT: 1310 mL    Total NET: 643 mL      MEDICATIONS  (STANDING):  acetaminophen   Tablet .. 650 milliGRAM(s) Oral every 6 hours  aspirin  chewable 81 milliGRAM(s) Oral daily  atorvastatin 80 milliGRAM(s) Oral at bedtime  cyanocobalamin 1000 MICROGram(s) Oral daily  FLUoxetine 20 milliGRAM(s) Oral daily  folic acid 1 milliGRAM(s) Oral daily  gabapentin 300 milliGRAM(s) Oral three times a day  influenza   Vaccine 0.5 milliLiter(s) IntraMuscular once  metoprolol succinate ER 12.5 milliGRAM(s) Oral daily  multivitamin 1 Tablet(s) Oral daily  nicotine -   7 mG/24Hr(s) Patch 1 patch Transdermal daily  pantoprazole    Tablet 40 milliGRAM(s) Oral before breakfast  polyethylene glycol 3350 17 Gram(s) Oral daily  sodium chloride 0.9%. 1000 milliLiter(s) (75 mL/Hr) IV Continuous <Continuous>  thiamine 100 milliGRAM(s) Oral daily  traZODone 150 milliGRAM(s) Oral at bedtime    MEDICATIONS  (PRN):  bisacodyl Suppository 10 milliGRAM(s) Rectal daily PRN Constipation  HYDROmorphone  Injectable 0.5 milliGRAM(s) IV Push every 4 hours PRN breakthrough pain  naloxone Injectable 0.1 milliGRAM(s) IV Push every 3 minutes PRN For ANY of the following changes in patient status:  A. RR LESS THAN 10 breaths per minute, B. Oxygen saturation LESS THAN 90%, C. Sedation score of 6  ondansetron Injectable 4 milliGRAM(s) IV Push every 6 hours PRN Nausea  oxyCODONE    IR 5 milliGRAM(s) Oral every 4 hours PRN Moderate Pain (4 - 6)  oxyCODONE    IR 10 milliGRAM(s) Oral every 4 hours PRN Severe Pain (7 - 10)      LABS:                        10.2   12.14 )-----------( 393      ( 16 Jan 2020 07:18 )             32.8     01-16    139  |  101  |  10  ----------------------------<  97  3.7   |  26  |  0.48<L>    Ca    8.7      16 Jan 2020 07:17  Phos  3.8     01-16  Mg     1.8     01-16      PTT - ( 16 Jan 2020 07:21 )  PTT:83.8 sec

## 2020-01-17 NOTE — PROGRESS NOTE ADULT - ASSESSMENT
Plan:   Pt is scheduled for Right foot partial 4th ray resection with Dr. abraham at 1/17 11AM. Patient is aware of procedure and is NPO since midnight..   CXR on sunrise.  EKG on sunrise.  Medical/Cardiac clearance since 1/14 and documented in chart.  Consent signed and in chart.  Procedure was explained to patient in detail. All alternatives, risks and complications were discussed. All questions answered.

## 2020-01-17 NOTE — BRIEF OPERATIVE NOTE - NSICDXBRIEFPROCEDURE_GEN_ALL_CORE_FT
PROCEDURES:  Amputation of toe of right foot 17-Jan-2020 13:53:20  Bhumika Fan
PROCEDURES:  Bypass graft, popliteal-tibial, with vein 13-Jan-2020 12:21:06  Zeferino Fountain

## 2020-01-17 NOTE — BRIEF OPERATIVE NOTE - OPERATION/FINDINGS
gangreneous fourth toe
Thrombus noted in PT artery proximally. Strong PT signal upon completion of the case.

## 2020-01-18 LAB
ANION GAP SERPL CALC-SCNC: 15 MMOL/L — SIGNIFICANT CHANGE UP (ref 5–17)
APTT BLD: 41.4 SEC — HIGH (ref 27.5–36.3)
APTT BLD: 63.2 SEC — HIGH (ref 27.5–36.3)
BUN SERPL-MCNC: 9 MG/DL — SIGNIFICANT CHANGE UP (ref 7–23)
CALCIUM SERPL-MCNC: 8.8 MG/DL — SIGNIFICANT CHANGE UP (ref 8.4–10.5)
CHLORIDE SERPL-SCNC: 104 MMOL/L — SIGNIFICANT CHANGE UP (ref 96–108)
CO2 SERPL-SCNC: 19 MMOL/L — LOW (ref 22–31)
CREAT SERPL-MCNC: 0.48 MG/DL — LOW (ref 0.5–1.3)
GLUCOSE SERPL-MCNC: 99 MG/DL — SIGNIFICANT CHANGE UP (ref 70–99)
GRAM STN FLD: SIGNIFICANT CHANGE UP
HCT VFR BLD CALC: 30.1 % — LOW (ref 34.5–45)
HCT VFR BLD CALC: 36.8 % — SIGNIFICANT CHANGE UP (ref 34.5–45)
HGB BLD-MCNC: 11.5 G/DL — SIGNIFICANT CHANGE UP (ref 11.5–15.5)
HGB BLD-MCNC: 9.9 G/DL — LOW (ref 11.5–15.5)
MCHC RBC-ENTMCNC: 31.3 GM/DL — LOW (ref 32–36)
MCHC RBC-ENTMCNC: 31.3 PG — SIGNIFICANT CHANGE UP (ref 27–34)
MCHC RBC-ENTMCNC: 32.7 PG — SIGNIFICANT CHANGE UP (ref 27–34)
MCHC RBC-ENTMCNC: 32.9 GM/DL — SIGNIFICANT CHANGE UP (ref 32–36)
MCV RBC AUTO: 100 FL — SIGNIFICANT CHANGE UP (ref 80–100)
MCV RBC AUTO: 99.3 FL — SIGNIFICANT CHANGE UP (ref 80–100)
NRBC # BLD: 0 /100 WBCS — SIGNIFICANT CHANGE UP (ref 0–0)
NRBC # BLD: 0 /100 WBCS — SIGNIFICANT CHANGE UP (ref 0–0)
PLATELET # BLD AUTO: 471 K/UL — HIGH (ref 150–400)
PLATELET # BLD AUTO: 473 K/UL — HIGH (ref 150–400)
POTASSIUM SERPL-MCNC: 4.4 MMOL/L — SIGNIFICANT CHANGE UP (ref 3.5–5.3)
POTASSIUM SERPL-SCNC: 4.4 MMOL/L — SIGNIFICANT CHANGE UP (ref 3.5–5.3)
RBC # BLD: 3.03 M/UL — LOW (ref 3.8–5.2)
RBC # BLD: 3.68 M/UL — LOW (ref 3.8–5.2)
RBC # FLD: 16.9 % — HIGH (ref 10.3–14.5)
RBC # FLD: 17 % — HIGH (ref 10.3–14.5)
SODIUM SERPL-SCNC: 138 MMOL/L — SIGNIFICANT CHANGE UP (ref 135–145)
SPECIMEN SOURCE: SIGNIFICANT CHANGE UP
WBC # BLD: 11.82 K/UL — HIGH (ref 3.8–10.5)
WBC # BLD: 13.43 K/UL — HIGH (ref 3.8–10.5)
WBC # FLD AUTO: 11.82 K/UL — HIGH (ref 3.8–10.5)
WBC # FLD AUTO: 13.43 K/UL — HIGH (ref 3.8–10.5)

## 2020-01-18 PROCEDURE — 99233 SBSQ HOSP IP/OBS HIGH 50: CPT

## 2020-01-18 PROCEDURE — 73620 X-RAY EXAM OF FOOT: CPT | Mod: 26,RT

## 2020-01-18 RX ORDER — MORPHINE SULFATE 50 MG/1
2 CAPSULE, EXTENDED RELEASE ORAL EVERY 4 HOURS
Refills: 0 | Status: DISCONTINUED | OUTPATIENT
Start: 2020-01-18 | End: 2020-01-22

## 2020-01-18 RX ORDER — RIVAROXABAN 15 MG-20MG
20 KIT ORAL DAILY
Refills: 0 | Status: DISCONTINUED | OUTPATIENT
Start: 2020-01-18 | End: 2020-01-22

## 2020-01-18 RX ADMIN — OXYCODONE HYDROCHLORIDE 10 MILLIGRAM(S): 5 TABLET ORAL at 12:30

## 2020-01-18 RX ADMIN — Medication 1 TABLET(S): at 14:11

## 2020-01-18 RX ADMIN — OXYCODONE HYDROCHLORIDE 5 MILLIGRAM(S): 5 TABLET ORAL at 14:46

## 2020-01-18 RX ADMIN — Medication 81 MILLIGRAM(S): at 14:10

## 2020-01-18 RX ADMIN — Medication 20 MILLIGRAM(S): at 14:10

## 2020-01-18 RX ADMIN — OXYCODONE HYDROCHLORIDE 10 MILLIGRAM(S): 5 TABLET ORAL at 05:23

## 2020-01-18 RX ADMIN — Medication 975 MILLIGRAM(S): at 05:53

## 2020-01-18 RX ADMIN — OXYCODONE HYDROCHLORIDE 10 MILLIGRAM(S): 5 TABLET ORAL at 02:52

## 2020-01-18 RX ADMIN — OXYCODONE HYDROCHLORIDE 10 MILLIGRAM(S): 5 TABLET ORAL at 12:00

## 2020-01-18 RX ADMIN — HEPARIN SODIUM 11 UNIT(S)/HR: 5000 INJECTION INTRAVENOUS; SUBCUTANEOUS at 03:20

## 2020-01-18 RX ADMIN — GABAPENTIN 300 MILLIGRAM(S): 400 CAPSULE ORAL at 14:10

## 2020-01-18 RX ADMIN — ATORVASTATIN CALCIUM 80 MILLIGRAM(S): 80 TABLET, FILM COATED ORAL at 22:02

## 2020-01-18 RX ADMIN — Medication 975 MILLIGRAM(S): at 05:23

## 2020-01-18 RX ADMIN — GABAPENTIN 300 MILLIGRAM(S): 400 CAPSULE ORAL at 22:02

## 2020-01-18 RX ADMIN — Medication 150 MILLIGRAM(S): at 22:02

## 2020-01-18 RX ADMIN — RIVAROXABAN 20 MILLIGRAM(S): KIT at 15:53

## 2020-01-18 RX ADMIN — Medication 1 PATCH: at 19:00

## 2020-01-18 RX ADMIN — HYDROMORPHONE HYDROCHLORIDE 0.5 MILLIGRAM(S): 2 INJECTION INTRAMUSCULAR; INTRAVENOUS; SUBCUTANEOUS at 10:13

## 2020-01-18 RX ADMIN — Medication 12.5 MILLIGRAM(S): at 05:23

## 2020-01-18 RX ADMIN — Medication 100 MILLIGRAM(S): at 14:10

## 2020-01-18 RX ADMIN — Medication 1 PATCH: at 15:02

## 2020-01-18 RX ADMIN — OXYCODONE HYDROCHLORIDE 10 MILLIGRAM(S): 5 TABLET ORAL at 02:22

## 2020-01-18 RX ADMIN — OXYCODONE HYDROCHLORIDE 5 MILLIGRAM(S): 5 TABLET ORAL at 14:16

## 2020-01-18 RX ADMIN — Medication 975 MILLIGRAM(S): at 18:22

## 2020-01-18 RX ADMIN — Medication 1 PATCH: at 09:00

## 2020-01-18 RX ADMIN — HYDROMORPHONE HYDROCHLORIDE 0.5 MILLIGRAM(S): 2 INJECTION INTRAMUSCULAR; INTRAVENOUS; SUBCUTANEOUS at 10:43

## 2020-01-18 RX ADMIN — PREGABALIN 1000 MICROGRAM(S): 225 CAPSULE ORAL at 14:11

## 2020-01-18 RX ADMIN — Medication 1 PATCH: at 14:11

## 2020-01-18 RX ADMIN — OXYCODONE HYDROCHLORIDE 10 MILLIGRAM(S): 5 TABLET ORAL at 05:53

## 2020-01-18 RX ADMIN — GABAPENTIN 300 MILLIGRAM(S): 400 CAPSULE ORAL at 05:23

## 2020-01-18 RX ADMIN — PANTOPRAZOLE SODIUM 40 MILLIGRAM(S): 20 TABLET, DELAYED RELEASE ORAL at 09:37

## 2020-01-18 RX ADMIN — Medication 1 MILLIGRAM(S): at 14:11

## 2020-01-18 NOTE — PROGRESS NOTE ADULT - PROBLEM SELECTOR PLAN 1
ROSA MARIA finding c/w severe PAD of right foot w/ 4th toe discoloration.  - Vascular surgery on board, planning for Angio of LLE  - c/w ASA and Statin given recent CVA.  - pain control: Percocet and tylenol PRN.  - gabapentin ATC for neuropathic pain.  - currently on SSRI for depression. Hold trazodone to avoid 2 concurrent SSRI dosing.  - will need smoking cessation.  - consider cilostazole given patient's claudication symptoms.
- s/p RLE angiogram showing severe PAD   -for  bypass sx on Monday 1/13/2020 per Vascular Sx  - c/w ASA and Statin given recent CVA and PAD.   - pain control  -c/w  gabapentin 300mg TID  - Podiatry following   - c/w Heparin gtt   - c/w ASA and Statin  - quit smoking recently
- s/p RLE angiogram showing severe PAD   -for  bypass sx on Monday 1/13/2020 per Vascular Sx  - c/w ASA and Statin given recent CVA and PAD.   - pain control  -c/w  gabapentin 300mg TID  - Podiatry following   - c/w Heparin gtt - to be stopped at midnight  - c/w ASA and Statin  - quit smoking recently
- s/p RLE angiogram showing severe PAD  - s/p bypass sx 1/13 and thrombectomy  - s/p Amputation of 4th toe of right foot on 1/17  - c/w ASA and Statin given recent CVA and PAD.   - pain control  - c/w Gabapentin 300mg TID  - c/w Heparin gtt to [facility] today as per primary team  - quit smoking recently
- s/p RLE angiogram showing severe PAD  - s/p bypass sx 1/13 and thrombectomy.  - c/w ASA and Statin given recent CVA and PAD.   - pain control  -c/w  gabapentin 300mg TID  - Podiatry following   - c/w Heparin gtt as per primary team  - c/w ASA and Statin  - quit smoking recently
- s/p RLE angiogram showing severe PAD  - s/p bypass sx 1/13 and thrombectomy.  - c/w ASA and Statin given recent CVA and PAD.   - pain control  -c/w  gabapentin 300mg TID  - Podiatry following   - c/w Heparin gtt as per primary team  - c/w ASA and Statin  - quit smoking recently
- s/p RLE angiogram showing severe PAD  - s/p bypass sx 1/13 and thrombectomy.  - c/w ASA and Statin given recent CVA and PAD.   - pain control  -c/w  gabapentin 300mg TID  - Podiatry following - plan for toe amputation today  - c/w Heparin gtt as per primary team  - c/w ASA and Statin  - quit smoking recently  - toe amputation today  may proceed to OR without further testing as unlikely to change chronic risk factors. Moderate-high risk
- s/p RLE angiogram showing severe PAD  - s/p bypass sx today and thrombectomy.  - c/w ASA and Statin given recent CVA and PAD.   - pain control  -c/w  gabapentin 300mg TID  - Podiatry following   - c/w Heparin gtt as per vascular recs  - c/w ASA and Statin  - quit smoking recently
ROSA MARIA finding c/w severe PAD of right foot w/ 4th toe discoloration.  - Vascular surgery on board, they reviewed CT angio of RLE and say therapeutic AC and angiogram are contraindicated at this time due to recent hemorrhagic stroke. They say bilateral LE pulse signals present and limbs are currently not threatened.   - Vascular says to reconsult when patient is able to be put back on full AC.  - Consulted Neuro today to discuss when patient would be cleared for full AC.   - c/w ASA and Statin given recent CVA and PAD. Will increasing atorvastatin to 80mg QHS.   - pain control: Percocet and tylenol PRN.  - Continue increased dose of gabapentin ATC for neuropathic pain  - Smoking cessation counseling and nicotine patch for now.  - consider cilostazol given patient's claudication symptoms.  - Consulted podiatry to see patient to evaluate right 4th toe.
ROSA MARIA finding c/w severe PAD of right foot w/ 4th toe discoloration.  - Vascular surgery on board, they reviewed CT angio of RLE and say therapeutic AC and angiogram are contraindicated at this time due to recent hemorrhagic stroke. They say bilateral LE pulse signals present and limbs are currently not threatened.   - Vascular says to reconsult when patient is able to be put back on full AC.  - Consulted Neuro, follow up MRI, will follow up final recs regarding AC this afternoon.  - c/w ASA and Statin given recent CVA and PAD.   - pain control: Percocet and tylenol PRN.  - Continue increased dose of gabapentin ATC for neuropathic pain (will increase to 300 TID)  - Smoking cessation counseling and nicotine patch for now.  - Consulted podiatry to see patient to evaluate right 4th toe.
- s/p RLE angiogram showing severe PAD   -for  bypass sx on Monday 1/13/2020 per Vascular Sx  - vein mapping today  - c/w ASA and Statin given recent CVA and PAD.   - pain control  -c/w  gabapentin 300mg TID  - Podiatry following   - c/w Heparin gtt   - c/w ASA and Statin
- s/p RLE angiogram showing severe PAD  - further surgical planning for possible bypass per Vascular Sx  - c/w ASA and Statin given recent CVA and PAD.   - pain control  - Continue  gabapentin 300mg TID  - Smoking cessation counseling and nicotine patch for now.  - Podiatry following for right  4th toe discoloration.  - Heparin gtt restarted per Vasc sx
- s/p RLE angiogram showing severe PAD  - further surgical planning for possible bypass per Vascular Sx  - c/w ASA and Statin given recent CVA and PAD.   - pain control  - Continue  gabapentin ATC for neuropathic pain (300mg TID)  - Smoking cessation counseling and nicotine patch for now.  - Podiatry following for right  4th toe discoloration.
- s/p RLE angiogram showing severe PAD  - s/p bypass sx 1/13 and thrombectomy.  - c/w ASA and Statin given recent CVA and PAD.   - pain control  -c/w  gabapentin 300mg TID  - Podiatry following   - c/w Heparin gtt as per primary team  - c/w ASA and Statin  - quit smoking recently  - toe amputation in am  may proceed to OR without further testing as unlikely to change chronic risk factors. Moderate-high risk
ROSA MARIA finding c/w severe PAD of right foot w/ 4th toe discoloration.  - Pt has been cleared by Neurology to receive AC   - she will undergo angiogram today.  - c/w ASA and Statin given recent CVA and PAD.   - pain control  - Continue  gabapentin ATC for neuropathic pain (300mg TID)  - Smoking cessation counseling and nicotine patch for now.  - Podiatry  recs appreciated.
ROSA MARIA finding c/w severe PAD of right foot w/ 4th toe discoloration.  - Vascular surgery on board, they reviewed CT angio of RLE and say therapeutic AC and angiogram are contraindicated at this time due to recent hemorrhagic stroke. They say bilateral LE pulse signals present and limbs are currently not threatened.   - Vascular said to reconsult when patient is able to be put back on full AC.  - Consulted Neuro, they reviewed MRI; neuro said no new areas of ischemia or infarction upon review of recent CT and MRI. Therefore, if a vascular bypass procedures is limb saving, no objection to proceeding with surgery with caution. Try to avoid heparin bolus. CT head if change in neurological status.  -Informed vascular of above neuro recs. They say any type of procedure would require multiple heparin boluses, so if neuro can clear for that, they would consider a procedure. -Can ask neuro to follow up.     - c/w ASA and Statin given recent CVA and PAD.   - pain control: Oxycodone and tylenol PRN.  - Continue increased dose of gabapentin ATC for neuropathic pain (300mg TID)  - Smoking cessation counseling and nicotine patch for now.  - Consulted podiatry to see patient to evaluate right 4th toe; recs appreciated.
ROSA MARIA finding c/w severe PAD of right foot w/ 4th toe discoloration.  - Vascular surgery on board, they reviewed CT angio of RLE and say therapeutic AC and angiogram are contraindicated at this time due to recent hemorrhagic stroke. They say bilateral LE pulse signals present and limbs are currently not threatened.   - Vascular says to reconsult when patient is able to be put back on full AC.  - Consulted Neuro, they reviewed MRI; neuro said no new areas of ischemia or infarction upon review of recent CT and MRI. Therefore, if a vascular bypass procedures is limb saving, no objection to proceeding with surgery with caution. Try to avoid heparin bolus. CT head if change in neurological status.  -Informed vascular of above neuro recs. They will follow up tomorrow and let us know if any changes to the plan.     - c/w ASA and Statin given recent CVA and PAD.   - pain control: Oxycodone and tylenol PRN.  - Continue increased dose of gabapentin ATC for neuropathic pain (300mg TID)  - Smoking cessation counseling and nicotine patch for now.  - Consulted podiatry to see patient to evaluate right 4th toe; recs appreciated.
ROSA MARIA finding c/w severe PAD of right foot w/ 4th toe discoloration.  - per Vascular surgery any revascularization options would require multiple heparin boluses,  - Unfortunately heparin boluses would be contraindicated on account of recent hemorrhagic stroke.   - Pt has present  LE pulse signals b/l and limbs are currently not threatened  - will discuss with Neurology the feasibility of AC in the near future  - per Vascular ; can reconsult when patient is able to be put back on full AC.  - c/w ASA and Statin given recent CVA and PAD.   - pain control: Oxycodone and tylenol PRN.  - Continue  gabapentin ATC for neuropathic pain (300mg TID)  - Smoking cessation counseling and nicotine patch for now.  - Podiatry  recs appreciated.
ROSA MARIA finding c/w severe PAD of right foot w/ 4th toe discoloration.  - Vascular surgery on board, they reviewed CT angio of RLE and say therapeutic AC and angiogram are contraindicated at this time due to recent hemorrhagic stroke. They say bilateral LE pulse signals present and limbs are currently not threatened.   -Vascular says to reconsult when patient is able to be put back on full AC.  -Would consult with neuro/KIEL when patient would be cleared for full AC.   - c/w ASA and Statin given recent CVA and PAD. Consider increasing atorvastatin to 80mg QHS.   - pain control: Percocet and tylenol PRN.  - gabapentin ATC for neuropathic pain; will increase to 200mg TID.   - smoking cessation counseling and nicotine patch for now.  - consider cilostazol given patient's claudication symptoms.  -Would ask podiatry to see patient tomorrow to evaluate right 4th toe.

## 2020-01-18 NOTE — PROGRESS NOTE ADULT - PROBLEM SELECTOR PLAN 2
Acute M1 CVA w/ residual left sided weakness.  - PT/OT and Speech & Swallow consult  - c/w ASA and Statin  - Holding full a/c considering recent hemorrhagic stroke  - fall precaution  - aspiration precaution  - dysphagia diet  - Neurology following
Acute right M1 CVA w/ residual left sided weakness.  - PT recs acute rehab  - pt tolerating soft diet  - c/w aspiration precautions    - MR brain with no new areas of ischemia or infarct.   - c/w ASA and Statin  - Cleared by Neurology for full AC   - fall and aspiration precautions  - PMR consult placed
Acute right M1 CVA w/ residual left sided weakness.  - PT recs acute rehab  - pt tolerating soft diet  - c/w aspiration precautions    - MR brain with no new areas of ischemia or infarct.   - c/w ASA and Statin  - Cleared by Neurology for full AC   - fall and aspiration precautions  - PMR consult placed  - may consider surveillance CT brain as per vascular cardio prior to discharge
Acute right M1 CVA w/ residual left sided weakness.  - PT recs acute rehab  - pt tolerating soft diet  - c/w aspiration precautions    - MR brain with no new areas of ischemia or infarct.   - c/w ASA and Statin  - cleared by Neurology for full AC   - fall and aspiration precautions  - PMR consult placed  - may consider surveillance CT brain as per vascular cardio prior to discharge
Acute right M1 CVA w/ residual left sided weakness.  - PT recs acute rehab  - pt tolerating soft diet  - c/w aspiration precautions  - Neuro following  - MR brain with no new areas of ischemia or infarct.   - c/w ASA and Statin  - Cleared by Neurology for full AC   - fall and aspiration precautions  - PMR consult placed
Acute right M1 CVA w/ residual left sided weakness.  - PT/OT and Speech & Swallow consult  - Swallow eval recs MBS (planned now for 1/3)  - Neuro recs  - Follow up MR brain  - c/w ASA and Statin  - Holding full a/c considering recent hemorrhagic stroke  - fall and aspiration precautions  - Neuro checks Q4H.   - dysphagia diet for now   - PMNR consult placed
Acute right M1 CVA w/ residual left sided weakness.  - PT/OT and Speech & Swallow consult  - Swallow eval recs MBS (planned now for 1/3)  - Neuro requesting MRI Brain (ordered and pending)   - c/w ASA and Statin, increased atorva  - Holding full a/c considering recent hemorrhagic stroke  - fall and aspiration precautions  - Neuro checks Q4H.   - dysphagia diet for now  - Will likely require PMNR prior to discharge
Acute right M1 CVA w/ residual left sided weakness.  - PT reccs acute rehab  - Swallow eval recs MBS (completed1/3); dysphagia 3 nectar.   - Neuro recs appreciated.   - MR brain with no new areas of ischemia or infarct.   - c/w ASA and Statin  - Holding full AC considering recent hemorrhagic stroke  - fall and aspiration precautions  - Neuro checks Q4H.   - dysphagia diet as above.    - PMNR consult placed
Acute right M1 CVA w/ residual left sided weakness.  - PT reccs acute rehab  - Swallow eval recs dysphagia 3 nectar ; pt requesting to try soft diet  - will advance diet to Soft ; c/w aspiration precautions  - Neuro following  - MR brain with no new areas of ischemia or infarct.   - c/w ASA and Statin  - Holding full AC considering recent hemorrhagic stroke  - fall and aspiration precautions  - PMNR consult placed
Acute right M1 CVA w/ residual left sided weakness.  - PT reccs acute rehab  - pt tolerating soft diet  - c/w aspiration precautions  - Neuro following  - MR brain with no new areas of ischemia or infarct.   - c/w ASA and Statin  - Cleared by Neurology for full AC   - fall and aspiration precautions  - PMNR consult placed
Acute right M1 CVA w/ residual left sided weakness.  - PT reccs acute rehab  - pt tolerating soft diet  - c/w aspiration precautions  - Neuro following  - MR brain with no new areas of ischemia or infarct.   - c/w ASA and Statin  - Cleared by Neurology for full AC ; started on heparin gtt  - fall and aspiration precautions  - PMNR consult placed
Acute right M1 CVA w/ residual left sided weakness.  - PT recs acute rehab  - pt tolerating soft diet  - c/w aspiration precautions    - MR brain with no new areas of ischemia or infarct.   - c/w ASA and Statin  - Cleared by Neurology for full AC   - fall and aspiration precautions  - PMR consult placed
Acute right M1 CVA w/ residual left sided weakness.  - PT/OT and Speech & Swallow consult  - Swallow eval recs MBS (completed1/3); dysphagia 3 nectar.   - Neuro recs appreciated.   - Follow up MR brain - no new areas of ischemia or infarct.   - c/w ASA and Statin  - Holding full a/c considering recent hemorrhagic stroke  - fall and aspiration precautions  - Neuro checks Q4H.   - dysphagia diet as above.    - PMNR consult placed
Acute right M1 CVA w/ residual left sided weakness.  - PT/OT and Speech & Swallow consult  - Swallow eval recs MBS (completed1/3); dysphagia 3 nectar.   - Neuro recs appreciated.   - Follow up MR brain - no new areas of ischemia or infarct.   - c/w ASA and Statin  - Holding full a/c considering recent hemorrhagic stroke  - fall and aspiration precautions  - Neuro checks Q4H.   - dysphagia diet as above.    - PMNR consult placed
Acute right M1 CVA w/ residual left sided weakness.  - PT/OT and Speech & Swallow consult  - Swallow eval recs MBS (completed1/3); dysphagia 3 nectar.   - Neuro recs appreciated.   - MR brain with no new areas of ischemia or infarct.   - c/w ASA and Statin  - Holding full AC considering recent hemorrhagic stroke  - fall and aspiration precautions  - Neuro checks Q4H.   - dysphagia diet as above.    - PMNR consult placed
Acute right M1 CVA w/ residual left sided weakness.  - PT/OT and Speech & Swallow consult  -Swallow eval recs MBS tomorrow.   - c/w ASA and Statin, consider increasing atorvastatin.   - Holding full a/c considering recent hemorrhagic stroke  -Would consult neuro/KIEL regarding when safe to consider resuming full AC.   - fall and aspiration precautions  - Neuro checks Q4H.   - dysphagia diet for now  - Neurology eval as above.  -Will repeat CT head at this time given ?new right finger tip neuropathic symptoms. However, patient reports that she has had this in the past before and she also reports current right wrist pain, so unlikely a new neuro event.  -Check TSH, lipids, B12 in am.

## 2020-01-18 NOTE — PROGRESS NOTE ADULT - ASSESSMENT
· Assessment	  Assessment: 49y F s/p pop-PT bypass on 1/13  50 y/o woman w/ history of PTSD/depression, spinal disc herniation, GERD, gastric bypass (2004), ex lap for SBO and recent CVA, who was transferred from [place] for severe PVD and ischemic toe, now s/p popliteal-posterior tibial bypass with vein, recovering well    Plan:  - Continue with PO pain control  - switch from heparin to Xarelto   - Dispo: [facility]  - continue with regular diet

## 2020-01-18 NOTE — PROGRESS NOTE ADULT - SUBJECTIVE AND OBJECTIVE BOX
Surgery Progress Note  Patient is a 49y old  Female who presents with a chief complaint of Severe PVD (17 Jan 2020 11:55)      SUBJECTIVE: Patient seen and examined at bedside with surgical team, patient without complaints.     Vital Signs Last 24 Hrs  T(C): 36.7 (18 Jan 2020 09:15), Max: 37.3 (17 Jan 2020 16:49)  T(F): 98.1 (18 Jan 2020 09:15), Max: 99.2 (17 Jan 2020 16:49)  HR: 83 (18 Jan 2020 09:15) (76 - 89)  BP: 103/71 (18 Jan 2020 09:15) (103/71 - 183/81)  BP(mean): 111 (17 Jan 2020 14:45) (103 - 117)  RR: 18 (18 Jan 2020 09:15) (16 - 18)  SpO2: 96% (18 Jan 2020 09:15) (93% - 100%)    Physical Exam  Constitutional: NAD  Respiratory: breathing comfortably on RA  Ext: warm, well approximated incision site with staples - 4x4 guaze with kerlex dressing applied, +R DP signal   right foot with dressing in place     I&O's Detail    17 Jan 2020 07:01  -  18 Jan 2020 07:00  --------------------------------------------------------  IN:    heparin Infusion: 121 mL    Oral Fluid: 880 mL    sodium chloride 0.9%: 375 mL    sodium chloride 0.9%.: 1120 mL  Total IN: 2496 mL    OUT:    Voided: 550 mL  Total OUT: 550 mL    Total NET: 1946 mL      MEDICATIONS  (STANDING):  acetaminophen   Tablet .. 975 milliGRAM(s) Oral every 6 hours  aspirin  chewable 81 milliGRAM(s) Oral daily  atorvastatin 80 milliGRAM(s) Oral at bedtime  cyanocobalamin 1000 MICROGram(s) Oral daily  FLUoxetine 20 milliGRAM(s) Oral daily  folic acid 1 milliGRAM(s) Oral daily  gabapentin 300 milliGRAM(s) Oral three times a day  heparin  Infusion 1100 Unit(s)/Hr (11 mL/Hr) IV Continuous <Continuous>  influenza   Vaccine 0.5 milliLiter(s) IntraMuscular once  metoprolol succinate ER 12.5 milliGRAM(s) Oral daily  multivitamin 1 Tablet(s) Oral daily  nicotine -   7 mG/24Hr(s) Patch 1 patch Transdermal daily  pantoprazole    Tablet 40 milliGRAM(s) Oral before breakfast  polyethylene glycol 3350 17 Gram(s) Oral daily  sodium chloride 0.9%. 1000 milliLiter(s) (70 mL/Hr) IV Continuous <Continuous>  thiamine 100 milliGRAM(s) Oral daily  traZODone 150 milliGRAM(s) Oral at bedtime    MEDICATIONS  (PRN):  bisacodyl Suppository 10 milliGRAM(s) Rectal daily PRN Constipation  HYDROmorphone  Injectable 0.5 milliGRAM(s) IV Push every 4 hours PRN breakthrough pain  naloxone Injectable 0.1 milliGRAM(s) IV Push every 3 minutes PRN For ANY of the following changes in patient status:  A. RR LESS THAN 10 breaths per minute, B. Oxygen saturation LESS THAN 90%, C. Sedation score of 6  ondansetron Injectable 4 milliGRAM(s) IV Push every 6 hours PRN Nausea  oxyCODONE    IR 5 milliGRAM(s) Oral every 4 hours PRN Moderate Pain (4 - 6)  oxyCODONE    IR 10 milliGRAM(s) Oral every 4 hours PRN Severe Pain (7 - 10)      LABS:                        9.9    11.82 )-----------( 473      ( 18 Jan 2020 02:30 )             30.1     01-17    138  |  100  |  9   ----------------------------<  98  4.2   |  25  |  0.48<L>    Ca    9.2      17 Jan 2020 09:13  Phos  4.2     01-17  Mg     1.9     01-17      PT/INR - ( 17 Jan 2020 09:13 )   PT: 13.7 sec;   INR: 1.20 ratio         PTT - ( 18 Jan 2020 02:30 )  PTT:63.2 sec

## 2020-01-18 NOTE — PROGRESS NOTE ADULT - PROBLEM SELECTOR PLAN 3
- c/w SSRI (trazodone 150mg at bedtime for insomnia and possible augmentation for depression. fluoxetine 20mg qD)  - patient drinks alcohol daily for insomnia. Outside time window for withdrawal. Will need discussion regarding alcohol use prior to discharge.
- c/w fluoxetine 20mg qD  - c/w trazodone 150mg at bedtime for insomnia   -C/w MVI, folate, thiamine.
- low dose toprol as per cardio
RCRI 1 point ; Class II risk ; 6.0% 30 day risk of death, MI or cardiac arrest  METS ~ 4   Denies cp, sob.  EKG reviewed ; NSR, Low voltage QRS  TTE with hyperdynamic left ventricular systolic function. No segmental wall motion abnormalities.  Proceed with vascular surgery as planned without any additional cardiac testing.  Patient moderate-high risk for periop complications
RCRI 1 point ; Class II risk ; 6.0% 30 day risk of death, MI or cardiac arrest  METS ~ 4   Denies cp, sob.  EKG reviewed ; NSR, Low voltage QRS  TTE with hyperdynamic left ventricular systolic function. No segmental wall motion abnormalities.  Proceed with vascular surgery as planned without any additional cardiac testing.  Patient moderate-high risk for periop complications
Unlikely new CVA. ? If PAD verse carpel tunnel syndrome as she is more reliant on her RUE (though neuropathy not consistent with distribution) or discomfort secondary to overuse  - UE doppler ordered  - Gabapentin, pain regimen as above
Unlikely new CVA. ? If PAD verse less likely discomfort secondary to overuse  - UE doppler ordered  - Gabapentin, pain regimen as above
low dose toprol as per cardio
- UE doppler negative for any significant PAD of RUE.   - Gabapentin, pain regimen as above
Unlikely new CVA. ? If PAD verse less likely discomfort secondary to overuse  - UE doppler ordered - negative for any significant PAD of RUE.   - Gabapentin, pain regimen as above
Unlikely new CVA. ? If PAD verse less likely discomfort secondary to overuse  - UE doppler ordered - negative for any significant PAD of RUE.   - Gabapentin, pain regimen as above
low dose toprol as per cardio
- c/w SSRI (trazodone 150mg at bedtime for insomnia and possible augmentation for depression. fluoxetine 20mg qD)  - patient reportedly drinks alcohol daily for insomnia. Outside time window for withdrawal. Will need further discussion regarding alcohol use prior to discharge. -FAWN herrera.   -Will start MVI, folate, thiamine.

## 2020-01-18 NOTE — PROGRESS NOTE ADULT - ASSESSMENT
49yoF w/ PMHx significant for chronic smoker, PTSD/depression, spinal disc herniation, GERD, gastric bypass (2004), ex lap for SBO (9 yrs ago) and recent CVA, who was transferred from [place] for severe PVD and ischemic toe.

## 2020-01-18 NOTE — PROGRESS NOTE ADULT - PROBLEM SELECTOR PLAN 4
- c/w SSRI (trazodone 150mg at bedtime for insomnia and possible augmentation for depression. fluoxetine 20mg qD)  - patient reportedly drinks alcohol daily for insomnia. Outside time window for withdrawal. Will need further discussion regarding alcohol use prior to discharge. -FAWN herrera.   -Will start MVI, folate, thiamine.
- c/w SSRI (trazodone 150mg at bedtime for insomnia and possible augmentation for depression. fluoxetine 20mg qD)  - patient reportedly drinks alcohol daily for insomnia. Outside time window for withdrawal. Will need further discussion regarding alcohol use prior to discharge. -FAWN herrera.   -Will start MVI, folate, thiamine.
- c/w enalapril
- c/w fluoxetine 20mg qD  - c/w trazodone 150mg at bedtime for insomnia   - c/w MVI, folate, thiamine.
- c/w fluoxetine 20mg qD  - c/w trazodone 150mg at bedtime for insomnia   -C/w MVI, folate, thiamine.
Blood pressure meds with hold parameters
- c/w SSRI (trazodone 150mg at bedtime for insomnia and possible augmentation for depression. fluoxetine 20mg qD)  - patient reportedly drinks alcohol daily for insomnia. Outside time window for withdrawal. Will need further discussion regarding alcohol use prior to discharge. -FAWN herrera.   -C/w MVI, folate, thiamine.
- c/w SSRI (trazodone 150mg at bedtime for insomnia and possible augmentation for depression. fluoxetine 20mg qD)  - patient reportedly drinks alcohol daily for insomnia. Outside time window for withdrawal. Will need further discussion regarding alcohol use prior to discharge. -FAWN herrera.   -C/w MVI, folate, thiamine.
- c/w fluoxetine 20mg qD  - c/w trazodone 150mg at bedtime for insomnia    - patient reportedly drinks alcohol daily for insomnia. Outside time window for withdrawal ; will need further discussion regarding alcohol use prior to discharge. -FAWN herrera.   -C/w MVI, folate, thiamine.
- c/w fluoxetine 20mg qD  - c/w trazodone 150mg at bedtime for insomnia   -C/w MVI, folate, thiamine.
- c/w enalapril

## 2020-01-18 NOTE — PROGRESS NOTE ADULT - SUBJECTIVE AND OBJECTIVE BOX
Patient is a 49y old  Female who presents with a chief complaint of Severe PVD (18 Jan 2020 12:56)    SUBJECTIVE / OVERNIGHT EVENTS: Patient seen and examined. s/p Amputation of 4th toe of right foot, no subjective complaints.    OBJECTIVE:  Vital Signs Last 24 Hrs  T(F): 98.5 (18 Jan 2020 14:40), Max: 99 (17 Jan 2020 21:32)  HR: 78 (18 Jan 2020 14:40) (76 - 84)  BP: 141/84 (18 Jan 2020 14:40) (103/71 - 144/86)  RR: 16 (18 Jan 2020 14:40) (16 - 18)  SpO2: 97% (18 Jan 2020 14:40) (93% - 97%)    I&O's Summary  17 Jan 2020 07:01  -  18 Jan 2020 07:00  --------------------------------------------------------  IN: 2496 mL / OUT: 550 mL / NET: 1946 mL    18 Jan 2020 07:01  -  18 Jan 2020 18:04  --------------------------------------------------------  IN: 1297 mL / OUT: 700 mL / NET: 597 mL    Physical Examination:  GEN: thin man, laying in stretcher in NAD  PSYCH: A&Ox3, mood and affect appear appropriate   NECK: supple  RESPI: no accessory muscle use, B/L air entry, CTAB   CARDIO: regular rate/rhythm, +systolic murmur, no LE edema B/L  ABD: soft, NT, ND, +BS  EXT: left hemiplegia, right foot with dressing in place  VASC: peripheral pulses palpated    Labs:                     11.5   13.43 )-----------( 471      ( 18 Jan 2020 11:47 )             36.8     01-18  138  |  104  |  9   ----------------------------<  99  4.4   |  19<L>  |  0.48<L>    Ca    8.8      18 Jan 2020 11:47  Phos  4.2     01-17  Mg     1.9     01-17    PTT - ( 18 Jan 2020 11:47 )  PTT:41.4 sec    Culture - Tissue with Gram Stain (collected 17 Jan 2020 23:03)  Source: .Tissue Other  Gram Stain (18 Jan 2020 03:07):    No polymorphonuclear leukocytes seen per low power field    No organisms seen per oil power field    Culture - Fungal, Tissue (collected 17 Jan 2020 23:03)  Source: .Tissue clean bone right foot  Preliminary Report (18 Jan 2020 15:16):    Testing in progress    Consultant(s) Notes Reviewed: Podiatry    MEDICATIONS  (STANDING):  acetaminophen   Tablet .. 975 milliGRAM(s) Oral every 6 hours  aspirin  chewable 81 milliGRAM(s) Oral daily  atorvastatin 80 milliGRAM(s) Oral at bedtime  cyanocobalamin 1000 MICROGram(s) Oral daily  FLUoxetine 20 milliGRAM(s) Oral daily  folic acid 1 milliGRAM(s) Oral daily  gabapentin 300 milliGRAM(s) Oral three times a day  influenza   Vaccine 0.5 milliLiter(s) IntraMuscular once  metoprolol succinate ER 12.5 milliGRAM(s) Oral daily  multivitamin 1 Tablet(s) Oral daily  nicotine -   7 mG/24Hr(s) Patch 1 patch Transdermal daily  pantoprazole    Tablet 40 milliGRAM(s) Oral before breakfast  polyethylene glycol 3350 17 Gram(s) Oral daily  rivaroxaban 20 milliGRAM(s) Oral daily  thiamine 100 milliGRAM(s) Oral daily  traZODone 150 milliGRAM(s) Oral at bedtime    MEDICATIONS  (PRN):  bisacodyl Suppository 10 milliGRAM(s) Rectal daily PRN Constipation  morphine  - Injectable 2 milliGRAM(s) IV Push every 4 hours PRN Severe Pain (7 - 10)  naloxone Injectable 0.1 milliGRAM(s) IV Push every 3 minutes PRN For ANY of the following changes in patient status:  A. RR LESS THAN 10 breaths per minute, B. Oxygen saturation LESS THAN 90%, C. Sedation score of 6  oxyCODONE    IR 5 milliGRAM(s) Oral every 4 hours PRN Moderate Pain (4 - 6)  oxyCODONE    IR 10 milliGRAM(s) Oral every 4 hours PRN Severe Pain (7 - 10)

## 2020-01-18 NOTE — PROGRESS NOTE ADULT - PROBLEM SELECTOR PLAN 5
- c/w PPI
- c/w PPI
- c/w enalapril
- c/w enalapril
Blood pressure meds with hold parameters
- BPs have been soft   - start IV fluids 75cc /hr   - hold Enalapril  for now till BP improves
- BPs have been soft   - start IV fluids 75cc /hr   - hold Enalapril  for now till BP improves
- c/w enalapril
Blood pressure meds with hold parameters
- c/w PPI

## 2020-01-18 NOTE — PROGRESS NOTE ADULT - PROBLEM SELECTOR PLAN 6
- DVT ppx: Lovenox  - Diet: Dysphagia I diet w/ nectar thickened
- c/w PPI
heparin gtt as per vascular
- c/w PPI
- DVT ppx: Lovenox  - Diet: Dysphagia I diet w/ nectar thickened pending MBS.

## 2020-01-18 NOTE — PROGRESS NOTE ADULT - PROBLEM SELECTOR PROBLEM 6
GERD (gastroesophageal reflux disease)
Prophylactic measure
Prophylactic measure
GERD (gastroesophageal reflux disease)
Prophylactic measure

## 2020-01-18 NOTE — PROGRESS NOTE ADULT - PROBLEM SELECTOR PROBLEM 3
Depression
Depression
PAT (paroxysmal atrial tachycardia)
Preoperative examination
Preoperative examination
Right upper extremity numbness
Right upper extremity numbness
PAT (paroxysmal atrial tachycardia)
Right upper extremity numbness
Depression

## 2020-01-18 NOTE — PROGRESS NOTE ADULT - SUBJECTIVE AND OBJECTIVE BOX
Podiatry pager #: [phone] ([place])/ [phone] ([facility])    Patient is a 49y old  Female who presents with a chief complaint of Severe PVD (18 Jan 2020 09:38)       INTERVAL HPI/OVERNIGHT EVENTS:  Patient seen and evaluated at bedside.  Pt is resting comfortable in NAD. Denies N/V/F/C.     Allergies    No Known Allergies    Intolerances        Vital Signs Last 24 Hrs  T(C): 36.7 (18 Jan 2020 09:15), Max: 37.3 (17 Jan 2020 16:49)  T(F): 98.1 (18 Jan 2020 09:15), Max: 99.2 (17 Jan 2020 16:49)  HR: 83 (18 Jan 2020 09:15) (76 - 88)  BP: 103/71 (18 Jan 2020 09:15) (103/71 - 183/81)  BP(mean): 111 (17 Jan 2020 14:45) (103 - 117)  RR: 18 (18 Jan 2020 09:15) (16 - 18)  SpO2: 96% (18 Jan 2020 09:15) (93% - 100%)    LABS:                        11.5   13.43 )-----------( 471      ( 18 Jan 2020 11:47 )             36.8     01-17    138  |  100  |  9   ----------------------------<  98  4.2   |  25  |  0.48<L>    Ca    9.2      17 Jan 2020 09:13  Phos  4.2     01-17  Mg     1.9     01-17      PT/INR - ( 17 Jan 2020 09:13 )   PT: 13.7 sec;   INR: 1.20 ratio         PTT - ( 18 Jan 2020 11:47 )  PTT:41.4 sec    CAPILLARY BLOOD GLUCOSE          Lower Extremity Physical Exam:  Vascular: DP/PT 2/4 on L, nonpalpable on R, CFT <5 seconds B/L (no CFT on RF 4th digit), Temperature gradient warm to cool B/L  Neuro: Epicritic sensation intact to the level of toes B/L  Skin: s/p right foot 4th digit amputation closed, no hematoma, flap viable w/ no necrosis, no signs of infection. Minimal discoloration of right medial hallux.

## 2020-01-18 NOTE — PROGRESS NOTE ADULT - PROBLEM SELECTOR PROBLEM 5
GERD (gastroesophageal reflux disease)
GERD (gastroesophageal reflux disease)
HTN (hypertension)
GERD (gastroesophageal reflux disease)

## 2020-01-18 NOTE — PROGRESS NOTE ADULT - PROBLEM SELECTOR PROBLEM 4
HTN (hypertension)
Depression
HTN (hypertension)
Depression
HTN (hypertension)

## 2020-01-18 NOTE — PROGRESS NOTE ADULT - PROBLEM SELECTOR PROBLEM 1
Peripheral vascular disease

## 2020-01-18 NOTE — PROGRESS NOTE ADULT - ASSESSMENT
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 Told patient we will continue to monitor it as it looks stable w/ no signs of infection at this time.  -Stable for discharge from a podiatry standpoint, no need for cultures as it is purely ischemic  -Follow up w/ Dr. Annmarie Chinchilla ideally within 1 week of discharge ([phone] or [phone]), if at rehab may be difficult to follow up. At rehab nurses can change dressing with dry sterile dressing once/week.   -Discussed w/ attending

## 2020-01-19 LAB
ANION GAP SERPL CALC-SCNC: 11 MMOL/L — SIGNIFICANT CHANGE UP (ref 5–17)
APTT BLD: 36 SEC — SIGNIFICANT CHANGE UP (ref 27.5–36.3)
BUN SERPL-MCNC: 9 MG/DL — SIGNIFICANT CHANGE UP (ref 7–23)
CALCIUM SERPL-MCNC: 8.5 MG/DL — SIGNIFICANT CHANGE UP (ref 8.4–10.5)
CHLORIDE SERPL-SCNC: 103 MMOL/L — SIGNIFICANT CHANGE UP (ref 96–108)
CO2 SERPL-SCNC: 25 MMOL/L — SIGNIFICANT CHANGE UP (ref 22–31)
CREAT SERPL-MCNC: 0.51 MG/DL — SIGNIFICANT CHANGE UP (ref 0.5–1.3)
GLUCOSE SERPL-MCNC: 90 MG/DL — SIGNIFICANT CHANGE UP (ref 70–99)
HCT VFR BLD CALC: 32.7 % — LOW (ref 34.5–45)
HGB BLD-MCNC: 10.3 G/DL — LOW (ref 11.5–15.5)
INR BLD: 1.73 RATIO — HIGH (ref 0.88–1.16)
MAGNESIUM SERPL-MCNC: 1.9 MG/DL — SIGNIFICANT CHANGE UP (ref 1.6–2.6)
MCHC RBC-ENTMCNC: 31.4 PG — SIGNIFICANT CHANGE UP (ref 27–34)
MCHC RBC-ENTMCNC: 31.5 GM/DL — LOW (ref 32–36)
MCV RBC AUTO: 99.7 FL — SIGNIFICANT CHANGE UP (ref 80–100)
NRBC # BLD: 0 /100 WBCS — SIGNIFICANT CHANGE UP (ref 0–0)
PHOSPHATE SERPL-MCNC: 4.5 MG/DL — SIGNIFICANT CHANGE UP (ref 2.5–4.5)
PLATELET # BLD AUTO: 485 K/UL — HIGH (ref 150–400)
POTASSIUM SERPL-MCNC: 3.5 MMOL/L — SIGNIFICANT CHANGE UP (ref 3.5–5.3)
POTASSIUM SERPL-SCNC: 3.5 MMOL/L — SIGNIFICANT CHANGE UP (ref 3.5–5.3)
PROTHROM AB SERPL-ACNC: 20 SEC — HIGH (ref 10–12.9)
RBC # BLD: 3.28 M/UL — LOW (ref 3.8–5.2)
RBC # FLD: 16.9 % — HIGH (ref 10.3–14.5)
SODIUM SERPL-SCNC: 139 MMOL/L — SIGNIFICANT CHANGE UP (ref 135–145)
WBC # BLD: 8.97 K/UL — SIGNIFICANT CHANGE UP (ref 3.8–10.5)
WBC # FLD AUTO: 8.97 K/UL — SIGNIFICANT CHANGE UP (ref 3.8–10.5)

## 2020-01-19 RX ORDER — POTASSIUM CHLORIDE 20 MEQ
20 PACKET (EA) ORAL
Refills: 0 | Status: COMPLETED | OUTPATIENT
Start: 2020-01-19 | End: 2020-01-19

## 2020-01-19 RX ADMIN — Medication 975 MILLIGRAM(S): at 06:53

## 2020-01-19 RX ADMIN — OXYCODONE HYDROCHLORIDE 10 MILLIGRAM(S): 5 TABLET ORAL at 22:09

## 2020-01-19 RX ADMIN — OXYCODONE HYDROCHLORIDE 10 MILLIGRAM(S): 5 TABLET ORAL at 21:39

## 2020-01-19 RX ADMIN — OXYCODONE HYDROCHLORIDE 10 MILLIGRAM(S): 5 TABLET ORAL at 17:34

## 2020-01-19 RX ADMIN — Medication 20 MILLIEQUIVALENT(S): at 12:29

## 2020-01-19 RX ADMIN — Medication 150 MILLIGRAM(S): at 21:39

## 2020-01-19 RX ADMIN — Medication 100 MILLIGRAM(S): at 12:27

## 2020-01-19 RX ADMIN — Medication 975 MILLIGRAM(S): at 00:42

## 2020-01-19 RX ADMIN — GABAPENTIN 300 MILLIGRAM(S): 400 CAPSULE ORAL at 14:32

## 2020-01-19 RX ADMIN — MORPHINE SULFATE 2 MILLIGRAM(S): 50 CAPSULE, EXTENDED RELEASE ORAL at 19:32

## 2020-01-19 RX ADMIN — PREGABALIN 1000 MICROGRAM(S): 225 CAPSULE ORAL at 12:27

## 2020-01-19 RX ADMIN — MORPHINE SULFATE 2 MILLIGRAM(S): 50 CAPSULE, EXTENDED RELEASE ORAL at 10:33

## 2020-01-19 RX ADMIN — OXYCODONE HYDROCHLORIDE 10 MILLIGRAM(S): 5 TABLET ORAL at 12:29

## 2020-01-19 RX ADMIN — OXYCODONE HYDROCHLORIDE 10 MILLIGRAM(S): 5 TABLET ORAL at 07:23

## 2020-01-19 RX ADMIN — Medication 1 TABLET(S): at 12:26

## 2020-01-19 RX ADMIN — Medication 1 MILLIGRAM(S): at 12:27

## 2020-01-19 RX ADMIN — Medication 20 MILLIGRAM(S): at 12:27

## 2020-01-19 RX ADMIN — Medication 12.5 MILLIGRAM(S): at 06:54

## 2020-01-19 RX ADMIN — MORPHINE SULFATE 2 MILLIGRAM(S): 50 CAPSULE, EXTENDED RELEASE ORAL at 10:03

## 2020-01-19 RX ADMIN — Medication 1 PATCH: at 19:00

## 2020-01-19 RX ADMIN — OXYCODONE HYDROCHLORIDE 10 MILLIGRAM(S): 5 TABLET ORAL at 12:59

## 2020-01-19 RX ADMIN — GABAPENTIN 300 MILLIGRAM(S): 400 CAPSULE ORAL at 06:54

## 2020-01-19 RX ADMIN — Medication 1 PATCH: at 12:26

## 2020-01-19 RX ADMIN — OXYCODONE HYDROCHLORIDE 10 MILLIGRAM(S): 5 TABLET ORAL at 17:04

## 2020-01-19 RX ADMIN — OXYCODONE HYDROCHLORIDE 10 MILLIGRAM(S): 5 TABLET ORAL at 00:10

## 2020-01-19 RX ADMIN — Medication 1 PATCH: at 07:40

## 2020-01-19 RX ADMIN — GABAPENTIN 300 MILLIGRAM(S): 400 CAPSULE ORAL at 21:38

## 2020-01-19 RX ADMIN — RIVAROXABAN 20 MILLIGRAM(S): KIT at 12:27

## 2020-01-19 RX ADMIN — Medication 20 MILLIEQUIVALENT(S): at 14:32

## 2020-01-19 RX ADMIN — MORPHINE SULFATE 2 MILLIGRAM(S): 50 CAPSULE, EXTENDED RELEASE ORAL at 01:43

## 2020-01-19 RX ADMIN — Medication 975 MILLIGRAM(S): at 00:11

## 2020-01-19 RX ADMIN — ATORVASTATIN CALCIUM 80 MILLIGRAM(S): 80 TABLET, FILM COATED ORAL at 21:38

## 2020-01-19 RX ADMIN — OXYCODONE HYDROCHLORIDE 10 MILLIGRAM(S): 5 TABLET ORAL at 06:53

## 2020-01-19 RX ADMIN — Medication 81 MILLIGRAM(S): at 12:27

## 2020-01-19 RX ADMIN — MORPHINE SULFATE 2 MILLIGRAM(S): 50 CAPSULE, EXTENDED RELEASE ORAL at 19:02

## 2020-01-19 RX ADMIN — Medication 975 MILLIGRAM(S): at 12:26

## 2020-01-19 RX ADMIN — PANTOPRAZOLE SODIUM 40 MILLIGRAM(S): 20 TABLET, DELAYED RELEASE ORAL at 06:54

## 2020-01-19 RX ADMIN — MORPHINE SULFATE 2 MILLIGRAM(S): 50 CAPSULE, EXTENDED RELEASE ORAL at 02:00

## 2020-01-19 RX ADMIN — Medication 975 MILLIGRAM(S): at 17:04

## 2020-01-19 RX ADMIN — OXYCODONE HYDROCHLORIDE 10 MILLIGRAM(S): 5 TABLET ORAL at 00:40

## 2020-01-19 NOTE — PROVIDER CONTACT NOTE (OTHER) - ASSESSMENT
pt is A&Ox4, able to verbalize understanding. pt is on remote tele monitoring, according to tele tech Suzanne, patient has never had an episode of wide complex before. no complaints of chest pain, SOB, palpitations noted. VSS. PRN pain medication given for discomfort of RLE, dsg remains c/d/i.

## 2020-01-19 NOTE — PROGRESS NOTE ADULT - ASSESSMENT
Assessment:   48 y/o woman w/ history of PTSD/depression, spinal disc herniation, GERD, gastric bypass (2004), ex lap for SBO and recent CVA, who was transferred from [place] for severe PVD and ischemic toe, now POD 6 s/p popliteal-posterior tibial bypass with vein 1/13, s/p R 4th toe amputation with podiatry 1/17, recovering well    Plan:  - Continue with PO pain control  - continue with Xarelto   - discharge to [facility] on Tues  - continue with regular diet

## 2020-01-19 NOTE — PROVIDER CONTACT NOTE (OTHER) - BACKGROUND
patient transferred from [facility] on 12/30 with RLE pain, right 4th toe discoloration. CT of Right leg on 12/31 confirmed right popliteal occlusion. Right 4th toe amp on 1/17.

## 2020-01-19 NOTE — PROGRESS NOTE ADULT - SUBJECTIVE AND OBJECTIVE BOX
VASCULAR SURGERY DAILY PROGRESS NOTE:    SUBJECTIVE: Patient seen and examined at bedside with surgical team on AM rounds. Reports pain is controlled, no active complaints.     Vital Signs Last 24 Hrs  T(C): 36.8 (19 Jan 2020 09:20), Max: 37.1 (18 Jan 2020 18:32)  T(F): 98.3 (19 Jan 2020 09:20), Max: 98.8 (18 Jan 2020 18:32)  HR: 68 (19 Jan 2020 09:20) (68 - 83)  BP: 117/77 (19 Jan 2020 09:20) (101/68 - 141/84)  BP(mean): --  RR: 16 (19 Jan 2020 09:20) (16 - 18)  SpO2: 95% (19 Jan 2020 09:20) (93% - 98%)    Physical Exam  Constitutional: NAD  Respiratory: breathing comfortably on RA  Ext: RLE warm, well approximated incision site with staples, no erythema or discharge; +R DP signal;   right foot with dressing in place     I&O's Detail    18 Jan 2020 07:01  -  19 Jan 2020 07:00  --------------------------------------------------------  IN:    heparin Infusion: 77 mL    Oral Fluid: 1390 mL    sodium chloride 0.9%: 560 mL  Total IN: 2027 mL    OUT:    Voided: 1650 mL  Total OUT: 1650 mL    Total NET: 377 mL          Daily     Daily     MEDICATIONS  (STANDING):  acetaminophen   Tablet .. 975 milliGRAM(s) Oral every 6 hours  aspirin  chewable 81 milliGRAM(s) Oral daily  atorvastatin 80 milliGRAM(s) Oral at bedtime  cyanocobalamin 1000 MICROGram(s) Oral daily  FLUoxetine 20 milliGRAM(s) Oral daily  folic acid 1 milliGRAM(s) Oral daily  gabapentin 300 milliGRAM(s) Oral three times a day  influenza   Vaccine 0.5 milliLiter(s) IntraMuscular once  metoprolol succinate ER 12.5 milliGRAM(s) Oral daily  multivitamin 1 Tablet(s) Oral daily  nicotine -   7 mG/24Hr(s) Patch 1 patch Transdermal daily  pantoprazole    Tablet 40 milliGRAM(s) Oral before breakfast  polyethylene glycol 3350 17 Gram(s) Oral daily  potassium chloride    Tablet ER 20 milliEquivalent(s) Oral every 2 hours  rivaroxaban 20 milliGRAM(s) Oral daily  thiamine 100 milliGRAM(s) Oral daily  traZODone 150 milliGRAM(s) Oral at bedtime    MEDICATIONS  (PRN):  bisacodyl Suppository 10 milliGRAM(s) Rectal daily PRN Constipation  morphine  - Injectable 2 milliGRAM(s) IV Push every 4 hours PRN Severe Pain (7 - 10)  naloxone Injectable 0.1 milliGRAM(s) IV Push every 3 minutes PRN For ANY of the following changes in patient status:  A. RR LESS THAN 10 breaths per minute, B. Oxygen saturation LESS THAN 90%, C. Sedation score of 6  oxyCODONE    IR 5 milliGRAM(s) Oral every 4 hours PRN Moderate Pain (4 - 6)  oxyCODONE    IR 10 milliGRAM(s) Oral every 4 hours PRN Severe Pain (7 - 10)      LABS:                        10.3   8.97  )-----------( 485      ( 19 Jan 2020 09:01 )             32.7     01-19    139  |  103  |  9   ----------------------------<  90  3.5   |  25  |  0.51    Ca    8.5      19 Jan 2020 09:01  Phos  4.5     01-19  Mg     1.9     01-19      PT/INR - ( 19 Jan 2020 09:01 )   PT: 20.0 sec;   INR: 1.73 ratio         PTT - ( 19 Jan 2020 09:01 )  PTT:36.0 sec

## 2020-01-19 NOTE — PROVIDER CONTACT NOTE (OTHER) - ACTION/TREATMENT ORDERED:
12 leads EKG
NP made aware, continue to monitor
MD Choi aware, as per MD choi, no further intervention necessary. will continue to monitor.
Provider will assess pt at bedside.
continue to monitor
continue to monitor, possible GI consult

## 2020-01-19 NOTE — PROVIDER CONTACT NOTE (OTHER) - SITUATION
Gibson Villalta   4/4/2017 2:15 PM   Anticoagulation Therapy Visit    Description:  73 year old male   Provider:   ANTICOAGULATION CLINIC   Department:   Nurse           INR as of 4/4/2017     Today's INR 1.9!      Anticoagulation Summary as of 4/4/2017     INR goal 2.0-3.0   Today's INR 1.9!   Full instructions 4 mg on Mon, Wed, Fri; 6 mg all other days   Next INR check 4/25/2017    Indications   Long-term (current) use of anticoagulants [Z79.01] [Z79.01]         Your next Anticoagulation Clinic appointment(s)     Apr 25, 2017  2:15 PM CDT   Anticoagulation Visit with  ANTICOAGULATION CLINIC   [facility] ([facility])    [address]  [place] [address] [phone]              Contact Numbers     Clinic Number:         April 2017 Details    Sun Mon Tue Wed Thu Fri Sat           1                 2               3               4      6 mg   See details      5      4 mg         6      6 mg         7      4 mg         8      6 mg           9      6 mg         10      4 mg         11      6 mg         12      4 mg         13      6 mg         14      4 mg         15      6 mg           16      6 mg         17      4 mg         18      6 mg         19      4 mg         20      6 mg         21      4 mg         22      6 mg           23      6 mg         24      4 mg         25            26               27               28               29                 30                      Date Details   04/04 This INR check       Date of next INR:  4/25/2017         How to take your warfarin dose     To take:  4 mg Take 4 of the 1 mg tablets.    To take:  6 mg Take 6 of the 1 mg tablets.           
Patient had PAT of 3.1 seconds and rate up to 188 as per remote tele and resolved on its own
Pt RLE is cool to touch and cold on digits, unable to find doppler pulses. Pt had oxycodone 10mg and continues to be in 5/10 pain.
Pt c/o chest and upper abdomen heaviness
Pt had episode of PAT to 140s for 2.7 seconds
Pt unable to tolerate PO
patient had an episode of 7 beats of wide complex

## 2020-01-20 RX ADMIN — PREGABALIN 1000 MICROGRAM(S): 225 CAPSULE ORAL at 13:17

## 2020-01-20 RX ADMIN — RIVAROXABAN 20 MILLIGRAM(S): KIT at 13:17

## 2020-01-20 RX ADMIN — Medication 150 MILLIGRAM(S): at 21:08

## 2020-01-20 RX ADMIN — Medication 100 MILLIGRAM(S): at 13:18

## 2020-01-20 RX ADMIN — OXYCODONE HYDROCHLORIDE 10 MILLIGRAM(S): 5 TABLET ORAL at 19:30

## 2020-01-20 RX ADMIN — MORPHINE SULFATE 2 MILLIGRAM(S): 50 CAPSULE, EXTENDED RELEASE ORAL at 16:15

## 2020-01-20 RX ADMIN — Medication 12.5 MILLIGRAM(S): at 05:32

## 2020-01-20 RX ADMIN — Medication 975 MILLIGRAM(S): at 05:14

## 2020-01-20 RX ADMIN — Medication 975 MILLIGRAM(S): at 17:46

## 2020-01-20 RX ADMIN — Medication 1 PATCH: at 12:26

## 2020-01-20 RX ADMIN — Medication 1 MILLIGRAM(S): at 13:17

## 2020-01-20 RX ADMIN — PANTOPRAZOLE SODIUM 40 MILLIGRAM(S): 20 TABLET, DELAYED RELEASE ORAL at 05:14

## 2020-01-20 RX ADMIN — OXYCODONE HYDROCHLORIDE 10 MILLIGRAM(S): 5 TABLET ORAL at 11:00

## 2020-01-20 RX ADMIN — GABAPENTIN 300 MILLIGRAM(S): 400 CAPSULE ORAL at 05:14

## 2020-01-20 RX ADMIN — ATORVASTATIN CALCIUM 80 MILLIGRAM(S): 80 TABLET, FILM COATED ORAL at 21:08

## 2020-01-20 RX ADMIN — OXYCODONE HYDROCHLORIDE 10 MILLIGRAM(S): 5 TABLET ORAL at 10:15

## 2020-01-20 RX ADMIN — MORPHINE SULFATE 2 MILLIGRAM(S): 50 CAPSULE, EXTENDED RELEASE ORAL at 11:23

## 2020-01-20 RX ADMIN — Medication 1 PATCH: at 13:18

## 2020-01-20 RX ADMIN — Medication 975 MILLIGRAM(S): at 13:40

## 2020-01-20 RX ADMIN — Medication 975 MILLIGRAM(S): at 17:49

## 2020-01-20 RX ADMIN — Medication 1 TABLET(S): at 13:17

## 2020-01-20 RX ADMIN — OXYCODONE HYDROCHLORIDE 10 MILLIGRAM(S): 5 TABLET ORAL at 05:45

## 2020-01-20 RX ADMIN — Medication 1 PATCH: at 18:37

## 2020-01-20 RX ADMIN — POLYETHYLENE GLYCOL 3350 17 GRAM(S): 17 POWDER, FOR SOLUTION ORAL at 13:18

## 2020-01-20 RX ADMIN — Medication 20 MILLIGRAM(S): at 13:17

## 2020-01-20 RX ADMIN — Medication 81 MILLIGRAM(S): at 13:17

## 2020-01-20 RX ADMIN — OXYCODONE HYDROCHLORIDE 10 MILLIGRAM(S): 5 TABLET ORAL at 05:15

## 2020-01-20 RX ADMIN — OXYCODONE HYDROCHLORIDE 10 MILLIGRAM(S): 5 TABLET ORAL at 15:20

## 2020-01-20 RX ADMIN — MORPHINE SULFATE 2 MILLIGRAM(S): 50 CAPSULE, EXTENDED RELEASE ORAL at 11:40

## 2020-01-20 RX ADMIN — Medication 975 MILLIGRAM(S): at 13:17

## 2020-01-20 RX ADMIN — GABAPENTIN 300 MILLIGRAM(S): 400 CAPSULE ORAL at 21:08

## 2020-01-20 RX ADMIN — Medication 1 PATCH: at 07:00

## 2020-01-20 RX ADMIN — OXYCODONE HYDROCHLORIDE 10 MILLIGRAM(S): 5 TABLET ORAL at 14:21

## 2020-01-20 RX ADMIN — GABAPENTIN 300 MILLIGRAM(S): 400 CAPSULE ORAL at 13:17

## 2020-01-20 RX ADMIN — OXYCODONE HYDROCHLORIDE 10 MILLIGRAM(S): 5 TABLET ORAL at 18:52

## 2020-01-20 RX ADMIN — MORPHINE SULFATE 2 MILLIGRAM(S): 50 CAPSULE, EXTENDED RELEASE ORAL at 15:59

## 2020-01-20 RX ADMIN — OXYCODONE HYDROCHLORIDE 10 MILLIGRAM(S): 5 TABLET ORAL at 23:07

## 2020-01-20 NOTE — PROGRESS NOTE ADULT - ASSESSMENT
Assessment:   48 y/o woman w/ history of PTSD/depression, spinal disc herniation, GERD, gastric bypass (2004), ex lap for SBO and recent CVA, who was transferred from [place] for severe PVD and ischemic toe, now POD 6 s/p popliteal-posterior tibial bypass with vein 1/13, s/p R 4th toe amputation with podiatry 1/17, recovering well    Plan:  - Continue with PO pain control  - continue with Xarelto   - discharge to [facility] on Tues  - continue with regular diet

## 2020-01-20 NOTE — PROGRESS NOTE ADULT - SUBJECTIVE AND OBJECTIVE BOX
Surgery Progress Note  Patient is a 49y old  Female who presents with a chief complaint of Severe PVD (19 Jan 2020 09:25)      SUBJECTIVE: Patient seen and examined at bedside with surgical team.   Denies nausea and vomiting, Patient is tolerating a regular diet.   Pain is controlled with medication.   Awaiting dispo to [facility].     Vital Signs Last 24 Hrs  T(C): 36.3 (20 Jan 2020 05:39), Max: 37 (19 Jan 2020 21:31)  T(F): 97.4 (20 Jan 2020 05:39), Max: 98.6 (19 Jan 2020 21:31)  HR: 70 (20 Jan 2020 05:39) (66 - 75)  BP: 142/79 (20 Jan 2020 05:39) (107/72 - 142/79)  BP(mean): --  RR: 18 (20 Jan 2020 05:39) (16 - 18)  SpO2: 98% (20 Jan 2020 05:39) (94% - 98%)    Physical Exam  Constitutional: NAD  Respiratory: breathing comfortably on RA  Ext: Rt leg: +DP signal, Rt leg is warm to touch    I&O's Detail    19 Jan 2020 07:01  -  20 Jan 2020 07:00  --------------------------------------------------------  IN:    Oral Fluid: 940 mL  Total IN: 940 mL    OUT:    Voided: 1200 mL  Total OUT: 1200 mL    Total NET: -260 mL      MEDICATIONS  (STANDING):  acetaminophen   Tablet .. 975 milliGRAM(s) Oral every 6 hours  aspirin  chewable 81 milliGRAM(s) Oral daily  atorvastatin 80 milliGRAM(s) Oral at bedtime  cyanocobalamin 1000 MICROGram(s) Oral daily  FLUoxetine 20 milliGRAM(s) Oral daily  folic acid 1 milliGRAM(s) Oral daily  gabapentin 300 milliGRAM(s) Oral three times a day  influenza   Vaccine 0.5 milliLiter(s) IntraMuscular once  metoprolol succinate ER 12.5 milliGRAM(s) Oral daily  multivitamin 1 Tablet(s) Oral daily  nicotine -   7 mG/24Hr(s) Patch 1 patch Transdermal daily  pantoprazole    Tablet 40 milliGRAM(s) Oral before breakfast  polyethylene glycol 3350 17 Gram(s) Oral daily  rivaroxaban 20 milliGRAM(s) Oral daily  thiamine 100 milliGRAM(s) Oral daily  traZODone 150 milliGRAM(s) Oral at bedtime    MEDICATIONS  (PRN):  bisacodyl Suppository 10 milliGRAM(s) Rectal daily PRN Constipation  morphine  - Injectable 2 milliGRAM(s) IV Push every 4 hours PRN Severe Pain (7 - 10)  naloxone Injectable 0.1 milliGRAM(s) IV Push every 3 minutes PRN For ANY of the following changes in patient status:  A. RR LESS THAN 10 breaths per minute, B. Oxygen saturation LESS THAN 90%, C. Sedation score of 6  oxyCODONE    IR 5 milliGRAM(s) Oral every 4 hours PRN Moderate Pain (4 - 6)  oxyCODONE    IR 10 milliGRAM(s) Oral every 4 hours PRN Severe Pain (7 - 10)      LABS:                        10.3   8.97  )-----------( 485      ( 19 Jan 2020 09:01 )             32.7     01-19    139  |  103  |  9   ----------------------------<  90  3.5   |  25  |  0.51    Ca    8.5      19 Jan 2020 09:01  Phos  4.5     01-19  Mg     1.9     01-19      PT/INR - ( 19 Jan 2020 09:01 )   PT: 20.0 sec;   INR: 1.73 ratio         PTT - ( 19 Jan 2020 09:01 )  PTT:36.0 sec

## 2020-01-21 LAB
HCT VFR BLD CALC: 32.5 % — LOW (ref 34.5–45)
HGB BLD-MCNC: 10.4 G/DL — LOW (ref 11.5–15.5)
MCHC RBC-ENTMCNC: 31.8 PG — SIGNIFICANT CHANGE UP (ref 27–34)
MCHC RBC-ENTMCNC: 32 GM/DL — SIGNIFICANT CHANGE UP (ref 32–36)
MCV RBC AUTO: 99.4 FL — SIGNIFICANT CHANGE UP (ref 80–100)
NRBC # BLD: 0 /100 WBCS — SIGNIFICANT CHANGE UP (ref 0–0)
PLATELET # BLD AUTO: 516 K/UL — HIGH (ref 150–400)
RBC # BLD: 3.27 M/UL — LOW (ref 3.8–5.2)
RBC # FLD: 17 % — HIGH (ref 10.3–14.5)
WBC # BLD: 9.08 K/UL — SIGNIFICANT CHANGE UP (ref 3.8–10.5)
WBC # FLD AUTO: 9.08 K/UL — SIGNIFICANT CHANGE UP (ref 3.8–10.5)

## 2020-01-21 PROCEDURE — 99232 SBSQ HOSP IP/OBS MODERATE 35: CPT

## 2020-01-21 RX ORDER — NICOTINE POLACRILEX 2 MG
1 GUM BUCCAL
Qty: 0 | Refills: 0 | DISCHARGE
Start: 2020-01-21

## 2020-01-21 RX ORDER — OXYCODONE HYDROCHLORIDE 5 MG/1
1 TABLET ORAL
Qty: 0 | Refills: 0 | DISCHARGE
Start: 2020-01-21

## 2020-01-21 RX ORDER — THIAMINE MONONITRATE (VIT B1) 100 MG
1 TABLET ORAL
Qty: 0 | Refills: 0 | DISCHARGE
Start: 2020-01-21

## 2020-01-21 RX ORDER — POLYETHYLENE GLYCOL 3350 17 G/17G
17 POWDER, FOR SOLUTION ORAL
Qty: 0 | Refills: 0 | DISCHARGE
Start: 2020-01-21

## 2020-01-21 RX ORDER — METOPROLOL TARTRATE 50 MG
12.5 TABLET ORAL
Qty: 0 | Refills: 0 | DISCHARGE

## 2020-01-21 RX ADMIN — Medication 1 PATCH: at 05:13

## 2020-01-21 RX ADMIN — GABAPENTIN 300 MILLIGRAM(S): 400 CAPSULE ORAL at 05:07

## 2020-01-21 RX ADMIN — Medication 975 MILLIGRAM(S): at 11:46

## 2020-01-21 RX ADMIN — Medication 100 MILLIGRAM(S): at 11:46

## 2020-01-21 RX ADMIN — Medication 1 TABLET(S): at 11:46

## 2020-01-21 RX ADMIN — OXYCODONE HYDROCHLORIDE 10 MILLIGRAM(S): 5 TABLET ORAL at 03:40

## 2020-01-21 RX ADMIN — PREGABALIN 1000 MICROGRAM(S): 225 CAPSULE ORAL at 11:46

## 2020-01-21 RX ADMIN — MORPHINE SULFATE 2 MILLIGRAM(S): 50 CAPSULE, EXTENDED RELEASE ORAL at 18:10

## 2020-01-21 RX ADMIN — OXYCODONE HYDROCHLORIDE 10 MILLIGRAM(S): 5 TABLET ORAL at 21:47

## 2020-01-21 RX ADMIN — Medication 975 MILLIGRAM(S): at 17:42

## 2020-01-21 RX ADMIN — Medication 975 MILLIGRAM(S): at 18:10

## 2020-01-21 RX ADMIN — Medication 20 MILLIGRAM(S): at 11:45

## 2020-01-21 RX ADMIN — MORPHINE SULFATE 2 MILLIGRAM(S): 50 CAPSULE, EXTENDED RELEASE ORAL at 17:41

## 2020-01-21 RX ADMIN — GABAPENTIN 300 MILLIGRAM(S): 400 CAPSULE ORAL at 13:19

## 2020-01-21 RX ADMIN — Medication 81 MILLIGRAM(S): at 11:46

## 2020-01-21 RX ADMIN — GABAPENTIN 300 MILLIGRAM(S): 400 CAPSULE ORAL at 21:17

## 2020-01-21 RX ADMIN — MORPHINE SULFATE 2 MILLIGRAM(S): 50 CAPSULE, EXTENDED RELEASE ORAL at 11:10

## 2020-01-21 RX ADMIN — Medication 150 MILLIGRAM(S): at 21:17

## 2020-01-21 RX ADMIN — Medication 975 MILLIGRAM(S): at 05:40

## 2020-01-21 RX ADMIN — ATORVASTATIN CALCIUM 80 MILLIGRAM(S): 80 TABLET, FILM COATED ORAL at 21:17

## 2020-01-21 RX ADMIN — OXYCODONE HYDROCHLORIDE 10 MILLIGRAM(S): 5 TABLET ORAL at 13:55

## 2020-01-21 RX ADMIN — Medication 1 PATCH: at 19:00

## 2020-01-21 RX ADMIN — Medication 1 PATCH: at 11:45

## 2020-01-21 RX ADMIN — MORPHINE SULFATE 2 MILLIGRAM(S): 50 CAPSULE, EXTENDED RELEASE ORAL at 10:40

## 2020-01-21 RX ADMIN — Medication 1 MILLIGRAM(S): at 11:46

## 2020-01-21 RX ADMIN — OXYCODONE HYDROCHLORIDE 10 MILLIGRAM(S): 5 TABLET ORAL at 00:00

## 2020-01-21 RX ADMIN — Medication 975 MILLIGRAM(S): at 01:24

## 2020-01-21 RX ADMIN — Medication 12.5 MILLIGRAM(S): at 05:07

## 2020-01-21 RX ADMIN — OXYCODONE HYDROCHLORIDE 10 MILLIGRAM(S): 5 TABLET ORAL at 21:17

## 2020-01-21 RX ADMIN — OXYCODONE HYDROCHLORIDE 10 MILLIGRAM(S): 5 TABLET ORAL at 03:03

## 2020-01-21 RX ADMIN — OXYCODONE HYDROCHLORIDE 10 MILLIGRAM(S): 5 TABLET ORAL at 13:24

## 2020-01-21 RX ADMIN — RIVAROXABAN 20 MILLIGRAM(S): KIT at 11:46

## 2020-01-21 RX ADMIN — PANTOPRAZOLE SODIUM 40 MILLIGRAM(S): 20 TABLET, DELAYED RELEASE ORAL at 05:08

## 2020-01-21 RX ADMIN — Medication 1 PATCH: at 11:44

## 2020-01-21 RX ADMIN — Medication 975 MILLIGRAM(S): at 05:08

## 2020-01-21 RX ADMIN — Medication 975 MILLIGRAM(S): at 02:00

## 2020-01-21 NOTE — PROGRESS NOTE ADULT - SUBJECTIVE AND OBJECTIVE BOX
Podiatry pager #: [phone] ([place])/ [phone] ([facility])    Patient is a 49y old  Female who presents with a chief complaint of Severe PVD (20 Jan 2020 07:14)       INTERVAL HPI/OVERNIGHT EVENTS:  Patient seen and evaluated at bedside.  Pt is resting comfortable in NAD. Denies N/V/F/C.     Allergies    No Known Allergies    Intolerances        Vital Signs Last 24 Hrs  T(C): 36.6 (21 Jan 2020 05:06), Max: 36.9 (20 Jan 2020 17:05)  T(F): 97.9 (21 Jan 2020 05:06), Max: 98.5 (20 Jan 2020 21:14)  HR: 64 (21 Jan 2020 05:06) (64 - 76)  BP: 126/84 (21 Jan 2020 05:06) (103/64 - 147/82)  BP(mean): --  RR: 18 (21 Jan 2020 05:06) (17 - 18)  SpO2: 96% (21 Jan 2020 05:06) (94% - 98%)    LABS:                        10.4   9.08  )-----------( 516      ( 21 Jan 2020 07:23 )             32.5               CAPILLARY BLOOD GLUCOSE          Lower Extremity Physical Exam:  Vascular: DP/PT 2/4 on L, nonpalpable on R, CFT <5 seconds B/L (no CFT on RF 4th digit), Temperature gradient warm to cool B/L  Neuro: Epicritic sensation intact to the level of toes B/L  Skin: s/p right foot 4th digit amputation closed, no hematoma, flap viable w/ no necrosis, no signs of infection. Minimal discoloration of right medial hallux.

## 2020-01-21 NOTE — PROGRESS NOTE ADULT - ASSESSMENT
Assessment:   48 y/o woman w/ history of PTSD/depression, spinal disc herniation, GERD, gastric bypass (2004), ex lap for SBO and recent CVA, who was transferred from [place] for severe PVD and ischemic toe, now POD 6 s/p popliteal-posterior tibial bypass with vein 1/13, s/p R 4th toe amputation with podiatry 1/17, recovering well    Plan:  - f/u with PT  - discharge to [facility] today

## 2020-01-21 NOTE — PROGRESS NOTE ADULT - ASSESSMENT
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 Told patient we will continue to monitor it as it looks stable w/ no signs of infection at this time.  -Stable for discharge from a podiatry standpoint, no need for cultures as it is purely ischemic  -Keep dressing clean dry and intact until follow up  -Follow up w/ Dr. Chinchilla 1 week after discharge ([place]: [phone] or [place]: [phone])  -Discussed w/ attending

## 2020-01-21 NOTE — PROGRESS NOTE ADULT - SUBJECTIVE AND OBJECTIVE BOX
VASCULAR SURGERY DAILY PROGRESS NOTE:    Subjective:  Patient seen and examined at bedside on AM rounds. No acute event overnight, tolerating regular diet, denies nausea and vomiting. Pain is controlled with medication.     Vital Signs Last 24 Hrs  T(C): 36.7 (21 Jan 2020 09:18), Max: 36.9 (20 Jan 2020 17:05)  T(F): 98.1 (21 Jan 2020 09:18), Max: 98.5 (20 Jan 2020 21:14)  HR: 68 (21 Jan 2020 09:18) (64 - 76)  BP: 122/70 (21 Jan 2020 09:18) (103/64 - 147/82)  BP(mean): --  RR: 17 (21 Jan 2020 09:18) (17 - 18)  SpO2: 96% (21 Jan 2020 09:18) (94% - 98%)    Physical Exam  Constitutional: resting in bed, NAD  Respiratory: breathing comfortably on RA  Ext: Rt leg: +DP signal, Rt leg is warm to touch; right foot dressing c/d/i; no erythema or discharge    I&O's Detail    20 Jan 2020 07:01  -  21 Jan 2020 07:00  --------------------------------------------------------  IN:    Oral Fluid: 1720 mL  Total IN: 1720 mL    OUT:    Voided: 1050 mL  Total OUT: 1050 mL    Total NET: 670 mL      21 Jan 2020 07:01  -  21 Jan 2020 10:43  --------------------------------------------------------  IN:  Total IN: 0 mL    OUT:    Voided: 400 mL  Total OUT: 400 mL    Total NET: -400 mL          Daily     Daily     MEDICATIONS  (STANDING):  acetaminophen   Tablet .. 975 milliGRAM(s) Oral every 6 hours  aspirin  chewable 81 milliGRAM(s) Oral daily  atorvastatin 80 milliGRAM(s) Oral at bedtime  cyanocobalamin 1000 MICROGram(s) Oral daily  FLUoxetine 20 milliGRAM(s) Oral daily  folic acid 1 milliGRAM(s) Oral daily  gabapentin 300 milliGRAM(s) Oral three times a day  influenza   Vaccine 0.5 milliLiter(s) IntraMuscular once  metoprolol succinate ER 12.5 milliGRAM(s) Oral daily  multivitamin 1 Tablet(s) Oral daily  nicotine -   7 mG/24Hr(s) Patch 1 patch Transdermal daily  pantoprazole    Tablet 40 milliGRAM(s) Oral before breakfast  polyethylene glycol 3350 17 Gram(s) Oral daily  rivaroxaban 20 milliGRAM(s) Oral daily  thiamine 100 milliGRAM(s) Oral daily  traZODone 150 milliGRAM(s) Oral at bedtime    MEDICATIONS  (PRN):  bisacodyl Suppository 10 milliGRAM(s) Rectal daily PRN Constipation  morphine  - Injectable 2 milliGRAM(s) IV Push every 4 hours PRN Severe Pain (7 - 10)  naloxone Injectable 0.1 milliGRAM(s) IV Push every 3 minutes PRN For ANY of the following changes in patient status:  A. RR LESS THAN 10 breaths per minute, B. Oxygen saturation LESS THAN 90%, C. Sedation score of 6  oxyCODONE    IR 5 milliGRAM(s) Oral every 4 hours PRN Moderate Pain (4 - 6)  oxyCODONE    IR 10 milliGRAM(s) Oral every 4 hours PRN Severe Pain (7 - 10)      LABS:                        10.4   9.08  )-----------( 516      ( 21 Jan 2020 07:23 )             32.5     139  |  103  |  9   ----------------------------<  90  3.5   |  25  |  0.51    Ca    8.5      19 Jan 2020 09:01  Phos  4.5     01-19  Mg     1.9     01-19      PT/INR - ( 19 Jan 2020 09:01 )   PT: 20.0 sec;   INR: 1.73 ratio         PTT - ( 19 Jan 2020 09:01 )  PTT:36.0 sec

## 2020-01-21 NOTE — PROGRESS NOTE ADULT - SUBJECTIVE AND OBJECTIVE BOX
Vascular Cardiology Consult Note     SPECTRA [id number]              EMAIL [email or web address]   OFFICE [phone]    CC:  Severe PVD    Interval Events: Denies C/P or SOB. No palpitations. R LE feeling well.      Allergies  No Known Allergies    MEDICATIONS:  aspirin  chewable 81 milliGRAM(s) Oral daily  metoprolol succinate ER 12.5 milliGRAM(s) Oral daily  rivaroxaban 20 milliGRAM(s) Oral daily  acetaminophen   Tablet .. 975 milliGRAM(s) Oral every 6 hours  FLUoxetine 20 milliGRAM(s) Oral daily  gabapentin 300 milliGRAM(s) Oral three times a day  morphine  - Injectable 2 milliGRAM(s) IV Push every 4 hours PRN  oxyCODONE    IR 5 milliGRAM(s) Oral every 4 hours PRN  oxyCODONE    IR 10 milliGRAM(s) Oral every 4 hours PRN  traZODone 150 milliGRAM(s) Oral at bedtime  bisacodyl Suppository 10 milliGRAM(s) Rectal daily PRN  pantoprazole    Tablet 40 milliGRAM(s) Oral before breakfast  polyethylene glycol 3350 17 Gram(s) Oral daily  atorvastatin 80 milliGRAM(s) Oral at bedtime  cyanocobalamin 1000 MICROGram(s) Oral daily  folic acid 1 milliGRAM(s) Oral daily  influenza   Vaccine 0.5 milliLiter(s) IntraMuscular once  multivitamin 1 Tablet(s) Oral daily  thiamine 100 milliGRAM(s) Oral daily    PAST MEDICAL & SURGICAL HISTORY:  Chronic insomnia  PTSD (post-traumatic stress disorder)  ETOH abuse  Anxiety  HTN (hypertension)  Depression  Tubal Ligation Status  Status Post Gastric Bypass for Obesity  Status Post Laparoscopic Cholecystectomy    FAMILY HISTORY:  No pertinent family history in first degree relatives    SOCIAL HISTORY:  unchanged    REVIEW OF SYSTEMS:  CONSTITUTIONAL: No fever  EYES: No eye pain, visual disturbances,  ENT:   No sinus or throat pain  NECK: No pain or stiffness  RESPIRATORY: No SOB  CARDIOVASCULAR: No C/P   GASTROINTESTINAL: No abdominal or epigastric pain. No nausea, vomiting, or hematemesis;  No melena or hematochezia.  GENITOURINARY: No dysuria, hematuria  NEUROLOGICAL: L sided facial droop and L UE/ LE weakness noted  SKIN: R LE sutures intact  LYMPH Nodes: No enlarged glands  ENDOCRINE: No heat or cold intolerance  MUSCULOSKELETAL: Resolved / Improved R LE discomfort   PSYCHIATRIC: No depression, anxiety  HEME/LYMPH: No easy bruising, or bleeding gums  ALLERGY AND IMMUNOLOGIC: No hives or eczema	  [ x] All others negative	      PHYSICAL EXAM:  T(C): 36.7 (01-21-20 @ 09:18), Max: 36.9 (01-20-20 @ 17:05)  HR: 68 (01-21-20 @ 09:18) (64 - 76)  BP: 122/70 (01-21-20 @ 09:18) (103/64 - 147/82)  RR: 17 (01-21-20 @ 09:18) (17 - 18)  SpO2: 96% (01-21-20 @ 09:18) (94% - 98%)  I&O's Summary    20 Jan 2020 07:01  -  21 Jan 2020 07:00  --------------------------------------------------------  IN: 1720 mL / OUT: 1050 mL / NET: 670 mL    21 Jan 2020 07:01  -  21 Jan 2020 11:15  --------------------------------------------------------  IN: 0 mL / OUT: 400 mL / NET: -400 mL    Appearance: NAD  HEENT:   Normal oral mucosa	  Cardiovascular: RRR, S1 and S2   Respiratory: CTA B/L 	  Psychiatry:  AAO x 3  Gastrointestinal:  Soft, Non-tender, + BS	  Skin: No rashes  Neurologic: L sided facial droop and L UE/ LE weakness noted in setting of recent CVA  Extremities:  staples and sutures intact to R medical leg, s/p R 4th digit amputation with sutures intact  Pulses intact to R DP/PT      LABS:	 	    CBC Full  -  ( 21 Jan 2020 07:23 )  WBC Count : 9.08 K/uL  Hemoglobin : 10.4 g/dL  Hematocrit : 32.5 %  Platelet Count - Automated : 516 K/uL  Mean Cell Volume : 99.4 fl  Mean Cell Hemoglobin : 31.8 pg  Mean Cell Hemoglobin Concentration : 32.0 gm/dL  Auto Neutrophil # : x  Auto Lymphocyte # : x  Auto Monocyte # : x  Auto Eosinophil # : x  Auto Basophil # : x  Auto Neutrophil % : x  Auto Lymphocyte % : x  Auto Monocyte % : x  Auto Eosinophil % : x  Auto Basophil % : x    Assessment:     Plan:    Thank you      Vascular Cardiology Service    Please call with any questions:   SPECTRA - [id number]  Office [phone]  email:  [email or web address] Vascular Cardiology Consult Note     SPECTRA [id number]              EMAIL [email or web address]   OFFICE [phone]    CC:  Severe PVD    Interval Events: Denies C/P or SOB. No palpitations. R LE feeling well. No events on telemetry in last 24 hours.      Allergies  No Known Allergies    MEDICATIONS:  aspirin  chewable 81 milliGRAM(s) Oral daily  metoprolol succinate ER 12.5 milliGRAM(s) Oral daily  rivaroxaban 20 milliGRAM(s) Oral daily  acetaminophen   Tablet .. 975 milliGRAM(s) Oral every 6 hours  FLUoxetine 20 milliGRAM(s) Oral daily  gabapentin 300 milliGRAM(s) Oral three times a day  morphine  - Injectable 2 milliGRAM(s) IV Push every 4 hours PRN  oxyCODONE    IR 5 milliGRAM(s) Oral every 4 hours PRN  oxyCODONE    IR 10 milliGRAM(s) Oral every 4 hours PRN  traZODone 150 milliGRAM(s) Oral at bedtime  bisacodyl Suppository 10 milliGRAM(s) Rectal daily PRN  pantoprazole    Tablet 40 milliGRAM(s) Oral before breakfast  polyethylene glycol 3350 17 Gram(s) Oral daily  atorvastatin 80 milliGRAM(s) Oral at bedtime  cyanocobalamin 1000 MICROGram(s) Oral daily  folic acid 1 milliGRAM(s) Oral daily  influenza   Vaccine 0.5 milliLiter(s) IntraMuscular once  multivitamin 1 Tablet(s) Oral daily  thiamine 100 milliGRAM(s) Oral daily    PAST MEDICAL & SURGICAL HISTORY:  Chronic insomnia  PTSD (post-traumatic stress disorder)  ETOH abuse  Anxiety  HTN (hypertension)  Depression  Tubal Ligation Status  Status Post Gastric Bypass for Obesity  Status Post Laparoscopic Cholecystectomy    FAMILY HISTORY:  No pertinent family history in first degree relatives    SOCIAL HISTORY:  unchanged    REVIEW OF SYSTEMS:  CONSTITUTIONAL: No fever  EYES: No eye pain, visual disturbances,  ENT:   No sinus or throat pain  NECK: No pain or stiffness  RESPIRATORY: No SOB  CARDIOVASCULAR: No C/P   GASTROINTESTINAL: No abdominal or epigastric pain. No nausea, vomiting, or hematemesis;  No melena or hematochezia.  GENITOURINARY: No dysuria, hematuria  NEUROLOGICAL: L sided facial droop and L UE/ LE weakness noted  SKIN: R LE sutures intact  LYMPH Nodes: No enlarged glands  ENDOCRINE: No heat or cold intolerance  MUSCULOSKELETAL: Resolved / Improved R LE discomfort   PSYCHIATRIC: No depression, anxiety  HEME/LYMPH: No easy bruising, or bleeding gums  ALLERGY AND IMMUNOLOGIC: No hives or eczema	  [ x] All others negative	      PHYSICAL EXAM:  T(C): 36.7 (01-21-20 @ 09:18), Max: 36.9 (01-20-20 @ 17:05)  HR: 68 (01-21-20 @ 09:18) (64 - 76)  BP: 122/70 (01-21-20 @ 09:18) (103/64 - 147/82)  RR: 17 (01-21-20 @ 09:18) (17 - 18)  SpO2: 96% (01-21-20 @ 09:18) (94% - 98%)  I&O's Summary    20 Jan 2020 07:01  -  21 Jan 2020 07:00  --------------------------------------------------------  IN: 1720 mL / OUT: 1050 mL / NET: 670 mL    21 Jan 2020 07:01  -  21 Jan 2020 11:15  --------------------------------------------------------  IN: 0 mL / OUT: 400 mL / NET: -400 mL    Appearance: NAD  HEENT:   Normal oral mucosa	  Cardiovascular: RRR, S1 and S2   Respiratory: CTA B/L 	  Psychiatry:  AAO x 3  Gastrointestinal:  Soft, Non-tender, + BS	  Skin: No rashes  Neurologic: L sided facial droop and L UE/ LE weakness noted in setting of recent CVA  Extremities:  staples and sutures intact to R medical leg, s/p R 4th digit amputation with sutures intact  Pulses intact to R DP/PT      LABS:	 	    CBC Full  -  ( 21 Jan 2020 07:23 )  WBC Count : 9.08 K/uL  Hemoglobin : 10.4 g/dL  Hematocrit : 32.5 %  Platelet Count - Automated : 516 K/uL  Mean Cell Volume : 99.4 fl  Mean Cell Hemoglobin : 31.8 pg  Mean Cell Hemoglobin Concentration : 32.0 gm/dL  Auto Neutrophil # : x  Auto Lymphocyte # : x  Auto Monocyte # : x  Auto Eosinophil # : x  Auto Basophil # : x  Auto Neutrophil % : x  Auto Lymphocyte % : x  Auto Monocyte % : x  Auto Eosinophil % : x  Auto Basophil % : x    Assessment:   1. Severe PAD and R 4th Toe Necrosis s/p R Popliteal-Tibial Bypass 1/13/20, s/p R 4th digit amputation 1/17/20      - Hemodynamically Stable  2.  R MCA CVA with hemorrhagic conversion in 12/2019   3. Hyperdynamic EF      - S/p IV Fluid Hydration  4. 3 seconds of PAT 1/14/20       - currently in SR, started on BB       - 7 beats of NSVT noted on 1/19/20    Plan:  1. Doing well post procedures.  2. Continue Xarelto 20mg QHS. Continue Statin.   3. Continue Toprol XL 12.5mg daily. Resting HR 60s. SBP 120s.   4. On discharge to [facility], Zio patch to be placed for 2 weeks telemetry monitoring.  5. Cardiology follow-up 2 weeks post discharge.    Thank you  VESTA Ferris  Vascular Cardiology Service  Please call with any questions:   SPECTRA - [phone]  Office [phone]  email:  [email or web address]

## 2020-01-22 VITALS
HEART RATE: 77 BPM | DIASTOLIC BLOOD PRESSURE: 78 MMHG | TEMPERATURE: 98 F | SYSTOLIC BLOOD PRESSURE: 122 MMHG | RESPIRATION RATE: 17 BRPM | OXYGEN SATURATION: 95 %

## 2020-01-22 LAB
CULTURE RESULTS: SIGNIFICANT CHANGE UP
SPECIMEN SOURCE: SIGNIFICANT CHANGE UP

## 2020-01-22 PROCEDURE — 92610 EVALUATE SWALLOWING FUNCTION: CPT

## 2020-01-22 PROCEDURE — 71045 X-RAY EXAM CHEST 1 VIEW: CPT

## 2020-01-22 PROCEDURE — 97112 NEUROMUSCULAR REEDUCATION: CPT

## 2020-01-22 PROCEDURE — 86850 RBC ANTIBODY SCREEN: CPT

## 2020-01-22 PROCEDURE — 84443 ASSAY THYROID STIM HORMONE: CPT

## 2020-01-22 PROCEDURE — 83735 ASSAY OF MAGNESIUM: CPT

## 2020-01-22 PROCEDURE — C1769: CPT

## 2020-01-22 PROCEDURE — 80053 COMPREHEN METABOLIC PANEL: CPT

## 2020-01-22 PROCEDURE — 74230 X-RAY XM SWLNG FUNCJ C+: CPT

## 2020-01-22 PROCEDURE — 97162 PT EVAL MOD COMPLEX 30 MIN: CPT

## 2020-01-22 PROCEDURE — 73620 X-RAY EXAM OF FOOT: CPT

## 2020-01-22 PROCEDURE — 97116 GAIT TRAINING THERAPY: CPT

## 2020-01-22 PROCEDURE — 84100 ASSAY OF PHOSPHORUS: CPT

## 2020-01-22 PROCEDURE — 88311 DECALCIFY TISSUE: CPT

## 2020-01-22 PROCEDURE — 70450 CT HEAD/BRAIN W/O DYE: CPT

## 2020-01-22 PROCEDURE — 36246 INS CATH ABD/L-EXT ART 2ND: CPT

## 2020-01-22 PROCEDURE — 97530 THERAPEUTIC ACTIVITIES: CPT

## 2020-01-22 PROCEDURE — 82607 VITAMIN B-12: CPT

## 2020-01-22 PROCEDURE — 93005 ELECTROCARDIOGRAM TRACING: CPT

## 2020-01-22 PROCEDURE — 70551 MRI BRAIN STEM W/O DYE: CPT

## 2020-01-22 PROCEDURE — 80061 LIPID PANEL: CPT

## 2020-01-22 PROCEDURE — 93970 EXTREMITY STUDY: CPT

## 2020-01-22 PROCEDURE — 81025 URINE PREGNANCY TEST: CPT

## 2020-01-22 PROCEDURE — 92611 MOTION FLUOROSCOPY/SWALLOW: CPT

## 2020-01-22 PROCEDURE — 83036 HEMOGLOBIN GLYCOSYLATED A1C: CPT

## 2020-01-22 PROCEDURE — 85610 PROTHROMBIN TIME: CPT

## 2020-01-22 PROCEDURE — 97760 ORTHOTIC MGMT&TRAING 1ST ENC: CPT

## 2020-01-22 PROCEDURE — 80048 BASIC METABOLIC PNL TOTAL CA: CPT

## 2020-01-22 PROCEDURE — 97110 THERAPEUTIC EXERCISES: CPT

## 2020-01-22 PROCEDURE — 75625 CONTRAST EXAM ABDOMINL AORTA: CPT

## 2020-01-22 PROCEDURE — 87075 CULTR BACTERIA EXCEPT BLOOD: CPT

## 2020-01-22 PROCEDURE — 93923 UPR/LXTR ART STDY 3+ LVLS: CPT

## 2020-01-22 PROCEDURE — 81003 URINALYSIS AUTO W/O SCOPE: CPT

## 2020-01-22 PROCEDURE — 97535 SELF CARE MNGMENT TRAINING: CPT

## 2020-01-22 PROCEDURE — 86900 BLOOD TYPING SEROLOGIC ABO: CPT

## 2020-01-22 PROCEDURE — 86901 BLOOD TYPING SEROLOGIC RH(D): CPT

## 2020-01-22 PROCEDURE — 82746 ASSAY OF FOLIC ACID SERUM: CPT

## 2020-01-22 PROCEDURE — 75710 ARTERY X-RAYS ARM/LEG: CPT

## 2020-01-22 PROCEDURE — 87070 CULTURE OTHR SPECIMN AEROBIC: CPT

## 2020-01-22 PROCEDURE — 88305 TISSUE EXAM BY PATHOLOGIST: CPT

## 2020-01-22 PROCEDURE — 93931 UPPER EXTREMITY STUDY: CPT

## 2020-01-22 PROCEDURE — 88304 TISSUE EXAM BY PATHOLOGIST: CPT

## 2020-01-22 PROCEDURE — 76937 US GUIDE VASCULAR ACCESS: CPT

## 2020-01-22 PROCEDURE — C1894: CPT

## 2020-01-22 PROCEDURE — 84702 CHORIONIC GONADOTROPIN TEST: CPT

## 2020-01-22 PROCEDURE — C1887: CPT

## 2020-01-22 PROCEDURE — 85027 COMPLETE CBC AUTOMATED: CPT

## 2020-01-22 PROCEDURE — C8929: CPT

## 2020-01-22 PROCEDURE — C1889: CPT

## 2020-01-22 PROCEDURE — 75635 CT ANGIO ABDOMINAL ARTERIES: CPT

## 2020-01-22 PROCEDURE — 85730 THROMBOPLASTIN TIME PARTIAL: CPT

## 2020-01-22 PROCEDURE — 97163 PT EVAL HIGH COMPLEX 45 MIN: CPT

## 2020-01-22 PROCEDURE — 99232 SBSQ HOSP IP/OBS MODERATE 35: CPT

## 2020-01-22 PROCEDURE — 87102 FUNGUS ISOLATION CULTURE: CPT

## 2020-01-22 PROCEDURE — 97165 OT EVAL LOW COMPLEX 30 MIN: CPT

## 2020-01-22 PROCEDURE — C1757: CPT

## 2020-01-22 RX ADMIN — Medication 975 MILLIGRAM(S): at 05:09

## 2020-01-22 RX ADMIN — OXYCODONE HYDROCHLORIDE 10 MILLIGRAM(S): 5 TABLET ORAL at 13:50

## 2020-01-22 RX ADMIN — OXYCODONE HYDROCHLORIDE 10 MILLIGRAM(S): 5 TABLET ORAL at 09:51

## 2020-01-22 RX ADMIN — GABAPENTIN 300 MILLIGRAM(S): 400 CAPSULE ORAL at 13:18

## 2020-01-22 RX ADMIN — GABAPENTIN 300 MILLIGRAM(S): 400 CAPSULE ORAL at 05:09

## 2020-01-22 RX ADMIN — MORPHINE SULFATE 2 MILLIGRAM(S): 50 CAPSULE, EXTENDED RELEASE ORAL at 10:31

## 2020-01-22 RX ADMIN — Medication 1 PATCH: at 08:13

## 2020-01-22 RX ADMIN — PANTOPRAZOLE SODIUM 40 MILLIGRAM(S): 20 TABLET, DELAYED RELEASE ORAL at 05:09

## 2020-01-22 RX ADMIN — Medication 975 MILLIGRAM(S): at 12:15

## 2020-01-22 RX ADMIN — MORPHINE SULFATE 2 MILLIGRAM(S): 50 CAPSULE, EXTENDED RELEASE ORAL at 05:00

## 2020-01-22 RX ADMIN — PREGABALIN 1000 MICROGRAM(S): 225 CAPSULE ORAL at 12:15

## 2020-01-22 RX ADMIN — Medication 100 MILLIGRAM(S): at 12:15

## 2020-01-22 RX ADMIN — MORPHINE SULFATE 2 MILLIGRAM(S): 50 CAPSULE, EXTENDED RELEASE ORAL at 15:35

## 2020-01-22 RX ADMIN — OXYCODONE HYDROCHLORIDE 10 MILLIGRAM(S): 5 TABLET ORAL at 10:27

## 2020-01-22 RX ADMIN — OXYCODONE HYDROCHLORIDE 10 MILLIGRAM(S): 5 TABLET ORAL at 13:18

## 2020-01-22 RX ADMIN — RIVAROXABAN 20 MILLIGRAM(S): KIT at 12:15

## 2020-01-22 RX ADMIN — Medication 20 MILLIGRAM(S): at 12:15

## 2020-01-22 RX ADMIN — Medication 1 MILLIGRAM(S): at 12:15

## 2020-01-22 RX ADMIN — Medication 12.5 MILLIGRAM(S): at 05:09

## 2020-01-22 RX ADMIN — Medication 81 MILLIGRAM(S): at 12:15

## 2020-01-22 RX ADMIN — MORPHINE SULFATE 2 MILLIGRAM(S): 50 CAPSULE, EXTENDED RELEASE ORAL at 11:00

## 2020-01-22 RX ADMIN — Medication 1 PATCH: at 12:15

## 2020-01-22 RX ADMIN — MORPHINE SULFATE 2 MILLIGRAM(S): 50 CAPSULE, EXTENDED RELEASE ORAL at 16:05

## 2020-01-22 RX ADMIN — MORPHINE SULFATE 2 MILLIGRAM(S): 50 CAPSULE, EXTENDED RELEASE ORAL at 05:20

## 2020-01-22 RX ADMIN — Medication 1 TABLET(S): at 12:15

## 2020-01-22 NOTE — PROGRESS NOTE ADULT - SUBJECTIVE AND OBJECTIVE BOX
Vascular Cardiology Consult Note     SPECTRA [id number]              EMAIL [email or web address]   OFFICE [phone]    CC:  Severe PVD    Interval Events: Breathing well. No SOB. No Palpitations. Reports discomfort in R LE after standing yesterday. No active C/P.     Allergies  No Known Allergies	    MEDICATIONS:  aspirin  chewable 81 milliGRAM(s) Oral daily  metoprolol succinate ER 12.5 milliGRAM(s) Oral daily  rivaroxaban 20 milliGRAM(s) Oral daily  acetaminophen   Tablet .. 975 milliGRAM(s) Oral every 6 hours  FLUoxetine 20 milliGRAM(s) Oral daily  gabapentin 300 milliGRAM(s) Oral three times a day  morphine  - Injectable 2 milliGRAM(s) IV Push every 4 hours PRN  oxyCODONE    IR 5 milliGRAM(s) Oral every 4 hours PRN  oxyCODONE    IR 10 milliGRAM(s) Oral every 4 hours PRN  traZODone 150 milliGRAM(s) Oral at bedtime  bisacodyl Suppository 10 milliGRAM(s) Rectal daily PRN  pantoprazole    Tablet 40 milliGRAM(s) Oral before breakfast  polyethylene glycol 3350 17 Gram(s) Oral daily  atorvastatin 80 milliGRAM(s) Oral at bedtime  cyanocobalamin 1000 MICROGram(s) Oral daily  folic acid 1 milliGRAM(s) Oral daily  influenza   Vaccine 0.5 milliLiter(s) IntraMuscular once  multivitamin 1 Tablet(s) Oral daily  thiamine 100 milliGRAM(s) Oral daily    PAST MEDICAL & SURGICAL HISTORY:  Chronic insomnia  PTSD (post-traumatic stress disorder)  ETOH abuse  Anxiety  HTN (hypertension)  Depression  Tubal Ligation Status  Status Post Gastric Bypass for Obesity  Status Post Laparoscopic Cholecystectomy    FAMILY HISTORY:  No pertinent family history in first degree relative    SOCIAL HISTORY:  unchanged    REVIEW OF SYSTEMS:  CONSTITUTIONAL: No fever  EYES: No eye pain, visual disturbances,  ENT:   No sinus or throat pain  NECK: No pain or stiffness  RESPIRATORY: No SOB  CARDIOVASCULAR: No active C/P   GASTROINTESTINAL: No abdominal or epigastric pain. No nausea, vomiting, or hematemesis;  No melena or hematochezia.  GENITOURINARY: No dysuria, hematuria  NEUROLOGICAL: L sided facial droop and L UE/ LE weakness noted  SKIN: R LE sutures intact  LYMPH Nodes: No enlarged glands  ENDOCRINE: No heat or cold intolerance  MUSCULOSKELETAL: R LE discomfort   PSYCHIATRIC: No depression, anxiety  HEME/LYMPH: No easy bruising, or bleeding gums  ALLERGY AND IMMUNOLOGIC: No hives or eczema	  [ x] All others negative	    PHYSICAL EXAM:  T(C): 36.8 (01-22-20 @ 05:08), Max: 37.1 (01-21-20 @ 17:56)  HR: 72 (01-22-20 @ 05:08) (72 - 79)  BP: 123/78 (01-22-20 @ 05:08) (104/71 - 132/73)  RR: 16 (01-22-20 @ 05:08) (16 - 18)  SpO2: 96% (01-22-20 @ 05:08) (94% - 96%)  I&O's Summary    21 Jan 2020 07:01  -  22 Jan 2020 07:00  --------------------------------------------------------  IN: 960 mL / OUT: 1300 mL / NET: -340 mL    22 Jan 2020 07:01  -  22 Jan 2020 12:38  --------------------------------------------------------  IN: 240 mL / OUT: 0 mL / NET: 240 mL    Appearance: NAD  HEENT:   Normal oral mucosa	  Cardiovascular: RRR, S1 and S2   Respiratory: CTA B/L 	  Psychiatry:  AAO x 3  Gastrointestinal:  Soft, Non-tender, + BS	  Skin: No rashes  Neurologic: L sided facial droop and L UE/ LE weakness noted in setting of recent CVA  Extremities:  staples and sutures intact to R medical leg, s/p R 4th digit amputation with sutures intact  Palpable Pulses to R DP/PT     LABS:	 	    CBC Full  -  ( 21 Jan 2020 07:23 )  WBC Count : 9.08 K/uL  Hemoglobin : 10.4 g/dL  Hematocrit : 32.5 %  Platelet Count - Automated : 516 K/uL  Mean Cell Volume : 99.4 fl  Mean Cell Hemoglobin : 31.8 pg  Mean Cell Hemoglobin Concentration : 32.0 gm/dL  Auto Neutrophil # : x  Auto Lymphocyte # : x  Auto Monocyte # : x  Auto Eosinophil # : x  Auto Basophil # : x  Auto Neutrophil % : x  Auto Lymphocyte % : x  Auto Monocyte % : x  Auto Eosinophil % : x  Auto Basophil % : x    Assessment:   1. Severe PAD and R 4th Toe Necrosis s/p R Popliteal-Tibial Bypass 1/13/20, s/p R 4th digit amputation 1/17/20      - Hemodynamically Stable  2.  R MCA CVA with hemorrhagic conversion in 12/2019   3. Hyperdynamic EF      - S/p IV Fluid Hydration  4. 3 seconds of PAT 1/14/20       - currently in SR, started on BB       - 7 beats of NSVT noted on 1/19/20       - no events on telemetry in last 24 hours    Plan:  1. Doing well post procedures.  2. On Xarelto 20mg QHS. Continue Statin.   3. Continue Toprol XL 12.5mg daily.   4. On discharge to [facility], Zio patch to be placed for 2 weeks telemetry monitoring.  5. Cardiology follow-up 2 weeks post discharge.    Thank you  VESTA Ferris  Vascular Cardiology Service  Please call with any questions:   SPECTRA - [phone]  Office [phone]  email:  [email or web address]

## 2020-01-22 NOTE — PROGRESS NOTE ADULT - REASON FOR ADMISSION
Severe PVD

## 2020-01-22 NOTE — PROGRESS NOTE ADULT - ASSESSMENT
50 y/o woman w/ history of PTSD/depression, spinal disc herniation, GERD, gastric bypass (2004), ex lap for SBO and recent CVA, who was transferred from [place] for severe PVD and ischemic toe, now POD 6 s/p popliteal-posterior tibial bypass with vein 1/13, s/p R 4th toe amputation with podiatry 1/17, recovering well    Plan:    - Continue Xarelto 20mg QHS. Continue Statin.   - Continue Toprol XL 12.5mg daily. Resting HR 60s. SBP 120s.   - On discharge to [facility], Zio patch to be placed for 2 weeks telemetry monitoring  - Dispo: [facility] today pending acceptance    VASCULAR [id number]

## 2020-01-22 NOTE — PROGRESS NOTE ADULT - SUBJECTIVE AND OBJECTIVE BOX
VASCULAR SURGERY DAILY PROGRESS NOTE:       SUBJECTIVE/ROS: Patient seen and examined at bedside.  No acute overnight events.  Patient pending rehab placement.      MEDICATIONS  (STANDING):  acetaminophen   Tablet .. 975 milliGRAM(s) Oral every 6 hours  aspirin  chewable 81 milliGRAM(s) Oral daily  atorvastatin 80 milliGRAM(s) Oral at bedtime  cyanocobalamin 1000 MICROGram(s) Oral daily  FLUoxetine 20 milliGRAM(s) Oral daily  folic acid 1 milliGRAM(s) Oral daily  gabapentin 300 milliGRAM(s) Oral three times a day  influenza   Vaccine 0.5 milliLiter(s) IntraMuscular once  metoprolol succinate ER 12.5 milliGRAM(s) Oral daily  multivitamin 1 Tablet(s) Oral daily  nicotine -   7 mG/24Hr(s) Patch 1 patch Transdermal daily  pantoprazole    Tablet 40 milliGRAM(s) Oral before breakfast  polyethylene glycol 3350 17 Gram(s) Oral daily  rivaroxaban 20 milliGRAM(s) Oral daily  thiamine 100 milliGRAM(s) Oral daily  traZODone 150 milliGRAM(s) Oral at bedtime    MEDICATIONS  (PRN):  bisacodyl Suppository 10 milliGRAM(s) Rectal daily PRN Constipation  morphine  - Injectable 2 milliGRAM(s) IV Push every 4 hours PRN Severe Pain (7 - 10)  naloxone Injectable 0.1 milliGRAM(s) IV Push every 3 minutes PRN For ANY of the following changes in patient status:  A. RR LESS THAN 10 breaths per minute, B. Oxygen saturation LESS THAN 90%, C. Sedation score of 6  oxyCODONE    IR 5 milliGRAM(s) Oral every 4 hours PRN Moderate Pain (4 - 6)  oxyCODONE    IR 10 milliGRAM(s) Oral every 4 hours PRN Severe Pain (7 - 10)      OBJECTIVE:    Vital Signs Last 24 Hrs  T(C): 36.8 (22 Jan 2020 05:08), Max: 37.1 (21 Jan 2020 17:56)  T(F): 98.3 (22 Jan 2020 05:08), Max: 98.7 (21 Jan 2020 17:56)  HR: 72 (22 Jan 2020 05:08) (68 - 79)  BP: 123/78 (22 Jan 2020 05:08) (104/71 - 132/73)  BP(mean): --  RR: 16 (22 Jan 2020 05:08) (16 - 18)  SpO2: 96% (22 Jan 2020 05:08) (94% - 96%)        I&O's Detail    21 Jan 2020 07:01  -  22 Jan 2020 07:00  --------------------------------------------------------  IN:    Oral Fluid: 960 mL  Total IN: 960 mL    OUT:    Voided: 1300 mL  Total OUT: 1300 mL    Total NET: -340 mL          Daily     Daily     LABS:                        10.4   9.08  )-----------( 516      ( 21 Jan 2020 07:23 )             32.5                         PHYSICAL EXAM:  Constitutional: resting in bed, NAD  Respiratory: breathing comfortably on RA  Ext: Rt leg: +DP signal, Rt leg is warm to touch; right foot dressing c/d/i; no erythema or discharge

## 2020-01-22 NOTE — PROGRESS NOTE ADULT - ATTENDING COMMENTS
Poli Aragon MD  Division of [facility] Medicine  Cell: [phone]  Pager: [phone]  Office: [phone]
Poli Aragon MD  Division of [facility] Medicine  Cell: [phone]  Pager: [phone]  Office: [phone]
Poli Aragon MD  Division of [facility] Medicine  Cell: [phone]  Pager: [phone]  Office: [phone]
Continue a/c and metoprolol  After podiatric intervention, then transition to Xarelto 20mg daily   continue telemetry    [facility] [id number]
Continue current meds  will place ZIO patch x 2 weeks for continued monitoring for arrythmia  Outpatient followup    Jp [id number]
Once stable from a vascular perspective, start Xarelto 20mg daily   Start metoprolol XL 12.5mg daily   d/w Dr. Tom Trammell [id number]
She has a hyperdynamic ventricle on echo and low BP, would start IV fluids NS @ 75 cc per hour until further notice.    Continue telemetry monitoring for arrythmia  No objection to vascular surgery as planned    Jp [id number]
Will follow up her decision again tomorrow morning for procedure/toe amp. If patient refusing tomorrow will tentitively book for Monday. Pt will be stable for DC from a podiatry standpoint immediately after surgery - no need for cultures as it is purely ischemic.  May be difficult to book as out patient as she has multiple co-morbidities.
heparin gtt for now  continue metoprolol  proceed with podiatric amp as planned  Transition to Xarelto 20mg daily post procedure if ok with podiatry and vasc sgaviota Trammell [id number]
plan of care discussed with medicine NP
discharge plan and wound care as per primary team  d/w bedside RN
plan of care discussed with medicine NP
plan of care discussed with medicine NP  and vascular team
plan of care discussed with medicine NP and vascular resident    patient seen 1/12 and plan d/w vascular in detail.  unfortunately, note not uploaded onto [employer] ... doing so nowno new
will d/w vascular team  patient with multiple co-morbid conditions; high risk for future complications despite optimal medical therapy
plan of care discussed with vascular team

## 2020-01-23 LAB — SURGICAL PATHOLOGY STUDY: SIGNIFICANT CHANGE UP

## 2020-02-04 ENCOUNTER — EMERGENCY (EMERGENCY)
Facility: HOSPITAL | Age: 50
LOS: 1 days | Discharge: HOME | End: 2020-02-04
Attending: EMERGENCY MEDICINE | Admitting: EMERGENCY MEDICINE
Payer: MEDICARE

## 2020-02-04 VITALS
RESPIRATION RATE: 16 BRPM | SYSTOLIC BLOOD PRESSURE: 97 MMHG | TEMPERATURE: 98 F | OXYGEN SATURATION: 99 % | DIASTOLIC BLOOD PRESSURE: 56 MMHG | HEART RATE: 66 BPM

## 2020-02-04 VITALS — TEMPERATURE: 98 F

## 2020-02-04 DIAGNOSIS — F43.20 ADJUSTMENT DISORDER, UNSPECIFIED: ICD-10-CM

## 2020-02-04 LAB
ALBUMIN SERPL ELPH-MCNC: 3.2 G/DL — LOW (ref 3.3–5)
ALP SERPL-CCNC: 102 U/L — SIGNIFICANT CHANGE UP (ref 40–120)
ALT FLD-CCNC: 11 U/L — SIGNIFICANT CHANGE UP (ref 4–33)
ANION GAP SERPL CALC-SCNC: 10 MMO/L — SIGNIFICANT CHANGE UP (ref 7–14)
APPEARANCE UR: SIGNIFICANT CHANGE UP
AST SERPL-CCNC: 18 U/L — SIGNIFICANT CHANGE UP (ref 4–32)
BACTERIA # UR AUTO: SIGNIFICANT CHANGE UP
BASOPHILS # BLD AUTO: 0.05 K/UL — SIGNIFICANT CHANGE UP (ref 0–0.2)
BASOPHILS NFR BLD AUTO: 0.6 % — SIGNIFICANT CHANGE UP (ref 0–2)
BILIRUB SERPL-MCNC: 0.5 MG/DL — SIGNIFICANT CHANGE UP (ref 0.2–1.2)
BILIRUB UR-MCNC: SIGNIFICANT CHANGE UP
BLOOD UR QL VISUAL: NEGATIVE — SIGNIFICANT CHANGE UP
BUN SERPL-MCNC: 12 MG/DL — SIGNIFICANT CHANGE UP (ref 7–23)
CALCIUM SERPL-MCNC: 9.2 MG/DL — SIGNIFICANT CHANGE UP (ref 8.4–10.5)
CHLORIDE SERPL-SCNC: 99 MMOL/L — SIGNIFICANT CHANGE UP (ref 98–107)
CO2 SERPL-SCNC: 29 MMOL/L — SIGNIFICANT CHANGE UP (ref 22–31)
COLOR SPEC: SIGNIFICANT CHANGE UP
CREAT SERPL-MCNC: 0.54 MG/DL — SIGNIFICANT CHANGE UP (ref 0.5–1.3)
EOSINOPHIL # BLD AUTO: 0.44 K/UL — SIGNIFICANT CHANGE UP (ref 0–0.5)
EOSINOPHIL NFR BLD AUTO: 4.9 % — SIGNIFICANT CHANGE UP (ref 0–6)
ETHANOL BLD-MCNC: < 10 MG/DL — SIGNIFICANT CHANGE UP
GLUCOSE SERPL-MCNC: 91 MG/DL — SIGNIFICANT CHANGE UP (ref 70–99)
GLUCOSE UR-MCNC: NEGATIVE — SIGNIFICANT CHANGE UP
HCT VFR BLD CALC: 40 % — SIGNIFICANT CHANGE UP (ref 34.5–45)
HGB BLD-MCNC: 13.1 G/DL — SIGNIFICANT CHANGE UP (ref 11.5–15.5)
HYALINE CASTS # UR AUTO: HIGH
IMM GRANULOCYTES NFR BLD AUTO: 0.3 % — SIGNIFICANT CHANGE UP (ref 0–1.5)
KETONES UR-MCNC: SIGNIFICANT CHANGE UP
LEUKOCYTE ESTERASE UR-ACNC: SIGNIFICANT CHANGE UP
LYMPHOCYTES # BLD AUTO: 2.09 K/UL — SIGNIFICANT CHANGE UP (ref 1–3.3)
LYMPHOCYTES # BLD AUTO: 23 % — SIGNIFICANT CHANGE UP (ref 13–44)
MCHC RBC-ENTMCNC: 31.7 PG — SIGNIFICANT CHANGE UP (ref 27–34)
MCHC RBC-ENTMCNC: 32.8 % — SIGNIFICANT CHANGE UP (ref 32–36)
MCV RBC AUTO: 96.9 FL — SIGNIFICANT CHANGE UP (ref 80–100)
MONOCYTES # BLD AUTO: 0.91 K/UL — HIGH (ref 0–0.9)
MONOCYTES NFR BLD AUTO: 10 % — SIGNIFICANT CHANGE UP (ref 2–14)
NEUTROPHILS # BLD AUTO: 5.55 K/UL — SIGNIFICANT CHANGE UP (ref 1.8–7.4)
NEUTROPHILS NFR BLD AUTO: 61.2 % — SIGNIFICANT CHANGE UP (ref 43–77)
NITRITE UR-MCNC: NEGATIVE — SIGNIFICANT CHANGE UP
NRBC # FLD: 0 K/UL — SIGNIFICANT CHANGE UP (ref 0–0)
PH UR: 6 — SIGNIFICANT CHANGE UP (ref 5–8)
PLATELET # BLD AUTO: 462 K/UL — HIGH (ref 150–400)
PMV BLD: 10.6 FL — SIGNIFICANT CHANGE UP (ref 7–13)
POTASSIUM SERPL-MCNC: 4 MMOL/L — SIGNIFICANT CHANGE UP (ref 3.5–5.3)
POTASSIUM SERPL-SCNC: 4 MMOL/L — SIGNIFICANT CHANGE UP (ref 3.5–5.3)
PROT SERPL-MCNC: 5.9 G/DL — LOW (ref 6–8.3)
PROT UR-MCNC: 70 — SIGNIFICANT CHANGE UP
RBC # BLD: 4.13 M/UL — SIGNIFICANT CHANGE UP (ref 3.8–5.2)
RBC # FLD: 16.3 % — HIGH (ref 10.3–14.5)
RBC CASTS # UR COMP ASSIST: HIGH (ref 0–?)
SODIUM SERPL-SCNC: 138 MMOL/L — SIGNIFICANT CHANGE UP (ref 135–145)
SP GR SPEC: > 1.04 — HIGH (ref 1–1.04)
SQUAMOUS # UR AUTO: SIGNIFICANT CHANGE UP
UROBILINOGEN FLD QL: SIGNIFICANT CHANGE UP
WBC # BLD: 9.07 K/UL — SIGNIFICANT CHANGE UP (ref 3.8–10.5)
WBC # FLD AUTO: 9.07 K/UL — SIGNIFICANT CHANGE UP (ref 3.8–10.5)
WBC UR QL: HIGH (ref 0–?)

## 2020-02-04 PROCEDURE — 99285 EMERGENCY DEPT VISIT HI MDM: CPT

## 2020-02-04 PROCEDURE — 90792 PSYCH DIAG EVAL W/MED SRVCS: CPT | Mod: GC

## 2020-02-04 RX ORDER — OXYCODONE HYDROCHLORIDE 5 MG/1
5 TABLET ORAL ONCE
Refills: 0 | Status: DISCONTINUED | OUTPATIENT
Start: 2020-02-04 | End: 2020-02-04

## 2020-02-04 RX ADMIN — OXYCODONE HYDROCHLORIDE 5 MILLIGRAM(S): 5 TABLET ORAL at 14:31

## 2020-02-04 RX ADMIN — OXYCODONE HYDROCHLORIDE 5 MILLIGRAM(S): 5 TABLET ORAL at 17:54

## 2020-02-04 RX ADMIN — OXYCODONE HYDROCHLORIDE 5 MILLIGRAM(S): 5 TABLET ORAL at 15:30

## 2020-02-04 NOTE — ED BEHAVIORAL HEALTH ASSESSMENT NOTE - SUICIDE PROTECTIVE FACTORS
Identifies reasons for living/Supportive social network of family or friends/Has future plans/Fear of death or the actual act of killing self/Responsibility to family and others/Ability to cope with stress/Frustration tolerance
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
## 2020-02-04 NOTE — ED ADULT TRIAGE NOTE - CHIEF COMPLAINT QUOTE
Patient BIB EMS from nursing home after she verbalized suicidal ideation without a plan to staff.  Patient has left sided hemiparesis 2/2 to CVA in 2019 and external loop recorder on left anterior chest wall.  Endorsing chronic joint pain.  Patient non-ambulatory.

## 2020-02-04 NOTE — ED PROVIDER NOTE - CLINICAL SUMMARY MEDICAL DECISION MAKING FREE TEXT BOX
50 y/o female with hx of CVA, anxiety, depression, etoh abuse presenting to the ED with suicidal ideations and c/o joint pain. Will treat pain with oxycodone. Also order basic labs and urine and consult psychiatry.
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
## 2020-02-04 NOTE — ED BEHAVIORAL HEALTH ASSESSMENT NOTE - CURRENTLY ENROLLED STUDENT
HPI Comments: Kyleigh Mason  Is a 75-year-old gentleman with a past medical history significant for hypertension, hiccups, hyponatremia, psychogenic polydipsia, lower extremity edema and spinal stenosis who presents with a complaint that he has not been taking care of at home. I actually know Mr. Michele Pikeells quite well from multiple prior ER visits and his recurrent and chronic hiccups. He states that there is nothing new going on that it is the same problem and that he has chronic hiccups. He denies any other acute complaint. No fever no chills no nausea no vomiting no pain. No other aggravating or alleviating factors. No other associated symptoms. This document was created with voice recognition technology and unrecognized errors may be present. Patient is a 56 y.o. male presenting with other event. Other          Past Medical History:   Diagnosis Date    Hiccups     Hypertension     Hyponatremia     Lower extremity edema     Psychogenic polydipsia     Spinal stenosis        Past Surgical History:   Procedure Laterality Date    HX HEENT      pt reports past hx of surgery on ears unsure of what type         Family History:   Problem Relation Age of Onset    Hypertension Other        Social History     Social History    Marital status:      Spouse name: N/A    Number of children: N/A    Years of education: N/A     Occupational History    Not on file. Social History Main Topics    Smoking status: Former Smoker     Packs/day: 0.50    Smokeless tobacco: Not on file    Alcohol use No    Drug use: No    Sexual activity: Not Currently     Other Topics Concern    Not on file     Social History Narrative         ALLERGIES: Review of patient's allergies indicates no known allergies. Review of Systems   All other systems reviewed and are negative.       Vitals:    05/26/17 1855   BP: (!) 157/95   Pulse: (!) 101   Resp: 18   Temp: 98.3 °F (36.8 °C)   SpO2: 100%            Physical Exam   Constitutional: He is oriented to person, place, and time. He appears well-developed. HENT:   Head: Normocephalic and atraumatic. Eyes: EOM are normal. Pupils are equal, round, and reactive to light. Neck: Normal range of motion. Neck supple. Cardiovascular: Normal rate, regular rhythm and normal heart sounds. Exam reveals no friction rub. No murmur heard. Pulmonary/Chest: Effort normal and breath sounds normal. No respiratory distress. He has no wheezes. Abdominal: Soft. He exhibits no distension. There is no tenderness. There is no rebound and no guarding. Musculoskeletal: Normal range of motion. Neurological: He is alert and oriented to person, place, and time. Skin: Skin is warm and dry. Bilateral lower extremity edema without evidence of cellulitis at this point    10 cm anterior ulceration of his left lower extremity likely chronic wound   Psychiatric: He has a normal mood and affect. His behavior is normal. Thought content normal.        MDM  Number of Diagnoses or Management Options  Diagnosis management comments:   49-year-old gentleman presents with no real complaints other than these have been taken care of at home he has the hiccups. He does have psychogenic polydipsia as a home remedy for his Seen his sodium is low as 105 in the past actually resulted in a seizure which resolved without intervention. We will check basic blood work evaluate his legs bilaterally the bandages and  Observe     patient observed without incidence bloodwork unremarkable sodium is normal he does have a chronic anterior leg wound which apparently is caring for.   Will d/c     ED Course       Procedures No

## 2020-02-04 NOTE — ED BEHAVIORAL HEALTH ASSESSMENT NOTE - RISK ASSESSMENT
Chronic: Hx of attempts, hx of hospitalizations, hx opf treatment non-compliance, severe medical issues, isolation at NH, trait impulsivity/low frustration tolerance  Acute: Depressed  Protective: Does not want to kill herself, no intent/plan, very limited access to means in nursing home, duty to family, hopefulness, not psychotic/manic/intoxicated, not male, insightful, ability to use DBT skills in moments of distress, ability to engage well with psychotherapy    Elevated risk by history and current circumstances but acute risk low. Will be mitigated by continued engagement with mental health treatment team at [facility] and possible medication changes to better target mood and pain symptoms. Low Acute Suicide Risk

## 2020-02-04 NOTE — ED BEHAVIORAL HEALTH ASSESSMENT NOTE - DESCRIPTION
Calm, see ED Provider Note    Vital Signs - Last 24 Hrs    T(C): 36.9 (02-04-20 @ 17:41), Max: 37.4 (02-04-20 @ 15:00)  HR: 75 (02-04-20 @ 17:40) (66 - 75)  BP: 125/78 (02-04-20 @ 17:40) (97/56 - 125/78)  RR: 18 (02-04-20 @ 17:40) (16 - 18)  SpO2: 100% (02-04-20 @ 17:40) (99% - 100%)  Wt(kg): --  Daily     Daily     I&O's Summary CVA with L hemiplegia , disabled, mother of 3 adult daughters

## 2020-02-04 NOTE — ED PROVIDER NOTE - ATTENDING CONTRIBUTION TO CARE
I performed a face to face bedside interview with patient regarding history of present illness, review of symptoms and past medical history. I completed an independent physical exam.  I have discussed patient's plan of care.   I agree with note as stated above, having amended the EMR as needed to reflect my findings. I have discussed the assessment and plan of care.  This includes during the time I functioned as the attending physician for this patient.  Attending Contribution to Care: agree with plan of resident. 50 y/o female with hx of recent CVA 1.5 months ago and left sided FNDs, HTN, ETOH abuse, anxiety presenting to the ED c/o suicidal ideations. Reports she feels very depressed since her diagnosis of CVA and inability to move her left side. Reports feeling upset about living in a nursing facility and needing to wear diapers. Reports chronic joint pain since the cva and states she feels like her pain has been worsening. States she would probably overdose on pills as a plan for suicide. No hallucinations. No homicidal ideations.
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
## 2020-02-04 NOTE — ED BEHAVIORAL HEALTH ASSESSMENT NOTE - ACTIVATING EVENTS/STRESSORS
Acute medical problem/Inadequate social supports/Triggering events leading to humiliation, shame, and/or despair (e.g. Loss of relationship, financial or health status) (real or anticipated)
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
## 2020-02-04 NOTE — ED PROVIDER NOTE - OBJECTIVE STATEMENT
48 y/o female with hx of recent CVA 1.5 months ago and left sided FNDs, HTN, ETOH abuse, anxiety presenting to the ED c/o suicidal ideations. Reports she feels very depressed since her diagnosis of CVA and inability to move her left side. Reports feeling upset about living in a nursing facility and needing to wear diapers. Reports chronic joint pain since the cva and states she feels like her pain has been worsening. States she would probably overdose on pills as a plan for suicide. No hallucinations. No homicidal ideations.

## 2020-02-04 NOTE — ED PROVIDER NOTE - PHYSICAL EXAMINATION
GENERAL: Awake, alert, NAD  HEENT: NC/AT, moist mucous membranes, PERRL  LUNGS: CTAB, no wheezes or crackles   CARDIAC: RRR, no m/r/g  ABDOMEN: Soft, normal BS, non tender, non distended, no rebound, no guarding  BACK: No midline spinal tenderness, no CVA tenderness  EXT: No edema, no calf tenderness, 2+ DP pulses bilaterally, mild tenderness over bilateral shoulders, elbows, knees, ankles  NEURO: A&Ox3. limited movement of left arm and leg s/p CVA  SKIN: Warm and dry. No rash.  PSYCH: Depressed affect.

## 2020-02-04 NOTE — ED ADULT NURSE NOTE - OBJECTIVE STATEMENT
Patient received to room 18, A&Ox4, bedbound with left side paralysis s/p CVA, right inner thigh dressing, clean surgical site stapled under dressing, clean stiches to right calf, right lower extremity 4th digit amputation with clean stiches, no redness or swelling observed to surgical sites, bilateral + pedal pulses and + capillary refill, nicotine patch to left shoulder, left side chest external look recorder observed. Patient received cooperated but actively crying. Patient received from nursing home with suicidal ideations. Patient reports depression after CVA and states "no longer wants to live". Patient currently denies plan. Hx CVA.

## 2020-02-04 NOTE — ED PROVIDER NOTE - PATIENT PORTAL LINK FT
You can access the FollowMyHealth Patient Portal offered by [facility] by registering at the following website: [email or web address]. By joining [employer]’s FollowMyHealth portal, you will also be able to view your health information using other applications (apps) compatible with our system.

## 2020-02-04 NOTE — ED BEHAVIORAL HEALTH ASSESSMENT NOTE - CURRENT MEDICATION
traZODone 150 mg oral tablet: 1 tab(s) orally once a day (at bedtime)  FLUoxetine 20 mg oral capsule: 1 cap(s) orally once a day  gabapentin 100 mg oral capsule: 1 cap(s) orally every 8 hours  oxyCODONE 5 mg oral tablet: 1 tab(s) orally every 4 hours, As needed, Moderate Pain (4 - 6)

## 2020-02-05 LAB — SPECIMEN SOURCE: SIGNIFICANT CHANGE UP

## 2020-02-06 ENCOUNTER — APPOINTMENT (OUTPATIENT)
Dept: VASCULAR SURGERY | Facility: CLINIC | Age: 50
End: 2020-02-06
Payer: MEDICARE

## 2020-02-06 LAB — BACTERIA UR CULT: SIGNIFICANT CHANGE UP

## 2020-02-06 PROCEDURE — 99024 POSTOP FOLLOW-UP VISIT: CPT

## 2020-02-06 NOTE — PHYSICAL EXAM
[2+] : right 2+ [Ankle Swelling (On Exam)] : present [Ankle Swelling On The Right] : mild [Alert] : alert [Calm] : calm [JVD] : no jugular venous distention  [Varicose Veins Of Lower Extremities] : not present [] : not present [de-identified] : s/p 4th digit amputation  [de-identified] : appears well  [de-identified] : incision clean and dry, healed nicely

## 2020-02-06 NOTE — REASON FOR VISIT
[de-identified] : right lower extremity fem-pt bypass with reversed gsv [de-identified] : pt without any complaints currently in rehab

## 2020-02-06 NOTE — DISCUSSION/SUMMARY
[FreeTextEntry1] : 48 yo female with history of cva with left sided paralysis, s/p right pop-pt bypass with gsv graft and 4th digit amputation \par pt presents for follow up doing well with palpable pt pulse\par staples and sutures removed \par pt to follow up in 1 months with bypass duplex

## 2020-02-07 NOTE — ED POST DISCHARGE NOTE - DETAILS
Contacted nursing home and informed RN results of ucx. UCx was repeated already at nursing home and will follow up with pt

## 2020-02-13 ENCOUNTER — NON-APPOINTMENT (OUTPATIENT)
Age: 50
End: 2020-02-13

## 2020-02-13 ENCOUNTER — APPOINTMENT (OUTPATIENT)
Dept: CARDIOLOGY | Facility: CLINIC | Age: 50
End: 2020-02-13
Payer: MEDICARE

## 2020-02-13 VITALS
HEART RATE: 77 BPM | DIASTOLIC BLOOD PRESSURE: 60 MMHG | BODY MASS INDEX: 29.53 KG/M2 | WEIGHT: 173 LBS | HEIGHT: 64 IN | OXYGEN SATURATION: 99 % | SYSTOLIC BLOOD PRESSURE: 102 MMHG

## 2020-02-13 DIAGNOSIS — I10 ESSENTIAL (PRIMARY) HYPERTENSION: ICD-10-CM

## 2020-02-13 PROCEDURE — 93000 ELECTROCARDIOGRAM COMPLETE: CPT

## 2020-02-13 PROCEDURE — 99214 OFFICE O/P EST MOD 30 MIN: CPT

## 2020-02-13 PROCEDURE — 93005 ELECTROCARDIOGRAM TRACING: CPT

## 2020-02-13 PROCEDURE — 93010 ELECTROCARDIOGRAM REPORT: CPT

## 2020-02-13 NOTE — ASSESSMENT
[FreeTextEntry1] : Assessment:\par 1.  CVA\par 2.  RLE thrombotic event\par 3.  S/P Fem PT bypass\par 4.  Atrial tachycardia\par 5.  Left sided weakness\par \par \par Plan:\par 1.  I reviewed her medication reconsiliation at rehab, for unclear reasons she is NOT on xarelto\par 2.  Recommend resume xarelto 20mg daily \par 3.  Continue metoprolol and lisinopril at current dose\par 4.  Await results of Zio patch\par 5.  Followup with Dr. Santos and podiatry\par 6.  Retrun in 3 months.

## 2020-02-13 NOTE — REASON FOR VISIT
[FreeTextEntry1] : Kiara is 49 history of recent stroke, with subsequent RLE distal embolization s/p fem pt bypass with Dr. Santos.  PAT on tele. Started on heparin gtt pre and post bypass for concern from a thrombotic event.  Was dishcharged on Xarelto 20mg daily.  Here for followup.  She is at rehab.  I reviewed her medex, for unclear reasons she is NOT on xarelto.  She is on aspirin.  Overall she feels ok.  No palpitations, chest pain or shortness of breath.  She wore a Zio patch x 2 weeks upon d/c and this was mailed 3 days ago.  I do not have the results yet (likely next week)\par \par Continues to do PT daily.  Still has weakness of the left upper and lower extremity.  Working on getting from bed to chair at rehab.  Needs assisance.  \par \par Here with her family member

## 2020-02-14 ENCOUNTER — APPOINTMENT (OUTPATIENT)
Dept: NEUROLOGY | Facility: CLINIC | Age: 50
End: 2020-02-14
Payer: MEDICARE

## 2020-02-14 VITALS
HEIGHT: 64 IN | HEART RATE: 81 BPM | DIASTOLIC BLOOD PRESSURE: 72 MMHG | WEIGHT: 173 LBS | BODY MASS INDEX: 29.53 KG/M2 | SYSTOLIC BLOOD PRESSURE: 106 MMHG

## 2020-02-14 DIAGNOSIS — I69.30 UNSPECIFIED SEQUELAE OF CEREBRAL INFARCTION: ICD-10-CM

## 2020-02-14 PROCEDURE — 99215 OFFICE O/P EST HI 40 MIN: CPT

## 2020-02-14 NOTE — REVIEW OF SYSTEMS
[Feeling Poorly] : feeling poorly [Depression] : depression [As Noted in HPI] : as noted in HPI [Heartburn] : heartburn [Negative] : Integumentary [FreeTextEntry9] : history of spinal stenosis.She complains of left buttock pain.

## 2020-02-14 NOTE — PHYSICAL EXAM
[FreeTextEntry1] : Seated in a wheelchair.  Alert and oriented. No aphasia. The visual fields are full to confrontation. Pupils equal and constrict to light. Extraocular movements intact. He has a left facial droop associated with a left hemiplegia. Has sensory loss on the left hand with double simultaneous stimulation and has graphesthesia loss on the left hand No bruits in neck or murmurs heard.

## 2020-02-14 NOTE — ASSESSMENT
[FreeTextEntry1] : Continue physical therapy. Vigorously manage her stroke risk factors.\par \par Reevaluate in 3 months.

## 2020-02-14 NOTE — HISTORY OF PRESENT ILLNESS
[FreeTextEntry1] : 49-year-old woman sustained a right middle cerebral artery territory stroke 2 months ago and was hospitalized at [facility]. She was subsequently admitted to [facility] with gangrene of the right foot and underwent an amputation of the right fourth toe. She was seen by  vascular neurologist, Dr. Malachi Logan and his consultation note reviewed in the hospital electronic health record. Note is made of an asymptomatic left carotid occlusion.\par \par She has a history of hypertension, morbid obesity and underwent gastric bypass and subsequently developed a gastric ulcer 3 years ago. She continued to smoke cigarettes (half pack per day) and stopped at the time of her cerebral infarction 2 months ago. She was found to have paroxysmal atrial tachycardia and cardiologist yesterday advised that she start anticoagulant Xarelto.\par \par She currently is receiving physical therapy at the [facility] where she resides.

## 2020-02-15 LAB
CULTURE RESULTS: SIGNIFICANT CHANGE UP
SPECIMEN SOURCE: SIGNIFICANT CHANGE UP

## 2020-02-28 ENCOUNTER — TRANSCRIPTION ENCOUNTER (OUTPATIENT)
Age: 50
End: 2020-02-28

## 2020-03-05 ENCOUNTER — APPOINTMENT (OUTPATIENT)
Dept: VASCULAR SURGERY | Facility: CLINIC | Age: 50
End: 2020-03-05
Payer: MEDICARE

## 2020-03-05 PROCEDURE — 93926 LOWER EXTREMITY STUDY: CPT

## 2020-03-05 PROCEDURE — 99024 POSTOP FOLLOW-UP VISIT: CPT

## 2020-03-05 PROCEDURE — 93923 UPR/LXTR ART STDY 3+ LVLS: CPT | Mod: TC

## 2020-03-05 PROCEDURE — 93926 LOWER EXTREMITY STUDY: CPT | Mod: TC

## 2020-03-06 NOTE — REASON FOR VISIT
[de-identified] : right lower extremity pop to pt bypass  [de-identified] : 1/13/20 [de-identified] : 50 yo female with history of cva with left sided hemiplegia and right arterial occlussion with gangreneous digit s/p amputation and bypass of the right pop to pt artery \par pt without any complaints at this time

## 2020-03-06 NOTE — DISCUSSION/SUMMARY
[FreeTextEntry1] : 50 yo female with history of cva with left sided hemiplegia and right arterial occlusion with gangrenosus digit s/p amputation and bypass of the right pop to pt artery \par \par duplex shows thrombosed graft \par pvr completed \par no plan for intervention \par pt to follow up as needed

## 2020-03-06 NOTE — PHYSICAL EXAM
[Alert] : alert [Calm] : calm [JVD] : no jugular venous distention  [de-identified] : appears well  [de-identified] : right foot digit amputation site is healed, no open wounds, bypass incision site healed Statement Selected

## 2020-03-17 NOTE — DISCHARGE NOTE PROVIDER - NSCORESITESY/N_GEN_A_CORE_RD
"Rakan Rader III is a 50 y.o. male here for a non-provider visit for:   TDAP    Reason for immunization: Overdue/Provider Recommended  Immunization records indicate need for vaccine: Yes, confirmed with Epic  Minimum interval has been met for this vaccine: Yes  ABN completed: Not Indicated    Order and dose verified by: SULLY  VIS Dated  02/24/2015 was given to patient: Yes  All IAC Questionnaire questions were answered \"No.\"    Patient tolerated injection and no adverse effects were observed or reported: Yes    Pt scheduled for next dose in series: Not Indicated    " Yes

## 2020-05-29 ENCOUNTER — APPOINTMENT (OUTPATIENT)
Dept: NEUROLOGY | Facility: CLINIC | Age: 50
End: 2020-05-29

## 2020-07-23 NOTE — ED BEHAVIORAL HEALTH ASSESSMENT NOTE - ABNORMAL MOVEMENTS
Left message on voicemail for patient to return call to clinic.    RN spoke with Dr Fowler. If patient is having symptoms, she needs to go to the ER. If no symptoms, increase fluid intake and monitor for symptoms.  
Patient is concerned as her blood pressure is 96/51 with a pulse of 96.   
Rigidity

## 2020-12-30 NOTE — PATIENT PROFILE ADULT - NSPROEDAREADYLEARN_GEN_A_NUR
Patient has been spotting for last 4 days. Not due to start her period until next week she's concerned and wondering if she should be seen.  Best contact number for patient is [phone] none

## 2021-03-18 NOTE — SWALLOW BEDSIDE ASSESSMENT ADULT - H & P REVIEW
Patient: Kamila M Schwabe Date: 3/18/2021   : 1956 Attending: Wilberto Oscar MD   64 year old female      [facility]    PROCEDURE ASSESSMENT   (LOCAL ANESTHESIA - Not for use with Conscious Sedation)    Preop Diagnosis / Indications for Procedure:  DDD (degenerative disc disease), lumbar  (primary encounter diagnosis)  Degeneration of lumbar or lumbosacral intervertebral disc  Lumbar radiculopathy  Lumbosacral neuritis    Planned Procedure: Transforaminal Epidural Injection Lumbar/Sacral      Planned Anesthetic:  local      MEDICAL HISTORY / COMORBID CONDITIONS:  Medical / surgical history    Past Medical History:   Diagnosis Date   • Anemia    • Arthritis     Knees & hips   • Bipolar affective disorder (CMS/HCC)    • Cerebral infarction (CMS/[facility])     tias   • Chronic pain     hip   • CKD (chronic kidney disease) stage 3, GFR 30-59 ml/min (CMS/HCC)    • Depression    • GERD (gastroesophageal reflux disease)    • Hypertension    • Insomnia    • Neuropathy     Entire left leg, right thigh   • Occasional tremors    • Pneumonia 2013    Hospitalized   • Sleep apnea     No longer uses C PAP   • Thyroid condition    • TIA (transient ischemic attack) 2014    Affected legs   • Wears dentures        Normal mental status:   yes    EXAMINATION PERTINENT TO PROCEDURE BEING PERFORMED  Evaluation of operative site     BACK:  Gait is normal.  The patient can walk on heels and toes.    The thoracolumbar spine has a normal alignment.  The pelvis is level.    The patient is moderately tender to palpation.    The SI joints are nontender.   The sciatica notches are nontender.   The patient has 90 degrees of forward flexion. Side bending and trunk rotation are normal.      Neurologic exam:  Deep tendon reflexes are two plus and symmetrical at the patella tendon and the Achilles tendon.  Sensation to light touch is normal in a dermatomal distribution.  Motor strength is 5/5 in a dermatomal distribution.      Straight leg  raising is negative bilateral , in both a sitting position and supine.    NECK: The cervical spine has a full range of motion. There are no radicular signs with extension and compression of the cervical spine.     OTHER FINDINGS  Reviewed current medications and allergies yes      PERTINENT LAB / DIAGNOSTIC TESTS  PROTIME (sec)   Date Value   11/14/2013 11.9 (H)      INR (no units)   Date Value   11/14/2013 1.1       INFORMED CONSENT  Consent obtained: yes    Risks / benefits, complications, and alternatives explained, questions answered and patient / personal representative agrees to procedure listed above and anesthetic listed above.   yes

## 2022-06-27 NOTE — SWALLOW BEDSIDE ASSESSMENT ADULT - COMMENTS
Presented on 12/17/19 to [facility] for left sided weakness, right gaze preference, and facial droop. 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 She developed basal ganglia hemorrhagic conversion, but was stable. discharged to acute rehab. Pt continued to have right foot pain, especially at her right 4th toe which was ashy/dusky in color. Was evaluated by vascular surgery at [place] and was found to have severe PAD. Vascular surgery team at [place] recommended transfer to [place] for angiogram and possible stenting or other vascular procedures. Seen by [facility] Vascular surgery however no intervention at this time; signed off. Limbs not threatened currently. Due to recent hemorrhagic stroke, therapeutic AC and angiogram are contraindicated at this time. solids deferred to assessment via MBS Ketoconazole Counseling:   Patient counseled regarding improving absorption with orange juice.  Adverse effects include but are not limited to breast enlargement, headache, diarrhea, nausea, upset stomach, liver function test abnormalities, taste disturbance, and stomach pain.  There is a rare possibility of liver failure that can occur when taking ketoconazole. The patient understands that monitoring of LFTs may be required, especially at baseline. The patient verbalized understanding of the proper use and possible adverse effects of ketoconazole.  All of the patient's questions and concerns were addressed.
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
## 2022-07-26 NOTE — PRE-ANESTHESIA EVALUATION ADULT - NSANTHPEFT_GEN_ALL_CORE
Rx sent General: well appearing, appears stated age  Cardiovascular: RRR, no murmurs appreciated  Respiratory: CTA B/L  Neuro: left sided hemiplegia

## 2023-11-28 NOTE — SWALLOW VFSS/MBS ASSESSMENT ADULT - MODE OF PRESENTATION
fed by clinician/spoon No spoon/self fed/fed by clinician/cup spoon/fed by clinician fed by clinician straw/spoon/self fed/fed by clinician

## 2024-03-19 NOTE — ED BEHAVIORAL HEALTH ASSESSMENT NOTE - MEDICATIONS (PRESCRIPTIONS, DIRECTIONS)
Refill Request     CONFIRM preferred pharmacy with the patient.    If Mail Order Rx - Pend for 90 day refill.      Last Seen: Last Seen Department: 1/30/2024  Last Seen by PCP: 10/18/2023    Last Written: 10/13/2023    If no future appointment scheduled:  Review the last OV with PCP and review information for follow-up visit,  Route STAFF MESSAGE with patient name to the  Pool for scheduling with the following information:            -  Timing of next visit           -  Visit type ie Physical, OV, etc           -  Diagnoses/Reason ie. COPD, HTN - Do not use MEDICATION, Follow-up or CHECK UP - Give reason for visit      Next Appointment:   Future Appointments   Date Time Provider Department Center   4/24/2024  9:30 AM Miguel Snow, DO [facility]       Message sent to  to schedule appt with patient?  NO      Requested Prescriptions     Pending Prescriptions Disp Refills    metFORMIN (GLUCOPHAGE) 1000 MG tablet [Pharmacy Med Name: METFORMIN HCL 1,000 MG TABLET] 180 tablet 1     Sig: TAKE 1 TABLET BY MOUTH TWICE A DAY        Continue current meds but consider starting duloxetine instead of fluoxetine, chart indicates previous response to SNRI

## 2024-10-09 NOTE — PATIENT PROFILE ADULT - NSPRESCRUSEDDRG_GEN_A_NUR
Anesthesia Post-op Note    Patient: Alexandra Briones  Procedure(s) Performed: COLONOSCOPY (Anus)  Anesthesia type: MAC    Vitals Value Taken Time   Temp 97.7f 10/09/24 1334   Pulse 87 10/09/24 1333   Resp 18 10/09/24 1334   SpO2 100 % 10/09/24 1333   /67 10/09/24 1333   Vitals shown include unfiled device data.      Patient Location: Phase II  Post-op Vital Signs:stable  Level of Consciousness: awake, alert, participates in exam, oriented, follows commands and return to baseline  Respiratory Status: spontaneous ventilation, unassisted and room air  Cardiovascular stable and blood pressure returned to baseline  Hydration: hypovolemic  Pain Management: adequately controlled  Handoff: Handoff to receiving nurse was performed and questions were answered  Vomiting: none  Nausea: None  Airway Patency:patent  Post-op Assessment: awake, alert, appropriately conversant, or baseline, patient tolerated procedure well, no evidence of recall, dentition, mouth, tongue, and oropharynx unchanged , moving all extremities and No Corneal Abrasion  Comments: Pt to phase 2, report to rn, vss, pt denies pain and nausea at this time. I have evaluated the pt and determined they are ready for discharge        There were no known notable events for this encounter.                       No

## 2025-05-07 NOTE — PROGRESS NOTE ADULT - PROBLEM/PLAN-2
Discharge Diagnosis  Arthritis of left knee  Obesity with BMI 40.17    Issues Requiring Follow-Up  None     Test Results Pending At Discharge  Pending Labs       Order Current Status    Surgical Pathology Exam In process            Hospital Course   Patient is a 74 year old female with severe osteoarthritis of left knee.  On 5/6/25, patient underwent uncomplicated left total knee replacement by Dr Channing Beckman.  Spinal anesthesia was utilized and estimated blood loss intraoperatively was 100cc.  Patient received one dose of iV antibiotics preoperatively and 2 additional doses were administered post operatively.  Medical physician was placed on consult to help assist with post op medical management.  For DVT prophylaxis, patient was ordered eliquis 2.5mg twice daily and bilateral SCDs.  Physical and occupational therapy were initiated with full weight bearing status.    Patient progressed well with therapy.  Her hemoglobin level was stable at 11.5 and she remained afebrile.  On POD #1, patient was medically stable and safe for discharge home with in home therapy follow up.  At time of discharge, patient was ambulating 100 feet with use of wheeled walker and stand by assistance.  She was also able to demonstrate safe stair climbing with contact guard assist and her left knee range of motion was 0 to 85 degrees.  Due to cost of eliquis (as well as xarelto),  patient was given home going script of aspirin 325mg twice daily for 2 weeks,  in addition, bilateral abad hose compression stockings were provided along with instructions to wear at home for 4 weeks.  For pain management, oxycodone script was given.  Anticipate need for extended use of narcotic medications as well as higher MED requirements based on usual post operative incisional pain following total joint replacement surgery.  Patient will follow up in office with Dr Beckman in 2 weeks.      Pertinent Physical Exam At Time of Discharge  Physical Exam    Home 
Medications     Medication List      START taking these medications     acetaminophen 500 mg tablet; Commonly known as: Tylenol; Take 2 tablets   (1,000 mg) by mouth 3 times a day for 14 days.   aspirin 325 mg EC tablet; Take 1 tablet (325 mg) by mouth 2 times a day   for 14 days.   docusate sodium 100 mg capsule; Commonly known as: Colace; Take 1   capsule (100 mg) by mouth 2 times a day for 14 days.   oxyCODONE 5 mg immediate release tablet; Commonly known as: Roxicodone;   Take 1-2 tablets (5-10 mg) by mouth every 6 hours if needed for severe   pain (7 - 10) for up to 7 days.   polyethylene glycol 17 gram packet; Commonly known as: Glycolax,   Miralax; Mix 1 packet (17 g) in 4 to 8 ounces of a liquid and drink by   mouth once daily as needed (for constipation).     CONTINUE taking these medications     atenolol 50 mg tablet; Commonly known as: Tenormin   atorvastatin 20 mg tablet; Commonly known as: Lipitor   chlorthalidone 25 mg tablet; Commonly known as: Hygroton   cholecalciferol 50 mcg (2,000 units) capsule; Commonly known as: Vitamin   D-3   famotidine 20 mg tablet; Commonly known as: Pepcid     STOP taking these medications     ascorbic acid 100 mg tablet; Commonly known as: Vitamin C   celecoxib 200 mg capsule; Commonly known as: CeleBREX   chlorhexidine 0.12 % solution; Commonly known as: Peridex   chlorhexidine 4 % external liquid; Commonly known as: Hibiclens   Zinc (with A and C) Lozenges lozenge; Generic drug: vit A-vit   C-zinc-propolis       Outpatient Follow-Up  No future appointments.    ZULY Cortes-CNS  
DISPLAY PLAN FREE TEXT

## 2025-05-20 NOTE — ED ADULT TRIAGE NOTE - ARRIVAL FROM
Home
c/o mid abd pain with vomiting, body pains, and subjective fevers x 10 hours. UTD on vaccines. no medications given.